# Patient Record
Sex: FEMALE | Race: WHITE | NOT HISPANIC OR LATINO | Employment: OTHER | URBAN - METROPOLITAN AREA
[De-identification: names, ages, dates, MRNs, and addresses within clinical notes are randomized per-mention and may not be internally consistent; named-entity substitution may affect disease eponyms.]

---

## 2021-07-14 ENCOUNTER — HOSPITAL ENCOUNTER (INPATIENT)
Facility: HOSPITAL | Age: 86
LOS: 4 days | Discharge: HOME | End: 2021-07-19
Attending: EMERGENCY MEDICINE | Admitting: STUDENT IN AN ORGANIZED HEALTH CARE EDUCATION/TRAINING PROGRAM

## 2021-07-14 DIAGNOSIS — K52.9 COLITIS: Primary | ICD-10-CM

## 2021-07-14 DIAGNOSIS — N28.9 RENAL INSUFFICIENCY: ICD-10-CM

## 2021-07-14 DIAGNOSIS — R44.3 HALLUCINATIONS: ICD-10-CM

## 2021-07-14 DIAGNOSIS — R41.82 ALTERED MENTAL STATUS, UNSPECIFIED ALTERED MENTAL STATUS TYPE: ICD-10-CM

## 2021-07-14 LAB
ALBUMIN SERPL-MCNC: 3.5 G/DL (ref 3.5–5.2)
ALBUMIN/GLOB SERPL: 1.1 G/DL
ALP SERPL-CCNC: 81 U/L (ref 39–117)
ALT SERPL W P-5'-P-CCNC: 14 U/L (ref 1–33)
ANION GAP SERPL CALCULATED.3IONS-SCNC: 10.9 MMOL/L (ref 5–15)
AST SERPL-CCNC: 18 U/L (ref 1–32)
BACTERIA UR QL AUTO: NORMAL /HPF
BASOPHILS # BLD AUTO: 0.04 10*3/MM3 (ref 0–0.2)
BASOPHILS NFR BLD AUTO: 0.3 % (ref 0–1.5)
BILIRUB SERPL-MCNC: 0.2 MG/DL (ref 0–1.2)
BILIRUB UR QL STRIP: NEGATIVE
BUN SERPL-MCNC: 29 MG/DL (ref 8–23)
BUN/CREAT SERPL: 14.4 (ref 7–25)
CALCIUM SPEC-SCNC: 8.6 MG/DL (ref 8.6–10.5)
CHLORIDE SERPL-SCNC: 96 MMOL/L (ref 98–107)
CLARITY UR: CLEAR
CO2 SERPL-SCNC: 25.1 MMOL/L (ref 22–29)
COLOR UR: YELLOW
CREAT SERPL-MCNC: 2.02 MG/DL (ref 0.57–1)
DEPRECATED RDW RBC AUTO: 40.1 FL (ref 37–54)
EOSINOPHIL # BLD AUTO: 0.08 10*3/MM3 (ref 0–0.4)
EOSINOPHIL NFR BLD AUTO: 0.6 % (ref 0.3–6.2)
ERYTHROCYTE [DISTWIDTH] IN BLOOD BY AUTOMATED COUNT: 12.6 % (ref 12.3–15.4)
ETHANOL BLD-MCNC: <10 MG/DL (ref 0–10)
ETHANOL UR QL: <0.01 %
GFR SERPL CREATININE-BSD FRML MDRD: 24 ML/MIN/1.73
GLOBULIN UR ELPH-MCNC: 3.3 GM/DL
GLUCOSE SERPL-MCNC: 106 MG/DL (ref 65–99)
GLUCOSE UR STRIP-MCNC: NEGATIVE MG/DL
HCT VFR BLD AUTO: 27.1 % (ref 34–46.6)
HGB BLD-MCNC: 8.7 G/DL (ref 12–15.9)
HGB UR QL STRIP.AUTO: NEGATIVE
HOLD SPECIMEN: NORMAL
HYALINE CASTS UR QL AUTO: NORMAL /LPF
IMM GRANULOCYTES # BLD AUTO: 0.11 10*3/MM3 (ref 0–0.05)
IMM GRANULOCYTES NFR BLD AUTO: 0.9 % (ref 0–0.5)
KETONES UR QL STRIP: NEGATIVE
LEUKOCYTE ESTERASE UR QL STRIP.AUTO: NEGATIVE
LIPASE SERPL-CCNC: 108 U/L (ref 13–60)
LYMPHOCYTES # BLD AUTO: 1.31 10*3/MM3 (ref 0.7–3.1)
LYMPHOCYTES NFR BLD AUTO: 10.2 % (ref 19.6–45.3)
MCH RBC QN AUTO: 28.3 PG (ref 26.6–33)
MCHC RBC AUTO-ENTMCNC: 32.1 G/DL (ref 31.5–35.7)
MCV RBC AUTO: 88.3 FL (ref 79–97)
MONOCYTES # BLD AUTO: 0.87 10*3/MM3 (ref 0.1–0.9)
MONOCYTES NFR BLD AUTO: 6.8 % (ref 5–12)
NEUTROPHILS NFR BLD AUTO: 10.43 10*3/MM3 (ref 1.7–7)
NEUTROPHILS NFR BLD AUTO: 81.2 % (ref 42.7–76)
NITRITE UR QL STRIP: NEGATIVE
NRBC BLD AUTO-RTO: 0 /100 WBC (ref 0–0.2)
PH UR STRIP.AUTO: 5.5 [PH] (ref 5–8)
PLATELET # BLD AUTO: 252 10*3/MM3 (ref 140–450)
PMV BLD AUTO: 9.3 FL (ref 6–12)
POTASSIUM SERPL-SCNC: 3.4 MMOL/L (ref 3.5–5.2)
PROT SERPL-MCNC: 6.8 G/DL (ref 6–8.5)
PROT UR QL STRIP: ABNORMAL
RBC # BLD AUTO: 3.07 10*6/MM3 (ref 3.77–5.28)
RBC # UR: NORMAL /HPF
REF LAB TEST METHOD: NORMAL
SODIUM SERPL-SCNC: 132 MMOL/L (ref 136–145)
SP GR UR STRIP: 1.01 (ref 1–1.03)
SQUAMOUS #/AREA URNS HPF: NORMAL /HPF
TROPONIN T SERPL-MCNC: <0.01 NG/ML (ref 0–0.03)
UROBILINOGEN UR QL STRIP: ABNORMAL
WBC # BLD AUTO: 12.84 10*3/MM3 (ref 3.4–10.8)
WBC UR QL AUTO: NORMAL /HPF
WHOLE BLOOD HOLD SPECIMEN: NORMAL

## 2021-07-14 PROCEDURE — P9612 CATHETERIZE FOR URINE SPEC: HCPCS

## 2021-07-14 PROCEDURE — 84484 ASSAY OF TROPONIN QUANT: CPT | Performed by: PHYSICIAN ASSISTANT

## 2021-07-14 PROCEDURE — 80053 COMPREHEN METABOLIC PANEL: CPT | Performed by: PHYSICIAN ASSISTANT

## 2021-07-14 PROCEDURE — 99285 EMERGENCY DEPT VISIT HI MDM: CPT

## 2021-07-14 PROCEDURE — 93010 ELECTROCARDIOGRAM REPORT: CPT | Performed by: INTERNAL MEDICINE

## 2021-07-14 PROCEDURE — 83690 ASSAY OF LIPASE: CPT | Performed by: PHYSICIAN ASSISTANT

## 2021-07-14 PROCEDURE — 82077 ASSAY SPEC XCP UR&BREATH IA: CPT | Performed by: PHYSICIAN ASSISTANT

## 2021-07-14 PROCEDURE — 81001 URINALYSIS AUTO W/SCOPE: CPT | Performed by: PHYSICIAN ASSISTANT

## 2021-07-14 PROCEDURE — 93005 ELECTROCARDIOGRAM TRACING: CPT | Performed by: PHYSICIAN ASSISTANT

## 2021-07-14 PROCEDURE — U0004 COV-19 TEST NON-CDC HGH THRU: HCPCS | Performed by: PHYSICIAN ASSISTANT

## 2021-07-14 PROCEDURE — 85025 COMPLETE CBC W/AUTO DIFF WBC: CPT | Performed by: PHYSICIAN ASSISTANT

## 2021-07-14 PROCEDURE — U0005 INFEC AGEN DETEC AMPLI PROBE: HCPCS | Performed by: PHYSICIAN ASSISTANT

## 2021-07-14 RX ORDER — SODIUM CHLORIDE 0.9 % (FLUSH) 0.9 %
10 SYRINGE (ML) INJECTION AS NEEDED
Status: DISCONTINUED | OUTPATIENT
Start: 2021-07-14 | End: 2021-07-19 | Stop reason: HOSPADM

## 2021-07-15 ENCOUNTER — APPOINTMENT (OUTPATIENT)
Dept: CT IMAGING | Facility: HOSPITAL | Age: 86
End: 2021-07-15

## 2021-07-15 PROBLEM — N17.9 AKI (ACUTE KIDNEY INJURY): Status: ACTIVE | Noted: 2021-07-15

## 2021-07-15 PROBLEM — K52.9 COLITIS: Status: ACTIVE | Noted: 2021-07-15

## 2021-07-15 PROBLEM — G93.41 ACUTE METABOLIC ENCEPHALOPATHY: Status: ACTIVE | Noted: 2021-07-15

## 2021-07-15 PROBLEM — E87.6 HYPOKALEMIA: Status: ACTIVE | Noted: 2021-07-15

## 2021-07-15 PROBLEM — D72.829 LEUKOCYTOSIS: Status: ACTIVE | Noted: 2021-07-15

## 2021-07-15 PROBLEM — D64.9 ANEMIA: Status: ACTIVE | Noted: 2021-07-15

## 2021-07-15 PROBLEM — I10 HTN (HYPERTENSION): Status: ACTIVE | Noted: 2021-07-15

## 2021-07-15 PROBLEM — N18.4 CKD (CHRONIC KIDNEY DISEASE) STAGE 4, GFR 15-29 ML/MIN: Status: ACTIVE | Noted: 2021-07-15

## 2021-07-15 PROBLEM — E87.1 HYPONATREMIA: Status: ACTIVE | Noted: 2021-07-15

## 2021-07-15 PROBLEM — F03.90 DEMENTIA WITHOUT BEHAVIORAL DISTURBANCE: Status: ACTIVE | Noted: 2021-07-15

## 2021-07-15 LAB
ADV 40+41 DNA STL QL NAA+NON-PROBE: NOT DETECTED
ANION GAP SERPL CALCULATED.3IONS-SCNC: 12.2 MMOL/L (ref 5–15)
ASTRO TYP 1-8 RNA STL QL NAA+NON-PROBE: NOT DETECTED
BUN SERPL-MCNC: 29 MG/DL (ref 8–23)
BUN/CREAT SERPL: 15.9 (ref 7–25)
C CAYETANENSIS DNA STL QL NAA+NON-PROBE: NOT DETECTED
C COLI+JEJ+UPSA DNA STL QL NAA+NON-PROBE: NOT DETECTED
C DIFF TOX GENS STL QL NAA+PROBE: POSITIVE
CALCIUM SPEC-SCNC: 8.4 MG/DL (ref 8.6–10.5)
CHLORIDE SERPL-SCNC: 100 MMOL/L (ref 98–107)
CO2 SERPL-SCNC: 22.8 MMOL/L (ref 22–29)
CREAT SERPL-MCNC: 1.82 MG/DL (ref 0.57–1)
CRYPTOSP DNA STL QL NAA+NON-PROBE: NOT DETECTED
DEPRECATED RDW RBC AUTO: 40.9 FL (ref 37–54)
E HISTOLYT DNA STL QL NAA+NON-PROBE: NOT DETECTED
EAEC PAA PLAS AGGR+AATA ST NAA+NON-PRB: NOT DETECTED
EC STX1+STX2 GENES STL QL NAA+NON-PROBE: NOT DETECTED
EPEC EAE GENE STL QL NAA+NON-PROBE: NOT DETECTED
ERYTHROCYTE [DISTWIDTH] IN BLOOD BY AUTOMATED COUNT: 12.7 % (ref 12.3–15.4)
ETEC LTA+ST1A+ST1B TOX ST NAA+NON-PROBE: NOT DETECTED
G LAMBLIA DNA STL QL NAA+NON-PROBE: NOT DETECTED
GFR SERPL CREATININE-BSD FRML MDRD: 27 ML/MIN/1.73
GLUCOSE SERPL-MCNC: 98 MG/DL (ref 65–99)
HCT VFR BLD AUTO: 24.1 % (ref 34–46.6)
HEMOCCULT STL QL: NEGATIVE
HGB BLD-MCNC: 8 G/DL (ref 12–15.9)
MAGNESIUM SERPL-MCNC: 2.1 MG/DL (ref 1.6–2.4)
MCH RBC QN AUTO: 29.3 PG (ref 26.6–33)
MCHC RBC AUTO-ENTMCNC: 33.2 G/DL (ref 31.5–35.7)
MCV RBC AUTO: 88.3 FL (ref 79–97)
NOROVIRUS GI+II RNA STL QL NAA+NON-PROBE: NOT DETECTED
P SHIGELLOIDES DNA STL QL NAA+NON-PROBE: NOT DETECTED
PLATELET # BLD AUTO: 255 10*3/MM3 (ref 140–450)
PMV BLD AUTO: 9.5 FL (ref 6–12)
POTASSIUM SERPL-SCNC: 3 MMOL/L (ref 3.5–5.2)
POTASSIUM SERPL-SCNC: 3.5 MMOL/L (ref 3.5–5.2)
QT INTERVAL: 406 MS
RBC # BLD AUTO: 2.73 10*6/MM3 (ref 3.77–5.28)
RVA RNA STL QL NAA+NON-PROBE: NOT DETECTED
S ENT+BONG DNA STL QL NAA+NON-PROBE: NOT DETECTED
SAPO I+II+IV+V RNA STL QL NAA+NON-PROBE: NOT DETECTED
SARS-COV-2 ORF1AB RESP QL NAA+PROBE: NOT DETECTED
SHIGELLA SP+EIEC IPAH ST NAA+NON-PROBE: NOT DETECTED
SODIUM SERPL-SCNC: 135 MMOL/L (ref 136–145)
TSH SERPL DL<=0.05 MIU/L-ACNC: 1.53 UIU/ML (ref 0.27–4.2)
V CHOL+PARA+VUL DNA STL QL NAA+NON-PROBE: NOT DETECTED
V CHOLERAE DNA STL QL NAA+NON-PROBE: NOT DETECTED
WBC # BLD AUTO: 10.33 10*3/MM3 (ref 3.4–10.8)
Y ENTEROCOL DNA STL QL NAA+NON-PROBE: NOT DETECTED

## 2021-07-15 PROCEDURE — 84132 ASSAY OF SERUM POTASSIUM: CPT | Performed by: NURSE PRACTITIONER

## 2021-07-15 PROCEDURE — 25010000002 ONDANSETRON PER 1 MG: Performed by: NURSE PRACTITIONER

## 2021-07-15 PROCEDURE — 82272 OCCULT BLD FECES 1-3 TESTS: CPT | Performed by: NURSE PRACTITIONER

## 2021-07-15 PROCEDURE — 70450 CT HEAD/BRAIN W/O DYE: CPT

## 2021-07-15 PROCEDURE — 80048 BASIC METABOLIC PNL TOTAL CA: CPT | Performed by: NURSE PRACTITIONER

## 2021-07-15 PROCEDURE — 99222 1ST HOSP IP/OBS MODERATE 55: CPT | Performed by: INTERNAL MEDICINE

## 2021-07-15 PROCEDURE — 25010000002 PIPERACILLIN SOD-TAZOBACTAM PER 1 G: Performed by: PHYSICIAN ASSISTANT

## 2021-07-15 PROCEDURE — 87493 C DIFF AMPLIFIED PROBE: CPT | Performed by: PHYSICIAN ASSISTANT

## 2021-07-15 PROCEDURE — 85027 COMPLETE CBC AUTOMATED: CPT | Performed by: NURSE PRACTITIONER

## 2021-07-15 PROCEDURE — 84443 ASSAY THYROID STIM HORMONE: CPT | Performed by: NURSE PRACTITIONER

## 2021-07-15 PROCEDURE — 36415 COLL VENOUS BLD VENIPUNCTURE: CPT | Performed by: NURSE PRACTITIONER

## 2021-07-15 PROCEDURE — 83735 ASSAY OF MAGNESIUM: CPT | Performed by: NURSE PRACTITIONER

## 2021-07-15 PROCEDURE — 25010000003 POTASSIUM CHLORIDE PER 2 MEQ: Performed by: NURSE PRACTITIONER

## 2021-07-15 PROCEDURE — 74176 CT ABD & PELVIS W/O CONTRAST: CPT

## 2021-07-15 PROCEDURE — 0097U HC BIOFIRE FILMARRAY GI PANEL: CPT | Performed by: PHYSICIAN ASSISTANT

## 2021-07-15 PROCEDURE — 25010000002 PIPERACILLIN SOD-TAZOBACTAM PER 1 G: Performed by: NURSE PRACTITIONER

## 2021-07-15 RX ORDER — PRAVASTATIN SODIUM 20 MG
TABLET ORAL NIGHTLY
COMMUNITY
Start: 2021-06-24 | End: 2022-01-01

## 2021-07-15 RX ORDER — ACETAMINOPHEN 325 MG/1
650 TABLET ORAL EVERY 4 HOURS PRN
Status: DISCONTINUED | OUTPATIENT
Start: 2021-07-15 | End: 2021-07-19 | Stop reason: HOSPADM

## 2021-07-15 RX ORDER — AMLODIPINE BESYLATE 5 MG/1
5 TABLET ORAL 2 TIMES DAILY
Status: DISCONTINUED | OUTPATIENT
Start: 2021-07-15 | End: 2021-07-19 | Stop reason: HOSPADM

## 2021-07-15 RX ORDER — ONDANSETRON 2 MG/ML
4 INJECTION INTRAMUSCULAR; INTRAVENOUS EVERY 8 HOURS PRN
Status: DISCONTINUED | OUTPATIENT
Start: 2021-07-15 | End: 2021-07-19 | Stop reason: HOSPADM

## 2021-07-15 RX ORDER — LABETALOL HYDROCHLORIDE 5 MG/ML
10 INJECTION, SOLUTION INTRAVENOUS EVERY 6 HOURS PRN
Status: DISCONTINUED | OUTPATIENT
Start: 2021-07-15 | End: 2021-07-19 | Stop reason: HOSPADM

## 2021-07-15 RX ORDER — ONDANSETRON 4 MG/1
TABLET, FILM COATED ORAL
COMMUNITY
Start: 2021-06-29 | End: 2022-01-17

## 2021-07-15 RX ORDER — HYDRALAZINE HYDROCHLORIDE 20 MG/ML
10 INJECTION INTRAMUSCULAR; INTRAVENOUS EVERY 8 HOURS PRN
Status: DISCONTINUED | OUTPATIENT
Start: 2021-07-15 | End: 2021-07-19 | Stop reason: HOSPADM

## 2021-07-15 RX ORDER — POTASSIUM CHLORIDE 29.8 MG/ML
20 INJECTION INTRAVENOUS ONCE
Status: COMPLETED | OUTPATIENT
Start: 2021-07-15 | End: 2021-07-15

## 2021-07-15 RX ORDER — MORPHINE SULFATE 2 MG/ML
1 INJECTION, SOLUTION INTRAMUSCULAR; INTRAVENOUS EVERY 4 HOURS PRN
Status: DISCONTINUED | OUTPATIENT
Start: 2021-07-15 | End: 2021-07-19 | Stop reason: HOSPADM

## 2021-07-15 RX ORDER — PRAVASTATIN SODIUM 20 MG
20 TABLET ORAL NIGHTLY
Status: DISCONTINUED | OUTPATIENT
Start: 2021-07-15 | End: 2021-07-19 | Stop reason: HOSPADM

## 2021-07-15 RX ORDER — LEVOTHYROXINE SODIUM 0.07 MG/1
75 TABLET ORAL
Status: DISCONTINUED | OUTPATIENT
Start: 2021-07-16 | End: 2021-07-19 | Stop reason: HOSPADM

## 2021-07-15 RX ORDER — MONTELUKAST SODIUM 10 MG/1
10 TABLET ORAL NIGHTLY
Status: DISCONTINUED | OUTPATIENT
Start: 2021-07-15 | End: 2021-07-19 | Stop reason: HOSPADM

## 2021-07-15 RX ORDER — ASPIRIN 81 MG/1
81 TABLET ORAL DAILY
Status: DISCONTINUED | OUTPATIENT
Start: 2021-07-15 | End: 2021-07-19 | Stop reason: HOSPADM

## 2021-07-15 RX ORDER — ASPIRIN 81 MG/1
81 TABLET ORAL DAILY
COMMUNITY
Start: 2021-06-24 | End: 2022-01-01

## 2021-07-15 RX ORDER — NITROGLYCERIN 0.4 MG/1
0.4 TABLET SUBLINGUAL
Status: DISCONTINUED | OUTPATIENT
Start: 2021-07-15 | End: 2021-07-19 | Stop reason: HOSPADM

## 2021-07-15 RX ORDER — AMOXICILLIN AND CLAVULANATE POTASSIUM 875; 125 MG/1; MG/1
1 TABLET, FILM COATED ORAL
COMMUNITY
Start: 2021-07-10 | End: 2021-07-19 | Stop reason: HOSPADM

## 2021-07-15 RX ORDER — LEVOTHYROXINE SODIUM 0.07 MG/1
75 TABLET ORAL DAILY
COMMUNITY
Start: 2021-06-24 | End: 2022-01-01 | Stop reason: HOSPADM

## 2021-07-15 RX ORDER — ALLOPURINOL 100 MG/1
100 TABLET ORAL DAILY
COMMUNITY
Start: 2021-04-12 | End: 2022-01-01

## 2021-07-15 RX ORDER — SODIUM CHLORIDE 9 MG/ML
75 INJECTION, SOLUTION INTRAVENOUS CONTINUOUS
Status: DISCONTINUED | OUTPATIENT
Start: 2021-07-15 | End: 2021-07-16

## 2021-07-15 RX ORDER — AMLODIPINE BESYLATE 5 MG/1
5 TABLET ORAL
Status: DISCONTINUED | OUTPATIENT
Start: 2021-07-15 | End: 2021-07-15

## 2021-07-15 RX ORDER — SODIUM CHLORIDE 0.9 % (FLUSH) 0.9 %
10 SYRINGE (ML) INJECTION EVERY 12 HOURS SCHEDULED
Status: DISCONTINUED | OUTPATIENT
Start: 2021-07-15 | End: 2021-07-19 | Stop reason: HOSPADM

## 2021-07-15 RX ORDER — VANCOMYCIN HYDROCHLORIDE 125 MG/1
125 CAPSULE ORAL EVERY 6 HOURS SCHEDULED
Status: DISCONTINUED | OUTPATIENT
Start: 2021-07-15 | End: 2021-07-19 | Stop reason: HOSPADM

## 2021-07-15 RX ORDER — CLOPIDOGREL BISULFATE 75 MG/1
75 TABLET ORAL DAILY
Status: DISCONTINUED | OUTPATIENT
Start: 2021-07-15 | End: 2021-07-19 | Stop reason: HOSPADM

## 2021-07-15 RX ORDER — ACETAMINOPHEN 650 MG/1
650 SUPPOSITORY RECTAL EVERY 4 HOURS PRN
Status: DISCONTINUED | OUTPATIENT
Start: 2021-07-15 | End: 2021-07-19 | Stop reason: HOSPADM

## 2021-07-15 RX ORDER — MONTELUKAST SODIUM 10 MG/1
TABLET ORAL NIGHTLY
COMMUNITY
Start: 2021-06-29 | End: 2022-01-01 | Stop reason: HOSPADM

## 2021-07-15 RX ORDER — SODIUM CHLORIDE 0.9 % (FLUSH) 0.9 %
10 SYRINGE (ML) INJECTION AS NEEDED
Status: DISCONTINUED | OUTPATIENT
Start: 2021-07-15 | End: 2021-07-19 | Stop reason: HOSPADM

## 2021-07-15 RX ORDER — AMLODIPINE BESYLATE 5 MG/1
5 TABLET ORAL DAILY
COMMUNITY
Start: 2021-06-24 | End: 2022-01-01 | Stop reason: HOSPADM

## 2021-07-15 RX ORDER — LOSARTAN POTASSIUM 100 MG/1
100 TABLET ORAL DAILY
COMMUNITY
Start: 2021-06-18 | End: 2022-01-01 | Stop reason: HOSPADM

## 2021-07-15 RX ORDER — DICYCLOMINE HYDROCHLORIDE 10 MG/5ML
20 SOLUTION ORAL 4 TIMES DAILY
COMMUNITY
Start: 2021-07-10 | End: 2022-01-01

## 2021-07-15 RX ORDER — HYDROCODONE BITARTRATE AND ACETAMINOPHEN 5; 325 MG/1; MG/1
1 TABLET ORAL
COMMUNITY
Start: 2021-06-09 | End: 2022-01-01

## 2021-07-15 RX ORDER — PSEUDOEPHEDRINE HCL 30 MG
100 TABLET ORAL
COMMUNITY
Start: 2021-06-24 | End: 2021-07-24

## 2021-07-15 RX ORDER — HYDROCODONE BITARTRATE AND ACETAMINOPHEN 5; 325 MG/1; MG/1
1 TABLET ORAL EVERY 8 HOURS PRN
Status: DISCONTINUED | OUTPATIENT
Start: 2021-07-15 | End: 2021-07-19 | Stop reason: HOSPADM

## 2021-07-15 RX ORDER — ACETAMINOPHEN 160 MG/5ML
650 SOLUTION ORAL EVERY 4 HOURS PRN
Status: DISCONTINUED | OUTPATIENT
Start: 2021-07-15 | End: 2021-07-19 | Stop reason: HOSPADM

## 2021-07-15 RX ORDER — CALCIUM POLYCARBOPHIL 625 MG
625 TABLET ORAL DAILY
COMMUNITY
End: 2022-01-01

## 2021-07-15 RX ORDER — CALCIUM CARBONATE 200(500)MG
2 TABLET,CHEWABLE ORAL 2 TIMES DAILY PRN
Status: DISCONTINUED | OUTPATIENT
Start: 2021-07-15 | End: 2021-07-19 | Stop reason: HOSPADM

## 2021-07-15 RX ORDER — DICYCLOMINE HYDROCHLORIDE 10 MG/1
10 CAPSULE ORAL 4 TIMES DAILY
Status: DISCONTINUED | OUTPATIENT
Start: 2021-07-15 | End: 2021-07-19 | Stop reason: HOSPADM

## 2021-07-15 RX ADMIN — AMLODIPINE BESYLATE 5 MG: 5 TABLET ORAL at 15:05

## 2021-07-15 RX ADMIN — MONTELUKAST SODIUM 10 MG: 10 TABLET, FILM COATED ORAL at 21:11

## 2021-07-15 RX ADMIN — POTASSIUM CHLORIDE 20 MEQ: 29.8 INJECTION, SOLUTION INTRAVENOUS at 10:57

## 2021-07-15 RX ADMIN — PRAVASTATIN SODIUM 20 MG: 20 TABLET ORAL at 21:11

## 2021-07-15 RX ADMIN — DICYCLOMINE HYDROCHLORIDE 10 MG: 10 CAPSULE ORAL at 17:07

## 2021-07-15 RX ADMIN — ASPIRIN 81 MG: 81 TABLET, COATED ORAL at 15:05

## 2021-07-15 RX ADMIN — DICYCLOMINE HYDROCHLORIDE 10 MG: 10 CAPSULE ORAL at 21:11

## 2021-07-15 RX ADMIN — LABETALOL HYDROCHLORIDE 10 MG: 5 INJECTION, SOLUTION INTRAVENOUS at 17:35

## 2021-07-15 RX ADMIN — CLOPIDOGREL 75 MG: 75 TABLET, FILM COATED ORAL at 15:05

## 2021-07-15 RX ADMIN — ACETAMINOPHEN 650 MG: 325 TABLET, FILM COATED ORAL at 08:18

## 2021-07-15 RX ADMIN — SODIUM CHLORIDE 100 ML/HR: 9 INJECTION, SOLUTION INTRAVENOUS at 07:20

## 2021-07-15 RX ADMIN — SERTRALINE HYDROCHLORIDE 50 MG: 50 TABLET, FILM COATED ORAL at 15:05

## 2021-07-15 RX ADMIN — SODIUM CHLORIDE, PRESERVATIVE FREE 10 ML: 5 INJECTION INTRAVENOUS at 08:16

## 2021-07-15 RX ADMIN — HYDROCODONE BITARTRATE AND ACETAMINOPHEN 1 TABLET: 5; 325 TABLET ORAL at 15:05

## 2021-07-15 RX ADMIN — TAZOBACTAM SODIUM AND PIPERACILLIN SODIUM 3.38 G: 375; 3 INJECTION, SOLUTION INTRAVENOUS at 14:05

## 2021-07-15 RX ADMIN — SODIUM CHLORIDE 500 ML: 9 INJECTION, SOLUTION INTRAVENOUS at 04:23

## 2021-07-15 RX ADMIN — VANCOMYCIN HYDROCHLORIDE 125 MG: 125 CAPSULE ORAL at 21:11

## 2021-07-15 RX ADMIN — AMLODIPINE BESYLATE 5 MG: 5 TABLET ORAL at 21:11

## 2021-07-15 RX ADMIN — TAZOBACTAM SODIUM AND PIPERACILLIN SODIUM 3.38 G: 375; 3 INJECTION, SOLUTION INTRAVENOUS at 04:23

## 2021-07-15 RX ADMIN — ONDANSETRON 4 MG: 2 INJECTION INTRAMUSCULAR; INTRAVENOUS at 10:57

## 2021-07-15 NOTE — ED PROVIDER NOTES
EMERGENCY DEPARTMENT ENCOUNTER    Room Number:  E564/1  Date of encounter:  7/16/2021  PCP: Naomi Rivera APRN  Historian: Patient daughter and patient      HPI:  Chief Complaint: Hallucinations and altered mental status over the past 48 hours  A complete HPI/ROS/PMH/PSH/SH/FH are unobtainable due to: Nothing    Context: Kellen Parmar is a 85 y.o. female who was brought from home by her daughter to the emergency department for further evaluation of altered mental status and hallucinations.  Per the daughter the hallucinations have been intermittent over the past 24 to 48 hours.  Today they seem to progressively get worse.  Daughter found the patient talking to people that did not exist in her home.  She became concerned and brought the patient to the emergency department for further evaluation.  Patient states she has some mild left lower quadrant abdominal pain and the daughter informs me she is actively being treated for diverticulitis.  She is on Augmentin twice daily.  The patient denies chest pain, palpitations, head trauma.  She does however endorse having 2 falls over the past 48 hours.  Reviewing her chart she does not appear to be on anticoagulants.  She does have a past medical history of dementia.    PAST MEDICAL HISTORY  Active Ambulatory Problems     Diagnosis Date Noted   • No Active Ambulatory Problems     Resolved Ambulatory Problems     Diagnosis Date Noted   • No Resolved Ambulatory Problems     Past Medical History:   Diagnosis Date   • Anemia    • Asthma    • Cancer (CMS/HCC)    • Dementia (CMS/HCC)    • Diabetes mellitus (CMS/HCC)    • Disease of thyroid gland    • GERD (gastroesophageal reflux disease)    • Hypertension          PAST SURGICAL HISTORY  History reviewed. No pertinent surgical history.      FAMILY HISTORY  History reviewed. No pertinent family history.      SOCIAL HISTORY  Social History     Socioeconomic History   • Marital status: Single     Spouse name: Not on file    • Number of children: Not on file   • Years of education: Not on file   • Highest education level: Not on file   Tobacco Use   • Smoking status: Former Smoker     Packs/day: 0.50     Years: 15.00     Pack years: 7.50     Quit date:      Years since quittin.5   • Smokeless tobacco: Former User   Substance and Sexual Activity   • Alcohol use: Not Currently   • Drug use: Never   • Sexual activity: Defer         ALLERGIES  Contrast dye        REVIEW OF SYSTEMS  Review of Systems   Constitutional:        Poor appetite per the daughter   Respiratory: Negative for cough and shortness of breath.    Cardiovascular: Negative for chest pain and palpitations.   Gastrointestinal: Positive for abdominal pain. Negative for diarrhea, nausea and vomiting.   Genitourinary: Negative for flank pain.   Musculoskeletal: Negative for back pain and neck pain.   Neurological: Negative for headaches.        All systems reviewed and negative except for those discussed in HPI.       PHYSICAL EXAM    I have reviewed the triage vital signs and nursing notes.    ED Triage Vitals   Temp Heart Rate Resp BP SpO2   212 21 1902 21 19021 190   98 °F (36.7 °C) 72 17 (!) 181/78 96 %      Temp src Heart Rate Source Patient Position BP Location FiO2 (%)   -- -- 21 --     Sitting Right arm        Physical Exam  GENERAL: Chronically ill-appearing, nontoxic, does not appear to be in any acute distress  HENT: nares patent, face symmetric  EYES: no scleral icterus, PERRL  CV: regular rhythm, regular rate, no obvious murmur, no unilateral leg swelling  RESPIRATORY: normal effort, lungs CTA B  ABDOMEN: TTP in the left lower quadrant, no guarding, no rebound, no obvious mass, bowel sounds unremarkable  MUSCULOSKELETAL: no deformity  NEURO: alert to person and place disoriented to date time, moves all extremities, follows commands, no focal neuro deficits noted on exam  SKIN: warm,  dry, no obvious cellulitis or skin rash noted        LAB RESULTS  Recent Results (from the past 24 hour(s))   Magnesium    Collection Time: 07/16/21  5:57 AM    Specimen: Blood   Result Value Ref Range    Magnesium 2.0 1.6 - 2.4 mg/dL   Basic Metabolic Panel    Collection Time: 07/16/21  5:57 AM    Specimen: Blood   Result Value Ref Range    Glucose 102 (H) 65 - 99 mg/dL    BUN 20 8 - 23 mg/dL    Creatinine 1.46 (H) 0.57 - 1.00 mg/dL    Sodium 139 136 - 145 mmol/L    Potassium 3.6 3.5 - 5.2 mmol/L    Chloride 104 98 - 107 mmol/L    CO2 21.8 (L) 22.0 - 29.0 mmol/L    Calcium 8.3 (L) 8.6 - 10.5 mg/dL    eGFR Non African Amer 34 (L) >60 mL/min/1.73    BUN/Creatinine Ratio 13.7 7.0 - 25.0    Anion Gap 13.2 5.0 - 15.0 mmol/L   Phosphorus    Collection Time: 07/16/21  5:57 AM    Specimen: Blood   Result Value Ref Range    Phosphorus 2.8 2.5 - 4.5 mg/dL   Albumin    Collection Time: 07/16/21  5:57 AM    Specimen: Blood   Result Value Ref Range    Albumin 2.80 (L) 3.50 - 5.20 g/dL   CBC Auto Differential    Collection Time: 07/16/21  5:57 AM    Specimen: Blood   Result Value Ref Range    WBC 16.25 (H) 3.40 - 10.80 10*3/mm3    RBC 3.02 (L) 3.77 - 5.28 10*6/mm3    Hemoglobin 8.6 (L) 12.0 - 15.9 g/dL    Hematocrit 27.8 (L) 34.0 - 46.6 %    MCV 92.1 79.0 - 97.0 fL    MCH 28.5 26.6 - 33.0 pg    MCHC 30.9 (L) 31.5 - 35.7 g/dL    RDW 12.7 12.3 - 15.4 %    RDW-SD 43.3 37.0 - 54.0 fl    MPV 9.2 6.0 - 12.0 fL    Platelets 273 140 - 450 10*3/mm3   Manual Differential    Collection Time: 07/16/21  5:57 AM    Specimen: Blood   Result Value Ref Range    Neutrophil % 91.9 (H) 42.7 - 76.0 %    Lymphocyte % 6.1 (L) 19.6 - 45.3 %    Monocyte % 2.0 (L) 5.0 - 12.0 %    Neutrophils Absolute 14.93 (H) 1.70 - 7.00 10*3/mm3    Lymphocytes Absolute 0.99 0.70 - 3.10 10*3/mm3    Monocytes Absolute 0.33 0.10 - 0.90 10*3/mm3    RBC Morphology Normal Normal    WBC Morphology Normal Normal    Platelet Morphology Normal Normal       Ordered the above labs  and independently reviewed the results.        RADIOLOGY  No Radiology Exams Resulted Within Past 24 Hours    I ordered the above noted radiological studies. Reviewed by me and discussed with radiologist.  See dictation for official radiology interpretation.      PROCEDURES    Procedures      MEDICATIONS GIVEN IN ER    Medications   sodium chloride 0.9 % bolus 500 mL (500 mL Intravenous New Bag 7/15/21 0423)   piperacillin-tazobactam (ZOSYN) 3.375 g in iso-osmotic dextrose 50 ml (premix) (3.375 g Intravenous New Bag 7/15/21 0423)         PROGRESS, DATA ANALYSIS, CONSULTS, AND MEDICAL DECISION MAKING    All labs have been independently reviewed by me.  All radiology studies have been reviewed by me and discussed with radiologist dictating the report.   EKG's independently viewed and interpreted by me.  Discussion below represents my analysis of pertinent findings related to patient's condition, differential diagnosis, treatment plan and final disposition.    DDx includes but is not limited to:   hypertensive encephalopathy, metabolic encephalopathy, hepatic encephalopathy, polypharmacy, encephalopathy secondary to infection, other toxic encephalopathy, CNS insult/intercranial pathology, ACS.  Given history, physical exam findings.  I will order a CBC, CMP, lipase, UA, CT of the abdomen pelvis with out contrast to further evaluate for diverticulitis and complications.  We will also order a CT scan of the head without contrast given the patient's altered mental status to ensure no underlying intracranial pathology sustained secondary with her falls.  We will also order an EKG, troponin to rule out ACS.  Please see below for MDM and further course of care.    ED Course as of Jul 16 2007 Wed Jul 14, 2021   2308 EKG          EKG time: 2306  Rhythm/Rate: Sinus rhythm rate 74  P waves and FL: Normal  QRS, axis: Narrow regular  ST and T waves: Normal    Interpreted Contemporaneously by me, independently viewed    [TJ]    Thu Jul 15, 2021   0220 Call placed to the hospitalist to discuss admission.    [RC]   0245 CT scan of the abdomen pelvis showing a colitis.  This may just be residual findings from her previous diverticulitis.  However, we will go ahead and add a C. difficile and GI panel to further evaluate given her previous antibiotic therapy.  I will also go ahead and order IV Zosyn and 500 cc of normal saline for her acute on chronic renal insufficiency.    [RC]   0312 Discussed patient's case with ANDREA Adamson with [facility].  To admit to Dr. Velázquez's care.    [RC]      ED Course User Index  [RC] Jose Batista III, PA  [TJ] Nayan Moss MD       Patient was placed in face mask in first look. Patient was wearing facemask when I entered the room and throughout our encounter. I wore full protective equipment throughout this patient encounter including a face mask, and gloves. Hand hygiene was performed before donning protective equipment and after removal when leaving the room.    AS OF 20:07 EDT VITALS:    BP - 153/64  HR - 80  TEMP - 98.1 °F (36.7 °C) (Oral)  O2 SATS - 97%        DIAGNOSIS  Final diagnoses:   Colitis   Renal insufficiency   Altered mental status, unspecified altered mental status type   Hallucinations         DISPOSITION  ADMISSION    Discussed treatment plan and reason for admission with pt/family and admitting physician.  Pt/family voiced understanding of the plan for admission for further testing/treatment as needed.              Jose Batista III, PA  07/15/21 0622       Jose Batista III, PA  07/16/21 2006       Jose Batista III, PA  07/16/21 2127

## 2021-07-15 NOTE — ED NOTES
Nursing report ED to floor  Kellen Parmar  85 y.o.  female    HPI (triage note):   Chief Complaint   Patient presents with   • Psychiatric Evaluation   • Hallucinations       Admitting doctor:   Greyson Velázquez MD    Admitting diagnosis:   The primary encounter diagnosis was Colitis. Diagnoses of Renal insufficiency, Altered mental status, unspecified altered mental status type, and Hallucinations were also pertinent to this visit.    Code status:   Current Code Status     Date Active Code Status Order ID Comments User Context       7/15/2021 0309 CPR [id number]  Tere Leonard APRN ED     Advance Care Planning Activity      Questions for Current Code Status     Question Answer Comment    Code Status CPR     Medical Interventions (Level of Support Prior to Arrest) Full           Allergies:   Contrast dye    Weight:   There were no vitals filed for this visit.    Most recent vitals:   Vitals:    07/15/21 0048 07/15/21 0050 07/15/21 0227 07/15/21 0244   BP:  142/91 (!) 164/111    BP Location:  Right arm     Patient Position:  Sitting     Pulse: 71  65 64   Resp:       Temp:       SpO2: 96%  96% 97%       Active LDAs/IV Access:   Lines, Drains & Airways    Active LDAs     Name:   Placement date:   Placement time:   Site:   Days:    Peripheral IV 07/14/21 2242 Right Antecubital   07/14/21 2242    Antecubital   less than 1                Labs (abnormal labs have a star):   Labs Reviewed   COMPREHENSIVE METABOLIC PANEL - Abnormal; Notable for the following components:       Result Value    Glucose 106 (*)     BUN 29 (*)     Creatinine 2.02 (*)     Sodium 132 (*)     Potassium 3.4 (*)     Chloride 96 (*)     eGFR Non  Amer 24 (*)     All other components within normal limits    Narrative:     GFR Normal >60  Chronic Kidney Disease <60  Kidney Failure <15     LIPASE - Abnormal; Notable for the following components:    Lipase 108 (*)     All other components within normal limits   URINALYSIS W/ MICROSCOPIC  IF INDICATED (NO CULTURE) - Abnormal; Notable for the following components:    Protein, UA 30 mg/dL (1+) (*)     All other components within normal limits   CBC WITH AUTO DIFFERENTIAL - Abnormal; Notable for the following components:    WBC 12.84 (*)     RBC 3.07 (*)     Hemoglobin 8.7 (*)     Hematocrit 27.1 (*)     Neutrophil % 81.2 (*)     Lymphocyte % 10.2 (*)     Immature Grans % 0.9 (*)     Neutrophils, Absolute 10.43 (*)     Immature Grans, Absolute 0.11 (*)     All other components within normal limits   COVID-19,APTIMA PANTHER,MIMI IN-HOUSE,NP/OP SWAB IN UTM/VTM/SALINE TRANSPORT MEDIA,24 HR TAT - Normal    Narrative:     Fact sheet for providers: https://www.fda.gov/media/150136/download     Fact sheet for patients: https://www.fda.gov/media/421560/download    Test performed by RT PCR.   TROPONIN (IN-HOUSE) - Normal    Narrative:     Troponin T Reference Range:  <= 0.03 ng/mL-   Negative for AMI  >0.03 ng/mL-     Abnormal for myocardial necrosis.  Clinicians would have to utilize clinical acumen, EKG, Troponin and serial changes to determine if it is an Acute Myocardial Infarction or myocardial injury due to an underlying chronic condition.       Results may be falsely decreased if patient taking Biotin.     COVID PRE-OP / PRE-PROCEDURE SCREENING ORDER (NO ISOLATION)    Narrative:     The following orders were created for panel order COVID PRE-OP / PRE-PROCEDURE SCREENING ORDER (NO ISOLATION) - Swab, Nasopharynx.  Procedure                               Abnormality         Status                     ---------                               -----------         ------                     COVID-19,APTIMA PANTHER,...[[id number]]  Normal              Final result                 Please view results for these tests on the individual orders.   GASTROINTESTINAL PANEL, PCR   CLOSTRIDIUM DIFFICILE TOXIN    Narrative:     The following orders were created for panel order Clostridium Difficile Toxin - Stool, Per  Rectum.  Procedure                               Abnormality         Status                     ---------                               -----------         ------                     Clostridium Difficile To...[[id number]]                                                   Please view results for these tests on the individual orders.   CLOSTRIDIUM DIFFICILE TOXIN, PCR   ETHANOL   URINALYSIS, MICROSCOPIC ONLY   RAINBOW DRAW    Narrative:     The following orders were created for panel order [place] Draw.  Procedure                               Abnormality         Status                     ---------                               -----------         ------                     Green Top (Gel)[[id number]]                                  Final result               Lavender Top[[id number]]                                     Final result               Gold Top - SST[[id number]]                                                                Please view results for these tests on the individual orders.   BASIC METABOLIC PANEL   CBC (NO DIFF)   CBC AND DIFFERENTIAL    Narrative:     The following orders were created for panel order CBC & Differential.  Procedure                               Abnormality         Status                     ---------                               -----------         ------                     CBC Auto Differential[[id number]]        Abnormal            Final result                 Please view results for these tests on the individual orders.   GREEN TOP   LAVENDER TOP   GOLD TOP - SST       EKG:   ECG 12 Lead   Preliminary Result   HEART RATE= 74  bpm   RR Interval= 808  ms   WA Interval= 180  ms   P Horizontal Axis=   deg   P Front Axis= -2  deg   QRSD Interval= 97  ms   QT Interval= 406  ms   QRS Axis= 1  deg   T Wave Axis= 62  deg   - NORMAL ECG -   Sinus rhythm   Electronically Signed By:    Date and Time of Study: 2021-07-14 23:06:38          Meds given in ED:   Medications   sodium chloride  0.9 % flush 10 mL (has no administration in time range)   sodium chloride 0.9 % flush 10 mL (has no administration in time range)   sodium chloride 0.9 % flush 10 mL (has no administration in time range)   nitroglycerin (NITROSTAT) SL tablet 0.4 mg (has no administration in time range)   acetaminophen (TYLENOL) tablet 650 mg (has no administration in time range)     Or   acetaminophen (TYLENOL) 160 MG/5ML solution 650 mg (has no administration in time range)     Or   acetaminophen (TYLENOL) suppository 650 mg (has no administration in time range)   calcium carbonate (TUMS) chewable tablet 500 mg (200 mg elemental) (has no administration in time range)   sodium chloride 0.9 % infusion (has no administration in time range)   sodium chloride 0.9 % bolus 500 mL (has no administration in time range)   piperacillin-tazobactam (ZOSYN) 3.375 g in iso-osmotic dextrose 50 ml (premix) (has no administration in time range)   Pharmacy to Dose Zosyn (has no administration in time range)       Imaging results:  CT Abdomen Pelvis Without Contrast    Result Date: 7/15/2021  Colitis involving the sigmoid colon. No pneumatosis or free air is seen. There is no evidence of obstruction.  Radiation dose reduction techniques were utilized, including automated exposure control and exposure modulation based on body size.  This report was finalized on 7/15/2021 1:21 AM by Dr. Vi Govea M.D.      CT Head Without Contrast    Result Date: 7/15/2021  No acute intracranial findings.  Radiation dose reduction techniques were utilized, including automated exposure control and exposure modulation based on body size.  This report was finalized on 7/15/2021 1:08 AM by Dr. Vi Govea M.D.        Ambulatory status:   - with assist    Social issues:   Social History     Socioeconomic History   • Marital status: Single     Spouse name: Not on file   • Number of children: Not on file   • Years of education: Not on file   • Highest education  level: Not on file   Tobacco Use   • Smoking status: Unknown If Ever Smoked   Substance and Sexual Activity   • Alcohol use: Not Currently   • Drug use: Never   • Sexual activity: Defer    Nursing report ED to floor       Roberta Peña RN  07/15/21 3530

## 2021-07-15 NOTE — PROGRESS NOTES
Star Thurston MD/Irene Weinberg DO    Nephrology Miscellaneous Note    Patient Name: Kellen Parmar  :1935  Age: 85 y.o.          Patient seen and examined.     C. Diff Positive     JEANIE on CKD; JEANIE improving     Continue NS for now but decrease to 75cc/hour.     Increase amlodipine to 5mg BID since ARB on hold. Likely can restart ARB if renal function stable.     PRN labetalol.     Full Consult Note to follow.     Case discussed with daughter Chacha.     Please feel free to contact me with any questions or concerns.     Irene Weinberg DO  [facility]  P: [phone]  F: [phone]  Pager: [phone]

## 2021-07-15 NOTE — ED PROVIDER NOTES
MD ATTESTATION NOTE    The JAY and I have discussed this patient's history, physical exam, and treatment plan.  I have reviewed the documentation and personally had a face to face interaction with the patient. I affirm the documentation and agree with the treatment and plan.  The attached note describes my personal findings.      Kellen Parmar is a 85 y.o. female who presents to the ED c/o altered mental status and hallucinations.  Brought in by her daughter.  States that symptoms were intermittent over the past 2 days.  Patient has been interacting with people when do not exist in her home.  Patient complaining of some abdominal pain being treated for diverticulitis presently.  Patient does have history of dementia      On exam:  No acute distress, normocephalic atraumatic, supple nontender, regular rate and rhythm, clear to auscultation bilaterally, soft left lower quadrant tender to palpation no guarding no rebound nondistended, moving all extremities well    Labs  Recent Results (from the past 24 hour(s))   Comprehensive Metabolic Panel    Collection Time: 07/14/21 10:42 PM    Specimen: Blood   Result Value Ref Range    Glucose 106 (H) 65 - 99 mg/dL    BUN 29 (H) 8 - 23 mg/dL    Creatinine 2.02 (H) 0.57 - 1.00 mg/dL    Sodium 132 (L) 136 - 145 mmol/L    Potassium 3.4 (L) 3.5 - 5.2 mmol/L    Chloride 96 (L) 98 - 107 mmol/L    CO2 25.1 22.0 - 29.0 mmol/L    Calcium 8.6 8.6 - 10.5 mg/dL    Total Protein 6.8 6.0 - 8.5 g/dL    Albumin 3.50 3.50 - 5.20 g/dL    ALT (SGPT) 14 1 - 33 U/L    AST (SGOT) 18 1 - 32 U/L    Alkaline Phosphatase 81 39 - 117 U/L    Total Bilirubin 0.2 0.0 - 1.2 mg/dL    eGFR Non African Amer 24 (L) >60 mL/min/1.73    Globulin 3.3 gm/dL    A/G Ratio 1.1 g/dL    BUN/Creatinine Ratio 14.4 7.0 - 25.0    Anion Gap 10.9 5.0 - 15.0 mmol/L   Lipase    Collection Time: 07/14/21 10:42 PM    Specimen: Blood   Result Value Ref Range    Lipase 108 (H) 13 - 60 U/L   Urinalysis With Microscopic If  Indicated (No Culture) - Urine, Catheter    Collection Time: 07/14/21 10:42 PM    Specimen: Urine, Catheter   Result Value Ref Range    Color, UA Yellow Yellow, Straw    Appearance, UA Clear Clear    pH, UA 5.5 5.0 - 8.0    Specific Gravity, UA 1.007 1.005 - 1.030    Glucose, UA Negative Negative    Ketones, UA Negative Negative    Bilirubin, UA Negative Negative    Blood, UA Negative Negative    Protein, UA 30 mg/dL (1+) (A) Negative    Leuk Esterase, UA Negative Negative    Nitrite, UA Negative Negative    Urobilinogen, UA 0.2 E.U./dL 0.2 - 1.0 E.U./dL   Ethanol    Collection Time: 07/14/21 10:42 PM    Specimen: Blood   Result Value Ref Range    Ethanol <10 0 - 10 mg/dL    Ethanol % <0.010 %   Green Top (Gel)    Collection Time: 07/14/21 10:42 PM   Result Value Ref Range    Extra Tube Hold for add-ons.    Lavender Top    Collection Time: 07/14/21 10:42 PM   Result Value Ref Range    Extra Tube hold for add-on    CBC Auto Differential    Collection Time: 07/14/21 10:42 PM    Specimen: Blood   Result Value Ref Range    WBC 12.84 (H) 3.40 - 10.80 10*3/mm3    RBC 3.07 (L) 3.77 - 5.28 10*6/mm3    Hemoglobin 8.7 (L) 12.0 - 15.9 g/dL    Hematocrit 27.1 (L) 34.0 - 46.6 %    MCV 88.3 79.0 - 97.0 fL    MCH 28.3 26.6 - 33.0 pg    MCHC 32.1 31.5 - 35.7 g/dL    RDW 12.6 12.3 - 15.4 %    RDW-SD 40.1 37.0 - 54.0 fl    MPV 9.3 6.0 - 12.0 fL    Platelets 252 140 - 450 10*3/mm3    Neutrophil % 81.2 (H) 42.7 - 76.0 %    Lymphocyte % 10.2 (L) 19.6 - 45.3 %    Monocyte % 6.8 5.0 - 12.0 %    Eosinophil % 0.6 0.3 - 6.2 %    Basophil % 0.3 0.0 - 1.5 %    Immature Grans % 0.9 (H) 0.0 - 0.5 %    Neutrophils, Absolute 10.43 (H) 1.70 - 7.00 10*3/mm3    Lymphocytes, Absolute 1.31 0.70 - 3.10 10*3/mm3    Monocytes, Absolute 0.87 0.10 - 0.90 10*3/mm3    Eosinophils, Absolute 0.08 0.00 - 0.40 10*3/mm3    Basophils, Absolute 0.04 0.00 - 0.20 10*3/mm3    Immature Grans, Absolute 0.11 (H) 0.00 - 0.05 10*3/mm3    nRBC 0.0 0.0 - 0.2 /100 WBC    Troponin    Collection Time: 07/14/21 10:42 PM    Specimen: Blood   Result Value Ref Range    Troponin T <0.010 0.000 - 0.030 ng/mL   Urinalysis, Microscopic Only - Urine, Catheter    Collection Time: 07/14/21 10:42 PM    Specimen: Urine, Catheter   Result Value Ref Range    RBC, UA 0-2 None Seen, 0-2 /HPF    WBC, UA 0-2 None Seen, 0-2 /HPF    Bacteria, UA None Seen None Seen /HPF    Squamous Epithelial Cells, UA 0-2 None Seen, 0-2 /HPF    Hyaline Casts, UA 0-2 None Seen /LPF    Methodology Automated Microscopy    ECG 12 Lead    Collection Time: 07/14/21 11:06 PM   Result Value Ref Range    QT Interval 406 ms       Radiology  CT Abdomen Pelvis Without Contrast    Result Date: 7/15/2021  CT OF THE ABDOMEN AND PELVIS WITHOUT CONTRAST  HISTORY: Left lower quadrant pain  COMPARISON: None available.  TECHNIQUE: Axial CT imaging was obtained through the abdomen and pelvis. No IV contrast was administered.  FINDINGS: Images through the lung bases do not demonstrate any acute abnormalities. No focal hepatic lesions are seen. Spleen, stomach, and duodenum all appear unremarkable. The gallbladder is not visualized, and may be absent. There is some dilatation of the common bile duct, measuring up to 1 cm. However, this may be related to age and prior cholecystectomy. No hydronephrosis is seen on either side. There are hyperdensities identified within both kidneys. Most of them appear to be vascular calcifications, although a punctate nonobstructing stone cannot be completely excluded. There are simple appearing left renal cyst. No additional follow-up is necessary. No distal ureteral or bladder stones are seen. Urinary bladder is decompressed. Uterus appears normal. There is diffuse wall thickening involving the sigmoid colon, suggesting colitis. There is adjacent pericolonic soft tissue stranding. While there is diverticular disease within this area, the length of colon involved is more keeping with colitis. No pneumatosis  or free air is seen. There is no evidence of obstruction. The appendix is not identified. The patient is status post aortic stent grafting. Aneurysm sac measures approximately 2.9 x 4.7 cm. Presence or absence of endoleak cannot be excluded on this unenhanced study. No acute osseous abnormalities are seen.      Colitis involving the sigmoid colon. No pneumatosis or free air is seen. There is no evidence of obstruction.  Radiation dose reduction techniques were utilized, including automated exposure control and exposure modulation based on body size.  This report was finalized on 7/15/2021 1:21 AM by Dr. Vi Govea M.D.      CT Head Without Contrast    Result Date: 7/15/2021  CT HEAD WITHOUT CONTRAST  HISTORY: Altered mental status  COMPARISON: None available.  TECHNIQUE: Axial CT imaging was obtained through the brain. No IV contrast was administered.  FINDINGS: No acute intracranial hemorrhage is seen. There is diffuse atrophy. There is periventricular and deep white matter microangiopathic change. There is mucosal thickening identified within the ethmoid sinuses. There is no midline shift or mass effect. Old lacunar infarct is noted within the left basal ganglia.      No acute intracranial findings.  Radiation dose reduction techniques were utilized, including automated exposure control and exposure modulation based on body size.  This report was finalized on 7/15/2021 1:08 AM by Dr. Vi Govea M.D.        Medical Decision Making:  ED Course as of Jul 15 0258   Wed Jul 14, 2021   2308 EKG          EKG time: 2306  Rhythm/Rate: Sinus rhythm rate 74  P waves and [place]: Normal  QRS, axis: Narrow regular  ST and T waves: Normal    Interpreted Contemporaneously by me, independently viewed    [TJ]      ED Course User Index  [TJ] Nayan Moss MD           PPE: Both the patient and I wore a surgical mask throughout the entire patient encounter. I wore protective goggles.     Diagnosis  Final diagnoses:    Colitis   Renal insufficiency   Altered mental status, unspecified altered mental status type   Hallucinations        Nayan Moss MD  07/15/21 0259

## 2021-07-15 NOTE — CONSULTS
Star Thurston MD/Irene Weinberg DO    Nephrology Consult Note      Patient Name: Kellen Parmar  :1935  Age: 85 y.o.    Patient Care Team:  Naomi Rivera APRN as PCP - General (Family Medicine)    Chief Complaint:   Chief Complaint   Patient presents with   • Psychiatric Evaluation   • Hallucinations     Reason for Consult: JEANIE on CKD  Referral Provider: Greyson Velázquez MD  Encounter Provider: Irene Weinberg DO    Date of Service: 7/15/2021    SUBJECTIVE:  Kellen Parmar is a 85 y.o.female with history of DM type 2,  HTN, hypothyroidism, dementia who presents to [facility] for hallucinations. She was found to be septic and sigmoid colitis was noted on CT. C. Diff is now positive. Diarrhea is now improving.      Regarding her renal history, baseline of Cr is around 1.6. CR was 2.02 on admit. She was on Losartan and furosemide at home. Cr is improving with IVF and currently 1.82. She states she is peeing well.     When she saw Dr. Thurston in December, she was only on furosemide. Since then daughter states her BP has been very high at home, so she was started on amlodipine and Losartan.     Confusion continues here.     Daughter Chacha at bedside.     ROS  Constitutional: No fevers, no chills  Eye: No eye pain; no eye discharge  Respiratory: No shortness of breath, no cough  Cardiovascular: No Chest pain, no palpitations  Gastrointestinal: + abdominal pain; + diarreha    (All systems reviewed in addition to the above. All pertinent positive and negative as mentioned as above)    Current Meds  Scheduled Meds:amLODIPine, 5 mg, Oral, BID  aspirin, 81 mg, Oral, Daily  clopidogrel, 75 mg, Oral, Daily  dicyclomine, 10 mg, Oral, 4x Daily  [START ON 2021] levothyroxine, 75 mcg, Oral, Q AM  montelukast, 10 mg, Oral, Nightly  piperacillin-tazobactam, 3.375 g, Intravenous, Q12H  pravastatin, 20 mg, Oral, Nightly  sertraline, 50 mg, Oral, Daily  sodium chloride, 10 mL, Intravenous, Q12H      Continuous  Infusions:Pharmacy to Dose Zosyn,   sodium chloride, 75 mL/hr, Last Rate: 75 mL/hr (07/15/21 1506)      PRN Meds:.•  acetaminophen **OR** acetaminophen **OR** acetaminophen  •  calcium carbonate  •  hydrALAZINE  •  HYDROcodone-acetaminophen  •  labetalol  •  Morphine  •  nitroglycerin  •  ondansetron  •  Pharmacy to Dose Zosyn  •  sodium chloride  •  sodium chloride    Home Medications:   Medications Prior to Admission   Medication Sig Dispense Refill Last Dose   • allopurinol (ZYLOPRIM) 100 MG tablet 100 mg.      • amLODIPine (NORVASC) 5 MG tablet 5 mg.      • amoxicillin-clavulanate (AUGMENTIN) 875-125 MG per tablet Take 1 tablet by mouth.      • aspirin (aspirin) 81 MG EC tablet 81 mg.      • Clopidogrel Bisulfate (PLAVIX PO)       • dicyclomine (BENTYL) 10 MG/5ML syrup Take 20 mg by mouth 4 (Four) Times a Day.      • docusate sodium 100 MG capsule 100 mg.      • HYDROcodone-acetaminophen (NORCO) 5-325 MG per tablet Take 1 tablet by mouth.      • levothyroxine (SYNTHROID, LEVOTHROID) 75 MCG tablet 75 mcg.      • losartan (COZAAR) 50 MG tablet Take 50 mg by mouth Daily.      • montelukast (SINGULAIR) 10 MG tablet TAKE ONE TABLET BY MOUTH ONCE NIGHTLY      • ondansetron (ZOFRAN) 4 MG tablet TAKE ONE TABLET BY MOUTH EVERY 8 HOURS AS NEEDED FOR NAUSEA      • pravastatin (PRAVACHOL) 20 MG tablet       • sertraline (ZOLOFT) 50 MG tablet       • Melatonin 5 MG capsule Take 3 mg by mouth Daily.      • polycarbophil 625 MG tablet tablet Take 625 mg by mouth Daily.          Allergies:  Allergies as of 07/14/2021 - Reviewed 07/14/2021   Allergen Reaction Noted   • Contrast dye Anaphylaxis 07/14/2021       Past Medical History  Past Medical History:   Diagnosis Date   • Anemia    • Asthma    • Cancer (CMS/HCC)    • Dementia (CMS/HCC)    • Diabetes mellitus (CMS/HCC)    • Disease of thyroid gland    • GERD (gastroesophageal reflux disease)    • Hypertension        Past Surgical History  History reviewed. No pertinent surgical  "history.    Family History  History reviewed. No pertinent family history.      OBJECTIVE:   BP (!) 203/73 (BP Location: Left arm, Patient Position: Lying)   Pulse 74   Temp 98 °F (36.7 °C) (Oral)   Resp 18   Ht 162.6 cm (64\")   Wt 57.4 kg (126 lb 9.6 oz)   SpO2 97%   Breastfeeding No   BMI 21.73 kg/m²   Temp:  [98 °F (36.7 °C)-98.2 °F (36.8 °C)] 98 °F (36.7 °C)  Heart Rate:  [] 74  Resp:  [16-18] 18  BP: (142-203)/() 203/73    No intake/output data recorded.  No intake/output data recorded.  No intake or output data in the 24 hours ending 07/15/21 1728    Flowsheet Rows      First Filed Value   Admission Height  162.6 cm (64\") Documented at 07/15/2021 0457   Admission Weight  57.4 kg (126 lb 9.6 oz) Documented at 07/15/2021 0457        Weight change:     Physical Exam:   GENERAL: Nontoxic, no acute distress, alert  HEAD: Normocephalic, atraumatic  EENT: no scleral icterus or conjunctival injection  NECK: supple, no rigidity.   LUNGS: Clear to auscultation bilaterally. No crackles or wheezes; no accessory m. use  CV: Regular rate; irregular rhythm; , no gallops/rubs  GI: soft, tender, normoactive bowel sounds, no pulsatile mass or bruit.  : no suprapubic or flank tenderness.  MUCULOSKELETAL: no spinal or paraspinal tenderness; no joint swelling  EXT: No cyanosis, clubbing; + edema  SKIN: warm and dry, no rash  NEURO: Cranial nerves 2-12 grossly intact, no tremor  PSYCH: Normal mood and affect. Speech normal rate and tone.      RESULTS:  Labs:   Results from last 7 days   Lab Units 07/15/21  0539 07/14/21  2242 07/10/21  0949   WBC 10*3/mm3 10.33 12.84* 10.88   HEMOGLOBIN g/dL 8.0* 8.7* 8.8*   HEMATOCRIT % 24.1* 27.1* 28.2*   PLATELETS 10*3/mm3 255 252 275     Results from last 7 days   Lab Units 07/15/21  0539 07/14/21  2242   SODIUM mmol/L 135* 132*   POTASSIUM mmol/L 3.0* 3.4*   CHLORIDE mmol/L 100 96*   CO2 mmol/L 22.8 25.1   BUN mg/dL 29* 29*   CREATININE mg/dL 1.82* 2.02*   GLUCOSE mg/dL " 98 106*   CALCIUM mg/dL 8.4* 8.6   ALBUMIN g/dL  --  3.50   AST (SGOT) U/L  --  18   ALT (SGPT) U/L  --  14     Results from last 7 days   Lab Units 07/15/21  1137   MAGNESIUM mg/dL 2.1           Invalid input(s): LDLCALC  Results from last 7 days   Lab Units 07/15/21  1137   TSH uIU/mL 1.530         Results for orders placed or performed during the hospital encounter of 07/14/21   COVID-19,[facility]A PANTHER,[facility] IN-HOUSE, NP/OP SWAB IN UTM/VTM/[facility]INE TRANSPORT MEDIA,24 HR TAT - Swab, Nasopharynx    Specimen: Nasopharynx; Swab   Result Value Ref Range    COVID19 Not Detected Not Detected - Ref. Range   Gastrointestinal Panel, PCR - Stool, Per Rectum    Specimen: Per Rectum; Stool   Result Value Ref Range    Campylobacter Not Detected Not Detected    Plesiomonas shigelloides Not Detected Not Detected    Salmonella Not Detected Not Detected    Vibrio Not Detected Not Detected    Vibrio cholerae Not Detected Not Detected    Yersinia enterocolitica Not Detected Not Detected    Enteroaggregative E. coli (EAEC) Not Detected Not Detected    Enteropathogenic E. coli (EPEC) Not Detected Not Detected    Enterotoxigenic E. coli (ETEC) lt/st Not Detected Not Detected    Shiga-like toxin-producing E. coli (STEC) stx1/stx2 Not Detected Not Detected    Shigella/Enteroinvasive E. coli (EIEC) Not Detected Not Detected    Cryptosporidium Not Detected Not Detected    Cyclospora cayetanensis Not Detected Not Detected    Entamoeba histolytica Not Detected Not Detected    Giardia lamblia Not Detected Not Detected    Adenovirus F40/41 Not Detected Not Detected    Astrovirus Not Detected Not Detected    Norovirus GI/GII Not Detected Not Detected    Rotavirus A Not Detected Not Detected    Sapovirus (I, II, IV or V) Not Detected Not Detected   Clostridium Difficile Toxin, PCR - Stool, Per Rectum    Specimen: Per Rectum; Stool   Result Value Ref Range    C. Difficile Toxins by PCR Positive (A) Negative   Comprehensive Metabolic Panel     Specimen: Blood   Result Value Ref Range    Glucose 106 (H) 65 - 99 mg/dL    BUN 29 (H) 8 - 23 mg/dL    Creatinine 2.02 (H) 0.57 - 1.00 mg/dL    Sodium 132 (L) 136 - 145 mmol/L    Potassium 3.4 (L) 3.5 - 5.2 mmol/L    Chloride 96 (L) 98 - 107 mmol/L    CO2 25.1 22.0 - 29.0 mmol/L    Calcium 8.6 8.6 - 10.5 mg/dL    Total Protein 6.8 6.0 - 8.5 g/dL    Albumin 3.50 3.50 - 5.20 g/dL    ALT (SGPT) 14 1 - 33 U/L    AST (SGOT) 18 1 - 32 U/L    Alkaline Phosphatase 81 39 - 117 U/L    Total Bilirubin 0.2 0.0 - 1.2 mg/dL    eGFR Non African Amer 24 (L) >60 mL/min/1.73    Globulin 3.3 gm/dL    A/G Ratio 1.1 g/dL    BUN/Creatinine Ratio 14.4 7.0 - 25.0    Anion Gap 10.9 5.0 - 15.0 mmol/L   Lipase    Specimen: Blood   Result Value Ref Range    Lipase 108 (H) 13 - 60 U/L   Urinalysis With Microscopic If Indicated (No Culture) - Urine, Catheter    Specimen: Urine, Catheter   Result Value Ref Range    Color, UA Yellow Yellow, Straw    Appearance, UA Clear Clear    pH, UA 5.5 5.0 - 8.0    Specific Gravity, UA 1.007 1.005 - 1.030    Glucose, UA Negative Negative    Ketones, UA Negative Negative    Bilirubin, UA Negative Negative    Blood, UA Negative Negative    Protein, UA 30 mg/dL (1+) (A) Negative    Leuk Esterase, UA Negative Negative    Nitrite, UA Negative Negative    Urobilinogen, UA 0.2 E.U./dL 0.2 - 1.0 E.U./dL   Ethanol    Specimen: Blood   Result Value Ref Range    Ethanol <10 0 - 10 mg/dL    Ethanol % <0.010 %   CBC Auto Differential    Specimen: Blood   Result Value Ref Range    WBC 12.84 (H) 3.40 - 10.80 10*3/mm3    RBC 3.07 (L) 3.77 - 5.28 10*6/mm3    Hemoglobin 8.7 (L) 12.0 - 15.9 g/dL    Hematocrit 27.1 (L) 34.0 - 46.6 %    MCV 88.3 79.0 - 97.0 fL    MCH 28.3 26.6 - 33.0 pg    MCHC 32.1 31.5 - 35.7 g/dL    RDW 12.6 12.3 - 15.4 %    RDW-SD 40.1 37.0 - 54.0 fl    MPV 9.3 6.0 - 12.0 fL    Platelets 252 140 - 450 10*3/mm3    Neutrophil % 81.2 (H) 42.7 - 76.0 %    Lymphocyte % 10.2 (L) 19.6 - 45.3 %    Monocyte % 6.8 5.0  - 12.0 %    Eosinophil % 0.6 0.3 - 6.2 %    Basophil % 0.3 0.0 - 1.5 %    Immature Grans % 0.9 (H) 0.0 - 0.5 %    Neutrophils, Absolute 10.43 (H) 1.70 - 7.00 10*3/mm3    Lymphocytes, Absolute 1.31 0.70 - 3.10 10*3/mm3    Monocytes, Absolute 0.87 0.10 - 0.90 10*3/mm3    Eosinophils, Absolute 0.08 0.00 - 0.40 10*3/mm3    Basophils, Absolute 0.04 0.00 - 0.20 10*3/mm3    Immature Grans, Absolute 0.11 (H) 0.00 - 0.05 10*3/mm3    nRBC 0.0 0.0 - 0.2 /100 WBC   Troponin    Specimen: Blood   Result Value Ref Range    Troponin T <0.010 0.000 - 0.030 ng/mL   Urinalysis, Microscopic Only - Urine, Catheter    Specimen: Urine, Catheter   Result Value Ref Range    RBC, UA 0-2 None Seen, 0-2 /HPF    WBC, UA 0-2 None Seen, 0-2 /HPF    Bacteria, UA None Seen None Seen /HPF    Squamous Epithelial Cells, UA 0-2 None Seen, 0-2 /HPF    Hyaline Casts, UA 0-2 None Seen /LPF    Methodology Automated Microscopy    Basic Metabolic Panel    Specimen: Blood   Result Value Ref Range    Glucose 98 65 - 99 mg/dL    BUN 29 (H) 8 - 23 mg/dL    Creatinine 1.82 (H) 0.57 - 1.00 mg/dL    Sodium 135 (L) 136 - 145 mmol/L    Potassium 3.0 (L) 3.5 - 5.2 mmol/L    Chloride 100 98 - 107 mmol/L    CO2 22.8 22.0 - 29.0 mmol/L    Calcium 8.4 (L) 8.6 - 10.5 mg/dL    eGFR Non African Amer 27 (L) >60 mL/min/1.73    BUN/Creatinine Ratio 15.9 7.0 - 25.0    Anion Gap 12.2 5.0 - 15.0 mmol/L   CBC (No Diff)    Specimen: Blood   Result Value Ref Range    WBC 10.33 3.40 - 10.80 10*3/mm3    RBC 2.73 (L) 3.77 - 5.28 10*6/mm3    Hemoglobin 8.0 (L) 12.0 - 15.9 g/dL    Hematocrit 24.1 (L) 34.0 - 46.6 %    MCV 88.3 79.0 - 97.0 fL    MCH 29.3 26.6 - 33.0 pg    MCHC 33.2 31.5 - 35.7 g/dL    RDW 12.7 12.3 - 15.4 %    RDW-SD 40.9 37.0 - 54.0 fl    MPV 9.5 6.0 - 12.0 fL    Platelets 255 140 - 450 10*3/mm3   Magnesium    Specimen: Blood   Result Value Ref Range    Magnesium 2.1 1.6 - 2.4 mg/dL   Occult Blood X 1, Stool - Stool, Per Rectum    Specimen: Per Rectum; Stool   Result Value  Ref Range    Fecal Occult Blood Negative Negative   TSH    Specimen: Blood   Result Value Ref Range    TSH 1.530 0.270 - 4.200 uIU/mL   ECG 12 Lead   Result Value Ref Range    QT Interval 406 ms   Green Top (Gel)   Result Value Ref Range    Extra Tube Hold for add-ons.    Lavender Top   Result Value Ref Range    Extra Tube hold for add-on        Imaging  CT Abdomen Pelvis Without Contrast    Result Date: 7/15/2021  CT OF THE ABDOMEN AND PELVIS WITHOUT CONTRAST  HISTORY: Left lower quadrant pain  COMPARISON: None available.  TECHNIQUE: Axial CT imaging was obtained through the abdomen and pelvis. No IV contrast was administered.  FINDINGS: Images through the lung bases do not demonstrate any acute abnormalities. No focal hepatic lesions are seen. Spleen, stomach, and duodenum all appear unremarkable. The gallbladder is not visualized, and may be absent. There is some dilatation of the common bile duct, measuring up to 1 cm. However, this may be related to age and prior cholecystectomy. No hydronephrosis is seen on either side. There are hyperdensities identified within both kidneys. Most of them appear to be vascular calcifications, although a punctate nonobstructing stone cannot be completely excluded. There are simple appearing left renal cyst. No additional follow-up is necessary. No distal ureteral or bladder stones are seen. Urinary bladder is decompressed. Uterus appears normal. There is diffuse wall thickening involving the sigmoid colon, suggesting colitis. There is adjacent pericolonic soft tissue stranding. While there is diverticular disease within this area, the length of colon involved is more keeping with colitis. No pneumatosis or free air is seen. There is no evidence of obstruction. The appendix is not identified. The patient is status post aortic stent grafting. Aneurysm sac measures approximately 2.9 x 4.7 cm. Presence or absence of endoleak cannot be excluded on this unenhanced study. No acute  osseous abnormalities are seen.      Colitis involving the sigmoid colon. No pneumatosis or free air is seen. There is no evidence of obstruction.  Radiation dose reduction techniques were utilized, including automated exposure control and exposure modulation based on body size.  This report was finalized on 7/15/2021 1:21 AM by Dr. Vi Govea M.D.      CT Head Without Contrast    Result Date: 7/15/2021  CT HEAD WITHOUT CONTRAST  HISTORY: Altered mental status  COMPARISON: None available.  TECHNIQUE: Axial CT imaging was obtained through the brain. No IV contrast was administered.  FINDINGS: No acute intracranial hemorrhage is seen. There is diffuse atrophy. There is periventricular and deep white matter microangiopathic change. There is mucosal thickening identified within the ethmoid sinuses. There is no midline shift or mass effect. Old lacunar infarct is noted within the left basal ganglia.      No acute intracranial findings.  Radiation dose reduction techniques were utilized, including automated exposure control and exposure modulation based on body size.  This report was finalized on 7/15/2021 1:08 AM by Dr. Vi Govea M.D.         All available labs and imaging studies reviewed.        ASSESSMENT/PLAN:    JEANIE on CKD Stage 3  - Baseline Cr around 1.6  - Cr improving  - Agree with IVF but decrease to 75cc/hour since diarrhea improving   - Hold furosemide     HTN  - Decrease IVF as above  - partially exacerbated by pain   - increase amlodipine to 5mg BID since ARB on hold     Anemia  - likely partially due to infection and renal insufficiency  with history of iron def anemia  - ferritin 77.3 5/19/2021--> goal 100  - iron sat 22% 5/19/2021--> at goal   - transfuse as needed    Edema  - furosemide on hold    DM Type 2    Delirium/Dementia  - sitter present     C. Diff Colitis  - on PO Vanco    It has been a pleasure seeing this patient. Please feel free to contact me with any questions or  concerns    Irene Weinberg, DO  [facility]  P: [phone]  F: [phone]  Pager: [phone]

## 2021-07-15 NOTE — PLAN OF CARE
Problem: Adult Inpatient Plan of Care  Goal: Plan of Care Review  Outcome: Ongoing, Progressing  Flowsheets (Taken 7/15/2021 1630)  Progress: no change  Plan of Care Reviewed With: patient   Goal Outcome Evaluation:  Plan of Care Reviewed With: patient        Progress: no change       Pt was very confused first thing this morning, she accidentally pulled out 2 iv's along with climbing out of bed and her chair. At one point pt accidentally locked herself in the bathroom. A sitter was ordered to help with her being impulsive and not calling out. Pt is positive for C-diff. She is having a lot of abdominal pain and cramping. Pain medications and bentyl have been ordered to help with this. She is on IV fluids and antibiotics. Later in the afternoon she was able to know where she was and why. GI was consulted and so was Nephrology. Pt has PRN labetalol for HTN. VSS. Will continue to monitor.

## 2021-07-15 NOTE — CASE MANAGEMENT/SOCIAL WORK
Discharge Planning Assessment  [facility]     Patient Name: Kellen Parmar  MRN: [id number]  Today's Date: 7/15/2021    Admit Date: 7/14/2021    Discharge Needs Assessment     Row Name 07/15/21 0180       Living Environment    Lives With  alone    Current Living Arrangements  home/apartment/condo    Primary Care Provided by  self;child(sneha)    Provides Primary Care For  no one    Family Caregiver if Needed  child(sneha), adult    Quality of Family Relationships  supportive       Resource/Environmental Concerns    Transportation Concerns  car, none       Transition Planning    Patient/Family Anticipates Transition to  home       Discharge Needs Assessment    Equipment Currently Used at Home  cane, straight;walker, rolling        Discharge Plan     Row Name 07/15/21 1170       Plan    Plan  Home with family vs home health vs SNF    Patient/Family in Agreement with Plan  yes    Plan Comments  Met with pt and daughter at bedside. Introduced self, explained CCP role, facesheet verified. Pt lives alone.  Daughter states she checks on pt 3-4 times/dayand cooks for pt but does work.  Daughter does state there are cameras present in house and she keeps on eye on pt that way. Pt uses cane regularly.  Has walker but doesn't use.  Has used HH in past but did not find it to be helpful. No history of SNF. Discussed home needs, daughter states plan is for pt to return home.  CCP will follow for needs.   VICENTA Brizuela RN        Continued Care and Services - Admitted Since 7/14/2021    Coordination has not been started for this encounter.         Demographic Summary     Row Name 07/15/21 1448       General Information    Admission Type  observation    Arrived From  home    Referral Source  admission list    Reason for Consult  discharge planning    Preferred Language  English       Contact Information    Permission Granted to Share Info With  family/designee Chacha Castellanos ([facility]) [phone]        Functional Status    No  documentation.       Psychosocial    No documentation.       Abuse/Neglect    No documentation.       Legal    No documentation.       Substance Abuse    No documentation.       Patient Forms    No documentation.           Bela Brizuela RN

## 2021-07-15 NOTE — CONSULTS
Gastroenterology   Initial Inpatient Consult Note  Thank you for asking our opinion on this patient.  Referring Provider: Willem Underwood MD    Reason for Consultation: Possible colitis    Subjective     History of present illness:    85 y.o. female with a fairly complicated recent medical history was been in and out of the hospital with various issues but has had persistent lower abdominal and left lower quadrant discomfort with diarrhea.  She apparently was seen at [facility] and was told that she had diverticulitis.  CT here is more suggestive of colitis.  She responded partially in the past antibiotics but then symptoms returned.  Family is in the room and they deny any rectal bleeding.  She has had a colonoscopy but it was in the remote past.  No recent endoscopic evaluation.  Family is concerned currently about changes in mental status with hallucinations.    Past Medical History:  Past Medical History:   Diagnosis Date   • Anemia    • Asthma    • Cancer (CMS/HCC)    • Dementia (CMS/HCC)    • Diabetes mellitus (CMS/HCC)    • Disease of thyroid gland    • GERD (gastroesophageal reflux disease)    • Hypertension      Past Surgical History:  History reviewed. No pertinent surgical history.   Social History:   Social History     Tobacco Use   • Smoking status: Unknown If Ever Smoked   Substance Use Topics   • Alcohol use: Not Currently      Family History:  History reviewed. No pertinent family history.    Home Meds:  Medications Prior to Admission   Medication Sig Dispense Refill Last Dose   • allopurinol (ZYLOPRIM) 100 MG tablet 100 mg.      • amLODIPine (NORVASC) 5 MG tablet 5 mg.      • amoxicillin-clavulanate (AUGMENTIN) 875-125 MG per tablet Take 1 tablet by mouth.      • aspirin (aspirin) 81 MG EC tablet 81 mg.      • Clopidogrel Bisulfate (PLAVIX PO)       • dicyclomine (BENTYL) 10 MG/5ML syrup Take 20 mg by mouth 4 (Four) Times a Day.      • docusate sodium 100 MG capsule 100 mg.      •  HYDROcodone-acetaminophen (NORCO) 5-325 MG per tablet Take 1 tablet by mouth.      • levothyroxine (SYNTHROID, LEVOTHROID) 75 MCG tablet 75 mcg.      • losartan (COZAAR) 50 MG tablet Take 50 mg by mouth Daily.      • montelukast (SINGULAIR) 10 MG tablet TAKE ONE TABLET BY MOUTH ONCE NIGHTLY      • ondansetron (ZOFRAN) 4 MG tablet TAKE ONE TABLET BY MOUTH EVERY 8 HOURS AS NEEDED FOR NAUSEA      • pravastatin (PRAVACHOL) 20 MG tablet       • sertraline (ZOLOFT) 50 MG tablet       • Melatonin 5 MG capsule Take 3 mg by mouth Daily.      • polycarbophil 625 MG tablet tablet Take 625 mg by mouth Daily.        Current Meds:   amLODIPine, 5 mg, Oral, Q24H  aspirin, 81 mg, Oral, Daily  clopidogrel, 75 mg, Oral, Daily  dicyclomine, 10 mg, Oral, 4x Daily  [START ON 7/16/2021] levothyroxine, 75 mcg, Oral, Q AM  montelukast, 10 mg, Oral, Nightly  piperacillin-tazobactam, 3.375 g, Intravenous, Q12H  pravastatin, 20 mg, Oral, Nightly  sertraline, 50 mg, Oral, Daily  sodium chloride, 10 mL, Intravenous, Q12H      Allergies:  Allergies   Allergen Reactions   • Contrast Dye Anaphylaxis     Review of Systems  Pertinent items are noted in HPI, all other systems reviewed and negative    Objective     Vital Signs  Temp:  [98 °F (36.7 °C)-98.2 °F (36.8 °C)] 98 °F (36.7 °C)  Heart Rate:  [] 74  Resp:  [16-18] 18  BP: (142-203)/() 203/73    Physical Exam:  CONSTITUTIONAL:  today's vital signs reviewed  EARS NOSE THROAT: trachea midline and no deformity of the nares  EYES: no scleral icterus  GASTROINTESTINAL: abdomen is soft, nontender, nondistended with normal active bowel sounds, no masses are appreciated  PSYCHIATRIC: Patient responds to questions but minimally so  RESPIRATORY: normal inspiratory effort with no increased work of breathing  NEUROLOGIC: patient is awake  DERMATOLOGIC: skin is warm with no cyanosis  LYMPHATIC: no periumbilical lymphadenopathy     Results Review:   I reviewed the patient's new clinical  results.    Results from last 7 days   Lab Units 07/15/21  0539 07/14/21  2242 07/10/21  0949   WBC 10*3/mm3 10.33 12.84* 10.88   HEMOGLOBIN g/dL 8.0* 8.7* 8.8*   HEMATOCRIT % 24.1* 27.1* 28.2*   PLATELETS 10*3/mm3 255 252 275     Results from last 7 days   Lab Units 07/15/21  0539 07/14/21  2242   SODIUM mmol/L 135* 132*   POTASSIUM mmol/L 3.0* 3.4*   CHLORIDE mmol/L 100 96*   CO2 mmol/L 22.8 25.1   BUN mg/dL 29* 29*   CREATININE mg/dL 1.82* 2.02*   CALCIUM mg/dL 8.4* 8.6   BILIRUBIN mg/dL  --  0.2   ALK PHOS U/L  --  81   ALT (SGPT) U/L  --  14   AST (SGOT) U/L  --  18   GLUCOSE mg/dL 98 106*         Lab Results   Lab Value Date/Time    LIPASE 108 (H) 07/14/2021 2242    LIPASE 18 07/10/2021 0949    LIPASE 47 (H) 06/09/2021 1117    LIPASE 46 (H) 05/31/2021 1335    LIPASE 106 11/09/2020 2231    LIPASE 152 08/30/2020 1943       Radiology:  CT Abdomen Pelvis Without Contrast   Final Result   Colitis involving the sigmoid colon. No pneumatosis or free air is seen.   There is no evidence of obstruction.       Radiation dose reduction techniques were utilized, including automated   exposure control and exposure modulation based on body size.       This report was finalized on 7/15/2021 1:21 AM by Dr. Vi Govea M.D.          CT Head Without Contrast   Final Result   No acute intracranial findings.       Radiation dose reduction techniques were utilized, including automated   exposure control and exposure modulation based on body size.       This report was finalized on 7/15/2021 1:08 AM by Dr. Vi Govea M.D.              Assessment/Plan   Patient Active Problem List   Diagnosis   • Colitis   • HTN (hypertension)   • CKD (chronic kidney disease) stage 4, GFR 15-29 ml/min (CMS/[facility])   • JEANIE (acute kidney injury) (CMS/[facility])   • Hyponatremia   • Hypokalemia   • Anemia   • Leukocytosis   • Acute metabolic encephalopathy   • Dementia without behavioral disturbance (CMS/HCC)       Assessment:  1. Colitis.   Patient's had persistent findings of colitis on imaging studies with left lower quadrant pain and diarrhea.  Prior studies had showed similar changes but family reports that she had been told in the past that it was due to diverticulitis.  2. Leukocytosis.  Resolved.  3. Acute kidney injury stage IV chronic kidney disease.  4. Anemia.  Hemoglobin trending down slowly but no sign of any overt GI bleeding.  Stool Hemoccults have been ordered.    Plan:  · Await stool studies and C. difficile toxin.  · She will need endoscopic evaluation at least of the left side of the colon during this admission.  · To new IV antibiotics and IV fluid rehydration.  · Further recommendations pending stool studies and stool Hemoccult.      I discussed the patients findings and my recommendations with patient, family and nursing staff.    MD Jason Mejia M.D.  [facility] [place] [id number]  Office: [phone]

## 2021-07-15 NOTE — H&P
"    Patient Name:  Kellen Parmar  YOB: 1935  MRN:  [id number]  Admit Date:  7/14/2021  Patient Care Team:  Naomi Rivera APRN as PCP - General (Family Medicine)      Subjective   History Present Illness     Chief Complaint   Patient presents with   • Psychiatric Evaluation   • Hallucinations         Ms. Parmar is a 85 y.o. non-smoker with a history of hypertension, GERD, diabetes mellitus type 2, dementia, asthma, anemia, diverticulitis who presents to [facility] ER chief complaint of psychiatric evaluation, hallucinations admitted for colitis.    Recent hospital evaluation at [facility] on May 31, 2021 with subsequent 7-day course of Augmentin for colitis.  Patient limited historian regarding history of present illness and aggravated by complaints of abdominal pain with associated nausea, vomiting, diarrhea.  No collateral historian at bedside.  Continue contact patient's daughter--\"Chacha Carrillo at [phone]\"--for additional information; however, no answer.  Reportedly daughter concerned for patient hallucinating; therefore, pursued ER evaluation.    AVSS on room air with exception of hypertension blood pressure 181/78 most likely secondary to subjective abdominal pain, CT reported findings colitis involving sigmoid colon without evidence of obstruction.  CT head negative for acute intercranial abnormality.  Patient without lateralizing features on exam.  Mild leukocytosis WBC count 12,000.  Acute kidney injury serum creatinine 2.0 increased to 1.7 in May 2021 patient received--500 cc normal saline IV fluid bolus.    Further recommendations per hospital course.  See additional details in assessment/plan.    History of Present Illness  Review of Systems   Reason unable to perform ROS: limited ROS 2/2 dementia.   Constitutional: Negative for chills and fever.   HENT: Negative for congestion and rhinorrhea.    Respiratory: Negative for cough and shortness of breath.    Cardiovascular: " Negative for chest pain and leg swelling.   Gastrointestinal: Positive for abdominal pain, diarrhea, nausea and vomiting. Negative for constipation.   Endocrine: Negative for polydipsia, polyphagia and polyuria.   Genitourinary: Negative for difficulty urinating and dysuria.   Musculoskeletal: Positive for gait problem (2/2 generalized weakness). Negative for back pain.   Skin: Negative for rash and wound.   Neurological: Positive for weakness. Negative for light-headedness.   Psychiatric/Behavioral: Positive for confusion (chronic dementia) and hallucinations (Reported hallucinations by patient's daughter). The patient is nervous/anxious.         Personal History     Past Medical History:   Diagnosis Date   • Anemia    • Asthma    • Cancer (CMS/HCC)    • Dementia (CMS/HCC)    • Diabetes mellitus (CMS/HCC)    • Disease of thyroid gland    • GERD (gastroesophageal reflux disease)    • Hypertension      History reviewed. No pertinent surgical history.  History reviewed. No pertinent family history.  Social History     Tobacco Use   • Smoking status: Unknown If Ever Smoked   Substance Use Topics   • Alcohol use: Not Currently   • Drug use: Never     No current facility-administered medications on file prior to encounter.     Current Outpatient Medications on File Prior to Encounter   Medication Sig Dispense Refill   • allopurinol (ZYLOPRIM) 100 MG tablet 100 mg.     • amLODIPine (NORVASC) 5 MG tablet 5 mg.     • amoxicillin-clavulanate (AUGMENTIN) 875-125 MG per tablet Take 1 tablet by mouth.     • aspirin (aspirin) 81 MG EC tablet 81 mg.     • Clopidogrel Bisulfate (PLAVIX PO)      • dicyclomine (BENTYL) 10 MG/5ML syrup Take 20 mg by mouth 4 (Four) Times a Day.     • docusate sodium 100 MG capsule 100 mg.     • HYDROcodone-acetaminophen (NORCO) 5-325 MG per tablet Take 1 tablet by mouth.     • levothyroxine (SYNTHROID, LEVOTHROID) 75 MCG tablet 75 mcg.     • losartan (COZAAR) 50 MG tablet Take 50 mg by mouth Daily.      • montelukast (SINGULAIR) 10 MG tablet TAKE ONE TABLET BY MOUTH ONCE NIGHTLY     • ondansetron (ZOFRAN) 4 MG tablet TAKE ONE TABLET BY MOUTH EVERY 8 HOURS AS NEEDED FOR NAUSEA     • pravastatin (PRAVACHOL) 20 MG tablet      • sertraline (ZOLOFT) 50 MG tablet      • Melatonin 5 MG capsule Take 3 mg by mouth Daily.     • polycarbophil 625 MG tablet tablet Take 625 mg by mouth Daily.       Allergies   Allergen Reactions   • Contrast Dye Anaphylaxis       Objective    Objective     Vital Signs  Temp:  [98 °F (36.7 °C)-98.2 °F (36.8 °C)] 98.1 °F (36.7 °C)  Heart Rate:  [] 81  Resp:  [16-18] 18  BP: (142-193)/() 193/77  SpO2:  [95 %-100 %] 100 %  on   ;   Device (Oxygen Therapy): room air  Body mass index is 21.73 kg/m².    Physical Exam  Constitutional:       General: She is not in acute distress.     Appearance: She is ill-appearing. She is not toxic-appearing.      Comments: Elderly, generally weak   HENT:      Head: Normocephalic and atraumatic.   Eyes:      Extraocular Movements: Extraocular movements intact.      Conjunctiva/sclera: Conjunctivae normal.   Cardiovascular:      Rate and Rhythm: Normal rate.      Heart sounds: Normal heart sounds.   Pulmonary:      Effort: Pulmonary effort is normal.      Comments: Diminished on expiration  Abdominal:      General: There is no distension.      Palpations: Abdomen is soft.      Tenderness: There is abdominal tenderness. There is no guarding.   Musculoskeletal:         General: No tenderness.      Cervical back: Normal range of motion and neck supple.      Right lower leg: No edema.      Left lower leg: No edema.   Skin:     General: Skin is warm and dry.   Neurological:      Mental Status: She is alert. Mental status is at baseline.      Cranial Nerves: No cranial nerve deficit.      Motor: Weakness (generalized) present.   Psychiatric:         Thought Content: Thought content normal.      Comments: Currently cooperative         Results Review:  I reviewed  the patient's new clinical results.  I reviewed the patient's new imaging results and agree with the interpretation.  I reviewed the patient's other test results and agree with the interpretation  I personally viewed and interpreted the patient's EKG/Telemetry data  Discussed with ED provider.    Lab Results (last 24 hours)     Procedure Component Value Units Date/Time    CBC & Differential [[id number]]  (Abnormal) Collected: 07/14/21 2242    Specimen: Blood Updated: 07/14/21 2303    Narrative:      The following orders were created for panel order CBC & Differential.  Procedure                               Abnormality         Status                     ---------                               -----------         ------                     CBC Auto Differential[[id number]]        Abnormal            Final result                 Please view results for these tests on the individual orders.    Comprehensive Metabolic Panel [[id number]]  (Abnormal) Collected: 07/14/21 2242    Specimen: Blood Updated: 07/14/21 2307     Glucose 106 mg/dL      BUN 29 mg/dL      Creatinine 2.02 mg/dL      Sodium 132 mmol/L      Potassium 3.4 mmol/L      Chloride 96 mmol/L      CO2 25.1 mmol/L      Calcium 8.6 mg/dL      Total Protein 6.8 g/dL      Albumin 3.50 g/dL      ALT (SGPT) 14 U/L      AST (SGOT) 18 U/L      Alkaline Phosphatase 81 U/L      Total Bilirubin 0.2 mg/dL      eGFR Non African Amer 24 mL/min/1.73      Globulin 3.3 gm/dL      A/G Ratio 1.1 g/dL      BUN/Creatinine Ratio 14.4     Anion Gap 10.9 mmol/L     Narrative:      GFR Normal >60  Chronic Kidney Disease <60  Kidney Failure <15      Lipase [[id number]]  (Abnormal) Collected: 07/14/21 2242    Specimen: Blood Updated: 07/14/21 2307     Lipase 108 U/L     Urinalysis With Microscopic If Indicated (No Culture) - Urine, Catheter [[id number]]  (Abnormal) Collected: 07/14/21 2242    Specimen: Urine, Catheter Updated: 07/14/21 2311     Color, UA Yellow     Appearance, UA Clear      pH, UA 5.5     Specific Gravity, UA 1.007     Glucose, UA Negative     Ketones, UA Negative     Bilirubin, UA Negative     Blood, UA Negative     Protein, UA 30 mg/dL (1+)     Leuk Esterase, UA Negative     Nitrite, UA Negative     Urobilinogen, UA 0.2 E.U./dL    Ethanol [[id number]] Collected: 07/14/21 2242    Specimen: Blood Updated: 07/14/21 2307     Ethanol <10 mg/dL      Ethanol % <0.010 %     CBC Auto Differential [[id number]]  (Abnormal) Collected: 07/14/21 2242    Specimen: Blood Updated: 07/14/21 2303     WBC 12.84 10*3/mm3      RBC 3.07 10*6/mm3      Hemoglobin 8.7 g/dL      Hematocrit 27.1 %      MCV 88.3 fL      MCH 28.3 pg      MCHC 32.1 g/dL      RDW 12.6 %      RDW-SD 40.1 fl      MPV 9.3 fL      Platelets 252 10*3/mm3      Neutrophil % 81.2 %      Lymphocyte % 10.2 %      Monocyte % 6.8 %      Eosinophil % 0.6 %      Basophil % 0.3 %      Immature Grans % 0.9 %      Neutrophils, Absolute 10.43 10*3/mm3      Lymphocytes, Absolute 1.31 10*3/mm3      Monocytes, Absolute 0.87 10*3/mm3      Eosinophils, Absolute 0.08 10*3/mm3      Basophils, Absolute 0.04 10*3/mm3      Immature Grans, Absolute 0.11 10*3/mm3      nRBC 0.0 /100 WBC     Troponin [[id number]]  (Normal) Collected: 07/14/21 2242    Specimen: Blood Updated: 07/14/21 2307     Troponin T <0.010 ng/mL     Narrative:      Troponin T Reference Range:  <= 0.03 ng/mL-   Negative for AMI  >0.03 ng/mL-     Abnormal for myocardial necrosis.  Clinicians would have to utilize clinical acumen, EKG, Troponin and serial changes to determine if it is an Acute Myocardial Infarction or myocardial injury due to an underlying chronic condition.       Results may be falsely decreased if patient taking Biotin.      Urinalysis, Microscopic Only - Urine, Catheter [[id number]] Collected: 07/14/21 2242    Specimen: Urine, Catheter Updated: 07/14/21 2313     RBC, UA 0-2 /HPF      WBC, UA 0-2 /HPF      Bacteria, UA None Seen /HPF      Squamous Epithelial Cells, UA 0-2 /HPF       Hyaline Casts, UA 0-2 /LPF      Methodology Automated Microscopy    COVID PRE-OP / PRE-PROCEDURE SCREENING ORDER (NO ISOLATION) - Swab, Nasopharynx [[id number]]  (Normal) Collected: 07/14/21 2356    Specimen: Swab from Nasopharynx Updated: 07/15/21 0341    Narrative:      The following orders were created for panel order COVID PRE-OP / PRE-PROCEDURE SCREENING ORDER (NO ISOLATION) - Swab, Nasopharynx.  Procedure                               Abnormality         Status                     ---------                               -----------         ------                     COVID-19,APTIMA PANTHER,...[[id number]]  Normal              Final result                 Please view results for these tests on the individual orders.    COVID-19,APTIMA PANTHER,[facility] IN-HOUSE, NP/OP SWAB IN UTM/VTM/SALINE TRANSPORT MEDIA,24 HR TAT - Swab, Nasopharynx [[id number]]  (Normal) Collected: 07/14/21 2356    Specimen: Swab from Nasopharynx Updated: 07/15/21 0341     COVID19 Not Detected    Narrative:      Fact sheet for providers: https://www.fda.gov/media/321687/download     Fact sheet for patients: https://www.fda.gov/media/355987/download    Test performed by RT PCR.    Basic Metabolic Panel [[id number]]  (Abnormal) Collected: 07/15/21 0539    Specimen: Blood Updated: 07/15/21 0630     Glucose 98 mg/dL      BUN 29 mg/dL      Creatinine 1.82 mg/dL      Sodium 135 mmol/L      Potassium 3.0 mmol/L      Chloride 100 mmol/L      CO2 22.8 mmol/L      Calcium 8.4 mg/dL      eGFR Non African Amer 27 mL/min/1.73      BUN/Creatinine Ratio 15.9     Anion Gap 12.2 mmol/L     Narrative:      GFR Normal >60  Chronic Kidney Disease <60  Kidney Failure <15      CBC (No Diff) [[id number]]  (Abnormal) Collected: 07/15/21 0539    Specimen: Blood Updated: 07/15/21 0603     WBC 10.33 10*3/mm3      RBC 2.73 10*6/mm3      Hemoglobin 8.0 g/dL      Hematocrit 24.1 %      MCV 88.3 fL      MCH 29.3 pg      MCHC 33.2 g/dL      RDW 12.7 %      RDW-SD 40.9 fl      MPV  9.5 fL      Platelets 255 10*3/mm3     Magnesium [[id number]]  (Normal) Collected: 07/15/21 1137    Specimen: Blood Updated: 07/15/21 1250     Magnesium 2.1 mg/dL           Imaging Results (Last 24 Hours)     Procedure Component Value Units Date/Time    CT Abdomen Pelvis Without Contrast [[id number]] Collected: 07/15/21 0108     Updated: 07/15/21 0124    Narrative:      CT OF THE ABDOMEN AND PELVIS WITHOUT CONTRAST     HISTORY: Left lower quadrant pain     COMPARISON: None available.     TECHNIQUE: Axial CT imaging was obtained through the abdomen and pelvis.  No IV contrast was administered.     FINDINGS:  Images through the lung bases do not demonstrate any acute  abnormalities. No focal hepatic lesions are seen. Spleen, stomach, and  duodenum all appear unremarkable. The gallbladder is not visualized, and  may be absent. There is some dilatation of the common bile duct,  measuring up to 1 cm. However, this may be related to age and prior  cholecystectomy. No hydronephrosis is seen on either side. There are  hyperdensities identified within both kidneys. Most of them appear to be  vascular calcifications, although a punctate nonobstructing stone cannot  be completely excluded. There are simple appearing left renal cyst. No  additional follow-up is necessary. No distal ureteral or bladder stones  are seen. Urinary bladder is decompressed. Uterus appears normal. There  is diffuse wall thickening involving the sigmoid colon, suggesting  colitis. There is adjacent pericolonic soft tissue stranding. While  there is diverticular disease within this area, the length of colon  involved is more keeping with colitis. No pneumatosis or free air is  seen. There is no evidence of obstruction. The appendix is not  identified. The patient is status post aortic stent grafting. 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 Presence or absence of endoleak  cannot be excluded on this unenhanced study. No acute  osseous  abnormalities are seen.       Impression:      Colitis involving the sigmoid colon. No pneumatosis or free air is seen.  There is no evidence of obstruction.     Radiation dose reduction techniques were utilized, including automated  exposure control and exposure modulation based on body size.     This report was finalized on 7/15/2021 1:21 AM by Dr. Vi Govea M.D.       CT Head Without Contrast [[id number]] Collected: 07/15/21 0105     Updated: 07/15/21 0111    Narrative:      CT HEAD WITHOUT CONTRAST     HISTORY: Altered mental status     COMPARISON: None available.     TECHNIQUE: Axial CT imaging was obtained through the brain. No IV  contrast was administered.     FINDINGS:  No acute intracranial hemorrhage is seen. There is diffuse atrophy.  There is periventricular and deep white matter microangiopathic change.  There is mucosal thickening identified within the ethmoid sinuses. There  is no midline shift or mass effect. Old lacunar infarct is noted within  the left basal ganglia.       Impression:      No acute intracranial findings.     Radiation dose reduction techniques were utilized, including automated  exposure control and exposure modulation based on body size.     This report was finalized on 7/15/2021 1:08 AM by Dr. Vi Govea M.D.                 ECG 12 Lead   Preliminary Result   HEART RATE= 74  bpm   RR Interval= 808  ms   LA Interval= 180  ms   P Horizontal Axis=   deg   P Front Axis= -2  deg   QRSD Interval= 97  ms   QT Interval= 406  ms   QRS Axis= 1  deg   T Wave Axis= 62  deg   - NORMAL ECG -   Sinus rhythm   Electronically Signed By:    Date and Time of Study: 2021-07-14 23:06:38           Assessment/Plan     Active Hospital Problems    Diagnosis  POA   • **Colitis [K52.9]  Yes   • HTN (hypertension) [I10]  Yes   • CKD (chronic kidney disease) stage 4, GFR 15-29 ml/min (CMS/[facility]) [N18.4]  Yes   • JEANIE (acute kidney injury) (CMS/[facility]) [N17.9]  Yes   • Hyponatremia [E87.1]   Yes   • Hypokalemia [E87.6]  Yes   • Anemia [D64.9]  Yes   • Leukocytosis [D72.829]  Yes   • Acute metabolic encephalopathy [G93.41]  Yes   • Dementia without behavioral disturbance (CMS/HCC) [F03.90]  Yes      Resolved Hospital Problems   No resolved problems to display.       Ms. Parmar is a 85 y.o. non-smoker with a history of hypertension, GERD, diabetes mellitus type 2, dementia, asthma, anemia, diverticulitis who presents to [facility] ER chief complaint of psychiatric evaluation, hallucinations admitted for colitis.    Colitis /leukocytosis  Noted on CT abdomen pelvis within sigmoid colon  Afebrile, hemodynamically stable  Leukocytosis normalizing  Continued empiric antibiotic therapy IV Zosyn  Ordering GI consultation  Consider general surgery consult  Previous [facility] GI evaluation May 2021 for colitis  GI PCR & C. Diff pending    JEANIE / hyponatremia/ hypokalemia /CKD stage IV  Serum creatinine improving with trend following continuous gentle IVF hydration  Consult nephrology to advise further  Unremarkable serum magnesium 2.1   Provide KCL 20 mEq once & repeat serum K+ today    Acute metabolic encephalopathy in the setting of dementia / history of carotid stenosis  Most likely secondary #1   CT head negative  No evidence of lateralizing features  Ordering TSH--levothyroxine continued  Consult neurology service    Anemia  Serum hemoglobin trending downward: 8.8-->8.7-->8.0  No overt signs of active bleeding  ?  GIB, ordering stool Hemoccult    · I discussed the patient's findings and my recommendations with Dr. Underwood.    VTE Prophylaxis - SCD  Code Status - CPR       ANDREA Sena  [facility]  07/15/21  13:58 EDT

## 2021-07-16 LAB
ALBUMIN SERPL-MCNC: 2.8 G/DL (ref 3.5–5.2)
ANION GAP SERPL CALCULATED.3IONS-SCNC: 13.2 MMOL/L (ref 5–15)
BUN SERPL-MCNC: 20 MG/DL (ref 8–23)
BUN/CREAT SERPL: 13.7 (ref 7–25)
CALCIUM SPEC-SCNC: 8.3 MG/DL (ref 8.6–10.5)
CHLORIDE SERPL-SCNC: 104 MMOL/L (ref 98–107)
CO2 SERPL-SCNC: 21.8 MMOL/L (ref 22–29)
CREAT SERPL-MCNC: 1.46 MG/DL (ref 0.57–1)
DEPRECATED RDW RBC AUTO: 43.3 FL (ref 37–54)
ERYTHROCYTE [DISTWIDTH] IN BLOOD BY AUTOMATED COUNT: 12.7 % (ref 12.3–15.4)
GFR SERPL CREATININE-BSD FRML MDRD: 34 ML/MIN/1.73
GLUCOSE SERPL-MCNC: 102 MG/DL (ref 65–99)
HCT VFR BLD AUTO: 27.8 % (ref 34–46.6)
HGB BLD-MCNC: 8.6 G/DL (ref 12–15.9)
LYMPHOCYTES # BLD MANUAL: 0.99 10*3/MM3 (ref 0.7–3.1)
LYMPHOCYTES NFR BLD MANUAL: 2 % (ref 5–12)
LYMPHOCYTES NFR BLD MANUAL: 6.1 % (ref 19.6–45.3)
MAGNESIUM SERPL-MCNC: 2 MG/DL (ref 1.6–2.4)
MCH RBC QN AUTO: 28.5 PG (ref 26.6–33)
MCHC RBC AUTO-ENTMCNC: 30.9 G/DL (ref 31.5–35.7)
MCV RBC AUTO: 92.1 FL (ref 79–97)
MONOCYTES # BLD AUTO: 0.33 10*3/MM3 (ref 0.1–0.9)
NEUTROPHILS # BLD AUTO: 14.93 10*3/MM3 (ref 1.7–7)
NEUTROPHILS NFR BLD MANUAL: 91.9 % (ref 42.7–76)
PHOSPHATE SERPL-MCNC: 2.8 MG/DL (ref 2.5–4.5)
PLAT MORPH BLD: NORMAL
PLATELET # BLD AUTO: 273 10*3/MM3 (ref 140–450)
PMV BLD AUTO: 9.2 FL (ref 6–12)
POTASSIUM SERPL-SCNC: 3.6 MMOL/L (ref 3.5–5.2)
RBC # BLD AUTO: 3.02 10*6/MM3 (ref 3.77–5.28)
RBC MORPH BLD: NORMAL
SODIUM SERPL-SCNC: 139 MMOL/L (ref 136–145)
WBC # BLD AUTO: 16.25 10*3/MM3 (ref 3.4–10.8)
WBC MORPH BLD: NORMAL

## 2021-07-16 PROCEDURE — 80069 RENAL FUNCTION PANEL: CPT | Performed by: INTERNAL MEDICINE

## 2021-07-16 PROCEDURE — 85007 BL SMEAR W/DIFF WBC COUNT: CPT | Performed by: INTERNAL MEDICINE

## 2021-07-16 PROCEDURE — 25010000002 HYDRALAZINE PER 20 MG: Performed by: NURSE PRACTITIONER

## 2021-07-16 PROCEDURE — 83735 ASSAY OF MAGNESIUM: CPT | Performed by: NURSE PRACTITIONER

## 2021-07-16 PROCEDURE — 85025 COMPLETE CBC W/AUTO DIFF WBC: CPT | Performed by: INTERNAL MEDICINE

## 2021-07-16 PROCEDURE — 99232 SBSQ HOSP IP/OBS MODERATE 35: CPT | Performed by: INTERNAL MEDICINE

## 2021-07-16 RX ORDER — LOSARTAN POTASSIUM 50 MG/1
50 TABLET ORAL NIGHTLY
Status: DISCONTINUED | OUTPATIENT
Start: 2021-07-16 | End: 2021-07-19 | Stop reason: HOSPADM

## 2021-07-16 RX ORDER — POTASSIUM CHLORIDE 1.5 G/1.77G
20 POWDER, FOR SOLUTION ORAL ONCE
Status: COMPLETED | OUTPATIENT
Start: 2021-07-16 | End: 2021-07-16

## 2021-07-16 RX ADMIN — HYDROCODONE BITARTRATE AND ACETAMINOPHEN 1 TABLET: 5; 325 TABLET ORAL at 17:13

## 2021-07-16 RX ADMIN — ASPIRIN 81 MG: 81 TABLET, COATED ORAL at 08:52

## 2021-07-16 RX ADMIN — DICYCLOMINE HYDROCHLORIDE 10 MG: 10 CAPSULE ORAL at 17:13

## 2021-07-16 RX ADMIN — SODIUM CHLORIDE 75 ML/HR: 9 INJECTION, SOLUTION INTRAVENOUS at 00:20

## 2021-07-16 RX ADMIN — LOSARTAN POTASSIUM 50 MG: 50 TABLET, FILM COATED ORAL at 20:45

## 2021-07-16 RX ADMIN — VANCOMYCIN HYDROCHLORIDE 125 MG: 125 CAPSULE ORAL at 00:49

## 2021-07-16 RX ADMIN — POTASSIUM CHLORIDE 20 MEQ: 1.5 POWDER, FOR SOLUTION ORAL at 12:13

## 2021-07-16 RX ADMIN — HYDROCODONE BITARTRATE AND ACETAMINOPHEN 1 TABLET: 5; 325 TABLET ORAL at 00:59

## 2021-07-16 RX ADMIN — HYDROCODONE BITARTRATE AND ACETAMINOPHEN 1 TABLET: 5; 325 TABLET ORAL at 08:52

## 2021-07-16 RX ADMIN — DICYCLOMINE HYDROCHLORIDE 10 MG: 10 CAPSULE ORAL at 20:45

## 2021-07-16 RX ADMIN — SODIUM CHLORIDE, PRESERVATIVE FREE 10 ML: 5 INJECTION INTRAVENOUS at 08:52

## 2021-07-16 RX ADMIN — VANCOMYCIN HYDROCHLORIDE 125 MG: 125 CAPSULE ORAL at 06:20

## 2021-07-16 RX ADMIN — DICYCLOMINE HYDROCHLORIDE 10 MG: 10 CAPSULE ORAL at 08:52

## 2021-07-16 RX ADMIN — LABETALOL HYDROCHLORIDE 10 MG: 5 INJECTION, SOLUTION INTRAVENOUS at 03:46

## 2021-07-16 RX ADMIN — SERTRALINE HYDROCHLORIDE 50 MG: 50 TABLET, FILM COATED ORAL at 08:52

## 2021-07-16 RX ADMIN — DICYCLOMINE HYDROCHLORIDE 10 MG: 10 CAPSULE ORAL at 11:12

## 2021-07-16 RX ADMIN — VANCOMYCIN HYDROCHLORIDE 125 MG: 125 CAPSULE ORAL at 11:12

## 2021-07-16 RX ADMIN — CLOPIDOGREL 75 MG: 75 TABLET, FILM COATED ORAL at 08:52

## 2021-07-16 RX ADMIN — HYDRALAZINE HYDROCHLORIDE 10 MG: 20 INJECTION INTRAMUSCULAR; INTRAVENOUS at 08:52

## 2021-07-16 RX ADMIN — AMLODIPINE BESYLATE 5 MG: 5 TABLET ORAL at 20:45

## 2021-07-16 RX ADMIN — AMLODIPINE BESYLATE 5 MG: 5 TABLET ORAL at 08:52

## 2021-07-16 RX ADMIN — VANCOMYCIN HYDROCHLORIDE 125 MG: 125 CAPSULE ORAL at 17:13

## 2021-07-16 RX ADMIN — LEVOTHYROXINE SODIUM 75 MCG: 0.07 TABLET ORAL at 06:20

## 2021-07-16 RX ADMIN — MONTELUKAST SODIUM 10 MG: 10 TABLET, FILM COATED ORAL at 20:45

## 2021-07-16 RX ADMIN — LABETALOL HYDROCHLORIDE 10 MG: 5 INJECTION, SOLUTION INTRAVENOUS at 15:35

## 2021-07-16 RX ADMIN — PRAVASTATIN SODIUM 20 MG: 20 TABLET ORAL at 20:45

## 2021-07-16 RX ADMIN — HYDRALAZINE HYDROCHLORIDE 10 MG: 20 INJECTION INTRAMUSCULAR; INTRAVENOUS at 00:59

## 2021-07-16 NOTE — PROGRESS NOTES
Gastroenterology   Inpatient Progress Note    Reason for Follow Up: Colitis    Subjective     Interval History:   Patient without new complaints today.  She feels as though the diarrhea is somewhat improved.    Current Facility-Administered Medications:   •  acetaminophen (TYLENOL) tablet 650 mg, 650 mg, Oral, Q4H PRN, 650 mg at 07/15/21 0818 **OR** acetaminophen (TYLENOL) 160 MG/5ML solution 650 mg, 650 mg, Oral, Q4H PRN **OR** acetaminophen (TYLENOL) suppository 650 mg, 650 mg, Rectal, Q4H PRN, Tere Leonard APRN  •  amLODIPine (NORVASC) tablet 5 mg, 5 mg, Oral, BID, Irene eWinberg DO, 5 mg at 07/16/21 0852  •  aspirin EC tablet 81 mg, 81 mg, Oral, Daily, Alvin Cagle APRN, 81 mg at 07/16/21 0852  •  calcium carbonate (TUMS) chewable tablet 500 mg (200 mg elemental), 2 tablet, Oral, BID PRN, Tere Leonard APRN  •  clopidogrel (PLAVIX) tablet 75 mg, 75 mg, Oral, Daily, Alvin Cagle APRN, 75 mg at 07/16/21 0852  •  dicyclomine (BENTYL) capsule 10 mg, 10 mg, Oral, 4x Daily, Alvin Cagle APRN, 10 mg at 07/16/21 0852  •  hydrALAZINE (APRESOLINE) injection 10 mg, 10 mg, Intravenous, Q8H PRN, Alvin Cagle APRN, 10 mg at 07/16/21 0852  •  HYDROcodone-acetaminophen (NORCO) 5-325 MG per tablet 1 tablet, 1 tablet, Oral, Q8H PRN, Alvin Cagle APRN, 1 tablet at 07/16/21 0852  •  labetalol (NORMODYNE,TRANDATE) injection 10 mg, 10 mg, Intravenous, Q6H PRN, Irene Weinberg DO, 10 mg at 07/16/21 0346  •  levothyroxine (SYNTHROID, LEVOTHROID) tablet 75 mcg, 75 mcg, Oral, Q AM, Alvin Cagle APRN, 75 mcg at 07/16/21 0620  •  montelukast (SINGULAIR) tablet 10 mg, 10 mg, Oral, Nightly, Alvin Cagle APRN, 10 mg at 07/15/21 2111  •  morphine injection 1 mg, 1 mg, Intravenous, Q4H PRN, Alvin Cagle APRN  •  nitroglycerin (NITROSTAT) SL tablet 0.4 mg, 0.4 mg, Sublingual, Q5 Min PRN, Tere Leonard APRN  •  ondansetron (ZOFRAN) injection 4 mg, 4 mg, Intravenous, Q8H PRN, Alvin Cagle APRN,  4 mg at 07/15/21 1057  •  Pharmacy Consult, , Does not apply, Continuous PRN, Willem Underwood MD  •  pravastatin (PRAVACHOL) tablet 20 mg, 20 mg, Oral, Nightly, Alvin Cagle APRN, 20 mg at 07/15/21 2111  •  sertraline (ZOLOFT) tablet 50 mg, 50 mg, Oral, Daily, Alvin Cagle APRN, 50 mg at 07/16/21 0852  •  sodium chloride 0.9 % flush 10 mL, 10 mL, Intravenous, PRN, Jose Batista III, PA  •  sodium chloride 0.9 % flush 10 mL, 10 mL, Intravenous, Q12H, Tere Leonard APRN, 10 mL at 07/16/21 0852  •  sodium chloride 0.9 % flush 10 mL, 10 mL, Intravenous, PRN, Tere Leonard APRN  •  vancomycin (VANCOCIN) capsule 125 mg, 125 mg, Oral, Q6H, Willem Underwood MD, 125 mg at 07/16/21 0620  Review of Systems:    All systems were reviewed and negative except for:  Gastrointestinal: positive for  diarrhea    Objective     Vital Signs  Temp:  [97.6 °F (36.4 °C)-98.1 °F (36.7 °C)] 97.6 °F (36.4 °C)  Heart Rate:  [] 69  Resp:  [16-18] 16  BP: (169-203)/(62-82) 172/62  Body mass index is 21.73 kg/m².    Intake/Output Summary (Last 24 hours) at 7/16/2021 1040  Last data filed at 7/16/2021 0700  Gross per 24 hour   Intake 120 ml   Output --   Net 120 ml     No intake/output data recorded.     Physical Exam:   General: patient awake, alert and cooperative   Eyes: no scleral icterus   Skin: warm and dry, not jaundiced   Abdomen: soft, nontender, nondistended; normal bowel sounds, no masses palpated, no periumbical lymphadenopathy   Psychiatric: Appropriate affect and behavior     Results Review:     I reviewed the patient's new clinical results.    Results from last 7 days   Lab Units 07/16/21  0557 07/15/21  0539 07/14/21  2242   WBC 10*3/mm3 16.25* 10.33 12.84*   HEMOGLOBIN g/dL 8.6* 8.0* 8.7*   HEMATOCRIT % 27.8* 24.1* 27.1*   PLATELETS 10*3/mm3 273 255 252     Results from last 7 days   Lab Units 07/16/21  0557 07/15/21  1749 07/15/21  0539 07/14/21  2242   SODIUM mmol/L 139  --  135* 132*    POTASSIUM mmol/L 3.6 3.5 3.0* 3.4*   CHLORIDE mmol/L 104  --  100 96*   CO2 mmol/L 21.8*  --  22.8 25.1   BUN mg/dL 20  --  29* 29*   CREATININE mg/dL 1.46*  --  1.82* 2.02*   CALCIUM mg/dL 8.3*  --  8.4* 8.6   BILIRUBIN mg/dL  --   --   --  0.2   ALK PHOS U/L  --   --   --  81   ALT (SGPT) U/L  --   --   --  14   AST (SGOT) U/L  --   --   --  18   GLUCOSE mg/dL 102*  --  98 106*         Lab Results   Lab Value Date/Time    LIPASE 108 (H) 07/14/2021 2242    LIPASE 18 07/10/2021 0949    LIPASE 47 (H) 06/09/2021 1117    LIPASE 46 (H) 05/31/2021 1335    LIPASE 106 11/09/2020 2231    LIPASE 152 08/30/2020 1943       Radiology:  CT Abdomen Pelvis Without Contrast   Final Result   Colitis involving the sigmoid colon. No pneumatosis or free air is seen.   There is no evidence of obstruction.       Radiation dose reduction techniques were utilized, including automated   exposure control and exposure modulation based on body size.       This report was finalized on 7/15/2021 1:21 AM by Dr. Vi Govea M.D.          CT Head Without Contrast   Final Result   No acute intracranial findings.       Radiation dose reduction techniques were utilized, including automated   exposure control and exposure modulation based on body size.       This report was finalized on 7/15/2021 1:08 AM by Dr. Vi Govea M.D.              Assessment/Plan     Patient Active Problem List   Diagnosis   • Colitis   • HTN (hypertension)   • CKD (chronic kidney disease) stage 4, GFR 15-29 ml/min (CMS/[facility])   • JEANIE (acute kidney injury) (CMS/[facility])   • Hyponatremia   • Hypokalemia   • Anemia   • Leukocytosis   • Acute metabolic encephalopathy   • Dementia without behavioral disturbance (CMS/[facility])       Assessment:  1. Colitis on CT.  2. Stool positive for C. Difficile.  3. Anemia.  No overt bleeding and hemoglobin stable.  Stool Hemoccult negative.  4. Diverticulosis.      Plan:  · Patient has been started on vancomycin for C. difficile  colitis.  · Monitor clinical response.  · Monitor H&H.  · No plans for colonoscopy at this time.  I discussed the patients findings and my recommendations with patient and nursing staff.    MD Jason Mejia M.D.  [facility] [address]  Office: [phone]

## 2021-07-16 NOTE — PLAN OF CARE
Goal Outcome Evaluation:  Plan of Care Reviewed With: patient        Progress: no change  Outcome Summary: VSS ex hypertension. PRN labetalol and hydralazine given for hypertension. Alert and oriented to self with intermittent bouts of clarity. Pt c/o abdominal pain which worsens post bowel movement. Prn norco given x1 for pain this shift. Sitter remains at bedside. IVF @ 75 ml/hr continued. Normal sinus rhythm on monitor. On room air. Up to BSC x1 assist. PO vanc started for C. diff. Will continue to monitor.

## 2021-07-16 NOTE — PROGRESS NOTES
"    Star Thurston MD/Irene Weniberg DO    Nephrology Progress Note      Patient Name: Kellen Parmar  :1935  Age: 85 y.o.    Patient Care Team:  Naomi Rivera APRN as PCP - General (Family Medicine)    Referral Provider: Greyson Velázquez MD  Encounter Provider: Irene Weinberg DO  Date of Service: 2021    SUBJECTIVE:  Feeling better; abdominal pain better; diarrhea pretty much stopped per patient ( C. Diff positive)     Current Meds  Scheduled Meds:amLODIPine, 5 mg, Oral, BID  aspirin, 81 mg, Oral, Daily  clopidogrel, 75 mg, Oral, Daily  dicyclomine, 10 mg, Oral, 4x Daily  levothyroxine, 75 mcg, Oral, Q AM  montelukast, 10 mg, Oral, Nightly  pravastatin, 20 mg, Oral, Nightly  sertraline, 50 mg, Oral, Daily  sodium chloride, 10 mL, Intravenous, Q12H  vancomycin, 125 mg, Oral, Q6H      Continuous Infusions:Pharmacy Consult,       PRN Meds:.•  acetaminophen **OR** acetaminophen **OR** acetaminophen  •  calcium carbonate  •  hydrALAZINE  •  HYDROcodone-acetaminophen  •  labetalol  •  Morphine  •  nitroglycerin  •  ondansetron  •  Pharmacy Consult  •  sodium chloride  •  sodium chloride    OBJECTIVE:     Vitals:   /62 (BP Location: Right arm, Patient Position: Lying)   Pulse 69   Temp 97.6 °F (36.4 °C) (Oral)   Resp 16   Ht 162.6 cm (64\")   Wt 57.4 kg (126 lb 9.6 oz)   SpO2 91%   Breastfeeding No   BMI 21.73 kg/m²   Temp:  [97.6 °F (36.4 °C)-98 °F (36.7 °C)] 97.6 °F (36.4 °C)  Heart Rate:  [64-77] 69  Resp:  [16-18] 16  BP: (169-203)/(62-82) 172/62    I/O:   I/O last 3 completed shifts:  In: 120 [P.O.:120]  Out: -   No intake/output data recorded.    Intake/Output Summary (Last 24 hours) at 2021 1134  Last data filed at 2021 0700  Gross per 24 hour   Intake 120 ml   Output --   Net 120 ml       Weights:  Flowsheet Rows      First Filed Value   Admission Height  162.6 cm (64\") Documented at 07/15/2021 0457   Admission Weight  57.4 kg (126 lb 9.6 oz) Documented at 07/15/2021 0457    "     Weight change:     Physical Exam:   GEN: No acute distress, alert   CV: regular rate and rhythm, no rubs or gallops  PULM: clear to auscultation bilaterally, normal respiratory excursion  ABD: soft, nontender, nondistended, normoactive bowel sounds, no guarding or rebound.  EXTR: no cyanosis, clubbing; + edema        RESULTS:    Labs:   Results from last 7 days   Lab Units 07/16/21  0557 07/15/21  0539 07/14/21  2242 07/10/21  0949   WBC 10*3/mm3 16.25* 10.33 12.84* 10.88   HEMOGLOBIN g/dL 8.6* 8.0* 8.7* 8.8*   HEMATOCRIT % 27.8* 24.1* 27.1* 28.2*   PLATELETS 10*3/mm3 273 255 252 275     Results from last 7 days   Lab Units 07/16/21  0557 07/15/21  1749 07/15/21  0539 07/14/21  2242   SODIUM mmol/L 139  --  135* 132*   POTASSIUM mmol/L 3.6 3.5 3.0* 3.4*   CHLORIDE mmol/L 104  --  100 96*   CO2 mmol/L 21.8*  --  22.8 25.1   BUN mg/dL 20  --  29* 29*   CREATININE mg/dL 1.46*  --  1.82* 2.02*   GLUCOSE mg/dL 102*  --  98 106*   CALCIUM mg/dL 8.3*  --  8.4* 8.6   ALBUMIN g/dL 2.80*  --   --  3.50   AST (SGOT) U/L  --   --   --  18   ALT (SGPT) U/L  --   --   --  14     Results from last 7 days   Lab Units 07/16/21  0557 07/15/21  1137   MAGNESIUM mg/dL 2.0 2.1         Results from last 7 days   Lab Units 07/15/21  1137   TSH uIU/mL 1.530           Imaging  CT Abdomen Pelvis Without Contrast    Result Date: 7/15/2021  CT OF THE ABDOMEN AND PELVIS WITHOUT CONTRAST  HISTORY: Left lower quadrant pain  COMPARISON: None available.  TECHNIQUE: Axial CT imaging was obtained through the abdomen and pelvis. No IV contrast was administered.  FINDINGS: Images through the lung bases do not demonstrate any acute abnormalities. No focal hepatic lesions are seen. Spleen, stomach, and duodenum all appear unremarkable. The gallbladder is not visualized, and may be absent. There is some dilatation of the common bile duct, measuring up to 1 cm. However, this may be related to age and prior cholecystectomy. No hydronephrosis is seen on  either side. There are hyperdensities identified within both kidneys. Most of them appear to be vascular calcifications, although a punctate nonobstructing stone cannot be completely excluded. There are simple appearing left renal cyst. No additional follow-up is necessary. No distal ureteral or bladder stones are seen. Urinary bladder is decompressed. Uterus appears normal. There is diffuse wall thickening involving the sigmoid colon, suggesting colitis. There is adjacent pericolonic soft tissue stranding. While there is diverticular disease within this area, the length of colon involved is more keeping with colitis. No pneumatosis or free air is seen. There is no evidence of obstruction. The appendix is not identified. The patient is status post aortic stent grafting. Aneurysm sac measures approximately 2.9 x 4.7 cm. Presence or absence of endoleak cannot be excluded on this unenhanced study. No acute osseous abnormalities are seen.      Colitis involving the sigmoid colon. No pneumatosis or free air is seen. There is no evidence of obstruction.  Radiation dose reduction techniques were utilized, including automated exposure control and exposure modulation based on body size.  This report was finalized on 7/15/2021 1:21 AM by Dr. Vi Govea M.D.      CT Head Without Contrast    Result Date: 7/15/2021  CT HEAD WITHOUT CONTRAST  HISTORY: Altered mental status  COMPARISON: None available.  TECHNIQUE: Axial CT imaging was obtained through the brain. No IV contrast was administered.  FINDINGS: No acute intracranial hemorrhage is seen. There is diffuse atrophy. There is periventricular and deep white matter microangiopathic change. There is mucosal thickening identified within the ethmoid sinuses. There is no midline shift or mass effect. Old lacunar infarct is noted within the left basal ganglia.      No acute intracranial findings.  Radiation dose reduction techniques were utilized, including automated exposure  control and exposure modulation based on body size.  This report was finalized on 7/15/2021 1:08 AM by Dr. Vi Govea M.D.         **All available labs and imaging studies reviewed. **       ASSESSMENT/PLAN:  Kellen Parmar is a 85 y.o.female with history of DM type 2,  HTN, hypothyroidism, dementia who presents to [facility] for hallucinations. She was found to be septic and sigmoid colitis was noted on CT. C. Diff is now positive.       JEANIE on CKD Stage 3  - Baseline Cr around 1.6  - Cr back to baseline and diarrhea now controlled--> stop IVF  - Hold furosemide for today     HTN  - stop IVF as above  - partially exacerbated by pain   - on amlodipine   - restart Losartan 50mg daily tonight  - BP hold parameters in place     Anemia  - likely partially due to infection and renal insufficiency  with history of iron def anemia  - ferritin 77.3 5/19/2021--> goal 100  - iron sat 22% 5/19/2021--> at goal   - transfuse as needed     Edema  - furosemide on hold; reasonable     DM Type 2     Delirium/Dementia  - appears to have improved overall     C. Diff Colitis  - on PO Vanco    Further Plans/Recommendations  - give 20meq of KCL x1       Please feel free to contact me with any questions or concerns.     Irene Weinberg, DO  [facility]  P: [phone]  F: [phone]  Pager: [phone]

## 2021-07-16 NOTE — PLAN OF CARE
Pt hypertensive this shift, PRN hydralazine given with mild improvement. Otherwise vss, RA, NSR. IVF were d/c and pt now saline locked. PO vanc for cdiff. Physical therapy consulted and to see tomorrow. Bed alarm on, pt is mildly impulsive but has only tried to get out of bed once so far this shift. Oriented to self and knows she is in a hospital but not which one, disoriented to time and situation. Up to bedside commode with assistance, having foul smelling loose stool. C/o abdominal pain treated with PRN norco. No distress

## 2021-07-16 NOTE — PROGRESS NOTES
[facility]     LOS: 1 day     Subjective:    CC:Psychiatric Evaluation and Hallucinations    DIET:  Diet Order   Procedures   • Diet Regular; Low Sodium; 3,000 mg Na     No soa  No cp  Tolerating oral intake  Diarrhea slightly improved  Denies nausea or vomiting  Objective:    Vital Signs:  Temp:  [97.6 °F (36.4 °C)-99.9 °F (37.7 °C)] 99.9 °F (37.7 °C)  Heart Rate:  [64-80] 80  Resp:  [16-18] 16  BP: (153-187)/(62-83) 171/71    SpO2:  [91 %-97 %] 97 %  on   ;   Device (Oxygen Therapy): room air  Body mass index is 21.73 kg/m².    Physical Exam  HENT:      Head: Normocephalic and atraumatic.   Cardiovascular:      Heart sounds: No murmur heard.   No friction rub.   Pulmonary:      Effort: Pulmonary effort is normal.      Breath sounds: Normal breath sounds.   Abdominal:      General: Bowel sounds are normal. There is no distension.      Palpations: Abdomen is soft.      Tenderness: There is no abdominal tenderness.   Skin:     General: Skin is warm and dry.         Results Review:    Glucose   Date Value Ref Range Status   07/16/2021 102 (H) 65 - 99 mg/dL Final   07/15/2021 98 65 - 99 mg/dL Final   07/14/2021 106 (H) 65 - 99 mg/dL Final     Results from last 7 days   Lab Units 07/16/21  0557   WBC 10*3/mm3 16.25*   HEMOGLOBIN g/dL 8.6*   HEMATOCRIT % 27.8*   PLATELETS 10*3/mm3 273     Results from last 7 days   Lab Units 07/16/21  0557 07/14/21  2242   SODIUM mmol/L 139 132*   POTASSIUM mmol/L 3.6 3.4*   CHLORIDE mmol/L 104 96*   CO2 mmol/L 21.8* 25.1   BUN mg/dL 20 29*   CREATININE mg/dL 1.46* 2.02*   CALCIUM mg/dL 8.3* 8.6   BILIRUBIN mg/dL  --  0.2   ALK PHOS U/L  --  81   ALT (SGPT) U/L  --  14   AST (SGOT) U/L  --  18   GLUCOSE mg/dL 102* 106*         Results from last 7 days   Lab Units 07/16/21  0557   MAGNESIUM mg/dL 2.0     Results from last 7 days   Lab Units 07/14/21  2242   TROPONIN T ng/mL <0.010     Cultures:  No results found for: BLOODCX, URINECX, WOUNDCX, MRSACX, RESPCX,  STOOLCX    I have reviewed daily medications and changes in CPOE    Scheduled meds  amLODIPine, 5 mg, Oral, BID  aspirin, 81 mg, Oral, Daily  clopidogrel, 75 mg, Oral, Daily  dicyclomine, 10 mg, Oral, 4x Daily  levothyroxine, 75 mcg, Oral, Q AM  losartan, 50 mg, Oral, Nightly  montelukast, 10 mg, Oral, Nightly  pravastatin, 20 mg, Oral, Nightly  sertraline, 50 mg, Oral, Daily  sodium chloride, 10 mL, Intravenous, Q12H  vancomycin, 125 mg, Oral, Q6H        Pharmacy Consult,       PRN meds  •  acetaminophen **OR** acetaminophen **OR** acetaminophen  •  calcium carbonate  •  hydrALAZINE  •  HYDROcodone-acetaminophen  •  labetalol  •  Morphine  •  nitroglycerin  •  ondansetron  •  Pharmacy Consult  •  sodium chloride  •  sodium chloride        Colitis    HTN (hypertension)    CKD (chronic kidney disease) stage 4, GFR 15-29 ml/min (CMS/[facility])    JEANIE (acute kidney injury) (CMS/[facility])    Hyponatremia    Hypokalemia    Anemia    Leukocytosis    Acute metabolic encephalopathy    Dementia without behavioral disturbance (CMS/[facility])        Assessment/Plan:  -C. Difficile-vancomycin  -Kidney function stable  -Potassium is improved  -Continue aspirin and Plavix  -Mental status is improved slightly  -Decrease fluids    DVT PPX:       Willem Underwood MD  07/16/21  17:39 EDT

## 2021-07-17 PROBLEM — A04.72 CLOSTRIDIUM DIFFICILE COLITIS: Status: ACTIVE | Noted: 2021-07-15

## 2021-07-17 PROBLEM — I65.23 CAROTID STENOSIS, BILATERAL: Status: ACTIVE | Noted: 2021-07-17

## 2021-07-17 LAB
ANION GAP SERPL CALCULATED.3IONS-SCNC: 12.8 MMOL/L (ref 5–15)
BASOPHILS # BLD AUTO: 0.08 10*3/MM3 (ref 0–0.2)
BASOPHILS NFR BLD AUTO: 0.4 % (ref 0–1.5)
BUN SERPL-MCNC: 18 MG/DL (ref 8–23)
BUN/CREAT SERPL: 13.8 (ref 7–25)
CALCIUM SPEC-SCNC: 8.1 MG/DL (ref 8.6–10.5)
CHLORIDE SERPL-SCNC: 103 MMOL/L (ref 98–107)
CO2 SERPL-SCNC: 19.2 MMOL/L (ref 22–29)
CREAT SERPL-MCNC: 1.3 MG/DL (ref 0.57–1)
DEPRECATED RDW RBC AUTO: 43.1 FL (ref 37–54)
EOSINOPHIL # BLD AUTO: 0.11 10*3/MM3 (ref 0–0.4)
EOSINOPHIL NFR BLD AUTO: 0.6 % (ref 0.3–6.2)
ERYTHROCYTE [DISTWIDTH] IN BLOOD BY AUTOMATED COUNT: 12.7 % (ref 12.3–15.4)
GFR SERPL CREATININE-BSD FRML MDRD: 39 ML/MIN/1.73
GLUCOSE SERPL-MCNC: 89 MG/DL (ref 65–99)
HCT VFR BLD AUTO: 26.6 % (ref 34–46.6)
HGB BLD-MCNC: 8.4 G/DL (ref 12–15.9)
LYMPHOCYTES # BLD AUTO: 1.61 10*3/MM3 (ref 0.7–3.1)
LYMPHOCYTES NFR BLD AUTO: 8.8 % (ref 19.6–45.3)
MAGNESIUM SERPL-MCNC: 2.1 MG/DL (ref 1.6–2.4)
MCH RBC QN AUTO: 28.7 PG (ref 26.6–33)
MCHC RBC AUTO-ENTMCNC: 31.6 G/DL (ref 31.5–35.7)
MCV RBC AUTO: 90.8 FL (ref 79–97)
MONOCYTES # BLD AUTO: 1.11 10*3/MM3 (ref 0.1–0.9)
MONOCYTES NFR BLD AUTO: 6 % (ref 5–12)
NEUTROPHILS NFR BLD AUTO: 15.14 10*3/MM3 (ref 1.7–7)
NEUTROPHILS NFR BLD AUTO: 82.5 % (ref 42.7–76)
PLATELET # BLD AUTO: 247 10*3/MM3 (ref 140–450)
PMV BLD AUTO: 9.2 FL (ref 6–12)
POTASSIUM SERPL-SCNC: 3.5 MMOL/L (ref 3.5–5.2)
RBC # BLD AUTO: 2.93 10*6/MM3 (ref 3.77–5.28)
SODIUM SERPL-SCNC: 135 MMOL/L (ref 136–145)
WBC # BLD AUTO: 18.36 10*3/MM3 (ref 3.4–10.8)

## 2021-07-17 PROCEDURE — 99232 SBSQ HOSP IP/OBS MODERATE 35: CPT | Performed by: INTERNAL MEDICINE

## 2021-07-17 PROCEDURE — 85025 COMPLETE CBC W/AUTO DIFF WBC: CPT | Performed by: INTERNAL MEDICINE

## 2021-07-17 PROCEDURE — 97161 PT EVAL LOW COMPLEX 20 MIN: CPT | Performed by: PHYSICAL THERAPIST

## 2021-07-17 PROCEDURE — 97116 GAIT TRAINING THERAPY: CPT | Performed by: PHYSICAL THERAPIST

## 2021-07-17 PROCEDURE — 80048 BASIC METABOLIC PNL TOTAL CA: CPT | Performed by: INTERNAL MEDICINE

## 2021-07-17 PROCEDURE — 83735 ASSAY OF MAGNESIUM: CPT | Performed by: INTERNAL MEDICINE

## 2021-07-17 RX ADMIN — PRAVASTATIN SODIUM 20 MG: 20 TABLET ORAL at 20:22

## 2021-07-17 RX ADMIN — DICYCLOMINE HYDROCHLORIDE 10 MG: 10 CAPSULE ORAL at 20:22

## 2021-07-17 RX ADMIN — ASPIRIN 81 MG: 81 TABLET, COATED ORAL at 08:42

## 2021-07-17 RX ADMIN — HYDROCODONE BITARTRATE AND ACETAMINOPHEN 1 TABLET: 5; 325 TABLET ORAL at 23:33

## 2021-07-17 RX ADMIN — LOSARTAN POTASSIUM 50 MG: 50 TABLET, FILM COATED ORAL at 20:22

## 2021-07-17 RX ADMIN — SODIUM CHLORIDE, PRESERVATIVE FREE 10 ML: 5 INJECTION INTRAVENOUS at 20:22

## 2021-07-17 RX ADMIN — LEVOTHYROXINE SODIUM 75 MCG: 0.07 TABLET ORAL at 05:18

## 2021-07-17 RX ADMIN — VANCOMYCIN HYDROCHLORIDE 125 MG: 125 CAPSULE ORAL at 23:40

## 2021-07-17 RX ADMIN — AMLODIPINE BESYLATE 5 MG: 5 TABLET ORAL at 20:22

## 2021-07-17 RX ADMIN — MONTELUKAST SODIUM 10 MG: 10 TABLET, FILM COATED ORAL at 20:22

## 2021-07-17 RX ADMIN — LABETALOL HYDROCHLORIDE 10 MG: 5 INJECTION, SOLUTION INTRAVENOUS at 00:02

## 2021-07-17 RX ADMIN — VANCOMYCIN HYDROCHLORIDE 125 MG: 125 CAPSULE ORAL at 05:19

## 2021-07-17 RX ADMIN — CLOPIDOGREL 75 MG: 75 TABLET, FILM COATED ORAL at 08:42

## 2021-07-17 RX ADMIN — DICYCLOMINE HYDROCHLORIDE 10 MG: 10 CAPSULE ORAL at 08:42

## 2021-07-17 RX ADMIN — VANCOMYCIN HYDROCHLORIDE 125 MG: 125 CAPSULE ORAL at 17:05

## 2021-07-17 RX ADMIN — HYDROCODONE BITARTRATE AND ACETAMINOPHEN 1 TABLET: 5; 325 TABLET ORAL at 05:18

## 2021-07-17 RX ADMIN — DICYCLOMINE HYDROCHLORIDE 10 MG: 10 CAPSULE ORAL at 17:05

## 2021-07-17 RX ADMIN — AMLODIPINE BESYLATE 5 MG: 5 TABLET ORAL at 08:42

## 2021-07-17 RX ADMIN — DICYCLOMINE HYDROCHLORIDE 10 MG: 10 CAPSULE ORAL at 12:59

## 2021-07-17 RX ADMIN — VANCOMYCIN HYDROCHLORIDE 125 MG: 125 CAPSULE ORAL at 12:59

## 2021-07-17 RX ADMIN — VANCOMYCIN HYDROCHLORIDE 125 MG: 125 CAPSULE ORAL at 00:03

## 2021-07-17 RX ADMIN — SODIUM CHLORIDE, PRESERVATIVE FREE 10 ML: 5 INJECTION INTRAVENOUS at 08:43

## 2021-07-17 RX ADMIN — SERTRALINE HYDROCHLORIDE 50 MG: 50 TABLET, FILM COATED ORAL at 08:42

## 2021-07-17 NOTE — PROGRESS NOTES
Gastroenterology   Inpatient Progress Note    Reason for Follow Up:  Colitis, C. difficile positive    Subjective     Interval History:   Patient reports mild tenderness today.  She says it is about the same and may be slightly better however it is still present.  She describes it as cramping and sort of diffuse throughout the abdomen.  She has been able to advance her p.o. diet    Current Facility-Administered Medications:   •  acetaminophen (TYLENOL) tablet 650 mg, 650 mg, Oral, Q4H PRN, 650 mg at 07/15/21 0818 **OR** acetaminophen (TYLENOL) 160 MG/5ML solution 650 mg, 650 mg, Oral, Q4H PRN **OR** acetaminophen (TYLENOL) suppository 650 mg, 650 mg, Rectal, Q4H PRN, Tere Leonard APRN  •  amLODIPine (NORVASC) tablet 5 mg, 5 mg, Oral, BID, Irene Weinberg DO, 5 mg at 07/17/21 0842  •  aspirin EC tablet 81 mg, 81 mg, Oral, Daily, Alvin Cagle APRN, 81 mg at 07/17/21 0842  •  calcium carbonate (TUMS) chewable tablet 500 mg (200 mg elemental), 2 tablet, Oral, BID PRN, Tere Leonard APRN  •  clopidogrel (PLAVIX) tablet 75 mg, 75 mg, Oral, Daily, Alvin Cagle APRN, 75 mg at 07/17/21 0842  •  dicyclomine (BENTYL) capsule 10 mg, 10 mg, Oral, 4x Daily, Alvin Cagle APRN, 10 mg at 07/17/21 0842  •  hydrALAZINE (APRESOLINE) injection 10 mg, 10 mg, Intravenous, Q8H PRN, Alvin Cagle APRN, 10 mg at 07/16/21 0852  •  HYDROcodone-acetaminophen (NORCO) 5-325 MG per tablet 1 tablet, 1 tablet, Oral, Q8H PRN, Alvin Cagle APRN, 1 tablet at 07/17/21 0518  •  labetalol (NORMODYNE,TRANDATE) injection 10 mg, 10 mg, Intravenous, Q6H PRN, Irene Weinberg DO, 10 mg at 07/17/21 0002  •  levothyroxine (SYNTHROID, LEVOTHROID) tablet 75 mcg, 75 mcg, Oral, Q AM, Alvin Cagle APRN, 75 mcg at 07/17/21 0518  •  losartan (COZAAR) tablet 50 mg, 50 mg, Oral, Nightly, Irene Weinberg DO, 50 mg at 07/16/21 2045  •  montelukast (SINGULAIR) tablet 10 mg, 10 mg, Oral, Nightly, Alvin Cagle APRN, 10 mg at 07/16/21 2045  •   morphine injection 1 mg, 1 mg, Intravenous, Q4H PRN, Alvin Cagle APRN  •  nitroglycerin (NITROSTAT) SL tablet 0.4 mg, 0.4 mg, Sublingual, Q5 Min PRN, Tere Leonard APRN  •  ondansetron (ZOFRAN) injection 4 mg, 4 mg, Intravenous, Q8H PRN, Alivn Cagle APRN, 4 mg at 07/15/21 1057  •  Pharmacy Consult, , Does not apply, Continuous PRN, Willem Underwood MD  •  pravastatin (PRAVACHOL) tablet 20 mg, 20 mg, Oral, Nightly, Alvin Cagle APRN, 20 mg at 07/16/21 2045  •  sertraline (ZOLOFT) tablet 50 mg, 50 mg, Oral, Daily, Alvin Cagle APRN, 50 mg at 07/17/21 0842  •  sodium chloride 0.9 % flush 10 mL, 10 mL, Intravenous, PRN, Jose Batista III, PA  •  sodium chloride 0.9 % flush 10 mL, 10 mL, Intravenous, Q12H, Tere Leonard APRN, 10 mL at 07/17/21 0843  •  sodium chloride 0.9 % flush 10 mL, 10 mL, Intravenous, PRN, Tere Leonard APRN  •  vancomycin (VANCOCIN) capsule 125 mg, 125 mg, Oral, Q6H, Willem Underwood MD, 125 mg at 07/17/21 0519  Review of Systems:               All systems were reviewed and negative except for:  Gastrointestinal: positive for  pain    Objective     Vital Signs  Temp:  [97.1 °F (36.2 °C)-99.9 °F (37.7 °C)] 97.1 °F (36.2 °C)  Heart Rate:  [66-80] 66  Resp:  [16] 16  BP: (153-172)/(54-83) 154/83  Body mass index is 21.73 kg/m².    Intake/Output Summary (Last 24 hours) at 7/17/2021 1019  Last data filed at 7/17/2021 0900  Gross per 24 hour   Intake 480 ml   Output --   Net 480 ml     I/O this shift:  In: 240 [P.O.:240]  Out: -                 Physical Exam:              General: patient awake, alert and cooperative              Eyes: no scleral icterus              Skin: warm and dry, not jaundiced              Abdomen: soft, mildly tender to palpation, nondistended; normal bowel sounds, no masses palpated, no periumbical lymphadenopathy              Psychiatric: Appropriate affect and behavior                Results Review:                I  reviewed the patient's new clinical results.    Results from last 7 days   Lab Units 07/17/21  0851 07/16/21  0557 07/15/21  0539   WBC 10*3/mm3 18.36* 16.25* 10.33   HEMOGLOBIN g/dL 8.4* 8.6* 8.0*   HEMATOCRIT % 26.6* 27.8* 24.1*   PLATELETS 10*3/mm3 247 273 255     Results from last 7 days   Lab Units 07/17/21  0851 07/16/21  0557 07/15/21  1749 07/15/21  0539 07/14/21  2242   SODIUM mmol/L 135* 139  --  135* 132*   POTASSIUM mmol/L 3.5 3.6 3.5 3.0* 3.4*   CHLORIDE mmol/L 103 104  --  100 96*   CO2 mmol/L 19.2* 21.8*  --  22.8 25.1   BUN mg/dL 18 20  --  29* 29*   CREATININE mg/dL 1.30* 1.46*  --  1.82* 2.02*   CALCIUM mg/dL 8.1* 8.3*  --  8.4* 8.6   BILIRUBIN mg/dL  --   --   --   --  0.2   ALK PHOS U/L  --   --   --   --  81   ALT (SGPT) U/L  --   --   --   --  14   AST (SGOT) U/L  --   --   --   --  18   GLUCOSE mg/dL 89 102*  --  98 106*         Lab Results   Lab Value Date/Time    LIPASE 108 (H) 07/14/2021 2242    LIPASE 18 07/10/2021 0949    LIPASE 47 (H) 06/09/2021 1117    LIPASE 46 (H) 05/31/2021 1335    LIPASE 106 11/09/2020 2231    LIPASE 152 08/30/2020 1943       Radiology:  CT Abdomen Pelvis Without Contrast   Final Result   Colitis involving the sigmoid colon. No pneumatosis or free air is seen.   There is no evidence of obstruction.       Radiation dose reduction techniques were utilized, including automated   exposure control and exposure modulation based on body size.       This report was finalized on 7/15/2021 1:21 AM by Dr. Vi Govea M.D.          CT Head Without Contrast   Final Result   No acute intracranial findings.       Radiation dose reduction techniques were utilized, including automated   exposure control and exposure modulation based on body size.       This report was finalized on 7/15/2021 1:08 AM by Dr. Vi Govea M.D.              Assessment/Plan     Patient Active Problem List   Diagnosis   • Colitis   • HTN (hypertension)   • CKD (chronic kidney disease) stage  4, GFR 15-29 ml/min (CMS/HCC)   • JEANIE (acute kidney injury) (CMS/HCC)   • Hyponatremia   • Hypokalemia   • Anemia   • Leukocytosis   • Acute metabolic encephalopathy   • Dementia without behavioral disturbance (CMS/HCC)       Assessment:  1. Colitis visible on CT scan and was positive for C. Difficile  2. Patient has been started on vancomycin  3. She of diverticulosis  4. Anemia, stable  5. Abdominal pain.  The patient was mildly tender to exam today and complains of some abdominal pain.  She does inform me that this is similar to better than how it has been and not worse.  She did have a slight increase in her white count today and this needs to be monitored    These problems are new to me.  Plan:  · Continue vancomycin  · Clinically the patient looks okay but she does have some mild tenderness and a slightly increased white count today we will keep an eye on both her clinically her physical exam and her labs while she is being treated effectively with the vancomycin  · Monitor her clinical response  · Monitor H&H and watch for signs of bleeding  · No urgent need for colonoscopy could consider if she does not improve as expected  I discussed the patients findings and my recommendations with patient.             Vineet Schilling M.D.  [facility] [address]  Office: [phone]

## 2021-07-17 NOTE — PLAN OF CARE
Goal Outcome Evaluation:  Plan of Care Reviewed With: patient        Progress: no change  Outcome Summary: VSS ex hypertension. Prn labetalol given. Alert and oriented x2. Pt c/o abdominal pain. Treated with prn pain meds x1. Bed alarm in place. On room air. Normal sinus rhythm on monitor. Continues on PO vanc. 1 BM this shift. x1 assist to bedside commode. Will continue to monitor.

## 2021-07-17 NOTE — PROGRESS NOTES
Name: Kellen Parmar ADMIT: 2021   : 1935  PCP: Naomi Rivera APRN    MRN: [id number] LOS: 2 days   AGE/SEX: 85 y.o. female  ROOM: [facility]     Subjective   Subjective   Sitting on commode. No nausea or vomiting but having abdominal cramping. She reports at least 3-4 BMs so far. No chest pain or dyspnea. No cough/fever/chills. Asking when she can go home.     Objective   Objective   Vital Signs  Temp:  [97.1 °F (36.2 °C)-98.6 °F (37 °C)] 97.2 °F (36.2 °C)  Heart Rate:  [66-80] 80  Resp:  [16] 16  BP: (153-172)/(54-86) 169/72  SpO2:  [94 %-97 %] 96 %  on   ;   Device (Oxygen Therapy): room air  Body mass index is 21.73 kg/m².     Physical Exam  Vitals and nursing note reviewed.   Constitutional:       General: She is not in acute distress.     Appearance: She is ill-appearing (chronically). She is not toxic-appearing.      Comments: Pleasant and alert.   HENT:      Head:      Comments: Missing teeth  Cardiovascular:      Rate and Rhythm: Normal rate and regular rhythm.      Pulses: Normal pulses.      Heart sounds: Normal heart sounds.   Pulmonary:      Effort: Pulmonary effort is normal. No respiratory distress.      Breath sounds: Normal breath sounds.   Abdominal:      General: Bowel sounds are normal. There is no distension.      Palpations: Abdomen is soft.      Tenderness: There is no abdominal tenderness.   Musculoskeletal:         General: No swelling. Normal range of motion.      Cervical back: Normal range of motion and neck supple.   Skin:     General: Skin is warm and dry.      Findings: No bruising.   Neurological:      Mental Status: She is alert. She is disoriented.      Sensory: No sensory deficit.      Motor: Weakness present.      Coordination: Coordination normal.      Comments: Aware of self, place, situation, unaware of time.   Psychiatric:         Mood and Affect: Mood normal.         Behavior: Behavior normal.       Results Review:       I reviewed the patient's new  clinical results.  Results from last 7 days   Lab Units 07/17/21  0851 07/16/21  0557 07/15/21  0539 07/14/21  2242   WBC 10*3/mm3 18.36* 16.25* 10.33 12.84*   HEMOGLOBIN g/dL 8.4* 8.6* 8.0* 8.7*   PLATELETS 10*3/mm3 247 273 255 252     Results from last 7 days   Lab Units 07/17/21  0851 07/16/21  0557 07/15/21  1749 07/15/21  0539 07/14/21  2242   SODIUM mmol/L 135* 139  --  135* 132*   POTASSIUM mmol/L 3.5 3.6 3.5 3.0* 3.4*   CHLORIDE mmol/L 103 104  --  100 96*   CO2 mmol/L 19.2* 21.8*  --  22.8 25.1   BUN mg/dL 18 20  --  29* 29*   CREATININE mg/dL 1.30* 1.46*  --  1.82* 2.02*   GLUCOSE mg/dL 89 102*  --  98 106*   Estimated Creatinine Clearance: 29.7 mL/min (A) (by C-G formula based on SCr of 1.3 mg/dL (H)).  Results from last 7 days   Lab Units 07/16/21  0557 07/14/21  2242   ALBUMIN g/dL 2.80* 3.50   BILIRUBIN mg/dL  --  0.2   ALK PHOS U/L  --  81   AST (SGOT) U/L  --  18   ALT (SGPT) U/L  --  14     Results from last 7 days   Lab Units 07/17/21  0851 07/16/21  0557 07/15/21  1137 07/15/21  0539 07/14/21  2242   CALCIUM mg/dL 8.1* 8.3*  --  8.4* 8.6   ALBUMIN g/dL  --  2.80*  --   --  3.50   MAGNESIUM mg/dL 2.1 2.0 2.1  --   --    PHOSPHORUS mg/dL  --  2.8  --   --   --        No results found for: HGBA1C, POCGLU    amLODIPine, 5 mg, Oral, BID  aspirin, 81 mg, Oral, Daily  clopidogrel, 75 mg, Oral, Daily  dicyclomine, 10 mg, Oral, 4x Daily  levothyroxine, 75 mcg, Oral, Q AM  losartan, 50 mg, Oral, Nightly  montelukast, 10 mg, Oral, Nightly  pravastatin, 20 mg, Oral, Nightly  sertraline, 50 mg, Oral, Daily  sodium chloride, 10 mL, Intravenous, Q12H  vancomycin, 125 mg, Oral, Q6H      Pharmacy Consult,     Diet Regular; Low Sodium; 3,000 mg Na       Assessment/Plan     Active Hospital Problems    Diagnosis  POA   • **Clostridium difficile colitis [A04.72]  Yes   • Carotid stenosis, bilateral [I65.23]  Yes   • HTN (hypertension) [I10]  Yes   • CKD (chronic kidney disease) stage 4, GFR 15-29 ml/min (CMS/HCC)  [N18.4]  Yes   • JEANIE (acute kidney injury) (CMS/HCC) [N17.9]  Yes   • Hyponatremia [E87.1]  Yes   • Hypokalemia [E87.6]  Yes   • Anemia [D64.9]  Yes   • Leukocytosis [D72.829]  Yes   • Acute metabolic encephalopathy [G93.41]  Yes   • Dementia without behavioral disturbance (CMS/HCC) [F03.90]  Yes      Resolved Hospital Problems   No resolved problems to display.     Ms. Parmar is a 85 year old that initially presented to the hospital with reports of hallucinations per her daughter. Recently treated at [facility] for infectious colitis with Augmentin.    · Clostridium difficile colitis: CT A/P with colitis involving the sigmoid colon. GI PCR negative. C.diff positive. Currently on PO Vancomycin. GI following. No urgent plans for colonoscopy.   · Leukocytosis: Secondary to the above. Mildly up today. Monitor closely for changes.  · Acute metabolic encephalopathy: Secondary to infection. Improving. CT head negative on admission.   · Dementia without behavioral disturbance: Stable. Monitor for any acute changes. Delirium precautions.   · CKD4: Creatinine back near baseline. 1.3 today. Monitor labs. Nephrology managing.   · HTN: BP slightly up but stable. On Norvasc and ARB.   · Anemia: Stable in 8 range. Fecal occult negative. Will check AM anemia labs.  · Carotid stenosis: Continues on ASA/Plavix/Statin.    Discussed with patient.    VTE Prophylaxis - SCDs  Code Status - Full code  Disposition - Anticipate discharge TBD. Hopefully Monday. PT was consulted 7/15 but has not yet seen. Await their input regarding ability to return home or if SNF needed versus .      ANDREA Zuniga  [facility]  07/17/21  15:56 EDT

## 2021-07-17 NOTE — PROGRESS NOTES
"Nephrology Progress Note:     LOS: 2 days   Patient Care Team:  Naomi Rivera APRN as PCP - General (Family Medicine)      Subjective     Interval History:   Renal follow up of JEANIE on CKD 3  Mild SOB  Still some diarrhea  Weak  No chest pain      Review of Systems:        Objective     Vital Signs  /86 (BP Location: Right arm, Patient Position: Lying)   Pulse 76   Temp 98.2 °F (36.8 °C) (Oral)   Resp 16   Ht 162.6 cm (64\")   Wt 57.4 kg (126 lb 9.6 oz)   SpO2 96%   Breastfeeding No   BMI 21.73 kg/m²     Intake/Output  I/O last 3 completed shifts:  In: 480 [P.O.:480]  Out: -   I/O this shift:  In: 240 [P.O.:240]  Out: -       Physical Exam:  NAD   No icterus  No JVD  RRR  Lungs clear  Soft mild tenderness positive bowel sounds  No C/C/E  No rash  No focal neuro  alert   Results Review:       Imaging Results (Last 24 Hours)     ** No results found for the last 24 hours. **          Medication Review:  amLODIPine, 5 mg, Oral, BID  aspirin, 81 mg, Oral, Daily  clopidogrel, 75 mg, Oral, Daily  dicyclomine, 10 mg, Oral, 4x Daily  levothyroxine, 75 mcg, Oral, Q AM  losartan, 50 mg, Oral, Nightly  montelukast, 10 mg, Oral, Nightly  pravastatin, 20 mg, Oral, Nightly  sertraline, 50 mg, Oral, Daily  sodium chloride, 10 mL, Intravenous, Q12H  vancomycin, 125 mg, Oral, Q6H        Assessment/Plan   1. JEANIE/CKD 3. Improved and stable renal function  2. C Diff on Vancomycin  3. Anemia   4. HTN slightly high. Observe with recent   changes  5. Encephalopathy better.  Plan:  Observe with current BP meds  Continue Vancomycin    Colitis    HTN (hypertension)    CKD (chronic kidney disease) stage 4, GFR 15-29 ml/min (CMS/HCC)    JEANIE (acute kidney injury) (CMS/[facility])    Hyponatremia    Hypokalemia    Anemia    Leukocytosis    Acute metabolic encephalopathy    Dementia without behavioral disturbance (CMS/[facility])      Star Thurston MD  07/17/21  12:57 EDT        "

## 2021-07-17 NOTE — PLAN OF CARE
Goal Outcome Evaluation:  Plan of Care Reviewed With: patient, family           Outcome Summary: PT EVAL completed. Pt AO x 3. Pt reports she previously lived alone and walked with no A.D. Pt transferred supine to sit with SBA x 1 and bed rail. Pt transferred sit to stand with CGA x 1 and RW. Pt ambulated 60 ft with min x 1 and RW. Pt transferred sit to supine with CGA x 1. Pt requires skilled therapy due to balance deficits, decreased ambulation and transfers, and decreased functional activity tolerance. Recommend DC home with HH or inpatient rehab.    PPE: Patient was placed in face mask in first look. Patient was wearing facemask when I entered the room and throughout our encounter. I wore full protective equipment throughout this patient encounter including a face mask, and gloves. Hand hygiene was performed before donning protective equipment and after removal when leaving the room.

## 2021-07-17 NOTE — THERAPY EVALUATION
Patient Name: Kellen Parmar  : 1935    MRN: [id number]                              Today's Date: 2021       Admit Date: 2021    Visit Dx:     ICD-10-CM ICD-9-CM   1. Colitis  K52.9 558.9   2. Renal insufficiency  N28.9 593.9   3. Altered mental status, unspecified altered mental status type  R41.82 780.97   4. Hallucinations  R44.3 780.1     Patient Active Problem List   Diagnosis   • Clostridium difficile colitis   • HTN (hypertension)   • CKD (chronic kidney disease) stage 4, GFR 15-29 ml/min (CMS/HCC)   • JEANIE (acute kidney injury) (CMS/[facility])   • Hyponatremia   • Hypokalemia   • Anemia   • Leukocytosis   • Acute metabolic encephalopathy   • Dementia without behavioral disturbance (CMS/[facility])   • Carotid stenosis, bilateral     Past Medical History:   Diagnosis Date   • Anemia    • Asthma    • Cancer (CMS/HCC)    • Dementia (CMS/HCC)    • Diabetes mellitus (CMS/[facility])    • Disease of thyroid gland    • GERD (gastroesophageal reflux disease)    • Hypertension      History reviewed. No pertinent surgical history.  General Information     Row Name 21 162          Physical Therapy Time and Intention    Document Type  evaluation  -     Mode of Treatment  individual therapy;physical therapy  -     Row Name 21          General Information    Patient Profile Reviewed  yes  -LC     Prior Level of Function  independent:;community mobility;all household mobility;gait;transfer;bed mobility;ADL's  -LC     Existing Precautions/Restrictions  fall  -LC     Row Name 21 162          Living Environment    Lives With  alone  -     Row Name 21 162          Home Main Entrance    Number of Stairs, Main Entrance  none  -LC     Row Name 21 162          Cognition    Orientation Status (Cognition)  oriented x 3  -LC     Row Name 21 162          Safety Issues, Functional Mobility    Safety Issues Affecting Function (Mobility)  ability to follow commands;insight into  deficits/self-awareness  -     Impairments Affecting Function (Mobility)  balance;strength;endurance/activity tolerance  -       User Key  (r) = Recorded By, (t) = Taken By, (c) = Cosigned By    Initials Name Provider Type     Prudencio Segal, PT DPT Physical Therapist        Mobility     Row Name 07/17/21 1625          Bed Mobility    Bed Mobility  bed mobility (all) activities  -     All Activities, [place] (Bed Mobility)  standby assist  -     Assistive Device (Bed Mobility)  bed rails;head of bed elevated  -     Row Name 07/17/21 1625          Sit-Stand Transfer    Sit-Stand [place] (Transfers)  contact guard  -     Assistive Device (Sit-Stand Transfers)  walker, front-wheeled  -     Row Name 07/17/21 1625          Gait/Stairs (Locomotion)    [place] Level (Gait)  minimum assist (75% patient effort)  -     Assistive Device (Gait)  walker, front-wheeled  -     Distance in Feet (Gait)  60  -     Deviations/Abnormal Patterns (Gait)  ataxic;gait speed decreased;festinating/shuffling  -       User Key  (r) = Recorded By, (t) = Taken By, (c) = Cosigned By    Initials Name Provider Type     Prudencio Segal, PT DPT Physical Therapist        Obj/Interventions     Row Name 07/17/21 1626          Range of Motion Comprehensive    General Range of Motion  no range of motion deficits identified  -     Row Name 07/17/21 1626          Strength Comprehensive (MMT)    Comment, General Manual Muscle Testing (MMT) Assessment  BLE MMT grossly 3/5  -     Row Name 07/17/21 1626          Balance    Balance Assessment  standing static balance;standing dynamic balance  -     Static Standing Balance  WFL;mild impairment  -     Dynamic Standing Balance  mild impairment  -       User Key  (r) = Recorded By, (t) = Taken By, (c) = Cosigned By    Initials Name Provider Type     Prudencio Segal, PT DPT Physical Therapist        Goals/Plan     Row Name 07/17/21 1628          Bed Mobility Goal  1 (PT)    Activity/Assistive Device (Bed Mobility Goal 1, PT)  bed mobility activities, all  -LC     [place] Level/Cues Needed (Bed Mobility Goal 1, PT)  supervision required  -LC     Time Frame (Bed Mobility Goal 1, PT)  1 week  -LC     Row Name 07/17/21 1628          Transfer Goal 1 (PT)    Activity/Assistive Device (Transfer Goal 1, PT)  transfers, all;walker, rolling  -LC     [place] Level/Cues Needed (Transfer Goal 1, PT)  supervision required  -LC     Time Frame (Transfer Goal 1, PT)  1 week  -LC     Row Name 07/17/21 1628          Gait Training Goal 1 (PT)    Activity/Assistive Device (Gait Training Goal 1, PT)  gait (walking locomotion);walker, rolling  -LC     [place] Level (Gait Training Goal 1, PT)  contact guard assist  -LC     Distance (Gait Training Goal 1, PT)  150  -LC     Time Frame (Gait Training Goal 1, PT)  1 week  -LC       User Key  (r) = Recorded By, (t) = Taken By, (c) = Cosigned By    Initials Name Provider Type    LC Prudencio Segal, PT DPT Physical Therapist        Clinical Impression     Row Name 07/17/21 1626          Pain    Additional Documentation  Pain Scale: Numbers Pre/Post-Treatment (Group)  -LC     Row Name 07/17/21 1626          Pain Scale: Numbers Pre/Post-Treatment    Pretreatment Pain Rating  4/10  -LC     Posttreatment Pain Rating  4/10  -LC     Pain Location - Orientation  incisional  -LC     Pain Intervention(s)  Medication (See MAR);Repositioned  -LC     Row Name 07/17/21 1626          Plan of Care Review    Plan of Care Reviewed With  patient;family  -     Outcome Summary  PT EVAL completed. Pt AO x 3. Pt reports she previously lived alone and walked with no A.D. Pt transferred supine to sit with SBA x 1 and bed rail. Pt transferred sit to stand with CGA x 1 and RW. Pt ambulated 60 ft with min x 1 and RW. Pt transferred sit to supine with CGA x 1. Pt requires skilled therapy due to balance deficits, decreased ambulation and transfers, and decreased  functional activity tolerance. Recommend DC home with  or inpatient rehab.  -     Row Name 07/17/21 1626          Therapy Assessment/Plan (PT)    Patient/Family Therapy Goals Statement (PT)  return home with assist  -     Criteria for Skilled Interventions Met (PT)  yes;skilled treatment is necessary  -     Predicted Duration of Therapy Intervention (PT)  1 week  -     Row Name 07/17/21 1626          Positioning and Restraints    Pre-Treatment Position  in bed  -     Post Treatment Position  bed  -     In Bed  notified nsg;supine;call light within reach;encouraged to call for assist;with family/caregiver;side rails up x3  -       User Key  (r) = Recorded By, (t) = Taken By, (c) = Cosigned By    Initials Name Provider Type    Prudencio Terrell, PT DPT Physical Therapist        Outcome Measures     Row Name 07/17/21 1628          How much help from another person do you currently need...    Turning from your back to your side while in flat bed without using bedrails?  3  -LC     Moving from lying on back to sitting on the side of a flat bed without bedrails?  3  -LC     Moving to and from a bed to a chair (including a wheelchair)?  3  -LC     Standing up from a chair using your arms (e.g., wheelchair, bedside chair)?  3  -LC     Climbing 3-5 steps with a railing?  2  -LC     To walk in hospital room?  3  -LC     AM-PAC 6 Clicks Score (PT)  17  -     Row Name 07/17/21 1628          Functional Assessment    Outcome Measure Options  AM-PAC 6 Clicks Basic Mobility (PT)  -       User Key  (r) = Recorded By, (t) = Taken By, (c) = Cosigned By    Initials Name Provider Type    Prudencio Terrell, PT DPT Physical Therapist        Physical Therapy Education                 Title: PT OT SLP Therapies (Done)     Topic: Physical Therapy (Done)     Point: Mobility training (Done)     Learning Progress Summary           Patient Acceptance, E,D, VU,DU,NR by  at 7/17/2021 1629                   Point: Home  exercise program (Done)     Learning Progress Summary           Patient Acceptance, E,D, VU,DU,NR by  at 7/17/2021 1629                   Point: Body mechanics (Done)     Learning Progress Summary           Patient Acceptance, E,D, VU,DU,NR by  at 7/17/2021 1629                   Point: Precautions (Done)     Learning Progress Summary           Patient Acceptance, E,D, VU,DU,NR by  at 7/17/2021 1629                               User Key     Initials Effective Dates Name Provider Type [facility] 07/02/20 -  Prudencio Segal, PT DPT Physical Therapist PT              PT Recommendation and Plan     Plan of Care Reviewed With: patient, family  Outcome Summary: PT EVAL completed. Pt AO x 3. Pt reports she previously lived alone and walked with no A.D. Pt transferred supine to sit with SBA x 1 and bed rail. Pt transferred sit to stand with CGA x 1 and RW. Pt ambulated 60 ft with min x 1 and RW. Pt transferred sit to supine with CGA x 1. Pt requires skilled therapy due to balance deficits, decreased ambulation and transfers, and decreased functional activity tolerance. Recommend DC home with  or inpatient rehab.     Time Calculation:   PT Charges     Row Name 07/17/21 1629             Time Calculation    Start Time  1605  -      Stop Time  1630  -      Time Calculation (min)  25 min  -      PT Received On  07/17/21  -      PT - Next Appointment  07/18/21  -      PT Goal Re-Cert Due Date  07/24/21  -         Time Calculation- PT    Total Timed Code Minutes- PT  25 minute(s)  -         Timed Charges    43263 - Gait Training Minutes   15  -         Total Minutes    Timed Charges Total Minutes  15  -       Total Minutes  15  -        User Key  (r) = Recorded By, (t) = Taken By, (c) = Cosigned By    Initials Name Provider Type     Prudencio Segal, PT DPT Physical Therapist        Therapy Charges for Today     Code Description Service Date Service Provider Modifiers Qty    78010108577 HC GAIT  TRAINING EA 15 MIN 7/17/2021 Prudencio Segal, PT DPT GP 1    12495002797 HC PT EVAL LOW COMPLEXITY 1 7/17/2021 Prudencio Segal, PT DPT GP 1          PT G-Codes  Outcome Measure Options: AM-PAC 6 Clicks Basic Mobility (PT)  AM-PAC 6 Clicks Score (PT): 17    Prudencio Segal, PT DPT  7/17/2021

## 2021-07-17 NOTE — PLAN OF CARE
Goal Outcome Evaluation:         C diff isolation. Bms today but she reports more formed. Sbp 150's, under prn parameters. Monitoring wbc. No complaints. Safety maintained will continue to monitor.

## 2021-07-18 LAB
ANION GAP SERPL CALCULATED.3IONS-SCNC: 10.5 MMOL/L (ref 5–15)
BASOPHILS # BLD AUTO: 0.05 10*3/MM3 (ref 0–0.2)
BASOPHILS NFR BLD AUTO: 0.4 % (ref 0–1.5)
BUN SERPL-MCNC: 18 MG/DL (ref 8–23)
BUN/CREAT SERPL: 14.6 (ref 7–25)
CALCIUM SPEC-SCNC: 7.8 MG/DL (ref 8.6–10.5)
CHLORIDE SERPL-SCNC: 103 MMOL/L (ref 98–107)
CO2 SERPL-SCNC: 21.5 MMOL/L (ref 22–29)
CREAT SERPL-MCNC: 1.23 MG/DL (ref 0.57–1)
DEPRECATED RDW RBC AUTO: 41.3 FL (ref 37–54)
EOSINOPHIL # BLD AUTO: 0.19 10*3/MM3 (ref 0–0.4)
EOSINOPHIL NFR BLD AUTO: 1.5 % (ref 0.3–6.2)
ERYTHROCYTE [DISTWIDTH] IN BLOOD BY AUTOMATED COUNT: 12.8 % (ref 12.3–15.4)
FERRITIN SERPL-MCNC: 111 NG/ML (ref 13–150)
FOLATE SERPL-MCNC: 11.4 NG/ML (ref 4.78–24.2)
GFR SERPL CREATININE-BSD FRML MDRD: 42 ML/MIN/1.73
GLUCOSE SERPL-MCNC: 95 MG/DL (ref 65–99)
HCT VFR BLD AUTO: 24.8 % (ref 34–46.6)
HGB BLD-MCNC: 7.9 G/DL (ref 12–15.9)
IMM GRANULOCYTES # BLD AUTO: 0.39 10*3/MM3 (ref 0–0.05)
IMM GRANULOCYTES NFR BLD AUTO: 3.1 % (ref 0–0.5)
IRON 24H UR-MRATE: 16 MCG/DL (ref 37–145)
IRON SATN MFR SERPL: 8 % (ref 20–50)
LYMPHOCYTES # BLD AUTO: 1.73 10*3/MM3 (ref 0.7–3.1)
LYMPHOCYTES NFR BLD AUTO: 13.8 % (ref 19.6–45.3)
MCH RBC QN AUTO: 28.2 PG (ref 26.6–33)
MCHC RBC AUTO-ENTMCNC: 31.9 G/DL (ref 31.5–35.7)
MCV RBC AUTO: 88.6 FL (ref 79–97)
MONOCYTES # BLD AUTO: 0.94 10*3/MM3 (ref 0.1–0.9)
MONOCYTES NFR BLD AUTO: 7.5 % (ref 5–12)
NEUTROPHILS NFR BLD AUTO: 73.7 % (ref 42.7–76)
NEUTROPHILS NFR BLD AUTO: 9.21 10*3/MM3 (ref 1.7–7)
NRBC BLD AUTO-RTO: 0.1 /100 WBC (ref 0–0.2)
PLATELET # BLD AUTO: 236 10*3/MM3 (ref 140–450)
PMV BLD AUTO: 9.4 FL (ref 6–12)
POTASSIUM SERPL-SCNC: 3.5 MMOL/L (ref 3.5–5.2)
RBC # BLD AUTO: 2.8 10*6/MM3 (ref 3.77–5.28)
SODIUM SERPL-SCNC: 135 MMOL/L (ref 136–145)
TIBC SERPL-MCNC: 210 MCG/DL (ref 298–536)
TRANSFERRIN SERPL-MCNC: 141 MG/DL (ref 200–360)
VIT B12 BLD-MCNC: 974 PG/ML (ref 211–946)
WBC # BLD AUTO: 12.51 10*3/MM3 (ref 3.4–10.8)

## 2021-07-18 PROCEDURE — 82607 VITAMIN B-12: CPT | Performed by: NURSE PRACTITIONER

## 2021-07-18 PROCEDURE — 83540 ASSAY OF IRON: CPT | Performed by: NURSE PRACTITIONER

## 2021-07-18 PROCEDURE — 97530 THERAPEUTIC ACTIVITIES: CPT

## 2021-07-18 PROCEDURE — 82746 ASSAY OF FOLIC ACID SERUM: CPT | Performed by: NURSE PRACTITIONER

## 2021-07-18 PROCEDURE — 99231 SBSQ HOSP IP/OBS SF/LOW 25: CPT | Performed by: INTERNAL MEDICINE

## 2021-07-18 PROCEDURE — 80048 BASIC METABOLIC PNL TOTAL CA: CPT | Performed by: INTERNAL MEDICINE

## 2021-07-18 PROCEDURE — 25010000002 HYDRALAZINE PER 20 MG: Performed by: NURSE PRACTITIONER

## 2021-07-18 PROCEDURE — 82728 ASSAY OF FERRITIN: CPT | Performed by: NURSE PRACTITIONER

## 2021-07-18 PROCEDURE — 84466 ASSAY OF TRANSFERRIN: CPT | Performed by: NURSE PRACTITIONER

## 2021-07-18 PROCEDURE — 85025 COMPLETE CBC W/AUTO DIFF WBC: CPT | Performed by: INTERNAL MEDICINE

## 2021-07-18 RX ADMIN — DICYCLOMINE HYDROCHLORIDE 10 MG: 10 CAPSULE ORAL at 20:49

## 2021-07-18 RX ADMIN — ACETAMINOPHEN 650 MG: 325 TABLET, FILM COATED ORAL at 23:21

## 2021-07-18 RX ADMIN — DICYCLOMINE HYDROCHLORIDE 10 MG: 10 CAPSULE ORAL at 18:54

## 2021-07-18 RX ADMIN — SODIUM CHLORIDE, PRESERVATIVE FREE 10 ML: 5 INJECTION INTRAVENOUS at 09:48

## 2021-07-18 RX ADMIN — PRAVASTATIN SODIUM 20 MG: 20 TABLET ORAL at 20:49

## 2021-07-18 RX ADMIN — VANCOMYCIN HYDROCHLORIDE 125 MG: 125 CAPSULE ORAL at 23:21

## 2021-07-18 RX ADMIN — CLOPIDOGREL 75 MG: 75 TABLET, FILM COATED ORAL at 09:47

## 2021-07-18 RX ADMIN — SODIUM CHLORIDE, PRESERVATIVE FREE 10 ML: 5 INJECTION INTRAVENOUS at 20:49

## 2021-07-18 RX ADMIN — ASPIRIN 81 MG: 81 TABLET, COATED ORAL at 09:47

## 2021-07-18 RX ADMIN — DICYCLOMINE HYDROCHLORIDE 10 MG: 10 CAPSULE ORAL at 09:47

## 2021-07-18 RX ADMIN — VANCOMYCIN HYDROCHLORIDE 125 MG: 125 CAPSULE ORAL at 07:27

## 2021-07-18 RX ADMIN — VANCOMYCIN HYDROCHLORIDE 125 MG: 125 CAPSULE ORAL at 18:54

## 2021-07-18 RX ADMIN — LOSARTAN POTASSIUM 50 MG: 50 TABLET, FILM COATED ORAL at 20:49

## 2021-07-18 RX ADMIN — HYDRALAZINE HYDROCHLORIDE 10 MG: 20 INJECTION INTRAMUSCULAR; INTRAVENOUS at 23:29

## 2021-07-18 RX ADMIN — AMLODIPINE BESYLATE 5 MG: 5 TABLET ORAL at 09:47

## 2021-07-18 RX ADMIN — SERTRALINE HYDROCHLORIDE 50 MG: 50 TABLET, FILM COATED ORAL at 09:47

## 2021-07-18 RX ADMIN — MONTELUKAST SODIUM 10 MG: 10 TABLET, FILM COATED ORAL at 20:49

## 2021-07-18 RX ADMIN — LEVOTHYROXINE SODIUM 75 MCG: 0.07 TABLET ORAL at 07:27

## 2021-07-18 RX ADMIN — AMLODIPINE BESYLATE 5 MG: 5 TABLET ORAL at 20:49

## 2021-07-18 NOTE — PROGRESS NOTES
[facility]     LOS: 3 days     Subjective:    CC:Psychiatric Evaluation and Hallucinations    DIET:  Diet Order   Procedures   • Diet Regular; Low Sodium; 3,000 mg Na   Answering questions appropriately, patient was up all night according to the nurse, this morning sleeping well when I am seeing her she has no complaints  No soa  No cp  Tolerating oral intake  Diarrhea is improved  Denies nausea or vomiting  Objective:    Vital Signs:  Temp:  [97.7 °F (36.5 °C)-98 °F (36.7 °C)] 97.7 °F (36.5 °C)  Heart Rate:  [65-86] 65  Resp:  [18] 18  BP: (156-169)/(66-72) 156/66    SpO2:  [94 %-100 %] 94 %  on   ;   Device (Oxygen Therapy): room air  Body mass index is 21.73 kg/m².    Physical Exam  Constitutional:       Appearance: Normal appearance.   HENT:      Head: Normocephalic and atraumatic.   Cardiovascular:      Rate and Rhythm: Normal rate and regular rhythm.      Heart sounds: No murmur heard.   No friction rub.   Pulmonary:      Effort: Pulmonary effort is normal.      Breath sounds: Normal breath sounds.   Abdominal:      General: Bowel sounds are normal. There is no distension.      Palpations: Abdomen is soft.      Tenderness: There is no abdominal tenderness.   Skin:     General: Skin is warm and dry.   Neurological:      Mental Status: She is alert.   Psychiatric:         Mood and Affect: Mood normal.         Behavior: Behavior normal.         Results Review:    Glucose   Date Value Ref Range Status   07/18/2021 95 65 - 99 mg/dL Final   07/17/2021 89 65 - 99 mg/dL Final   07/16/2021 102 (H) 65 - 99 mg/dL Final     Results from last 7 days   Lab Units 07/18/21  0407   WBC 10*3/mm3 12.51*   HEMOGLOBIN g/dL 7.9*   HEMATOCRIT % 24.8*   PLATELETS 10*3/mm3 236     Results from last 7 days   Lab Units 07/18/21  0407 07/14/21  2242   SODIUM mmol/L 135* 132*   POTASSIUM mmol/L 3.5 3.4*   CHLORIDE mmol/L 103 96*   CO2 mmol/L 21.5* 25.1   BUN mg/dL 18 29*   CREATININE mg/dL 1.23* 2.02*    CALCIUM mg/dL 7.8* 8.6   BILIRUBIN mg/dL  --  0.2   ALK PHOS U/L  --  81   ALT (SGPT) U/L  --  14   AST (SGOT) U/L  --  18   GLUCOSE mg/dL 95 106*         Results from last 7 days   Lab Units 07/17/21  0851   MAGNESIUM mg/dL 2.1     Results from last 7 days   Lab Units 07/14/21  2242   TROPONIN T ng/mL <0.010     Cultures:  No results found for: BLOODCX, URINECX, WOUNDCX, MRSACX, RESPCX, STOOLCX    I have reviewed daily medications and changes in CPOE    Scheduled meds  amLODIPine, 5 mg, Oral, BID  aspirin, 81 mg, Oral, Daily  clopidogrel, 75 mg, Oral, Daily  dicyclomine, 10 mg, Oral, 4x Daily  levothyroxine, 75 mcg, Oral, Q AM  losartan, 50 mg, Oral, Nightly  montelukast, 10 mg, Oral, Nightly  pravastatin, 20 mg, Oral, Nightly  sertraline, 50 mg, Oral, Daily  sodium chloride, 10 mL, Intravenous, Q12H  vancomycin, 125 mg, Oral, Q6H        Pharmacy Consult,       PRN meds  •  acetaminophen **OR** acetaminophen **OR** acetaminophen  •  calcium carbonate  •  hydrALAZINE  •  HYDROcodone-acetaminophen  •  labetalol  •  Morphine  •  nitroglycerin  •  ondansetron  •  Pharmacy Consult  •  sodium chloride  •  sodium chloride        Clostridium difficile colitis    HTN (hypertension)    CKD (chronic kidney disease) stage 4, GFR 15-29 ml/min (CMS/[facility])    JEANIE (acute kidney injury) (CMS/[facility])    Hyponatremia    Hypokalemia    Anemia    Leukocytosis    Acute metabolic encephalopathy    Dementia without behavioral disturbance (CMS/[facility])    Carotid stenosis, bilateral        Assessment/Plan:  -C. Difficile-continue with oral vancomycin, leukocytosis continues to improve, follow-up with [facility] outpatient, gastroenterology has signed off  -Acute kidney injury for which patient was seen consultation by nephrologist Dr. Garcia, kidney function stable, hold on further IV fluids  -Patient's hyperkalemia has improved potassium is improved  -Continue aspirin and Plavix  -Mental status is improving  -Hemoglobin stable likely related to  chronic disease, ferritin levels normal, iron levels low    DVT PPX: SCDs and hold on chemoprophylaxis as patient is already on aspirin and Plavix and hemoglobin is low      Willem Underwood MD  07/18/21  16:05 EDT

## 2021-07-18 NOTE — THERAPY TREATMENT NOTE
Patient Name: Kellen Parmar  : 1935    MRN: [id number]                              Today's Date: 2021       Admit Date: 2021    Visit Dx:     ICD-10-CM ICD-9-CM   1. Colitis  K52.9 558.9   2. Renal insufficiency  N28.9 593.9   3. Altered mental status, unspecified altered mental status type  R41.82 780.97   4. Hallucinations  R44.3 780.1     Patient Active Problem List   Diagnosis   • Clostridium difficile colitis   • HTN (hypertension)   • CKD (chronic kidney disease) stage 4, GFR 15-29 ml/min (CMS/HCC)   • JEANIE (acute kidney injury) (CMS/Formerly [facility])   • Hyponatremia   • Hypokalemia   • Anemia   • Leukocytosis   • Acute metabolic encephalopathy   • Dementia without behavioral disturbance (CMS/Formerly [facility])   • Carotid stenosis, bilateral     Past Medical History:   Diagnosis Date   • Anemia    • Asthma    • Cancer (CMS/HCC)    • Dementia (CMS/HCC)    • Diabetes mellitus (CMS/Formerly [facility])    • Disease of thyroid gland    • GERD (gastroesophageal reflux disease)    • Hypertension      History reviewed. No pertinent surgical history.  General Information     Row Name 21 1544          Physical Therapy Time and Intention    Document Type  therapy note (daily note)  -PH     Mode of Treatment  physical therapy  -PH     Row Name 21 1544          General Information    Existing Precautions/Restrictions  fall  -PH     Row Name 21 1542          Safety Issues, Functional Mobility    Safety Issues Affecting Function (Mobility)  judgment;positioning of assistive device;insight into deficits/self-awareness;at risk behavior observed;safety precaution awareness;safety precautions follow-through/compliance  -PH     Impairments Affecting Function (Mobility)  balance;endurance/activity tolerance;strength;pain  -PH       User Key  (r) = Recorded By, (t) = Taken By, (c) = Cosigned By    Initials Name Provider Type    PH Regina Hameed PTA Physical Therapy Assistant        Mobility     Row Name 21 1541     "      Bed Mobility    Bed Mobility  bed mobility (all) activities  -PH     All Activities, [place] (Bed Mobility)  standby assist  -PH     Assistive Device (Bed Mobility)  bed rails;head of bed elevated  -PH     Comment (Bed Mobility)  Pt SBA at EOB  -PH     Row Name 07/18/21 1545          Sit-Stand Transfer    Sit-Stand [place] (Transfers)  contact guard  -PH     Assistive Device (Sit-Stand Transfers)  walker, front-wheeled  -PH     Row Name 07/18/21 1545          Gait/Stairs (Locomotion)    [place] Level (Gait)  contact guard;minimum assist (75% patient effort);verbal cues;nonverbal cues (demo/gesture)  -PH     Assistive Device (Gait)  walker, front-wheeled  -PH     Distance in Feet (Gait)  90' to BR toilet then 10' to bed  -PH     Deviations/Abnormal Patterns (Gait)  katie decreased;festinating/shuffling;gait speed decreased;stride length decreased  -PH     Bilateral Gait Deviations  forward flexed posture;heel strike decreased  -PH     Comment (Gait/Stairs)  vc for upright posture; pt had 1 LOB as she sat down to toilet req mod A to correct. Pt appears confused and turned fww sideways when walking out of BR w/ pt assisted and cued to turn fww and place B hands on . Pt reports feeling \"shakey\"  -       User Key  (r) = Recorded By, (t) = Taken By, (c) = Cosigned By    Initials Name Provider Type    Regina Serrano PTA Physical Therapy Assistant        Obj/Interventions     Row Name 07/18/21 1548          Motor Skills    Therapeutic Exercise  other (see comments) AP, LAQ, seated march; x 10 reps all; pt continuing to state she felt \"shakey\"  -PH     Row Name 07/18/21 1548          Balance    Balance Assessment  sitting static balance;standing static balance  -PH     Static Sitting Balance  WFL;unsupported;sitting, edge of bed  -PH     Static Standing Balance  mild impairment;supported;standing  -PH     Comment, Balance  Pt req min A w/o use of fww as she cleaned up standing at " edge of toilet.  -PH       User Key  (r) = Recorded By, (t) = Taken By, (c) = Cosigned By    Initials Name Provider Type    Regina Serrano PTA Physical Therapy Assistant        Goals/Plan    No documentation.       Clinical Impression     Row Name 07/18/21 1549          Pain    Additional Documentation  Pain Scale: Numbers Pre/Post-Treatment (Group)  -PH     Row Name 07/18/21 1549          Pain Scale: Numbers Pre/Post-Treatment    Posttreatment Pain Rating  5/10  -PH     Pain Location  abdomen  -PH     Pain Intervention(s)  Repositioned;Ambulation/increased activity  -PH     Row Name 07/18/21 1549          Plan of Care Review    Plan of Care Reviewed With  patient  -PH     Progress  improving  -PH     Outcome Summary  Pt improving w/ amb of 100' although still unsteady at times. Pt req CGA and use of fww for gait. Pt had 1 LOB as she sat down on low toilet req mod A to correct. Pt performed clean up w/ stand bal req min A. PT will prog as pt madisyn.  -PH     Row Name 07/18/21 1549          Positioning and Restraints    Pre-Treatment Position  in bed  -PH     Post Treatment Position  bed  -PH     In Bed  call light within reach;encouraged to call for assist;exit alarm on;fowlers  -PH       User Key  (r) = Recorded By, (t) = Taken By, (c) = Cosigned By    Initials Name Provider Type    Regina Serrano PTA Physical Therapy Assistant        Outcome Measures     Row Name 07/18/21 6497          How much help from another person do you currently need...    Turning from your back to your side while in flat bed without using bedrails?  4  -PH     Moving from lying on back to sitting on the side of a flat bed without bedrails?  4  -PH     Moving to and from a bed to a chair (including a wheelchair)?  3  -PH     Standing up from a chair using your arms (e.g., wheelchair, bedside chair)?  3  -PH     Climbing 3-5 steps with a railing?  2  -PH     To walk in hospital room?  3  -PH     AM-PAC 6 Clicks Score  (PT)  19  -PH     Row Name 07/18/21 1553          Functional Assessment    Outcome Measure Options  AM-PAC 6 Clicks Basic Mobility (PT)  -PH       User Key  (r) = Recorded By, (t) = Taken By, (c) = Cosigned By    Initials Name Provider Type     Regina Hameed PTA Physical Therapy Assistant        Physical Therapy Education                 Title: PT OT SLP Therapies (Done)     Topic: Physical Therapy (Done)     Point: Mobility training (Done)     Learning Progress Summary           Patient Acceptance, E,D, VU,NR by  at 7/18/2021 1554    Acceptance, E,D, VU,DU,NR by  at 7/17/2021 1629                   Point: Home exercise program (Done)     Learning Progress Summary           Patient Acceptance, E,D, VU,NR by  at 7/18/2021 1554    Acceptance, E,D, VU,DU,NR by  at 7/17/2021 1629                   Point: Body mechanics (Done)     Learning Progress Summary           Patient Acceptance, E,D, VU,NR by  at 7/18/2021 1554    Acceptance, E,D, VU,DU,NR by  at 7/17/2021 1629                   Point: Precautions (Done)     Learning Progress Summary           Patient Acceptance, E,D, VU,NR by  at 7/18/2021 1554    Acceptance, E,D, VU,DU,NR by  at 7/17/2021 1629                               User Key     Initials Effective Dates Name Provider Type Discipline     07/02/20 -  Prudencio Segal, PT DPT Physical Therapist PT     06/16/21 -  Regina Hameed PTA Physical Therapy Assistant PT              PT Recommendation and Plan     Plan of Care Reviewed With: patient  Progress: improving  Outcome Summary: Pt improving w/ amb of 100' although still unsteady at times. Pt req CGA and use of fww for gait. Pt had 1 LOB as she sat down on low toilet req mod A to correct. Pt performed clean up w/ stand bal req min A. PT will prog as pt madisyn.     Time Calculation:   PT Charges     Row Name 07/18/21 1554             Time Calculation    Start Time  1514  -PH      Stop Time  1534  -PH      Time Calculation  (min)  20 min  -PH      PT Received On  07/18/21  -PH      PT - Next Appointment  07/19/21  -PH         Timed Charges    40704 - PT Therapeutic Exercise Minutes  4  -PH      99178 - PT Therapeutic Activity Minutes  16  -PH         Total Minutes    Timed Charges Total Minutes  20  -PH       Total Minutes  20  -PH        User Key  (r) = Recorded By, (t) = Taken By, (c) = Cosigned By    Initials Name Provider Type    PH Regina Hameed PTA Physical Therapy Assistant        Therapy Charges for Today     Code Description Service Date Service Provider Modifiers Qty    [id number]  PT THERAPEUTIC ACT EA 15 MIN 7/18/2021 Regian Hameed PTA GP 1          PT G-Codes  Outcome Measure Options: AM-PAC 6 Clicks Basic Mobility (PT)  AM-PAC 6 Clicks Score (PT): 19    Regina Hameed PTA  7/18/2021

## 2021-07-18 NOTE — PLAN OF CARE
Goal Outcome Evaluation:  Plan of Care Reviewed With: patient        Progress: improving  Outcome Summary: VSS, room air, NSR on monitor, pt confused and trying to get out of bed frequently, restless picking at wires. Pleasant and consoled quickly. No bowel movements during evening. Norco 5x1 for abdominal pain. Contact spore precautions. PO Vanc. Will continue to monitor.

## 2021-07-18 NOTE — PROGRESS NOTES
[facility]  Inpatient Progress Note    Reason for Follow Up:  C diff    Subjective     Interval History:   No BM last shift.  Confused    Current Facility-Administered Medications:   •  acetaminophen (TYLENOL) tablet 650 mg, 650 mg, Oral, Q4H PRN, 650 mg at 07/15/21 0818 **OR** acetaminophen (TYLENOL) 160 MG/5ML solution 650 mg, 650 mg, Oral, Q4H PRN **OR** acetaminophen (TYLENOL) suppository 650 mg, 650 mg, Rectal, Q4H PRN, Tere Leonard APRN  •  amLODIPine (NORVASC) tablet 5 mg, 5 mg, Oral, BID, Irene Weinberg DO, 5 mg at 07/17/21 2022  •  aspirin EC tablet 81 mg, 81 mg, Oral, Daily, Alvin Cagle APRN, 81 mg at 07/17/21 0842  •  calcium carbonate (TUMS) chewable tablet 500 mg (200 mg elemental), 2 tablet, Oral, BID PRN, Tere Leonard APRN  •  clopidogrel (PLAVIX) tablet 75 mg, 75 mg, Oral, Daily, Alvin Cagle APRN, 75 mg at 07/17/21 0842  •  dicyclomine (BENTYL) capsule 10 mg, 10 mg, Oral, 4x Daily, Alvin Cagle APRN, 10 mg at 07/17/21 2022  •  hydrALAZINE (APRESOLINE) injection 10 mg, 10 mg, Intravenous, Q8H PRN, Alvin Cagle APRN, 10 mg at 07/16/21 0852  •  HYDROcodone-acetaminophen (NORCO) 5-325 MG per tablet 1 tablet, 1 tablet, Oral, Q8H PRN, Alvin Cagle APRN, 1 tablet at 07/17/21 2333  •  labetalol (NORMODYNE,TRANDATE) injection 10 mg, 10 mg, Intravenous, Q6H PRN, Irene Weinberg DO, 10 mg at 07/17/21 0002  •  levothyroxine (SYNTHROID, LEVOTHROID) tablet 75 mcg, 75 mcg, Oral, Q AM, Alvin Cagle APRN, 75 mcg at 07/17/21 0518  •  losartan (COZAAR) tablet 50 mg, 50 mg, Oral, Nightly, Irene Weinberg DO, 50 mg at 07/17/21 2022  •  montelukast (SINGULAIR) tablet 10 mg, 10 mg, Oral, Nightly, Alvin Cagle APRN, 10 mg at 07/17/21 2022  •  morphine injection 1 mg, 1 mg, Intravenous, Q4H PRN, Alvin Cagle APRN  •  nitroglycerin (NITROSTAT) SL tablet 0.4 mg, 0.4 mg, Sublingual, Q5 Min PRN, Tere Leonard APRN  •  ondansetron (ZOFRAN) injection 4 mg, 4  mg, Intravenous, Q8H PRN, Alvin Cagle APRN, 4 mg at 07/15/21 1057  •  Pharmacy Consult, , Does not apply, Continuous PRN, Willem Underwood MD  •  pravastatin (PRAVACHOL) tablet 20 mg, 20 mg, Oral, Nightly, Alvin Cagle APRN, 20 mg at 07/17/21 2022  •  sertraline (ZOLOFT) tablet 50 mg, 50 mg, Oral, Daily, Alvin Cagle APRN, 50 mg at 07/17/21 0842  •  sodium chloride 0.9 % flush 10 mL, 10 mL, Intravenous, PRN, Jose Batista III, PA  •  sodium chloride 0.9 % flush 10 mL, 10 mL, Intravenous, Q12H, Tere Leonard APRN, 10 mL at 07/17/21 2022  •  sodium chloride 0.9 % flush 10 mL, 10 mL, Intravenous, PRN, Tere Leonard APRN  •  vancomycin (VANCOCIN) capsule 125 mg, 125 mg, Oral, Q6H, Willem Underwood MD, 125 mg at 07/17/21 2340  Review of Systems:    Review of systems could not be obtained due to  patient confusion.    Objective     Vital Signs  Temp:  [97.1 °F (36.2 °C)-98.2 °F (36.8 °C)] 98 °F (36.7 °C)  Heart Rate:  [66-86] 78  Resp:  [16-18] 18  BP: (154-169)/(72-86) 163/72  Body mass index is 21.73 kg/m².    Intake/Output Summary (Last 24 hours) at 7/18/2021 0641  Last data filed at 7/17/2021 2334  Gross per 24 hour   Intake 840 ml   Output --   Net 840 ml     I/O this shift:  In: 120 [P.O.:120]  Out: -      Physical Exam:   General: patient confused, no distress   Abdomen: soft, nontender, nondistended; normal bowel sounds   Rectal: deferred   Extremities: no rash or edema     Results Review:     I reviewed the patient's new clinical results.    Results from last 7 days   Lab Units 07/18/21  0407 07/17/21  0851 07/16/21  0557   WBC 10*3/mm3 12.51* 18.36* 16.25*   HEMOGLOBIN g/dL 7.9* 8.4* 8.6*   HEMATOCRIT % 24.8* 26.6* 27.8*   PLATELETS 10*3/mm3 236 247 273     Results from last 7 days   Lab Units 07/18/21  0407 07/17/21  0851 07/16/21  0557 07/14/21  2242   SODIUM mmol/L 135* 135* 139 132*   POTASSIUM mmol/L 3.5 3.5 3.6 3.4*   CHLORIDE mmol/L 103 103 104 96*   CO2 mmol/L  21.5* 19.2* 21.8* 25.1   BUN mg/dL 18 18 20 29*   CREATININE mg/dL 1.23* 1.30* 1.46* 2.02*   CALCIUM mg/dL 7.8* 8.1* 8.3* 8.6   BILIRUBIN mg/dL  --   --   --  0.2   ALK PHOS U/L  --   --   --  81   ALT (SGPT) U/L  --   --   --  14   AST (SGOT) U/L  --   --   --  18   GLUCOSE mg/dL 95 89 102* 106*         Lab Results   Lab Value Date/Time    LIPASE 108 (H) 07/14/2021 2242    LIPASE 18 07/10/2021 0949    LIPASE 47 (H) 06/09/2021 1117    LIPASE 46 (H) 05/31/2021 1335    LIPASE 106 11/09/2020 2231    LIPASE 152 08/30/2020 1943       Assessment/Plan   Assessment:   1.  Colitis by CT scan  2.  C diff positive stool  3.  Anemia, heme negative    Plan:   Continue current dose vancomycin  Leukocytosis improving  Follow up [facility] upon discharge.    GI will see again as needed      I discussed the patients findings and my recommendations with patient.         Jagdish Read M.D.  [facility], [facility]  [address] [place] [address]  Office: [phone]

## 2021-07-18 NOTE — PLAN OF CARE
Goal Outcome Evaluation:  Plan of Care Reviewed With: patient        Progress: improving  Outcome Summary: Pt improving w/ amb of 100' although still unsteady at times. Pt req CGA and use of fww for gait. Pt had 1 LOB as she sat down on low toilet req mod A to correct. Pt performed clean up w/ stand bal req min A. PT will prog as pt madisyn.    Patient was intermittently wearing a face mask during this therapy encounter. Therapist used appropriate personal protective equipment including eye protection, mask, and gloves.  Mask used was standard procedure mask. Appropriate PPE was worn during the entire therapy session. Hand hygiene was completed before and after therapy session. Patient is not in enhanced droplet precautions.

## 2021-07-19 VITALS
HEIGHT: 64 IN | BODY MASS INDEX: 21.61 KG/M2 | SYSTOLIC BLOOD PRESSURE: 180 MMHG | OXYGEN SATURATION: 94 % | RESPIRATION RATE: 16 BRPM | WEIGHT: 126.6 LBS | HEART RATE: 86 BPM | TEMPERATURE: 98 F | DIASTOLIC BLOOD PRESSURE: 65 MMHG

## 2021-07-19 LAB
ANION GAP SERPL CALCULATED.3IONS-SCNC: 9.9 MMOL/L (ref 5–15)
BASOPHILS # BLD AUTO: 0.04 10*3/MM3 (ref 0–0.2)
BASOPHILS NFR BLD AUTO: 0.4 % (ref 0–1.5)
BUN SERPL-MCNC: 18 MG/DL (ref 8–23)
BUN/CREAT SERPL: 14.4 (ref 7–25)
CALCIUM SPEC-SCNC: 7.9 MG/DL (ref 8.6–10.5)
CHLORIDE SERPL-SCNC: 106 MMOL/L (ref 98–107)
CO2 SERPL-SCNC: 20.1 MMOL/L (ref 22–29)
CREAT SERPL-MCNC: 1.25 MG/DL (ref 0.57–1)
DEPRECATED RDW RBC AUTO: 41.7 FL (ref 37–54)
EOSINOPHIL # BLD AUTO: 0.16 10*3/MM3 (ref 0–0.4)
EOSINOPHIL NFR BLD AUTO: 1.7 % (ref 0.3–6.2)
ERYTHROCYTE [DISTWIDTH] IN BLOOD BY AUTOMATED COUNT: 13 % (ref 12.3–15.4)
GFR SERPL CREATININE-BSD FRML MDRD: 41 ML/MIN/1.73
GLUCOSE SERPL-MCNC: 89 MG/DL (ref 65–99)
HCT VFR BLD AUTO: 24.9 % (ref 34–46.6)
HGB BLD-MCNC: 7.9 G/DL (ref 12–15.9)
IMM GRANULOCYTES # BLD AUTO: 0.4 10*3/MM3 (ref 0–0.05)
IMM GRANULOCYTES NFR BLD AUTO: 4.3 % (ref 0–0.5)
LYMPHOCYTES # BLD AUTO: 1.53 10*3/MM3 (ref 0.7–3.1)
LYMPHOCYTES NFR BLD AUTO: 16.4 % (ref 19.6–45.3)
MCH RBC QN AUTO: 28.1 PG (ref 26.6–33)
MCHC RBC AUTO-ENTMCNC: 31.7 G/DL (ref 31.5–35.7)
MCV RBC AUTO: 88.6 FL (ref 79–97)
MONOCYTES # BLD AUTO: 0.65 10*3/MM3 (ref 0.1–0.9)
MONOCYTES NFR BLD AUTO: 7 % (ref 5–12)
NEUTROPHILS NFR BLD AUTO: 6.57 10*3/MM3 (ref 1.7–7)
NEUTROPHILS NFR BLD AUTO: 70.2 % (ref 42.7–76)
NRBC BLD AUTO-RTO: 0 /100 WBC (ref 0–0.2)
PLATELET # BLD AUTO: 266 10*3/MM3 (ref 140–450)
PMV BLD AUTO: 9.2 FL (ref 6–12)
POTASSIUM SERPL-SCNC: 3.4 MMOL/L (ref 3.5–5.2)
RBC # BLD AUTO: 2.81 10*6/MM3 (ref 3.77–5.28)
SODIUM SERPL-SCNC: 136 MMOL/L (ref 136–145)
WBC # BLD AUTO: 9.35 10*3/MM3 (ref 3.4–10.8)

## 2021-07-19 PROCEDURE — 80048 BASIC METABOLIC PNL TOTAL CA: CPT | Performed by: INTERNAL MEDICINE

## 2021-07-19 PROCEDURE — 85025 COMPLETE CBC W/AUTO DIFF WBC: CPT | Performed by: INTERNAL MEDICINE

## 2021-07-19 RX ORDER — VANCOMYCIN HYDROCHLORIDE 125 MG/1
125 CAPSULE ORAL EVERY 6 HOURS SCHEDULED
Qty: 26 CAPSULE | Refills: 0 | Status: SHIPPED | OUTPATIENT
Start: 2021-07-19 | End: 2021-07-26

## 2021-07-19 RX ORDER — POTASSIUM CHLORIDE 750 MG/1
40 TABLET, FILM COATED, EXTENDED RELEASE ORAL ONCE
Status: COMPLETED | OUTPATIENT
Start: 2021-07-19 | End: 2021-07-19

## 2021-07-19 RX ADMIN — DICYCLOMINE HYDROCHLORIDE 10 MG: 10 CAPSULE ORAL at 09:39

## 2021-07-19 RX ADMIN — HYDROCODONE BITARTRATE AND ACETAMINOPHEN 1 TABLET: 5; 325 TABLET ORAL at 09:39

## 2021-07-19 RX ADMIN — CLOPIDOGREL 75 MG: 75 TABLET, FILM COATED ORAL at 09:39

## 2021-07-19 RX ADMIN — SERTRALINE HYDROCHLORIDE 50 MG: 50 TABLET, FILM COATED ORAL at 09:39

## 2021-07-19 RX ADMIN — DICYCLOMINE HYDROCHLORIDE 10 MG: 10 CAPSULE ORAL at 13:30

## 2021-07-19 RX ADMIN — POTASSIUM CHLORIDE 40 MEQ: 750 TABLET, EXTENDED RELEASE ORAL at 09:39

## 2021-07-19 RX ADMIN — VANCOMYCIN HYDROCHLORIDE 125 MG: 125 CAPSULE ORAL at 06:09

## 2021-07-19 RX ADMIN — LEVOTHYROXINE SODIUM 75 MCG: 0.07 TABLET ORAL at 06:09

## 2021-07-19 RX ADMIN — VANCOMYCIN HYDROCHLORIDE 125 MG: 125 CAPSULE ORAL at 13:30

## 2021-07-19 RX ADMIN — ASPIRIN 81 MG: 81 TABLET, COATED ORAL at 09:39

## 2021-07-19 RX ADMIN — SODIUM CHLORIDE, PRESERVATIVE FREE 10 ML: 5 INJECTION INTRAVENOUS at 09:40

## 2021-07-19 RX ADMIN — AMLODIPINE BESYLATE 5 MG: 5 TABLET ORAL at 09:39

## 2021-07-19 NOTE — CASE MANAGEMENT/SOCIAL WORK
Continued Stay Note  [facility]     Patient Name: Kellen Parmar  MRN: [id number]  Today's Date: 7/19/2021    Admit Date: 7/14/2021    Discharge Plan     Row Name 07/19/21 0856       Plan    Plan  Home with family    Patient/Family in Agreement with Plan  yes    Plan Comments  Met with pt and daughter at bedside who confirm plan to return home at discharge.  Discussed possible risk for fall and need for HH vs rehab. Pt and daughter both declines referrals. Daughter states she will have a family member stay with pt for then next few days and she checks on pt frequently.   Daughter did ask about meals on wheels.  Spoke with [facility] who states [facility] dept isn't in until 10am.  Will follow up .  No further needs identified. Pt has cane, walker, and bedside commode at home.  VICENTA Brizuela RN        Discharge Codes    No documentation.       Expected Discharge Date and Time     Expected Discharge Date Expected Discharge Time    Jul 19, 2021             Blea Brizuela, RN

## 2021-07-19 NOTE — PROGRESS NOTES
"Nephrology Progress Note:     LOS: 3 days   Patient Care Team:  Naomi Rivera APRN as PCP - General (Family Medicine)      Subjective     Interval History:   Renal follow up of CKD 3 resolving JEANIE  Very tired today  Slight confusion  Less diarrhea  No SOB  No chest pain      Review of Systems:        Objective     Vital Signs  /78 (BP Location: Right arm, Patient Position: Lying)   Pulse 74   Temp 97.8 °F (36.6 °C) (Oral)   Resp 18   Ht 162.6 cm (64\")   Wt 57.4 kg (126 lb 9.6 oz)   SpO2 96%   Breastfeeding No   BMI 21.73 kg/m²     Intake/Output  I/O last 3 completed shifts:  In: 1200 [P.O.:1200]  Out: -   No intake/output data recorded.      Physical Exam:  Awake in NAD  No icterus  No JVD  RRR  Lungs clear  Soft abdomen positive bowel sounds  No C/C/E        Results Review:       Imaging Results (Last 24 Hours)     ** No results found for the last 24 hours. **          Medication Review:  amLODIPine, 5 mg, Oral, BID  aspirin, 81 mg, Oral, Daily  clopidogrel, 75 mg, Oral, Daily  dicyclomine, 10 mg, Oral, 4x Daily  levothyroxine, 75 mcg, Oral, Q AM  losartan, 50 mg, Oral, Nightly  montelukast, 10 mg, Oral, Nightly  pravastatin, 20 mg, Oral, Nightly  sertraline, 50 mg, Oral, Daily  sodium chloride, 10 mL, Intravenous, Q12H  vancomycin, 125 mg, Oral, Q6H        Assessment/Plan   1. CKD 3 stable with resolved JEANIE.  2. HTN BP ok  3. C Dif on Oral Vancomycin  4. Hyponatremia better  5. Hypokalemia replace  6. Mild dementia  7. Anemia  Plan  Replace K  Continue oral Vanc      Clostridium difficile colitis    HTN (hypertension)    CKD (chronic kidney disease) stage 4, GFR 15-29 ml/min (CMS/HCC)    JEANIE (acute kidney injury) (CMS/[facility])    Hyponatremia    Hypokalemia    Anemia    Leukocytosis    Acute metabolic encephalopathy    Dementia without behavioral disturbance (CMS/[facility])    Carotid stenosis, bilateral      Don Gian Thurston MD  07/18/21  20:37 EDT        "

## 2021-07-19 NOTE — PLAN OF CARE
Goal Outcome Evaluation:  Plan of Care Reviewed With: patient        Progress: improving   Vital signs stable. Alert and oriented x 3. Up with assistance of one out of bed. Unsteady on feet with generalized weakness noted. Daughter visiting at bedside. On room air. Normal sinus cardiac rhythm. Tolerating regular diet well. Denies discomfort at this time. Voiding per bedside commode. Slept all morning but awake and appropriate this afternoon. Possible discharge in the morning. Will continue to monitor.

## 2021-07-19 NOTE — PLAN OF CARE
Goal Outcome Evaluation:  Plan of Care Reviewed With: patient        Progress: improving  Outcome Summary: VSS except high SBP - IV hydralazine given. Pt restless at beginning of shift calling out frequently, resting better throughout evening with daughter at bedside. Bed alarm and 3rd bedrail up. BSC. A few small bowel movements.  PO Vanc. Awaiting labs. Possible discharge today.

## 2021-07-19 NOTE — PLAN OF CARE
Goal Outcome Evaluation:  Plan of Care Reviewed With: patient        Progress: improving   Vital signs stable. Alert and oriented x 4. Up with assistance of one out of bed. Using bedside commode with help. Falls protocol maintained. Bed alarm on. Tolerating regular diet fairly well. On room air. Normal sinus cardiac rhythm. Daughter at bedside, providing transportation for patient to go home. Discharged in stable condition. Oral hydrocodone given as needed for complaint of abdominal discomfort. Oral vancomycin given for C diff infection. Remains in contact spore isolation precautions. Will continue to monitor.

## 2021-07-19 NOTE — NURSING NOTE
Patient discharged home today. Refusing rehab and home health services offered. Patient lives alone with daughter's residence near by. Daughter states she will be monitored daily and as needed. Nephrology called and agreed with discharge order. Will not transfuse today due to blood shortage. Hgb results reported to Dr. Underwood ( [facility] ) and  ( nephrology). Potassium replacement ordered.

## 2021-07-19 NOTE — DISCHARGE INSTR - APPOINTMENTS
Call for 1 week appointment with nephrology physician Dr. Thurston  for follow up lab work. # [phone]

## 2021-07-19 NOTE — DISCHARGE SUMMARY
"[facility]  [phone]    DISCHARGE SUMMARY  [facility]    Patient Identification:  Name: Kellen Parmar  Age: 85 y.o.  Sex: female  :  1935  MRN: [id number]  Primary Care Physician: Naomi Rivera APRN    Admit date: 2021  Discharge date and time:      Discharge Diagnoses:  Clostridium difficile colitis    HTN (hypertension)    CKD (chronic kidney disease) stage 4, GFR 15-29 ml/min (CMS/Formerly [facility])    JEANIE (acute kidney injury) (CMS/Formerly [facility])    Hyponatremia    Hypokalemia    Anemia    Leukocytosis    Acute metabolic encephalopathy    Dementia without behavioral disturbance (CMS/Formerly [facility])    Carotid stenosis, bilateral       History of present illness from H&P:  Ms. Parmar is a 85 y.o. non-smoker with a history of hypertension, GERD, diabetes mellitus type 2, dementia, asthma, anemia, diverticulitis who presents to [facility] ER chief complaint of psychiatric evaluation, hallucinations admitted for colitis.     Recent hospital evaluation at [facility] on May 31, 2021 with subsequent 7-day course of Augmentin for colitis.  Patient limited historian regarding history of present illness and aggravated by complaints of abdominal pain with associated nausea, vomiting, diarrhea.  No collateral historian at bedside.  Continue contact patient's daughter--\"Chacha Carrillo at [phone]\"--for additional information; however, no answer.  Reportedly daughter concerned for patient hallucinating; therefore, pursued ER evaluation.     AVSS on room air with exception of hypertension blood pressure 181/78 most likely secondary to subjective abdominal pain, CT reported findings colitis involving sigmoid colon without evidence of obstruction.  CT head negative for acute intercranial abnormality.  Patient without lateralizing features on exam.  Mild leukocytosis WBC count 12,000.  Acute kidney injury serum creatinine 2.0 increased to 1.7 in May 2021 patient received--500 cc " normal saline IV fluid bolus.    Hospital Course:    Ms. Parmar is a 85 year old that initially presented to the hospital with reports of hallucinations per her daughter. Recently treated at [facility] for infectious colitis with Augmentin. She was found to have C. Diff colitis. And has been treated with vancomycin and is improved. WBC was up to 18 and is now normal.     · Clostridium difficile colitis: CT A/P with colitis involving the sigmoid colon. GI PCR negative. C.diff positive. Finish PO Vancomycin outpatient. GI followed.   · Leukocytosis: resolved  · Acute metabolic encephalopathy: Secondary to infection.CT head negative on admission. Much better by discharge.   · Dementia without behavioral disturbance: Stable. Monitor for any acute changes. Delirium precautions.   · CKD4: Creatinine back near baseline. 1.3 today. Monitor labs. Nephrology managing.   · HTN: BP slightly up but stable. On Norvasc and ARB. F/u outpatient, followed by nephrology   Anemia: Stable in 8 range. Fecal occult negative. Defer to nephrology and follow up with them  Carotid stenosis: Continues on ASA/Plavix/Statin.      The patient was seen and examined on the day of discharge.    Consults:   Consults     Date and Time Order Name Status Description    7/15/2021  2:03 PM Inpatient Nephrology Consult Completed     7/15/2021  2:02 PM Inpatient Gastroenterology Consult      7/15/2021  2:39 AM DIANA (on-call MD unless specified) Details Completed           Results from last 7 days   Lab Units 07/19/21  0605   WBC 10*3/mm3 9.35   HEMOGLOBIN g/dL 7.9*   HEMATOCRIT % 24.9*   PLATELETS 10*3/mm3 266       Results from last 7 days   Lab Units 07/19/21  0605   SODIUM mmol/L 136   POTASSIUM mmol/L 3.4*   CHLORIDE mmol/L 106   CO2 mmol/L 20.1*   BUN mg/dL 18   CREATININE mg/dL 1.25*   GLUCOSE mg/dL 89   CALCIUM mg/dL 7.9*       Significant Diagnostic Studies:   WBC   Date Value Ref Range Status   07/19/2021 9.35 3.40 - 10.80 10*3/mm3 Final      Hemoglobin   Date Value Ref Range Status   07/19/2021 7.9 (L) 12.0 - 15.9 g/dL Final     Hematocrit   Date Value Ref Range Status   07/19/2021 24.9 (L) 34.0 - 46.6 % Final     Platelets   Date Value Ref Range Status   07/19/2021 266 140 - 450 10*3/mm3 Final     Sodium   Date Value Ref Range Status   07/19/2021 136 136 - 145 mmol/L Final     Potassium   Date Value Ref Range Status   07/19/2021 3.4 (L) 3.5 - 5.2 mmol/L Final     Chloride   Date Value Ref Range Status   07/19/2021 106 98 - 107 mmol/L Final     CO2   Date Value Ref Range Status   07/19/2021 20.1 (L) 22.0 - 29.0 mmol/L Final     BUN   Date Value Ref Range Status   07/19/2021 18 8 - 23 mg/dL Final     Creatinine   Date Value Ref Range Status   07/19/2021 1.25 (H) 0.57 - 1.00 mg/dL Final     Glucose   Date Value Ref Range Status   07/19/2021 89 65 - 99 mg/dL Final     Calcium   Date Value Ref Range Status   07/19/2021 7.9 (L) 8.6 - 10.5 mg/dL Final     Magnesium   Date Value Ref Range Status   07/17/2021 2.1 1.6 - 2.4 mg/dL Final     No results found for: AST, ALT, ALKPHOS  No results found for: APTT, INR  No results found for: COLORU, CLARITYU, SPECGRAV, PHUR, PROTEINUR, GLUCOSEU, KETONESU, BLOODU, NITRITE, LEUKOCYTESUR, BILIRUBINUR, UROBILINOGEN, RBCUA, WBCUA, BACTERIA, UACOMMENT  No results found for: TROPONINT, TROPONINI, BNP  No components found for: HGBA1C;2  No components found for: TSH;2    Imaging Results (All)     Procedure Component Value Units Date/Time    CT Abdomen Pelvis Without Contrast [[id number]] Collected: 07/15/21 0108     Updated: 07/15/21 0124    Narrative:      CT OF THE ABDOMEN AND PELVIS WITHOUT CONTRAST     HISTORY: Left lower quadrant pain     COMPARISON: None available.     TECHNIQUE: Axial CT imaging was obtained through the abdomen and pelvis.  No IV contrast was administered.     FINDINGS:  Images through the lung bases do not demonstrate any acute  abnormalities. No focal hepatic lesions are seen. Spleen, stomach,  and  duodenum all appear unremarkable. The gallbladder is not visualized, and  may be absent. There is some dilatation of the common bile duct,  measuring up to 1 cm. However, this may be related to age and prior  cholecystectomy. No hydronephrosis is seen on either side. There are  hyperdensities identified within both kidneys. Most of them appear to be  vascular calcifications, although a punctate nonobstructing stone cannot  be completely excluded. There are simple appearing left renal cyst. No  additional follow-up is necessary. No distal ureteral or bladder stones  are seen. Urinary bladder is decompressed. Uterus appears normal. There  is diffuse wall thickening involving the sigmoid colon, suggesting  colitis. There is adjacent pericolonic soft tissue stranding. While  there is diverticular disease within this area, the length of colon  involved is more keeping with colitis. No pneumatosis or free air is  seen. There is no evidence of obstruction. The appendix is not  identified. The patient is status post aortic stent grafting. 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 Presence or absence of endoleak  cannot be excluded on this unenhanced study. No acute osseous  abnormalities are seen.       Impression:      Colitis involving the sigmoid colon. No pneumatosis or free air is seen.  There is no evidence of obstruction.     Radiation dose reduction techniques were utilized, including automated  exposure control and exposure modulation based on body size.     This report was finalized on 7/15/2021 1:21 AM by Dr. Vi Govea M.D.       CT Head Without Contrast [[id number]] Collected: 07/15/21 0105     Updated: 07/15/21 0111    Narrative:      CT HEAD WITHOUT CONTRAST     HISTORY: Altered mental status     COMPARISON: None available.     TECHNIQUE: Axial CT imaging was obtained through the brain. No IV  contrast was administered.     FINDINGS:  No acute intracranial hemorrhage is seen. There is  diffuse atrophy.  There is periventricular and deep white matter microangiopathic change.  There is mucosal thickening identified within the ethmoid sinuses. There  is no midline shift or mass effect. Old lacunar infarct is noted within  the left basal ganglia.       Impression:      No acute intracranial findings.     Radiation dose reduction techniques were utilized, including automated  exposure control and exposure modulation based on body size.     This report was finalized on 7/15/2021 1:08 AM by Dr. Vi Govea M.D.         No results found for: SITE, ALLENTEST, PHART, EVH2IHP, PO2ART, WEL3ZAK, BASEEXCESS, I8BLHZRT, HGBBG, HCTABG, OXYHEMOGLOBI, METHHGBN, CARBOXYHGB, CO2CT, BAROMETRIC, MODALITY, FIO2       Discharge Medications      New Medications      Instructions Start Date   vancomycin 125 MG capsule  Commonly known as: VANCOCIN   125 mg, Oral, Every 6 Hours Scheduled         Continue These Medications      Instructions Start Date   allopurinol 100 MG tablet  Commonly known as: ZYLOPRIM   100 mg      amLODIPine 5 MG tablet  Commonly known as: NORVASC   5 mg      aspirin 81 MG EC tablet   81 mg      dicyclomine 10 MG/5ML syrup  Commonly known as: BENTYL   20 mg, Oral, 4 Times Daily      docusate sodium 100 MG capsule   100 mg      HYDROcodone-acetaminophen 5-325 MG per tablet  Commonly known as: NORCO   1 tablet, Oral      levothyroxine 75 MCG tablet  Commonly known as: SYNTHROID, LEVOTHROID   75 mcg      losartan 50 MG tablet  Commonly known as: COZAAR   50 mg, Oral, Daily      Melatonin 5 MG capsule   3 mg, Oral, Daily      montelukast 10 MG tablet  Commonly known as: SINGULAIR   TAKE ONE TABLET BY MOUTH ONCE NIGHTLY      ondansetron 4 MG tablet  Commonly known as: ZOFRAN   TAKE ONE TABLET BY MOUTH EVERY 8 HOURS AS NEEDED FOR NAUSEA      PLAVIX PO   No dose, route, or frequency recorded.      polycarbophil 625 MG tablet tablet   625 mg, Oral, Daily      pravastatin 20 MG tablet  Commonly known as:  PRAVACHOL   No dose, route, or frequency recorded.      sertraline 50 MG tablet  Commonly known as: ZOLOFT   No dose, route, or frequency recorded.         Stop These Medications    amoxicillin-clavulanate 875-125 MG per tablet  Commonly known as: AUGMENTIN              Patient Instructions:       No future appointments.     Follow-up Information     Naomi Rivera APRN .    Specialty: Family Medicine  Contact information:  Laura CHANDLER [address] [place], [place] [id number] [phone]                   Discharge Order (From admission, onward)     Start     Ordered    07/19/21 0746  Discharge patient  Once     Expected Discharge Date: 07/19/21    Discharge Disposition: Home or Self Care    Physician of Record for Attribution - Please select from Treatment Team: WILLEM UNDERWOOD [[id number]]    Review needed by CMO to determine Physician of Record: No       Question Answer Comment   Physician of Record for Attribution - Please select from Treatment Team WILLEM UNDERWOOD    Review needed by CMO to determine Physician of Record No        07/19/21 0747                Diet Order   Procedures   • Diet Regular; Low Sodium; 3,000 mg Na         Cbc in 1 week with PMD        Total time spent discharging patient including evaluation, post hospitalization follow up,  medication and post hospitalization instructions and education, total time exceeds 30 minutes.    Signed:  Willem Underwood MD  7/19/2021  07:58 EDT

## 2021-07-20 ENCOUNTER — READMISSION MANAGEMENT (OUTPATIENT)
Dept: CALL CENTER | Facility: HOSPITAL | Age: 86
End: 2021-07-20

## 2021-07-20 NOTE — OUTREACH NOTE
Prep Survey      Responses   [facility] patient discharged from?  [place]   Is LACE score < 7 ?  No   Emergency Room discharge w/ pulse ox?  No   Eligibility  Readm Mgmt   Discharge diagnosis  Clostridium difficile colitis   Does the patient have one of the following disease processes/diagnoses(primary or secondary)?  Other   Does the patient have Home health ordered?  No   Is there a DME ordered?  No   Prep survey completed?  Yes          Tere Caruso RN

## 2021-07-20 NOTE — CASE MANAGEMENT/SOCIAL WORK
Case Management Discharge Note      Final Note: Pt discharged home with family         Selected Continued Care - Discharged on 7/19/2021 Admission date: 7/14/2021 - Discharge disposition: Home or Self Care    Destination    No services have been selected for the patient.              Durable Medical Equipment    No services have been selected for the patient.              Dialysis/Infusion    No services have been selected for the patient.              Home Medical Care    No services have been selected for the patient.              Therapy    No services have been selected for the patient.              Community Resources    No services have been selected for the patient.              Community & DME    No services have been selected for the patient.                  Transportation Services  Private: Car    Final Discharge Disposition Code: 01 - home or self-care

## 2021-07-23 ENCOUNTER — READMISSION MANAGEMENT (OUTPATIENT)
Dept: CALL CENTER | Facility: HOSPITAL | Age: 86
End: 2021-07-23

## 2021-07-23 NOTE — OUTREACH NOTE
Medical Week 1 Survey      Responses   [facility] [place] patient discharged from?  [place]   Does the patient have one of the following disease processes/diagnoses(primary or secondary)?  Other   Week 1 attempt successful?  No   Unsuccessful attempts  Attempt 1          Roberta Agarwal RN

## 2021-07-28 ENCOUNTER — READMISSION MANAGEMENT (OUTPATIENT)
Dept: CALL CENTER | Facility: HOSPITAL | Age: 86
End: 2021-07-28

## 2021-07-28 NOTE — OUTREACH NOTE
Medical Week 1 Survey      Responses   [facility] patient discharged from?  [place]   Does the patient have one of the following disease processes/diagnoses(primary or secondary)?  Other   Week 1 attempt successful?  No   Unsuccessful attempts  Attempt 2          Chetna Spicer RN

## 2021-08-03 ENCOUNTER — READMISSION MANAGEMENT (OUTPATIENT)
Dept: CALL CENTER | Facility: HOSPITAL | Age: 86
End: 2021-08-03

## 2021-08-03 NOTE — OUTREACH NOTE
Medical Week 2 Survey      Responses   [facility] [place] patient discharged from?  [place]   Does the patient have one of the following disease processes/diagnoses(primary or secondary)?  Other   Week 2 attempt successful?  No   Unsuccessful attempts  Attempt 1          Nakita Pollack RN

## 2021-08-07 ENCOUNTER — READMISSION MANAGEMENT (OUTPATIENT)
Dept: CALL CENTER | Facility: HOSPITAL | Age: 86
End: 2021-08-07

## 2021-08-07 NOTE — OUTREACH NOTE
Medical Week 2 Survey      Responses   [facility] patient discharged from?  [place]   Does the patient have one of the following disease processes/diagnoses(primary or secondary)?  Other   Week 2 attempt successful?  No   Unsuccessful attempts  Attempt 2   Discharge diagnosis  Clostridium difficile colitis          Marisela Rice RN

## 2021-08-16 ENCOUNTER — READMISSION MANAGEMENT (OUTPATIENT)
Dept: CALL CENTER | Facility: HOSPITAL | Age: 86
End: 2021-08-16

## 2021-08-16 NOTE — OUTREACH NOTE
Medical Week 3 Survey      Responses   [facility] patient discharged from?  [place]   Does the patient have one of the following disease processes/diagnoses(primary or secondary)?  Other   Week 3 attempt successful?  No   Rescheduled  Revoked [5 calls no answer]          Nolvia Machado RN

## 2021-09-22 ENCOUNTER — HOSPITAL ENCOUNTER (OUTPATIENT)
Facility: HOSPITAL | Age: 86
Setting detail: OBSERVATION
Discharge: HOME | End: 2021-09-24
Attending: EMERGENCY MEDICINE | Admitting: HOSPITALIST

## 2021-09-22 DIAGNOSIS — I10 HYPERTENSION, UNSPECIFIED TYPE: ICD-10-CM

## 2021-09-22 DIAGNOSIS — F41.9 ANXIETY: ICD-10-CM

## 2021-09-22 DIAGNOSIS — N17.9 AKI (ACUTE KIDNEY INJURY) (HCC): Primary | ICD-10-CM

## 2021-09-22 DIAGNOSIS — N17.9 AKI (ACUTE KIDNEY INJURY) (HCC): ICD-10-CM

## 2021-09-22 LAB
ALBUMIN SERPL-MCNC: 4.8 G/DL (ref 3.5–5.2)
ALBUMIN/GLOB SERPL: 1.5 G/DL
ALP SERPL-CCNC: 64 U/L (ref 39–117)
ALT SERPL W P-5'-P-CCNC: 15 U/L (ref 1–33)
ANION GAP SERPL CALCULATED.3IONS-SCNC: 13.8 MMOL/L (ref 5–15)
AST SERPL-CCNC: 26 U/L (ref 1–32)
BASOPHILS # BLD AUTO: 0.04 10*3/MM3 (ref 0–0.2)
BASOPHILS NFR BLD AUTO: 0.6 % (ref 0–1.5)
BILIRUB SERPL-MCNC: 0.3 MG/DL (ref 0–1.2)
BUN SERPL-MCNC: 28 MG/DL (ref 8–23)
BUN/CREAT SERPL: 13.2 (ref 7–25)
CALCIUM SPEC-SCNC: 9.6 MG/DL (ref 8.6–10.5)
CHLORIDE SERPL-SCNC: 99 MMOL/L (ref 98–107)
CO2 SERPL-SCNC: 23.2 MMOL/L (ref 22–29)
CREAT SERPL-MCNC: 2.12 MG/DL (ref 0.57–1)
DEPRECATED RDW RBC AUTO: 50.2 FL (ref 37–54)
EOSINOPHIL # BLD AUTO: 0.02 10*3/MM3 (ref 0–0.4)
EOSINOPHIL NFR BLD AUTO: 0.3 % (ref 0.3–6.2)
ERYTHROCYTE [DISTWIDTH] IN BLOOD BY AUTOMATED COUNT: 15 % (ref 12.3–15.4)
GFR SERPL CREATININE-BSD FRML MDRD: 22 ML/MIN/1.73
GLOBULIN UR ELPH-MCNC: 3.1 GM/DL
GLUCOSE SERPL-MCNC: 96 MG/DL (ref 65–99)
HCT VFR BLD AUTO: 31.3 % (ref 34–46.6)
HGB BLD-MCNC: 9.8 G/DL (ref 12–15.9)
HOLD SPECIMEN: NORMAL
HOLD SPECIMEN: NORMAL
IMM GRANULOCYTES # BLD AUTO: 0.02 10*3/MM3 (ref 0–0.05)
IMM GRANULOCYTES NFR BLD AUTO: 0.3 % (ref 0–0.5)
LYMPHOCYTES # BLD AUTO: 1.59 10*3/MM3 (ref 0.7–3.1)
LYMPHOCYTES NFR BLD AUTO: 23.2 % (ref 19.6–45.3)
MCH RBC QN AUTO: 28.3 PG (ref 26.6–33)
MCHC RBC AUTO-ENTMCNC: 31.3 G/DL (ref 31.5–35.7)
MCV RBC AUTO: 90.5 FL (ref 79–97)
MONOCYTES # BLD AUTO: 0.44 10*3/MM3 (ref 0.1–0.9)
MONOCYTES NFR BLD AUTO: 6.4 % (ref 5–12)
NEUTROPHILS NFR BLD AUTO: 4.74 10*3/MM3 (ref 1.7–7)
NEUTROPHILS NFR BLD AUTO: 69.2 % (ref 42.7–76)
NRBC BLD AUTO-RTO: 0 /100 WBC (ref 0–0.2)
PLATELET # BLD AUTO: 195 10*3/MM3 (ref 140–450)
PMV BLD AUTO: 9 FL (ref 6–12)
POTASSIUM SERPL-SCNC: 3.9 MMOL/L (ref 3.5–5.2)
PROT SERPL-MCNC: 7.9 G/DL (ref 6–8.5)
RBC # BLD AUTO: 3.46 10*6/MM3 (ref 3.77–5.28)
SARS-COV-2 RNA RESP QL NAA+PROBE: NOT DETECTED
SODIUM SERPL-SCNC: 136 MMOL/L (ref 136–145)
TROPONIN T SERPL-MCNC: 0.03 NG/ML (ref 0–0.03)
TROPONIN T SERPL-MCNC: 0.03 NG/ML (ref 0–0.03)
WBC # BLD AUTO: 6.85 10*3/MM3 (ref 3.4–10.8)
WHOLE BLOOD HOLD SPECIMEN: NORMAL
WHOLE BLOOD HOLD SPECIMEN: NORMAL

## 2021-09-22 PROCEDURE — 96374 THER/PROPH/DIAG INJ IV PUSH: CPT

## 2021-09-22 PROCEDURE — 84484 ASSAY OF TROPONIN QUANT: CPT | Performed by: PHYSICIAN ASSISTANT

## 2021-09-22 PROCEDURE — U0005 INFEC AGEN DETEC AMPLI PROBE: HCPCS | Performed by: EMERGENCY MEDICINE

## 2021-09-22 PROCEDURE — 93010 ELECTROCARDIOGRAM REPORT: CPT | Performed by: INTERNAL MEDICINE

## 2021-09-22 PROCEDURE — 82570 ASSAY OF URINE CREATININE: CPT | Performed by: INTERNAL MEDICINE

## 2021-09-22 PROCEDURE — U0003 INFECTIOUS AGENT DETECTION BY NUCLEIC ACID (DNA OR RNA); SEVERE ACUTE RESPIRATORY SYNDROME CORONAVIRUS 2 (SARS-COV-2) (CORONAVIRUS DISEASE [COVID-19]), AMPLIFIED PROBE TECHNIQUE, MAKING USE OF HIGH THROUGHPUT TECHNOLOGIES AS DESCRIBED BY CMS-2020-01-R: HCPCS | Performed by: EMERGENCY MEDICINE

## 2021-09-22 PROCEDURE — G0378 HOSPITAL OBSERVATION PER HR: HCPCS

## 2021-09-22 PROCEDURE — 84484 ASSAY OF TROPONIN QUANT: CPT | Performed by: INTERNAL MEDICINE

## 2021-09-22 PROCEDURE — C9803 HOPD COVID-19 SPEC COLLECT: HCPCS

## 2021-09-22 PROCEDURE — 84300 ASSAY OF URINE SODIUM: CPT | Performed by: INTERNAL MEDICINE

## 2021-09-22 PROCEDURE — 99284 EMERGENCY DEPT VISIT MOD MDM: CPT

## 2021-09-22 PROCEDURE — 96375 TX/PRO/DX INJ NEW DRUG ADDON: CPT

## 2021-09-22 PROCEDURE — 96372 THER/PROPH/DIAG INJ SC/IM: CPT

## 2021-09-22 PROCEDURE — 25010000002 HYDRALAZINE PER 20 MG: Performed by: INTERNAL MEDICINE

## 2021-09-22 PROCEDURE — 25010000002 HEPARIN (PORCINE) PER 1000 UNITS: Performed by: INTERNAL MEDICINE

## 2021-09-22 PROCEDURE — 87205 SMEAR GRAM STAIN: CPT | Performed by: INTERNAL MEDICINE

## 2021-09-22 PROCEDURE — 36415 COLL VENOUS BLD VENIPUNCTURE: CPT | Performed by: INTERNAL MEDICINE

## 2021-09-22 PROCEDURE — 93005 ELECTROCARDIOGRAM TRACING: CPT | Performed by: PHYSICIAN ASSISTANT

## 2021-09-22 PROCEDURE — 80053 COMPREHEN METABOLIC PANEL: CPT | Performed by: PHYSICIAN ASSISTANT

## 2021-09-22 PROCEDURE — 85025 COMPLETE CBC W/AUTO DIFF WBC: CPT | Performed by: PHYSICIAN ASSISTANT

## 2021-09-22 PROCEDURE — 96361 HYDRATE IV INFUSION ADD-ON: CPT

## 2021-09-22 PROCEDURE — 93005 ELECTROCARDIOGRAM TRACING: CPT | Performed by: EMERGENCY MEDICINE

## 2021-09-22 PROCEDURE — 81001 URINALYSIS AUTO W/SCOPE: CPT | Performed by: EMERGENCY MEDICINE

## 2021-09-22 RX ORDER — SODIUM CHLORIDE 9 MG/ML
65 INJECTION, SOLUTION INTRAVENOUS CONTINUOUS
Status: DISCONTINUED | OUTPATIENT
Start: 2021-09-22 | End: 2021-09-23

## 2021-09-22 RX ORDER — HYDRALAZINE HYDROCHLORIDE 20 MG/ML
10 INJECTION INTRAMUSCULAR; INTRAVENOUS EVERY 6 HOURS PRN
Status: DISCONTINUED | OUTPATIENT
Start: 2021-09-22 | End: 2021-09-24 | Stop reason: HOSPADM

## 2021-09-22 RX ORDER — SODIUM CHLORIDE 0.9 % (FLUSH) 0.9 %
10 SYRINGE (ML) INJECTION EVERY 12 HOURS SCHEDULED
Status: DISCONTINUED | OUTPATIENT
Start: 2021-09-22 | End: 2021-09-24 | Stop reason: HOSPADM

## 2021-09-22 RX ORDER — ACETAMINOPHEN 650 MG/1
650 SUPPOSITORY RECTAL EVERY 4 HOURS PRN
Status: DISCONTINUED | OUTPATIENT
Start: 2021-09-22 | End: 2021-09-24 | Stop reason: HOSPADM

## 2021-09-22 RX ORDER — SODIUM CHLORIDE 9 MG/ML
125 INJECTION, SOLUTION INTRAVENOUS CONTINUOUS
Status: DISCONTINUED | OUTPATIENT
Start: 2021-09-22 | End: 2021-09-23

## 2021-09-22 RX ORDER — NITROGLYCERIN 0.4 MG/1
0.4 TABLET SUBLINGUAL
Status: DISCONTINUED | OUTPATIENT
Start: 2021-09-22 | End: 2021-09-24 | Stop reason: HOSPADM

## 2021-09-22 RX ORDER — ACETAMINOPHEN 325 MG/1
650 TABLET ORAL EVERY 4 HOURS PRN
Status: DISCONTINUED | OUTPATIENT
Start: 2021-09-22 | End: 2021-09-24 | Stop reason: HOSPADM

## 2021-09-22 RX ORDER — ACETAMINOPHEN 160 MG/5ML
650 SOLUTION ORAL EVERY 4 HOURS PRN
Status: DISCONTINUED | OUTPATIENT
Start: 2021-09-22 | End: 2021-09-24 | Stop reason: HOSPADM

## 2021-09-22 RX ORDER — SODIUM CHLORIDE 0.9 % (FLUSH) 0.9 %
10 SYRINGE (ML) INJECTION AS NEEDED
Status: DISCONTINUED | OUTPATIENT
Start: 2021-09-22 | End: 2021-09-24 | Stop reason: HOSPADM

## 2021-09-22 RX ORDER — HEPARIN SODIUM 5000 [USP'U]/ML
5000 INJECTION, SOLUTION INTRAVENOUS; SUBCUTANEOUS EVERY 12 HOURS SCHEDULED
Status: DISCONTINUED | OUTPATIENT
Start: 2021-09-22 | End: 2021-09-24 | Stop reason: HOSPADM

## 2021-09-22 RX ORDER — ONDANSETRON 2 MG/ML
4 INJECTION INTRAMUSCULAR; INTRAVENOUS EVERY 6 HOURS PRN
Status: DISCONTINUED | OUTPATIENT
Start: 2021-09-22 | End: 2021-09-24 | Stop reason: HOSPADM

## 2021-09-22 RX ORDER — AMLODIPINE BESYLATE 5 MG/1
5 TABLET ORAL
Status: DISCONTINUED | OUTPATIENT
Start: 2021-09-22 | End: 2021-09-23

## 2021-09-22 RX ADMIN — HYDRALAZINE HYDROCHLORIDE 10 MG: 20 INJECTION INTRAMUSCULAR; INTRAVENOUS at 23:39

## 2021-09-22 RX ADMIN — HEPARIN SODIUM 5000 UNITS: 5000 INJECTION INTRAVENOUS; SUBCUTANEOUS at 23:01

## 2021-09-22 RX ADMIN — SODIUM CHLORIDE 125 ML/HR: 9 INJECTION, SOLUTION INTRAVENOUS at 20:05

## 2021-09-22 RX ADMIN — SODIUM CHLORIDE, PRESERVATIVE FREE 10 ML: 5 INJECTION INTRAVENOUS at 21:50

## 2021-09-22 RX ADMIN — AMLODIPINE BESYLATE 5 MG: 5 TABLET ORAL at 20:05

## 2021-09-22 NOTE — ED NOTES
"Pt repeated 2x while in phleb room, \"I 'dont know why i'm here, my dr sent me to have some blood work but I don't know why\" and I don't know how im going to get home\". Pt reassured we will help her with a ride home once seen and evaluated by the dr.     Pt placed in mask at triage, this staff member wearing appropriate ppe        Tere Alvares RN  09/22/21 2805    "

## 2021-09-22 NOTE — ED PROVIDER NOTES
EMERGENCY DEPARTMENT ENCOUNTER    Room Number:  24/24  Date seen:  9/22/2021  PCP: Naomi Rivera APRN  Historian: Patient, family      HPI:  Chief Complaint: Anxious, numbness  A complete HPI/ROS/PMH/PSH/SH/FH are unobtainable due to: Nothing  Context: Kellen Parmar is a 85 y.o. female who presents to the ED c/o numbness involving her entire body that occurred this afternoon around 3 PM.  She believes it lasted less than an hour, spontaneously resolved.  She denies any focal weakness during that episode.  That is the primary reason that she came in.  She also reports that she feels mildly anxious right now.  She does have a history of anxiety and is currently taking Zoloft.  She was admitted in July of this year for C. difficile colitis and hallucinations.  She denies any hallucinations or diarrhea currently.  She denies chest pain, shortness of air, nausea, vomiting.  She denies black or bloody stool.  She does report a history of anemia and chronic kidney disease.  She reports normal urine output.  She denies any dysuria or flank pain.  Her blood pressure is elevated but she reports that she has not yet had her home blood pressure medications today.  She has received her first Covid vaccine and is scheduled for her second dose in a couple of weeks.  She denies shortness of breath.  She has a history of diabetes, but does not currently take medication for it.            PAST MEDICAL HISTORY  Active Ambulatory Problems     Diagnosis Date Noted   • Clostridium difficile colitis 07/15/2021   • HTN (hypertension) 07/15/2021   • CKD (chronic kidney disease) stage 4, GFR 15-29 ml/min (CMS/[facility]) 07/15/2021   • JEANIE (acute kidney injury) (CMS/[facility]) 07/15/2021   • Hyponatremia 07/15/2021   • Hypokalemia 07/15/2021   • Anemia 07/15/2021   • Leukocytosis 07/15/2021   • Acute metabolic encephalopathy 07/15/2021   • Dementia without behavioral disturbance (CMS/[facility]) 07/15/2021   • Carotid stenosis, bilateral 07/17/2021      Resolved Ambulatory Problems     Diagnosis Date Noted   • No Resolved Ambulatory Problems     Past Medical History:   Diagnosis Date   • Asthma    • Cancer (CMS/HCC)    • Dementia (CMS/HCC)    • Diabetes mellitus (CMS/HCC)    • Disease of thyroid gland    • GERD (gastroesophageal reflux disease)    • Hypertension          PAST SURGICAL HISTORY  No past surgical history on file.      FAMILY HISTORY  No family history on file.      SOCIAL HISTORY  Social History     Socioeconomic History   • Marital status: Single     Spouse name: Not on file   • Number of children: Not on file   • Years of education: Not on file   • Highest education level: Not on file   Tobacco Use   • Smoking status: Former Smoker     Packs/day: 0.50     Years: 15.00     Pack years: 7.50     Quit date:      Years since quittin.7   • Smokeless tobacco: Former User   Substance and Sexual Activity   • Alcohol use: Not Currently   • Drug use: Never   • Sexual activity: Defer         ALLERGIES  Contrast dye and Shrimp        REVIEW OF SYSTEMS  Review of Systems   Review of all 14 systems is negative other than stated in the HPI above.      PHYSICAL EXAM  ED Triage Vitals [21 1654]   Temp Heart Rate Resp BP SpO2   98.8 °F (37.1 °C) 80 18 174/75 97 %      Temp src Heart Rate Source Patient Position BP Location FiO2 (%)   -- -- -- -- --         GENERAL: Awake alert, no acute distress  HENT: nares patent  EYES: no scleral icterus, EOMI  CV: regular rhythm, normal rate  RESPIRATORY: normal effort, lungs clear to auscultation bilaterally  ABDOMEN: soft, nondistended, nontender throughout  MUSCULOSKELETAL: no deformity, no peripheral edema NEURO: alert, moves all extremities, follows commands  PSYCH:  calm, cooperative  SKIN: warm, dry, normal to inspection, no rash    Vital signs and nursing notes reviewed.          LAB RESULTS  Recent Results (from the past 24 hour(s))   Troponin    Collection Time: 21  5:50 PM    Specimen: Blood    Result Value Ref Range    Troponin T 0.034 (C) 0.000 - 0.030 ng/mL   Comprehensive Metabolic Panel    Collection Time: 09/22/21  5:50 PM    Specimen: Blood   Result Value Ref Range    Glucose 96 65 - 99 mg/dL    BUN 28 (H) 8 - 23 mg/dL    Creatinine 2.12 (H) 0.57 - 1.00 mg/dL    Sodium 136 136 - 145 mmol/L    Potassium 3.9 3.5 - 5.2 mmol/L    Chloride 99 98 - 107 mmol/L    CO2 23.2 22.0 - 29.0 mmol/L    Calcium 9.6 8.6 - 10.5 mg/dL    Total Protein 7.9 6.0 - 8.5 g/dL    Albumin 4.80 3.50 - 5.20 g/dL    ALT (SGPT) 15 1 - 33 U/L    AST (SGOT) 26 1 - 32 U/L    Alkaline Phosphatase 64 39 - 117 U/L    Total Bilirubin 0.3 0.0 - 1.2 mg/dL    eGFR Non African Amer 22 (L) >60 mL/min/1.73    Globulin 3.1 gm/dL    A/G Ratio 1.5 g/dL    BUN/Creatinine Ratio 13.2 7.0 - 25.0    Anion Gap 13.8 5.0 - 15.0 mmol/L   CBC Auto Differential    Collection Time: 09/22/21  5:50 PM    Specimen: Blood   Result Value Ref Range    WBC 6.85 3.40 - 10.80 10*3/mm3    RBC 3.46 (L) 3.77 - 5.28 10*6/mm3    Hemoglobin 9.8 (L) 12.0 - 15.9 g/dL    Hematocrit 31.3 (L) 34.0 - 46.6 %    MCV 90.5 79.0 - 97.0 fL    MCH 28.3 26.6 - 33.0 pg    MCHC 31.3 (L) 31.5 - 35.7 g/dL    RDW 15.0 12.3 - 15.4 %    RDW-SD 50.2 37.0 - 54.0 fl    MPV 9.0 6.0 - 12.0 fL    Platelets 195 140 - 450 10*3/mm3    Neutrophil % 69.2 42.7 - 76.0 %    Lymphocyte % 23.2 19.6 - 45.3 %    Monocyte % 6.4 5.0 - 12.0 %    Eosinophil % 0.3 0.3 - 6.2 %    Basophil % 0.6 0.0 - 1.5 %    Immature Grans % 0.3 0.0 - 0.5 %    Neutrophils, Absolute 4.74 1.70 - 7.00 10*3/mm3    Lymphocytes, Absolute 1.59 0.70 - 3.10 10*3/mm3    Monocytes, Absolute 0.44 0.10 - 0.90 10*3/mm3    Eosinophils, Absolute 0.02 0.00 - 0.40 10*3/mm3    Basophils, Absolute 0.04 0.00 - 0.20 10*3/mm3    Immature Grans, Absolute 0.02 0.00 - 0.05 10*3/mm3    nRBC 0.0 0.0 - 0.2 /100 WBC   Green Top (Gel)    Collection Time: 09/22/21  5:50 PM   Result Value Ref Range    Extra Tube Hold for add-ons.    Lavender Top    Collection  Time: 09/22/21  5:50 PM   Result Value Ref Range    Extra Tube hold for add-on    Gold Top - SST    Collection Time: 09/22/21  5:50 PM   Result Value Ref Range    Extra Tube Hold for add-ons.    Light Blue Top    Collection Time: 09/22/21  5:52 PM   Result Value Ref Range    Extra Tube hold for add-on    ECG 12 Lead    Collection Time: 09/22/21  6:54 PM   Result Value Ref Range    QT Interval 394 ms       Ordered the above labs and reviewed the results.        RADIOLOGY  No Radiology Exams Resulted Within Past 24 Hours    Ordered the above noted radiological studies. Reviewed by me in PACS.            PROCEDURES  Procedures            MEDICATIONS GIVEN IN ER  Medications   sodium chloride 0.9 % infusion (has no administration in time range)   amLODIPine (NORVASC) tablet 5 mg (has no administration in time range)                   MEDICAL DECISION MAKING, PROGRESS, and CONSULTS    All labs have been independently reviewed by me.  All radiology studies have been reviewed by me and discussed with radiologist dictating the report.   EKG's independently viewed and interpreted by me.  Discussion below represents my analysis of pertinent findings related to patient's condition, differential diagnosis, treatment plan and final disposition.      Differential diagnosis includes but is not limited to:  Acute renal failure  Electrolyte disturbance  Hypoxia  Anxiety  Hyperventilation      ED Course as of Sep 22 1957   Wed Sep 22, 2021   1907 Hemoglobin(!): 9.8 [JR]   1907 Stable hgb      [JR]   1907 Troponin T(!!): 0.034 [JR]   1907 Acutely elevated   Creatinine(!): 2.12 [JR]   1907 Potassium: 3.9 [JR]   1907 Sodium: 136 [JR]   1907 AST (SGOT): 26 [JR]   1907 ALT (SGPT): 15 [JR]   1908 BP: 174/75 [JR]   1908 BP(!): 186/88 [JR]   1908 EKG          EKG time: 1854  Rhythm/Rate: Sinus rhythm, 82  P waves and NV: Normal  QRS, axis: Borderline left axis, occasional PAC  ST and T waves: No acute ischemic changes    Interpreted  Contemporaneously by me, independently viewed  Similar compared to prior 7/14/2021          [JR]   1910 Medical record review: I reviewed discharge summary from 7/19/2021.  Patient was admitted for hallucinations and C. difficile colitis.    [JR]   1910 Home norvasc dose ordered, home losartan held at this time due to JEANIE.     [JR]   1943 Medical record review: I reviewed progress notes from nephrology including Dr. Star Thurston, Dr. Weinberg in July 2021.  Patient's baseline creatinine is around 1.6.    [JR]   1943 Patient presents today with an episode of entire body numbness that occurred prior to arrival.  Symptoms have since resolved.  Her symptoms are not really suspicious for stroke given no focal symptoms.  NIH stroke scale is currently 0.  She was incidentally found to have acute renal failure today.  Baseline creatinine is around 1.6, creatinine up to 2.12 today.  I am not sure what caused this as she has had no recent diarrhea or vomiting.  She is on losartan as well as amlodipine.  Blood pressure is elevated here.  She was given her home amlodipine, losartan will be held at this time.  I have ordered normal saline at 125 cc an hour.  Cardiac troponin is mildly elevated today.  She denies chest pain or shortness of air.  EKG without ischemic changes.  I suspect this is secondary to her acute renal failure.  She has chronic anemia that is stable today.    [JR]   1956 Discussed with Dr. Vaughan, [facility], who agrees to admit.    [JR]      ED Course User Index  [JR] Serjio Soler MD              I wore an N95 mask, face shield, and gloves during this patient encounter.  Patient also wearing a surgical mask.  Hand hygeine performed before and after seeing the patient.    DIAGNOSIS  Final diagnoses:   JEANIE (acute kidney injury) (HCC)   Anxiety   Hypertension, unspecified type         DISPOSITION  ADMIT            Latest Documented Vital Signs:  As of 19:57 EDT  BP- (!) 186/88 HR- 92 Temp- 98.8 °F (37.1 °C) O2  sat- 99%        --    Please note that portions of this were completed with a voice recognition program.          Serjio Soler MD  09/22/21 1958

## 2021-09-22 NOTE — ED NOTES
"Pt is from home. Per EMS, call went out \"because she doesn't feel good\". When this RN asks what symptoms she is experiencing to come to the ER today she states, \"I just feel anxious. No other symptoms or pain\"   Pt states she has a hx of anxiety.   A&Ox4.     Patient was placed in face mask during triage process. Patient was wearing facemask when I entered the room and throughout our encounter. I wore full protective equipment throughout this patient encounter including a face mask, eye protection, and gloves. Hand hygiene was performed before donning protective equipment and again following doffing of PPE after leaving the room.         Ramona Alvarez, RN  09/22/21 [id number]    "

## 2021-09-23 PROBLEM — R19.7 DIARRHEA: Status: ACTIVE | Noted: 2021-09-23

## 2021-09-23 LAB
ANION GAP SERPL CALCULATED.3IONS-SCNC: 12.4 MMOL/L (ref 5–15)
BACTERIA UR QL AUTO: NORMAL /HPF
BASOPHILS # BLD AUTO: 0.03 10*3/MM3 (ref 0–0.2)
BASOPHILS NFR BLD AUTO: 0.7 % (ref 0–1.5)
BILIRUB UR QL STRIP: NEGATIVE
BUN SERPL-MCNC: 26 MG/DL (ref 8–23)
BUN/CREAT SERPL: 14.3 (ref 7–25)
C DIFF TOX GENS STL QL NAA+PROBE: NEGATIVE
CALCIUM SPEC-SCNC: 9.3 MG/DL (ref 8.6–10.5)
CHLORIDE SERPL-SCNC: 103 MMOL/L (ref 98–107)
CLARITY UR: CLEAR
CO2 SERPL-SCNC: 22.6 MMOL/L (ref 22–29)
COLOR UR: YELLOW
CREAT SERPL-MCNC: 1.82 MG/DL (ref 0.57–1)
CREAT UR-MCNC: 70.5 MG/DL
DEPRECATED RDW RBC AUTO: 47.6 FL (ref 37–54)
EOSINOPHIL # BLD AUTO: 0.08 10*3/MM3 (ref 0–0.4)
EOSINOPHIL NFR BLD AUTO: 1.8 % (ref 0.3–6.2)
EOSINOPHIL SPEC QL MICRO: 0 % EOS/100 CELLS (ref 0–0)
ERYTHROCYTE [DISTWIDTH] IN BLOOD BY AUTOMATED COUNT: 14.8 % (ref 12.3–15.4)
FERRITIN SERPL-MCNC: 38.1 NG/ML (ref 13–150)
FOLATE SERPL-MCNC: 10.2 NG/ML (ref 4.78–24.2)
GFR SERPL CREATININE-BSD FRML MDRD: 27 ML/MIN/1.73
GLUCOSE SERPL-MCNC: 144 MG/DL (ref 65–99)
GLUCOSE UR STRIP-MCNC: NEGATIVE MG/DL
HCT VFR BLD AUTO: 27.2 % (ref 34–46.6)
HGB BLD-MCNC: 8.6 G/DL (ref 12–15.9)
HGB UR QL STRIP.AUTO: NEGATIVE
HYALINE CASTS UR QL AUTO: NORMAL /LPF
IMM GRANULOCYTES # BLD AUTO: 0.01 10*3/MM3 (ref 0–0.05)
IMM GRANULOCYTES NFR BLD AUTO: 0.2 % (ref 0–0.5)
IRON 24H UR-MRATE: 37 MCG/DL (ref 37–145)
IRON SATN MFR SERPL: 10 % (ref 20–50)
KETONES UR QL STRIP: NEGATIVE
LEUKOCYTE ESTERASE UR QL STRIP.AUTO: NEGATIVE
LYMPHOCYTES # BLD AUTO: 1.07 10*3/MM3 (ref 0.7–3.1)
LYMPHOCYTES NFR BLD AUTO: 24.7 % (ref 19.6–45.3)
MCH RBC QN AUTO: 27.9 PG (ref 26.6–33)
MCHC RBC AUTO-ENTMCNC: 31.6 G/DL (ref 31.5–35.7)
MCV RBC AUTO: 88.3 FL (ref 79–97)
MONOCYTES # BLD AUTO: 0.42 10*3/MM3 (ref 0.1–0.9)
MONOCYTES NFR BLD AUTO: 9.7 % (ref 5–12)
NEUTROPHILS NFR BLD AUTO: 2.72 10*3/MM3 (ref 1.7–7)
NEUTROPHILS NFR BLD AUTO: 62.9 % (ref 42.7–76)
NITRITE UR QL STRIP: NEGATIVE
NRBC BLD AUTO-RTO: 0 /100 WBC (ref 0–0.2)
PH UR STRIP.AUTO: 7 [PH] (ref 5–8)
PLATELET # BLD AUTO: 180 10*3/MM3 (ref 140–450)
PMV BLD AUTO: 9.9 FL (ref 6–12)
POTASSIUM SERPL-SCNC: 3.6 MMOL/L (ref 3.5–5.2)
PROT UR QL STRIP: ABNORMAL
QT INTERVAL: 394 MS
RBC # BLD AUTO: 3.08 10*6/MM3 (ref 3.77–5.28)
RBC # UR: NORMAL /HPF
REF LAB TEST METHOD: NORMAL
RETICS # AUTO: 0.03 10*6/MM3 (ref 0.02–0.13)
RETICS/RBC NFR AUTO: 0.94 % (ref 0.7–1.9)
SODIUM SERPL-SCNC: 138 MMOL/L (ref 136–145)
SODIUM UR-SCNC: 61 MMOL/L
SP GR UR STRIP: 1.01 (ref 1–1.03)
SQUAMOUS #/AREA URNS HPF: NORMAL /HPF
TIBC SERPL-MCNC: 364 MCG/DL (ref 298–536)
TRANSFERRIN SERPL-MCNC: 244 MG/DL (ref 200–360)
TROPONIN T SERPL-MCNC: 0.02 NG/ML (ref 0–0.03)
UROBILINOGEN UR QL STRIP: ABNORMAL
VIT B12 BLD-MCNC: 524 PG/ML (ref 211–946)
WBC # BLD AUTO: 4.33 10*3/MM3 (ref 3.4–10.8)
WBC UR QL AUTO: NORMAL /HPF

## 2021-09-23 PROCEDURE — 82746 ASSAY OF FOLIC ACID SERUM: CPT | Performed by: INTERNAL MEDICINE

## 2021-09-23 PROCEDURE — 97161 PT EVAL LOW COMPLEX 20 MIN: CPT

## 2021-09-23 PROCEDURE — 84466 ASSAY OF TRANSFERRIN: CPT | Performed by: INTERNAL MEDICINE

## 2021-09-23 PROCEDURE — 83540 ASSAY OF IRON: CPT | Performed by: INTERNAL MEDICINE

## 2021-09-23 PROCEDURE — 25010000002 HEPARIN (PORCINE) PER 1000 UNITS: Performed by: INTERNAL MEDICINE

## 2021-09-23 PROCEDURE — G0378 HOSPITAL OBSERVATION PER HR: HCPCS

## 2021-09-23 PROCEDURE — 96372 THER/PROPH/DIAG INJ SC/IM: CPT

## 2021-09-23 PROCEDURE — 85025 COMPLETE CBC W/AUTO DIFF WBC: CPT | Performed by: INTERNAL MEDICINE

## 2021-09-23 PROCEDURE — 87493 C DIFF AMPLIFIED PROBE: CPT | Performed by: HOSPITALIST

## 2021-09-23 PROCEDURE — 82728 ASSAY OF FERRITIN: CPT | Performed by: INTERNAL MEDICINE

## 2021-09-23 PROCEDURE — 97530 THERAPEUTIC ACTIVITIES: CPT

## 2021-09-23 PROCEDURE — 25010000002 IRON SUCROSE PER 1 MG: Performed by: INTERNAL MEDICINE

## 2021-09-23 PROCEDURE — 96365 THER/PROPH/DIAG IV INF INIT: CPT

## 2021-09-23 PROCEDURE — 80048 BASIC METABOLIC PNL TOTAL CA: CPT | Performed by: INTERNAL MEDICINE

## 2021-09-23 PROCEDURE — 85045 AUTOMATED RETICULOCYTE COUNT: CPT | Performed by: INTERNAL MEDICINE

## 2021-09-23 PROCEDURE — 82607 VITAMIN B-12: CPT | Performed by: INTERNAL MEDICINE

## 2021-09-23 PROCEDURE — 84484 ASSAY OF TROPONIN QUANT: CPT | Performed by: INTERNAL MEDICINE

## 2021-09-23 RX ORDER — ASPIRIN 81 MG/1
81 TABLET ORAL DAILY
Status: DISCONTINUED | OUTPATIENT
Start: 2021-09-23 | End: 2021-09-24 | Stop reason: HOSPADM

## 2021-09-23 RX ORDER — LEVOTHYROXINE SODIUM 0.07 MG/1
75 TABLET ORAL DAILY
Status: DISCONTINUED | OUTPATIENT
Start: 2021-09-23 | End: 2021-09-24 | Stop reason: HOSPADM

## 2021-09-23 RX ORDER — CLOPIDOGREL BISULFATE 75 MG/1
75 TABLET ORAL DAILY
Status: DISCONTINUED | OUTPATIENT
Start: 2021-09-23 | End: 2021-09-24 | Stop reason: HOSPADM

## 2021-09-23 RX ORDER — AMLODIPINE BESYLATE 5 MG/1
5 TABLET ORAL DAILY
Status: DISCONTINUED | OUTPATIENT
Start: 2021-09-23 | End: 2021-09-23

## 2021-09-23 RX ORDER — SODIUM FERRIC GLUCONATE COMPLEX IN SUCROSE 12.5 MG/ML
125 INJECTION INTRAVENOUS DAILY
Status: DISCONTINUED | OUTPATIENT
Start: 2021-09-23 | End: 2021-09-23

## 2021-09-23 RX ORDER — MONTELUKAST SODIUM 10 MG/1
10 TABLET ORAL NIGHTLY
Status: DISCONTINUED | OUTPATIENT
Start: 2021-09-23 | End: 2021-09-24 | Stop reason: HOSPADM

## 2021-09-23 RX ORDER — SERTRALINE HYDROCHLORIDE 100 MG/1
100 TABLET, FILM COATED ORAL NIGHTLY
Status: DISCONTINUED | OUTPATIENT
Start: 2021-09-23 | End: 2021-09-24 | Stop reason: HOSPADM

## 2021-09-23 RX ORDER — PRAVASTATIN SODIUM 20 MG
20 TABLET ORAL NIGHTLY
Status: DISCONTINUED | OUTPATIENT
Start: 2021-09-23 | End: 2021-09-24 | Stop reason: HOSPADM

## 2021-09-23 RX ORDER — AMLODIPINE BESYLATE 10 MG/1
10 TABLET ORAL
Status: DISCONTINUED | OUTPATIENT
Start: 2021-09-23 | End: 2021-09-24

## 2021-09-23 RX ADMIN — PRAVASTATIN SODIUM 20 MG: 20 TABLET ORAL at 20:08

## 2021-09-23 RX ADMIN — HEPARIN SODIUM 5000 UNITS: 5000 INJECTION INTRAVENOUS; SUBCUTANEOUS at 08:44

## 2021-09-23 RX ADMIN — IRON SUCROSE 100 MG: 20 INJECTION, SOLUTION INTRAVENOUS at 15:53

## 2021-09-23 RX ADMIN — MONTELUKAST SODIUM 10 MG: 10 TABLET, FILM COATED ORAL at 20:08

## 2021-09-23 RX ADMIN — SODIUM CHLORIDE, PRESERVATIVE FREE 10 ML: 5 INJECTION INTRAVENOUS at 08:44

## 2021-09-23 RX ADMIN — SODIUM CHLORIDE, PRESERVATIVE FREE 10 ML: 5 INJECTION INTRAVENOUS at 20:08

## 2021-09-23 RX ADMIN — CLOPIDOGREL 75 MG: 75 TABLET, FILM COATED ORAL at 12:15

## 2021-09-23 RX ADMIN — SERTRALINE 100 MG: 100 TABLET, FILM COATED ORAL at 20:08

## 2021-09-23 RX ADMIN — ASPIRIN 81 MG: 81 TABLET, COATED ORAL at 12:16

## 2021-09-23 RX ADMIN — HEPARIN SODIUM 5000 UNITS: 5000 INJECTION INTRAVENOUS; SUBCUTANEOUS at 20:08

## 2021-09-23 RX ADMIN — ACETAMINOPHEN 650 MG: 325 TABLET, FILM COATED ORAL at 20:08

## 2021-09-23 RX ADMIN — AMLODIPINE BESYLATE 10 MG: 10 TABLET ORAL at 12:15

## 2021-09-23 RX ADMIN — LEVOTHYROXINE SODIUM 75 MCG: 0.07 TABLET ORAL at 12:15

## 2021-09-23 NOTE — CONSULTS
Star Thurston MD/Irene Weinberg DO    Nephrology Consult Note      Patient Name: Kellen Parmar  :1935  Age: 85 y.o.    Patient Care Team:  Naomi Rivera APRN as PCP - General (Family Medicine)    Chief Complaint:   Chief Complaint   Patient presents with   • Anxiety     Reason for Consult: JEANIE on CKD Stage 3  Admitting Provider: Steven Vaughan*  Encounter Provider: Irene Weinberg DO    Date of Service: 2021    SUBJECTIVE:  Kellen Parmar is a 85 y.o.female with history of DM type 2,  HTN, hypothyroidism, dementia who presents to [facility] for feeling numb all over which is no presumed to a possibly panic attack. She was recently admitted in 2021 for hallucinations. She was found to be septic and sigmoid colitis was noted on CT, and was noted to have C. Diff. She states she no longer has diarrhea though diarrhea was documented for this AM. She says she feels pretty good. She has been eating and drinking well on her own while here. She has been battling a upper respiratory infection per patient prior to admission.  Covid is negative here     Regarding her renal history, baseline of Cr is around 1.6, Cr ws 2.12 on admit. She is on Losartan and furosemide at home, along with amlodipine. She recently saw my partner in the office 2021 and furosemide was restarted/escalated on her due to her edema. Cr last 2021 was 1.5      ROS  Constitutional: No fevers, no chills  Eye: No eye pain; no eye discharge  Respiratory: No shortness of breath, no cough  Cardiovascular: No Chest pain, no palpitations     (All systems reviewed in addition to the above. All pertinent positive and negative as mentioned as above)    Current Meds  Scheduled Meds:amLODIPine, 10 mg, Oral, Q24H  aspirin, 81 mg, Oral, Daily  clopidogrel, 75 mg, Oral, Daily  heparin (porcine), 5,000 Units, Subcutaneous, Q12H  levothyroxine, 75 mcg, Oral, Daily  montelukast, 10 mg, Oral, Nightly  pravastatin, 20 mg, Oral,  Nightly  sertraline, 100 mg, Oral, Nightly  sodium chloride, 10 mL, Intravenous, Q12H      Continuous Infusions:   PRN Meds:.•  acetaminophen **OR** acetaminophen **OR** acetaminophen  •  hydrALAZINE  •  nitroglycerin  •  ondansetron  •  sodium chloride    Home Medications:   Medications Prior to Admission   Medication Sig Dispense Refill Last Dose   • allopurinol (ZYLOPRIM) 100 MG tablet 100 mg Daily.   9/22/2021 at Unknown time   • amLODIPine (NORVASC) 5 MG tablet 5 mg Daily.   9/22/2021 at Unknown time   • aspirin (aspirin) 81 MG EC tablet 81 mg Daily.   9/22/2021 at Unknown time   • Clopidogrel Bisulfate (PLAVIX PO) Daily.   9/22/2021 at Unknown time   • levothyroxine (SYNTHROID, LEVOTHROID) 75 MCG tablet 75 mcg Daily.   9/22/2021 at Unknown time   • losartan (COZAAR) 50 MG tablet Take 50 mg by mouth Daily.   9/22/2021 at Unknown time   • Melatonin 5 MG capsule Take 10 mg by mouth Every Night.   9/22/2021 at Unknown time   • montelukast (SINGULAIR) 10 MG tablet Every Night.   9/22/2021 at Unknown time   • ondansetron (ZOFRAN) 4 MG tablet TAKE ONE TABLET BY MOUTH EVERY 8 HOURS AS NEEDED FOR NAUSEA   9/22/2021 at Unknown time   • pravastatin (PRAVACHOL) 20 MG tablet Every Night.   9/22/2021 at Unknown time   • sertraline (ZOLOFT) 50 MG tablet 100 mg Every Night.   9/22/2021 at Unknown time   • dicyclomine (BENTYL) 10 MG/5ML syrup Take 20 mg by mouth 4 (Four) Times a Day.      • HYDROcodone-acetaminophen (NORCO) 5-325 MG per tablet Take 1 tablet by mouth.      • polycarbophil 625 MG tablet tablet Take 625 mg by mouth Daily.          Allergies:  Allergies as of 09/22/2021 - Reviewed 09/22/2021   Allergen Reaction Noted   • Contrast dye Anaphylaxis 07/14/2021   • Shrimp Nausea And Vomiting 06/29/2020       Past Medical History  Past Medical History:   Diagnosis Date   • Anemia    • Asthma    • Cancer (CMS/HCC)    • Dementia (CMS/HCC)    • Diabetes mellitus (CMS/HCC)    • Disease of thyroid gland    • GERD  "(gastroesophageal reflux disease)    • Hypertension        Past Surgical History  No past surgical history on file.    Family History  No family history on file.      OBJECTIVE:   /65 (BP Location: Left arm, Patient Position: Lying)   Pulse 85   Temp 98 °F (36.7 °C) (Oral)   Resp 18   Ht 152.4 cm (60\")   Wt 52.5 kg (115 lb 11.2 oz)   SpO2 95%   BMI 22.60 kg/m²   Temp:  [97.5 °F (36.4 °C)-98.8 °F (37.1 °C)] 98 °F (36.7 °C)  Heart Rate:  [69-92] 85  Resp:  [16-18] 18  BP: (161-197)/(61-88) 174/65    I/O last 3 completed shifts:  In: -   Out: 200 [Urine:200]  No intake/output data recorded.    Intake/Output Summary (Last 24 hours) at 9/23/2021 1357  Last data filed at 9/22/2021 2315  Gross per 24 hour   Intake --   Output 200 ml   Net -200 ml       Flowsheet Rows      First Filed Value   Admission Height  152.4 cm (60\") Documented at 09/22/2021 1656   Admission Weight  49.9 kg (110 lb) Documented at 09/22/2021 1656        Weight change:     Physical Exam:   GENERAL: Nontoxic, no acute distress, alert; sitting up in bed  HEAD: Normocephalic, atraumatic  EENT: no scleral icterus or conjunctival injection  NECK: supple, no rigidity.   LUNGS: + crackles on left base; no wheezing;; no accessory m. use  CV: Regular rate; irregular rhythm; , no gallops/rubs  GI: soft, tender, normoactive bowel sounds, no pulsatile mass or bruit.  : no suprapubic or flank tenderness.  MUCULOSKELETAL: no spinal or paraspinal tenderness; no joint swelling  EXT: No cyanosis, clubbing; + edema  SKIN: warm and dry, no rash  NEURO: Cranial nerves 2-12 grossly intact, no tremor  PSYCH: Normal mood and affect. Speech normal rate and tone.      RESULTS:  Labs:   Results from last 7 days   Lab Units 09/23/21  0945 09/22/21  1750   WBC 10*3/mm3 4.33 6.85   HEMOGLOBIN g/dL 8.6* 9.8*   HEMATOCRIT % 27.2* 31.3*   PLATELETS 10*3/mm3 180 195     Results from last 7 days   Lab Units 09/23/21  0945 09/22/21  1750   SODIUM mmol/L 138 136 "   POTASSIUM mmol/L 3.6 3.9   CHLORIDE mmol/L 103 99   CO2 mmol/L 22.6 23.2   BUN mg/dL 26* 28*   CREATININE mg/dL 1.82* 2.12*   GLUCOSE mg/dL 144* 96   CALCIUM mg/dL 9.3 9.6   ALBUMIN g/dL  --  4.80   AST (SGOT) U/L  --  26   ALT (SGPT) U/L  --  15               Invalid input(s): LDLCALC          Results for orders placed or performed during the hospital encounter of 09/22/21   COVID-19,[facility] IN-HOUSE CEPHEID/JANAY NP SWAB IN TRANSPORT MEDIA 8-12 HR TAT - Swab, Nasopharynx    Specimen: Nasopharynx; Swab   Result Value Ref Range    COVID19 Not Detected Not Detected - Ref. Range   Eosinophil Smear - Urine, Urine, Clean Catch    Specimen: Urine, Clean Catch   Result Value Ref Range    Eosinophil Smear 0 0 - 0 % EOS/100 Cells   Troponin    Specimen: Blood   Result Value Ref Range    Troponin T 0.034 (C) 0.000 - 0.030 ng/mL   Comprehensive Metabolic Panel    Specimen: Blood   Result Value Ref Range    Glucose 96 65 - 99 mg/dL    BUN 28 (H) 8 - 23 mg/dL    Creatinine 2.12 (H) 0.57 - 1.00 mg/dL    Sodium 136 136 - 145 mmol/L    Potassium 3.9 3.5 - 5.2 mmol/L    Chloride 99 98 - 107 mmol/L    CO2 23.2 22.0 - 29.0 mmol/L    Calcium 9.6 8.6 - 10.5 mg/dL    Total Protein 7.9 6.0 - 8.5 g/dL    Albumin 4.80 3.50 - 5.20 g/dL    ALT (SGPT) 15 1 - 33 U/L    AST (SGOT) 26 1 - 32 U/L    Alkaline Phosphatase 64 39 - 117 U/L    Total Bilirubin 0.3 0.0 - 1.2 mg/dL    eGFR Non African Amer 22 (L) >60 mL/min/1.73    Globulin 3.1 gm/dL    A/G Ratio 1.5 g/dL    BUN/Creatinine Ratio 13.2 7.0 - 25.0    Anion Gap 13.8 5.0 - 15.0 mmol/L   CBC Auto Differential    Specimen: Blood   Result Value Ref Range    WBC 6.85 3.40 - 10.80 10*3/mm3    RBC 3.46 (L) 3.77 - 5.28 10*6/mm3    Hemoglobin 9.8 (L) 12.0 - 15.9 g/dL    Hematocrit 31.3 (L) 34.0 - 46.6 %    MCV 90.5 79.0 - 97.0 fL    MCH 28.3 26.6 - 33.0 pg    MCHC 31.3 (L) 31.5 - 35.7 g/dL    RDW 15.0 12.3 - 15.4 %    RDW-SD 50.2 37.0 - 54.0 fl    MPV 9.0 6.0 - 12.0 fL    Platelets 195 140 - 450  10*3/mm3    Neutrophil % 69.2 42.7 - 76.0 %    Lymphocyte % 23.2 19.6 - 45.3 %    Monocyte % 6.4 5.0 - 12.0 %    Eosinophil % 0.3 0.3 - 6.2 %    Basophil % 0.6 0.0 - 1.5 %    Immature Grans % 0.3 0.0 - 0.5 %    Neutrophils, Absolute 4.74 1.70 - 7.00 10*3/mm3    Lymphocytes, Absolute 1.59 0.70 - 3.10 10*3/mm3    Monocytes, Absolute 0.44 0.10 - 0.90 10*3/mm3    Eosinophils, Absolute 0.02 0.00 - 0.40 10*3/mm3    Basophils, Absolute 0.04 0.00 - 0.20 10*3/mm3    Immature Grans, Absolute 0.02 0.00 - 0.05 10*3/mm3    nRBC 0.0 0.0 - 0.2 /100 WBC   Urinalysis With Microscopic If Indicated (No Culture) - Urine, Clean Catch    Specimen: Urine, Clean Catch   Result Value Ref Range    Color, UA Yellow Yellow, Straw    Appearance, UA Clear Clear    pH, UA 7.0 5.0 - 8.0    Specific Gravity, UA 1.013 1.005 - 1.030    Glucose, UA Negative Negative    Ketones, UA Negative Negative    Bilirubin, UA Negative Negative    Blood, UA Negative Negative    Protein,  mg/dL (2+) (A) Negative    Leuk Esterase, UA Negative Negative    Nitrite, UA Negative Negative    Urobilinogen, UA 0.2 E.U./dL 0.2 - 1.0 E.U./dL   Sodium, Urine, Random - Urine, Clean Catch    Specimen: Urine, Clean Catch   Result Value Ref Range    Sodium, Urine 61 mmol/L   Creatinine, Urine, Random - Urine, Clean Catch    Specimen: Urine, Clean Catch   Result Value Ref Range    Creatinine, Urine 70.5 mg/dL   Troponin    Specimen: Blood   Result Value Ref Range    Troponin T 0.033 (C) 0.000 - 0.030 ng/mL   Troponin    Specimen: Blood   Result Value Ref Range    Troponin T 0.024 0.000 - 0.030 ng/mL   Basic Metabolic Panel    Specimen: Blood   Result Value Ref Range    Glucose 144 (H) 65 - 99 mg/dL    BUN 26 (H) 8 - 23 mg/dL    Creatinine 1.82 (H) 0.57 - 1.00 mg/dL    Sodium 138 136 - 145 mmol/L    Potassium 3.6 3.5 - 5.2 mmol/L    Chloride 103 98 - 107 mmol/L    CO2 22.6 22.0 - 29.0 mmol/L    Calcium 9.3 8.6 - 10.5 mg/dL    eGFR Non African Amer 27 (L) >60 mL/min/1.73     BUN/Creatinine Ratio 14.3 7.0 - 25.0    Anion Gap 12.4 5.0 - 15.0 mmol/L   Iron Profile    Specimen: Blood   Result Value Ref Range    Iron 37 37 - 145 mcg/dL    Iron Saturation 10 (L) 20 - 50 %    Transferrin 244 200 - 360 mg/dL    TIBC 364 298 - 536 mcg/dL   Reticulocytes    Specimen: Blood   Result Value Ref Range    Reticulocyte % 0.94 0.70 - 1.90 %    Reticulocyte Absolute 0.0290 0.0200 - 0.1300 10*6/mm3   Ferritin    Specimen: Blood   Result Value Ref Range    Ferritin 38.10 13.00 - 150.00 ng/mL   Vitamin B12    Specimen: Blood   Result Value Ref Range    Vitamin B-12 524 211 - 946 pg/mL   Folate    Specimen: Blood   Result Value Ref Range    Folate 10.20 4.78 - 24.20 ng/mL   CBC Auto Differential    Specimen: Blood   Result Value Ref Range    WBC 4.33 3.40 - 10.80 10*3/mm3    RBC 3.08 (L) 3.77 - 5.28 10*6/mm3    Hemoglobin 8.6 (L) 12.0 - 15.9 g/dL    Hematocrit 27.2 (L) 34.0 - 46.6 %    MCV 88.3 79.0 - 97.0 fL    MCH 27.9 26.6 - 33.0 pg    MCHC 31.6 31.5 - 35.7 g/dL    RDW 14.8 12.3 - 15.4 %    RDW-SD 47.6 37.0 - 54.0 fl    MPV 9.9 6.0 - 12.0 fL    Platelets 180 140 - 450 10*3/mm3    Neutrophil % 62.9 42.7 - 76.0 %    Lymphocyte % 24.7 19.6 - 45.3 %    Monocyte % 9.7 5.0 - 12.0 %    Eosinophil % 1.8 0.3 - 6.2 %    Basophil % 0.7 0.0 - 1.5 %    Immature Grans % 0.2 0.0 - 0.5 %    Neutrophils, Absolute 2.72 1.70 - 7.00 10*3/mm3    Lymphocytes, Absolute 1.07 0.70 - 3.10 10*3/mm3    Monocytes, Absolute 0.42 0.10 - 0.90 10*3/mm3    Eosinophils, Absolute 0.08 0.00 - 0.40 10*3/mm3    Basophils, Absolute 0.03 0.00 - 0.20 10*3/mm3    Immature Grans, Absolute 0.01 0.00 - 0.05 10*3/mm3    nRBC 0.0 0.0 - 0.2 /100 WBC   Urinalysis, Microscopic Only - Urine, Clean Catch    Specimen: Urine, Clean Catch   Result Value Ref Range    RBC, UA 0-2 None Seen, 0-2 /HPF    WBC, UA 0-2 None Seen, 0-2 /HPF    Bacteria, UA None Seen None Seen /HPF    Squamous Epithelial Cells, UA 0-2 None Seen, 0-2 /HPF    Hyaline Casts, UA None Seen  None Seen /LPF    Methodology Automated Microscopy    ECG 12 Lead   Result Value Ref Range    QT Interval 394 ms   Green Top (Gel)   Result Value Ref Range    Extra Tube Hold for add-ons.    Lavender Top   Result Value Ref Range    Extra Tube hold for add-on    Gold Top - SST   Result Value Ref Range    Extra Tube Hold for add-ons.    Light Blue Top   Result Value Ref Range    Extra Tube hold for add-on        Imaging  No results found.     All available labs and imaging studies reviewed.        ASSESSMENT/PLAN:    JEANIE on CKD Stage 3  - Baseline Cr around 1.6  - Cr pending   - Hold ARB and Furosemide for now   - UA relatively unimpressive aside; urine sodium may be falsely elevated due to lasix use outpatient  - Hold furosemide   - Avoid NSAIDs/Nephrotoxins     HTN  - stop IVF  - increase amlodipine to 10mg daily since ARB on hold      Iron Deficiency Anemia  - ferritin 38.10 5/19/2021--goal >100  - iron sat 10% 5/19/2021--goal > 20%  - start venofer 100mg q day x 5 doses  - transfuse as needed     Edema  - furosemide on hold for now   - stop IVF     DM Type 2     Dementia     Diarrhea  - in isolation; C. Dif toxin getting checked again    It has been a pleasure seeing this patient. Please feel free to contact me with any questions or concerns    Irene Weinberg, DO  [facility]  P: [phone]  F: [phone]  Pager: [phone]

## 2021-09-23 NOTE — CASE MANAGEMENT/SOCIAL WORK
Discharge Planning Assessment  [facility]     Patient Name: Kellen Parmar  MRN: [id number]  Today's Date: 9/23/2021    Admit Date: 9/22/2021    Discharge Needs Assessment     Row Name 09/23/21 1424       Living Environment    Lives With  alone    Current Living Arrangements  home/apartment/condo    Primary Care Provided by  child(sneha)    Provides Primary Care For  no one, unable/limited ability to care for self    Family Caregiver if Needed  child(sneha), adult    Quality of Family Relationships  helpful;involved;supportive    Able to Return to Prior Arrangements  yes       Transition Planning    Patient/Family Anticipates Transition to  home    Patient/Family Anticipated Services at Transition  none    Transportation Anticipated  family or friend will provide       Discharge Needs Assessment    Readmission Within the Last 30 Days  no previous admission in last 30 days    Equipment Currently Used at Home  cane, straight;walker, rolling    Concerns to be Addressed  denies needs/concerns at this time    Anticipated Changes Related to Illness  none    Provided Post Acute Provider List?  Refused    Provided Post Acute Provider Quality & Resource List?  Refused        Discharge Plan     Row Name 09/23/21 1426       Plan    Plan  Home with assist of dtr    Plan Comments  S/W pt's dtr Chacha Castellanos by phone ([phone]).  Facesheet and pharmacy info confirmed.  Pt lives alone in a trailer with 1 step to enter. She usually ambulates without difficulty, but has a cane and walker if needed. Chacha lives a few doors down and checks on patient several times a day.  She assists pt as needed, including with  managing her meds, meals, grocery shopping, laundry and transport.  Pt does not drive. There is a camera at her home so Chacha can keep an eye on her when she is not there.  Chacha states pt does pretty well at home, but gets more confused when she comes into the hospital.  She does have early dementia. Pt has used HH before, but  does not like it and would not want to use it.  No hx of SNF.  Pt plans to return home upon DC. Pvt pay caregiver list given to dtr in case needed.  CCP will follow and assist if needed. .......sk        Continued Care and Services - Admitted Since 9/22/2021    Coordination has not been started for this encounter.       Expected Discharge Date and Time     Expected Discharge Date Expected Discharge Time    Sep 24, 2021         Demographic Summary     Row Name 09/23/21 1420       General Information    Admission Type  inpatient    Arrived From  home    Referral Source  admission list    Reason for Consult  discharge planning    Preferred Language  English        Functional Status     Row Name 09/23/21 1421       Functional Status    Usual Activity Tolerance  moderate    Current Activity Tolerance  moderate       Functional Status, IADL    Medications  assistive person    Meal Preparation  assistive person    Housekeeping  assistive person    Laundry  assistive person    Shopping  assistive person       Employment/    Employment Status  retired                Nolvia Ramos RN

## 2021-09-23 NOTE — H&P
[facility] Admission H&P    Patient Care Team:  Naomi Rivera APRN as PCP - General (Family Medicine)    Chief complaint: Numb all over    History of Present Illness    This is a 85-year-old female with history of mild dementia, diabetes, hypertension and anxiety with panic attacks who presented to the emergency room after family talked to her over the phone and she was complaining of feeling numb all over.  Her daughter went to her house and then called EMS given the ongoing complaints.  Her daughter denies any confusion or change in consciousness.  No slurred or unusual speech.  Her symptoms were resolved fairly quickly and her daughter feels that she likely had another panic attack.  In the emergency room she had basic labs performed which showed acute kidney injury on advanced chronic kidney disease.  Daughter states that the patient's oral intake has been fine and has not had any GI losses.  I been asked to admit her for further care.    Past Medical History:   Diagnosis Date   • Anemia    • Asthma    • Cancer (CMS/HCC)    • Dementia (CMS/HCC)    • Diabetes mellitus (CMS/HCC)    • Disease of thyroid gland    • GERD (gastroesophageal reflux disease)    • Hypertension      No past surgical history on file.  No family history on file.  Social History     Tobacco Use   • Smoking status: Former Smoker     Packs/day: 0.50     Years: 15.00     Pack years: 7.50     Quit date:      Years since quittin.7   • Smokeless tobacco: Former User   Substance Use Topics   • Alcohol use: Not Currently   • Drug use: Never     Medications reviewed    Allergies:  Contrast dye and Shrimp    Review of Systems   Constitutional: Negative for chills and fever.   HENT: Negative for congestion and sore throat.    Eyes: Negative for visual disturbance.   Respiratory: Negative for cough, chest tightness, shortness of breath and wheezing.    Cardiovascular: Negative for chest pain, palpitations and leg swelling.   Gastrointestinal:  Negative for abdominal distention, abdominal pain, diarrhea, nausea and vomiting.   Endocrine: Negative for polydipsia and polyuria.   Genitourinary: Negative for difficulty urinating, dysuria, frequency and urgency.   Musculoskeletal: Negative for arthralgias and myalgias.   Skin: Negative for color change and rash.   Neurological: Negative for dizziness and light-headedness.        Patient was feeling numb all over earlier today but this has since resolved and she now feels back to baseline        PHYSICAL EXAM    Vital Signs  tMax 24 hrs:  Temp (24hrs), Av.8 °F (37.1 °C), Min:98.8 °F (37.1 °C), Max:98.8 °F (37.1 °C)    Vitals Ranges:  Temp:  [98.8 °F (37.1 °C)] 98.8 °F (37.1 °C)  Heart Rate:  [80-92] 80  Resp:  [18] 18  BP: (174-191)/(75-88) 191/86    Physical Exam  Vitals and nursing note reviewed.   Constitutional:       General: She is not in acute distress.     Comments: Elderly frail, weak and deconditioned   HENT:      Head: Normocephalic and atraumatic.   Eyes:      Extraocular Movements: Extraocular movements intact.      Pupils: Pupils are equal, round, and reactive to light.   Cardiovascular:      Rate and Rhythm: Normal rate and regular rhythm.   Pulmonary:      Effort: Pulmonary effort is normal. No respiratory distress.      Breath sounds: Normal breath sounds.   Abdominal:      General: There is no distension.      Palpations: Abdomen is soft.      Tenderness: There is no abdominal tenderness.   Musculoskeletal:         General: No swelling or deformity.      Cervical back: Normal range of motion.   Skin:     General: Skin is warm and dry.   Neurological:      General: No focal deficit present.      Mental Status: She is alert.      Comments: Mildly confused per her baseline dementia         Results Review:  Results from last 7 days   Lab Units 21  1750   WBC 10*3/mm3 6.85   HEMOGLOBIN g/dL 9.8*   HEMATOCRIT % 31.3*   PLATELETS 10*3/mm3 195     Results from last 7 days   Lab Units  09/22/21  1750   SODIUM mmol/L 136   POTASSIUM mmol/L 3.9   CHLORIDE mmol/L 99   CO2 mmol/L 23.2   BUN mg/dL 28*   CREATININE mg/dL 2.12*   CALCIUM mg/dL 9.6   BILIRUBIN mg/dL 0.3   ALK PHOS U/L 64   ALT (SGPT) U/L 15   AST (SGOT) U/L 26   GLUCOSE mg/dL 96        I reviewed the patient's new clinical results.        Active Hospital Problems    Diagnosis  POA   • **JEANIE (acute kidney injury) (CMS/[facility]) [N17.9]  Yes   • CKD (chronic kidney disease) stage 4, GFR 15-29 ml/min (CMS/[facility]) [N18.4]  Yes   • HTN (hypertension) [I10]  Yes   • Dementia without behavioral disturbance (CMS/[facility]) [F03.90]  Yes      Resolved Hospital Problems   No resolved problems to display.       Assessment & Plan    The patient will be admitted mainly for what appears to be acute kidney injury on chronic kidney disease.  We will give her gentle IV fluids, check urine studies, bladder scan, and ask her nephrologist to evaluate.  Repeat labs in the morning after some hydration overnight tonight.  In speaking with the daughter it appears that the complaint of feeling numb all over was likely due to a panic attack.  Will monitor for any recurrence of symptoms.  Patient is back to baseline per her daughter.  Blood pressure elevated.  She has been restarted on Norvasc but ARB on hold.  Add as needed hydralazine to temporize overnight tonight.  Additional plans based on her clinical course.    I discussed the patients findings and my recommendations with patient and family    I wore full protective equipment throughout the patient encounter including eye protection and facemask.  Hand hygiene was performed before donning protective equipment and after removal when leaving the room.    Stevne Vaughan MD  09/22/21  20:35 EDT

## 2021-09-23 NOTE — PROGRESS NOTES
Star Thurston MD/Irene Weinberg DO    Nephrology Miscellaneous Note    Patient Name: Kellen Parmar  :1935  Age: 85 y.o.          Patient seen and examined.     AM labs pending; UA unimpressive and urine sodium may be skewed by her outpatient furosemide.     + edema and left side crackles in lung, though breathing is stable.     Stop IVF for now since she is eating and drinking well on her own.     Increase amlodipine to 10mg daily since Losartan on hold.       Full Consult Note to follow.         Please feel free to contact me with any questions or concerns.     Irene Weinberg DO  [facility]  P: [phone]  F: [phone]  Pager: [phone]

## 2021-09-23 NOTE — PROGRESS NOTES
Name: Kellen Parmar ADMIT: 2021   : 1935  PCP: Naomi Rivera APRN    MRN: [id number] LOS: 0 days   AGE/SEX: 85 y.o. female  ROOM: RUST     Subjective   Subjective   She feels back to normal. Discussed with nursing staff some confusion which according to family is not unusual when she is hospitalized. Denies any numbness. She did have a couple episodes of diarrhea but she states she did not see it. She has a prior history of C. difficile.    Greater than 50% of time spent in counseling and/or coordination of care. Total face/floor time 35 minutes.     Review of Systems   Constitutional: Negative for fever.   Respiratory: Negative for cough and shortness of breath.    Cardiovascular: Negative for chest pain.   Gastrointestinal: Negative for abdominal pain.   Neurological: Positive for numbness.      Objective   Objective   Vital Signs  Temp:  [97.5 °F (36.4 °C)-98.8 °F (37.1 °C)] 97.7 °F (36.5 °C)  Heart Rate:  [69-92] 80  Resp:  [16-18] 17  BP: (161-197)/(61-88) 179/81  SpO2:  [97 %-99 %] 98 %  on   ;   Device (Oxygen Therapy): room air  Body mass index is 22.6 kg/m².    Physical Exam  Constitutional:       Appearance: Normal appearance.   HENT:      Head: Normocephalic and atraumatic.   Cardiovascular:      Rate and Rhythm: Normal rate and regular rhythm.   Pulmonary:      Effort: Pulmonary effort is normal. No respiratory distress.      Breath sounds: Normal breath sounds.   Abdominal:      General: Bowel sounds are normal. There is no distension.      Palpations: Abdomen is soft.      Tenderness: There is no abdominal tenderness. There is no guarding or rebound.   Musculoskeletal:         General: No swelling.      Right lower leg: No edema.      Left lower leg: No edema.   Skin:     General: Skin is warm and dry.   Neurological:      General: No focal deficit present.      Mental Status: She is alert.   Psychiatric:         Mood and Affect: Mood normal.         Behavior: Behavior  normal.     Results Review  I reviewed the patient's new clinical results.  Results from last 7 days   Lab Units 09/22/21  1750   WBC 10*3/mm3 6.85   HEMOGLOBIN g/dL 9.8*   PLATELETS 10*3/mm3 195     Results from last 7 days   Lab Units 09/22/21  1750   SODIUM mmol/L 136   POTASSIUM mmol/L 3.9   CHLORIDE mmol/L 99   CO2 mmol/L 23.2   BUN mg/dL 28*   CREATININE mg/dL 2.12*   GLUCOSE mg/dL 96     Lab Results   Component Value Date    ANIONGAP 13.8 09/22/2021     Estimated Creatinine Clearance: 16.7 mL/min (A) (by C-G formula based on SCr of 2.12 mg/dL (H)).    Results from last 7 days   Lab Units 09/22/21  1750   ALBUMIN g/dL 4.80   BILIRUBIN mg/dL 0.3   ALK PHOS U/L 64   AST (SGOT) U/L 26   ALT (SGPT) U/L 15     Results from last 7 days   Lab Units 09/22/21  1750   CALCIUM mg/dL 9.6   ALBUMIN g/dL 4.80       No results found for: HGBA1C, POCGLU    Scheduled Meds  amLODIPine, 10 mg, Oral, Q24H  aspirin, 81 mg, Oral, Daily  clopidogrel, 75 mg, Oral, Daily  heparin (porcine), 5,000 Units, Subcutaneous, Q12H  levothyroxine, 75 mcg, Oral, Daily  montelukast, 10 mg, Oral, Nightly  pravastatin, 20 mg, Oral, Nightly  sertraline, 100 mg, Oral, Nightly  sodium chloride, 10 mL, Intravenous, Q12H    Continuous Infusions   PRN Meds  •  acetaminophen **OR** acetaminophen **OR** acetaminophen  •  hydrALAZINE  •  nitroglycerin  •  ondansetron  •  sodium chloride     Diet  Diet Regular; Renal     I personally reviewed tele     Assessment/Plan     Active Hospital Problems    Diagnosis  POA   • **JEANIE (acute kidney injury) (CMS/[facility]) [N17.9]  Yes   • Diarrhea [R19.7]  Unknown   • CKD (chronic kidney disease) stage 4, GFR 15-29 ml/min (CMS/[facility]) [N18.4]  Yes   • HTN (hypertension) [I10]  Yes   • Dementia without behavioral disturbance (CMS/[facility]) [F03.90]  Yes      Resolved Hospital Problems   No resolved problems to display.     85 y.o. female admitted with JEANIE (acute kidney injury) (HCC).    · Acute kidney injury on CKD 4  · Followed labs  pending  · ARB on hold  · Norvasc increased while ARB on hold  · Nephrology following   · History of C. difficile now with diarrhea this morning  · Patient now on isolation  · No fever or leukocytosis  · Continue to monitor  · Numbness related to panic attack and hyperventilation: Resolved  · Heparin SC for DVT prophylaxis  · Full code  · Discussed with patient, nursing staff and CCP  · Anticipate discharge home with family in 1-2 days. She lives with her daughter.    Cullen Morin MD  [facility]  09/23/21  10:59 EDT    I wore protective equipment throughout this patient encounter including a face mask, gloves, and protective eyewear.  Hand hygiene was performed before donning protective equipment and after removal when leaving the room.

## 2021-09-23 NOTE — PLAN OF CARE
Goal Outcome Evaluation:  Plan of Care Reviewed With: patient        Progress: improving  Outcome Summary: Pt admitted for JEANIE, had hx of CKD. Pt confised, disoriented to place and time. IVF. VSS. Up to BSC x1 assist. Awaiting am labs

## 2021-09-23 NOTE — PLAN OF CARE
Goal Outcome Evaluation:  Plan of Care Reviewed With: patient           Outcome Summary: PT confused today with short memory span.  She is oriented to self, but confused to others.  She was trying to get out of bed all shift, and while she does walk well, she is unsteady when getting up.  Not redirected well.  Lab levels improving.  Some diarrhea in the am, awaiting for another bm to send down a stool sample,  Will continue to monitor.

## 2021-09-23 NOTE — THERAPY EVALUATION
Patient Name: Kellen Parmar  : 1935    MRN: [id number]                              Today's Date: 2021       Admit Date: 2021    Visit Dx:     ICD-10-CM ICD-9-CM   1. JEANIE (acute kidney injury) ([facility])  N17.9 584.9   2. Anxiety  F41.9 300.00   3. Hypertension, unspecified type  I10 401.9     Patient Active Problem List   Diagnosis   • Clostridium difficile colitis   • HTN (hypertension)   • CKD (chronic kidney disease) stage 4, GFR 15-29 ml/min (CMS/[facility])   • JEANIE (acute kidney injury) (CMS/[facility])   • Hyponatremia   • Hypokalemia   • Anemia   • Leukocytosis   • Acute metabolic encephalopathy   • Dementia without behavioral disturbance (CMS/[facility])   • Carotid stenosis, bilateral   • Diarrhea     Past Medical History:   Diagnosis Date   • Anemia    • Asthma    • Cancer (CMS/[facility])    • Dementia (CMS/[facility])    • Diabetes mellitus (CMS/[facility])    • Disease of thyroid gland    • GERD (gastroesophageal reflux disease)    • Hypertension      No past surgical history on file.  General Information     Row Name 21 1501          Physical Therapy Time and Intention    Document Type  evaluation  -CF     Mode of Treatment  individual therapy;physical therapy  -     Row Name 21 1501          General Information    Patient Profile Reviewed  yes  -CF     Prior Level of Function  independent:;min assist: daughter lives down the street and assists as needed, pt owns cane and walker to use  -CF     Existing Precautions/Restrictions  fall  -CF     Barriers to Rehab  cognitive status  -CF     Row Name 21 1501          Living Environment    Lives With  alone  -CF     Row Name 21 1501          Home Main Entrance    Number of Stairs, Main Entrance  one  -CF     Row Name 21 1501          Stairs Within Home, Primary    Number of Stairs, Within Home, Primary  none  -CF     Row Name 21 1501          Cognition    Orientation Status (Cognition)  oriented to;person  -CF     Row Name 21 1501           Safety Issues, Functional Mobility    Safety Issues Affecting Function (Mobility)  insight into deficits/self-awareness;awareness of need for assistance;safety precautions follow-through/compliance;sequencing abilities;problem-solving;safety precaution awareness;positioning of assistive device  -CF     Impairments Affecting Function (Mobility)  balance;cognition;endurance/activity tolerance  -CF       User Key  (r) = Recorded By, (t) = Taken By, (c) = Cosigned By    Initials Name Provider Type    CF Maryam Ponce, PT Physical Therapist        Mobility     Row Name 09/23/21 1513          Bed Mobility    Bed Mobility  supine-sit;sit-supine  -CF     Supine-Sit [place] (Bed Mobility)  supervision  -CF     Sit-Supine [place] (Bed Mobility)  supervision  -CF     Comment (Bed Mobility)  Supervision at EOB due to cognition  -CF     Row Name 09/23/21 1513          Transfers    Comment (Transfers)  Pt amb to/from bathroom, sba for toileting/hygiene. Assist to maneuver walker into/out of bathroom. cga to stand at sink for handwashing  -CF     Row Name 09/23/21 1513          Sit-Stand Transfer    Sit-Stand [place] (Transfers)  contact guard;verbal cues  -CF     Assistive Device (Sit-Stand Transfers)  walker, front-wheeled  -CF     Row Name 09/23/21 1513          Gait/Stairs (Locomotion)    [place] Level (Gait)  minimum assist (75% patient effort);1 person assist;verbal cues  -CF     Assistive Device (Gait)  walker, front-wheeled  -CF     Distance in Feet (Gait)  10' x2 to/from bathroom  -CF     Deviations/Abnormal Patterns (Gait)  katie decreased;gait speed decreased  -CF     Bilateral Gait Deviations  forward flexed posture  -CF     Comment (Gait/Stairs)  Impulsive/unsafe use of assistive device, cues and assist to avoid obstacles and for steering. Mildly unsteady.  -CF       User Key  (r) = Recorded By, (t) = Taken By, (c) = Cosigned By    Initials Name Provider Type    CF Maryam Ponce PT  Physical Therapist        Obj/Interventions     [facility] Name 09/23/21 1515          Range of Motion Comprehensive    General Range of Motion  bilateral lower extremity ROM WFL  -CF     [facility] Name 09/23/21 1515          Strength Comprehensive (MMT)    Comment, General Manual Muscle Testing (MMT) Assessment  BLE WFL  -CF     [facility] Name 09/23/21 1515          Motor Skills    Therapeutic Exercise  other (see comments) B SLR, heel slides, glute sets, hip abd/add in supine x10 reps  -CF     [facility] Name 09/23/21 1515          Balance    Balance Assessment  sitting static balance;sitting dynamic balance;standing static balance;standing dynamic balance  -CF     Static Sitting Balance  WFL;sitting, edge of bed  -CF     Dynamic Sitting Balance  WFL;sitting, edge of bed  -CF     Static Standing Balance  mild impairment  -CF     Dynamic Standing Balance  mild impairment  -CF     Row Name 09/23/21 1515          Sensory Assessment (Somatosensory)    Sensory Assessment (Somatosensory)  LE sensation intact  -CF       User Key  (r) = Recorded By, (t) = Taken By, (c) = Cosigned By    Initials Name Provider Type    CF Maryam Ponec, PT Physical Therapist        Goals/Plan     Row Name 09/23/21 1522          Bed Mobility Goal 1 (PT)    Activity/Assistive Device (Bed Mobility Goal 1, PT)  bed mobility activities, all  -CF     [place] Level/Cues Needed (Bed Mobility Goal 1, PT)  modified independence  -CF     Time Frame (Bed Mobility Goal 1, PT)  1 week  -CF     Row Name 09/23/21 1522          Transfer Goal 1 (PT)    Activity/Assistive Device (Transfer Goal 1, PT)  sit-to-stand/stand-to-sit;bed-to-chair/chair-to-bed LRAD  -CF     [place] Level/Cues Needed (Transfer Goal 1, PT)  standby assist  -CF     Time Frame (Transfer Goal 1, PT)  1 week  -[facility] Name 09/23/21 1522          Gait Training Goal 1 (PT)    Activity/Assistive Device (Gait Training Goal 1, PT)  gait (walking locomotion) LRAD  -CF     [place] Level (Gait Training  Goal 1, PT)  contact guard assist  -CF     Distance (Gait Training Goal 1, PT)  80'  -CF     Time Frame (Gait Training Goal 1, PT)  1 week  -CF       User Key  (r) = Recorded By, (t) = Taken By, (c) = Cosigned By    Initials Name Provider Type    CF Maryam Ponce, PT Physical Therapist        Clinical Impression     Row Name 09/23/21 1517          Pain    Additional Documentation  Pain Scale: Numbers Pre/Post-Treatment (Group)  -CF     Row Name 09/23/21 1517          Pain Scale: Numbers Pre/Post-Treatment    Pretreatment Pain Rating  0/10 - no pain  -CF     Posttreatment Pain Rating  0/10 - no pain  -CF     Row Name 09/23/21 1517          Plan of Care Review    Plan of Care Reviewed With  patient  -CF     Outcome Summary  Pt is an 84 yo female admitted for JEANIE on CKD. Pt with PMH of HTN, DM2, dementia. Pt alert and oriented to self only. Per chart, pt lives alone in a trailer and her daughter lives down the street. Pt owns cane and walker and receives help as needed from daugther but does ok with mobility. Upon exam, pt demonstrates adequate strength but has limited endurance and impaired balance with use of walker. Pt required min A for obstacle navigation and for general appropriate use of assistive device. Due to confusion and balance impairments, PT recommending 24/7 assist at home vs SNF. Unsafe to d/c home alone at this time.  -CF     Row Name 09/23/21 1517          Therapy Assessment/Plan (PT)    Rehab Potential (PT)  good, to achieve stated therapy goals  -CF     Criteria for Skilled Interventions Met (PT)  yes  -CF     Predicted Duration of Therapy Intervention (PT)  1 week  -CF     Row Name 09/23/21 1517          Vital Signs    O2 Delivery Pre Treatment  room air  -CF     O2 Delivery Intra Treatment  room air  -CF     O2 Delivery Post Treatment  room air  -CF     Row Name 09/23/21 1517          Positioning and Restraints    Pre-Treatment Position  in bed  -CF     Post Treatment Position  bed  -CF      In Bed  call light within reach;supine;encouraged to call for assist;exit alarm on;notified nsg  -CF       User Key  (r) = Recorded By, (t) = Taken By, (c) = Cosigned By    Initials Name Provider Type    Maryam Manjarrez, JUSTIN Physical Therapist        Outcome Measures     Row Name 09/23/21 1523          How much help from another person do you currently need...    Turning from your back to your side while in flat bed without using bedrails?  3  -CF     Moving from lying on back to sitting on the side of a flat bed without bedrails?  3  -CF     Moving to and from a bed to a chair (including a wheelchair)?  3  -CF     Standing up from a chair using your arms (e.g., wheelchair, bedside chair)?  3  -CF     Climbing 3-5 steps with a railing?  2  -CF     To walk in hospital room?  3  -CF     AM-PAC 6 Clicks Score (PT)  17  -CF     Row Name 09/23/21 1523          Functional Assessment    Outcome Measure Options  AM-PAC 6 Clicks Basic Mobility (PT)  -CF       User Key  (r) = Recorded By, (t) = Taken By, (c) = Cosigned By    Initials Name Provider Type    Maryam Manjarrez, JUSTIN Physical Therapist                       Physical Therapy Education                 Title: PT OT SLP Therapies (In Progress)     Topic: Physical Therapy (In Progress)     Point: Mobility training (In Progress)     Learning Progress Summary           Patient Acceptance, E, NR by CF at 9/23/2021 1523                   Point: Home exercise program (In Progress)     Learning Progress Summary           Patient Acceptance, E, NR by CF at 9/23/2021 1523                   Point: Body mechanics (In Progress)     Learning Progress Summary           Patient Acceptance, E, NR by CF at 9/23/2021 1523                   Point: Precautions (In Progress)     Learning Progress Summary           Patient Acceptance, E, NR by CF at 9/23/2021 1523                               User Key     Initials Effective Dates Name Provider Type Discipline    CF 06/16/21 -   Maryam Ponce, PT Physical Therapist PT              PT Recommendation and Plan  Planned Therapy Interventions (PT): balance training, bed mobility training, gait training, patient/family education, transfer training, strengthening  Plan of Care Reviewed With: patient  Outcome Summary: Pt is an 84 yo female admitted for JEANIE on CKD. Pt with PMH of HTN, DM2, dementia. Pt alert and oriented to self only. Per chart, pt lives alone in a trailer and her daughter lives down the street. Pt owns cane and walker and receives help as needed from daugther but does ok with mobility. Upon exam, pt demonstrates adequate strength but has limited endurance and impaired balance with use of walker. Pt required min A for obstacle navigation and for general appropriate use of assistive device. Due to confusion and balance impairments, PT recommending 24/7 assist at home vs SNF. Unsafe to d/c home alone at this time.     Time Calculation:   PT Charges     Row Name 09/23/21 1512             Time Calculation    Start Time  1449  -CF      Stop Time  1507  -CF      Time Calculation (min)  18 min  -CF      PT Received On  09/23/21  -CF      PT - Next Appointment  09/24/21  -CF      PT Goal Re-Cert Due Date  09/30/21  -CF         Time Calculation- PT    Total Timed Code Minutes- PT  12 minute(s)  -CF         Timed Charges    38810 - PT Therapeutic Activity Minutes  12  -CF         Total Minutes    Timed Charges Total Minutes  12  -CF       Total Minutes  12  -CF        User Key  (r) = Recorded By, (t) = Taken By, (c) = Cosigned By    Initials Name Provider Type    CF Maryam Ponce, PT Physical Therapist        Therapy Charges for Today     Code Description Service Date Service Provider Modifiers Qty    61504147358 HC PT EVAL LOW COMPLEXITY 2 9/23/2021 Maryam Ponce, PT GP 1    [id number]  PT THERAPEUTIC ACT EA 15 MIN 9/23/2021 Maryam Ponce, PT GP 1          PT G-Codes  Outcome Measure Options: AM-PAC 6 Clicks Basic Mobility  (PT)  AM-PAC 6 Clicks Score (PT): 17    Maryam Ponce, PT  9/23/2021

## 2021-09-23 NOTE — NURSING NOTE
Am labs have not been drawn yet, spoke with Elizabeth in the laboratory at this time and she stated there was a note that patient had refused. This RN had not been notified that patient refused. Elizabeth stated she would be sending a message for lab to come up and attempt to draw labs.

## 2021-09-23 NOTE — PLAN OF CARE
Goal Outcome Evaluation:  Plan of Care Reviewed With: patient           Outcome Summary: Pt is an 84 yo female admitted for JEANIE on CKD. Pt with PMH of HTN, DM2, dementia. Pt alert and oriented to self only. Per chart, pt lives alone in a trailer and her daughter lives down the street. Pt owns cane and walker and receives help as needed from daugther but does ok with mobility. Upon exam, pt demonstrates adequate strength but has limited endurance and impaired balance with use of walker. Pt required min A for obstacle navigation and for general appropriate use of assistive device. Due to confusion and balance impairments, PT recommending 24/7 assist at home vs SNF. Unsafe to d/c home alone at this time.    Patient was intermittently wearing a face mask during this therapy encounter. Therapist used appropriate personal protective equipment including eye protection, mask, and gloves.  Mask used was standard procedure mask. Appropriate PPE was worn during the entire therapy session. Hand hygiene was completed before and after therapy session. Patient is not in enhanced droplet precautions.

## 2021-09-24 ENCOUNTER — READMISSION MANAGEMENT (OUTPATIENT)
Dept: CALL CENTER | Facility: HOSPITAL | Age: 86
End: 2021-09-24

## 2021-09-24 VITALS
RESPIRATION RATE: 18 BRPM | HEART RATE: 76 BPM | OXYGEN SATURATION: 97 % | TEMPERATURE: 98.5 F | HEIGHT: 60 IN | BODY MASS INDEX: 22.71 KG/M2 | SYSTOLIC BLOOD PRESSURE: 156 MMHG | WEIGHT: 115.7 LBS | DIASTOLIC BLOOD PRESSURE: 60 MMHG

## 2021-09-24 LAB
ALBUMIN SERPL-MCNC: 3.8 G/DL (ref 3.5–5.2)
ANION GAP SERPL CALCULATED.3IONS-SCNC: 11.6 MMOL/L (ref 5–15)
BASOPHILS # BLD AUTO: 0.03 10*3/MM3 (ref 0–0.2)
BASOPHILS NFR BLD AUTO: 0.6 % (ref 0–1.5)
BUN SERPL-MCNC: 23 MG/DL (ref 8–23)
BUN/CREAT SERPL: 14.8 (ref 7–25)
CALCIUM SPEC-SCNC: 8.9 MG/DL (ref 8.6–10.5)
CHLORIDE SERPL-SCNC: 107 MMOL/L (ref 98–107)
CO2 SERPL-SCNC: 20.4 MMOL/L (ref 22–29)
CREAT SERPL-MCNC: 1.55 MG/DL (ref 0.57–1)
DEPRECATED RDW RBC AUTO: 46.6 FL (ref 37–54)
EOSINOPHIL # BLD AUTO: 0.1 10*3/MM3 (ref 0–0.4)
EOSINOPHIL NFR BLD AUTO: 1.9 % (ref 0.3–6.2)
ERYTHROCYTE [DISTWIDTH] IN BLOOD BY AUTOMATED COUNT: 14.5 % (ref 12.3–15.4)
GFR SERPL CREATININE-BSD FRML MDRD: 32 ML/MIN/1.73
GLUCOSE SERPL-MCNC: 102 MG/DL (ref 65–99)
HCT VFR BLD AUTO: 26.8 % (ref 34–46.6)
HGB BLD-MCNC: 8.5 G/DL (ref 12–15.9)
IMM GRANULOCYTES # BLD AUTO: 0.01 10*3/MM3 (ref 0–0.05)
IMM GRANULOCYTES NFR BLD AUTO: 0.2 % (ref 0–0.5)
LYMPHOCYTES # BLD AUTO: 1.88 10*3/MM3 (ref 0.7–3.1)
LYMPHOCYTES NFR BLD AUTO: 35.8 % (ref 19.6–45.3)
MCH RBC QN AUTO: 27.9 PG (ref 26.6–33)
MCHC RBC AUTO-ENTMCNC: 31.7 G/DL (ref 31.5–35.7)
MCV RBC AUTO: 87.9 FL (ref 79–97)
MONOCYTES # BLD AUTO: 0.57 10*3/MM3 (ref 0.1–0.9)
MONOCYTES NFR BLD AUTO: 10.9 % (ref 5–12)
NEUTROPHILS NFR BLD AUTO: 2.66 10*3/MM3 (ref 1.7–7)
NEUTROPHILS NFR BLD AUTO: 50.6 % (ref 42.7–76)
NRBC BLD AUTO-RTO: 0 /100 WBC (ref 0–0.2)
PHOSPHATE SERPL-MCNC: 3.5 MG/DL (ref 2.5–4.5)
PLATELET # BLD AUTO: 185 10*3/MM3 (ref 140–450)
PMV BLD AUTO: 9.7 FL (ref 6–12)
POTASSIUM SERPL-SCNC: 3.6 MMOL/L (ref 3.5–5.2)
RBC # BLD AUTO: 3.05 10*6/MM3 (ref 3.77–5.28)
SODIUM SERPL-SCNC: 139 MMOL/L (ref 136–145)
WBC # BLD AUTO: 5.25 10*3/MM3 (ref 3.4–10.8)

## 2021-09-24 PROCEDURE — 25010000002 HYDRALAZINE PER 20 MG: Performed by: INTERNAL MEDICINE

## 2021-09-24 PROCEDURE — 96372 THER/PROPH/DIAG INJ SC/IM: CPT

## 2021-09-24 PROCEDURE — 85025 COMPLETE CBC W/AUTO DIFF WBC: CPT | Performed by: INTERNAL MEDICINE

## 2021-09-24 PROCEDURE — 25010000002 HEPARIN (PORCINE) PER 1000 UNITS: Performed by: INTERNAL MEDICINE

## 2021-09-24 PROCEDURE — G0378 HOSPITAL OBSERVATION PER HR: HCPCS

## 2021-09-24 PROCEDURE — 96366 THER/PROPH/DIAG IV INF ADDON: CPT

## 2021-09-24 PROCEDURE — 96376 TX/PRO/DX INJ SAME DRUG ADON: CPT

## 2021-09-24 PROCEDURE — 80069 RENAL FUNCTION PANEL: CPT | Performed by: INTERNAL MEDICINE

## 2021-09-24 PROCEDURE — 25010000002 IRON SUCROSE PER 1 MG: Performed by: INTERNAL MEDICINE

## 2021-09-24 RX ORDER — AMLODIPINE BESYLATE 5 MG/1
5 TABLET ORAL 2 TIMES DAILY
Status: DISCONTINUED | OUTPATIENT
Start: 2021-09-24 | End: 2021-09-24

## 2021-09-24 RX ORDER — POTASSIUM CHLORIDE 750 MG/1
40 TABLET, FILM COATED, EXTENDED RELEASE ORAL ONCE
Status: COMPLETED | OUTPATIENT
Start: 2021-09-24 | End: 2021-09-24

## 2021-09-24 RX ORDER — LOSARTAN POTASSIUM 50 MG/1
50 TABLET ORAL
Status: DISCONTINUED | OUTPATIENT
Start: 2021-09-24 | End: 2021-09-24 | Stop reason: HOSPADM

## 2021-09-24 RX ORDER — AMLODIPINE BESYLATE 5 MG/1
5 TABLET ORAL DAILY
Status: DISCONTINUED | OUTPATIENT
Start: 2021-09-25 | End: 2021-09-24 | Stop reason: HOSPADM

## 2021-09-24 RX ADMIN — LOSARTAN POTASSIUM 50 MG: 50 TABLET, FILM COATED ORAL at 09:10

## 2021-09-24 RX ADMIN — POTASSIUM CHLORIDE 40 MEQ: 750 TABLET, EXTENDED RELEASE ORAL at 09:10

## 2021-09-24 RX ADMIN — CLOPIDOGREL 75 MG: 75 TABLET, FILM COATED ORAL at 09:10

## 2021-09-24 RX ADMIN — LEVOTHYROXINE SODIUM 75 MCG: 0.07 TABLET ORAL at 09:10

## 2021-09-24 RX ADMIN — AMLODIPINE BESYLATE 5 MG: 5 TABLET ORAL at 09:10

## 2021-09-24 RX ADMIN — HEPARIN SODIUM 5000 UNITS: 5000 INJECTION INTRAVENOUS; SUBCUTANEOUS at 09:10

## 2021-09-24 RX ADMIN — SODIUM CHLORIDE, PRESERVATIVE FREE 10 ML: 5 INJECTION INTRAVENOUS at 09:11

## 2021-09-24 RX ADMIN — IRON SUCROSE 100 MG: 20 INJECTION, SOLUTION INTRAVENOUS at 09:11

## 2021-09-24 RX ADMIN — HYDRALAZINE HYDROCHLORIDE 10 MG: 20 INJECTION INTRAMUSCULAR; INTRAVENOUS at 00:06

## 2021-09-24 NOTE — PROGRESS NOTES
Continued Stay Note  [facility]     Patient Name: Kellen Parmar  MRN: [id number]  Today's Date: 9/24/2021    Admit Date: 9/22/2021    Discharge Plan     Row Name 09/24/21 1236       Plan    Plan  Home with family assist    Plan Comments  Spoke with the pateint's dtr, Chacha Castellanos # [phone].  She prefers that her mother dc to home and denies needs for services.  She states she spoke with nursing and that she was told to  the pateint around 4 pm and that is when she will be ready for DC after her iron infusion.........................Nitza Valenzuela RN        Discharge Codes    No documentation.       Expected Discharge Date and Time     Expected Discharge Date Expected Discharge Time    Sep 24, 2021             Nitza Valenzuela RN

## 2021-09-24 NOTE — PROGRESS NOTES
Continued Stay Note  [facility]     Patient Name: Kellen Parmar  MRN: [id number]  Today's Date: 9/24/2021    Admit Date: 9/22/2021    Discharge Plan     Row Name 09/24/21 1430       Plan    Plan  Home with family assist    Plan Comments  VM for DTR Chacha Castellanos [phone] to discuss SNU vs HH with 24/7 assist.  Awaiting return call.....................Nitza Valenzuela RN    Row Name 09/24/21 1236       Plan    Plan  Home with family assist    Plan Comments  Spoke with the pateint's dtr, Chacha Castellanos # [phone].  She prefers that her mother dc to home and denies needs for services.  She states she spoke with nursing and that she was told to  the pateint around 4 pm and that is when she will be ready for DC after her iron infusion.........................Nitza Valenzuela RN        Discharge Codes    No documentation.       Expected Discharge Date and Time     Expected Discharge Date Expected Discharge Time    Sep 24, 2021             Nitza Valenzuela RN

## 2021-09-24 NOTE — PROGRESS NOTES
Case Management Discharge Note      Final Note: Home with family assist and [facility] accepts for nursing, PT and     Provided Post Acute Provider List?: Refused  Provided Post Acute Provider Quality & Resource List?: Refused    Selected Continued Care - Admitted Since 9/22/2021     Destination    No services have been selected for the patient.              Durable Medical Equipment    No services have been selected for the patient.              Dialysis/Infusion    No services have been selected for the patient.              Home Medical Care Coordination complete.    Service Provider Selected Services Address Phone Fax Patient Preferred    [facility] [address]  [address] [address] [address] [phone] --          Therapy    No services have been selected for the patient.              Community Resources    No services have been selected for the patient.              Community & DME    No services have been selected for the patient.                  Transportation Services  Private: Car    Final Discharge Disposition Code: 06 - home with home health care

## 2021-09-24 NOTE — PROGRESS NOTES
Star Thurston MD/Irene Weinberg DO    Nephrology Miscellaneous Note    Patient Name: Kellen Parmar  :1935  Age: 85 y.o.        Patient seen and examined.   Cr back to baseline.   Edema controlled--> no need for diuretic today. Left basilar crackles resolved.   Urinating a lot per patient and nursing  C. Diff negative        Restart home dose of Losartan.   Getting 2nd dose of IV iron today.       Ok for DC from nephrology standpoint and would discharge patient on her home meds that she came in with. She can follow-up with Dr. Thurston soon for her BP management and CKD.         Please feel free to contact me with any questions or concerns.     Irene Weinberg, DO  [facility]  P: [phone]  F: [phone]  Pager: [phone]

## 2021-09-24 NOTE — DISCHARGE SUMMARY
[facility]    Date of Discharge:  9/24/2021    PCP: Naomi Rivera APRN    Discharge Diagnosis:   Active Hospital Problems    Diagnosis  POA   • **JEANIE (acute kidney injury) (CMS/[facility]) [N17.9]  Yes   • Diarrhea [R19.7]  Unknown   • CKD (chronic kidney disease) stage 4, GFR 15-29 ml/min (CMS/[facility]) [N18.4]  Yes   • HTN (hypertension) [I10]  Yes   • Dementia without behavioral disturbance (CMS/[facility]) [F03.90]  Yes      Resolved Hospital Problems   No resolved problems to display.      Consults     Date and Time Order Name Status Description    9/22/2021  8:33 PM Inpatient Nephrology Consult Completed     9/22/2021  7:37 PM [facility] (on-call MD unless specified) Details Completed         Hospital Course  85 y.o. female brought to the emergency department for numbness all over and was felt to be due to panic attack (history of the same). Numbness resolved. In the emergency department she was found to have acute kidney injury on chronic kidney disease.    ARB was put on hold and creatinine returned to baseline. Nephrology recommends that she resume her home medications she came in with and they also gave her a couple doses of IV iron.    She had a history of C. difficile had episode of diarrhea yesterday. C. difficile toxin was negative. She had no fever or leukocytosis.    I discussed the patient's findings and my recommendations with patient and nursing staff.    Condition on Discharge: Improved.     Temp:  [96.5 °F (35.8 °C)-98 °F (36.7 °C)] 97.7 °F (36.5 °C)  Heart Rate:  [75-90] 90  Resp:  [16-18] 18  BP: (140-184)/() 140/76  Body mass index is 22.6 kg/m².    Physical Exam  Constitutional:       Appearance: Normal appearance.   HENT:      Head: Normocephalic and atraumatic.   Cardiovascular:      Rate and Rhythm: Normal rate and regular rhythm.   Pulmonary:      Effort: Pulmonary effort is normal. No respiratory distress.      Breath sounds: Normal breath  sounds.   Abdominal:      General: Bowel sounds are normal. There is no distension.      Palpations: Abdomen is soft.      Tenderness: There is no abdominal tenderness. There is no guarding or rebound.   Musculoskeletal:         General: No swelling.      Right lower leg: No edema.      Left lower leg: No edema.   Skin:     General: Skin is warm and dry.   Neurological:      General: No focal deficit present.      Mental Status: She is alert.   Psychiatric:         Mood and Affect: Mood normal.         Behavior: Behavior normal.     While in the room and during my examination of the patient I wore gloves, mask, eye protection.  I washed my hands before and after this patient encounter.     Disposition: Home or Self Care       Discharge Medications      Continue These Medications      Instructions Start Date   allopurinol 100 MG tablet  Commonly known as: ZYLOPRIM   100 mg, Daily      amLODIPine 5 MG tablet  Commonly known as: NORVASC   5 mg, Daily      aspirin 81 MG EC tablet   81 mg, Daily      dicyclomine 10 MG/5ML syrup  Commonly known as: BENTYL   20 mg, Oral, 4 Times Daily      HYDROcodone-acetaminophen 5-325 MG per tablet  Commonly known as: NORCO   1 tablet, Oral      levothyroxine 75 MCG tablet  Commonly known as: SYNTHROID, LEVOTHROID   75 mcg, Daily      losartan 50 MG tablet  Commonly known as: COZAAR   50 mg, Oral, Daily      Melatonin 5 MG capsule   10 mg, Oral, Nightly      montelukast 10 MG tablet  Commonly known as: SINGULAIR   Nightly      ondansetron 4 MG tablet  Commonly known as: ZOFRAN   TAKE ONE TABLET BY MOUTH EVERY 8 HOURS AS NEEDED FOR NAUSEA      PLAVIX PO   Daily      polycarbophil 625 MG tablet tablet   625 mg, Oral, Daily      pravastatin 20 MG tablet  Commonly known as: PRAVACHOL   Nightly      sertraline 50 MG tablet  Commonly known as: ZOLOFT   100 mg, Nightly              Follow-up Information     Naomi Rivera APRN .    Specialty: Family Medicine  Contact information:  [address]  [facility] [address]  [phone]             Star Thurston MD Follow up.    Specialty: Nephrology  Contact information:  [address]  [facility] [address] [phone]                   Cullen Morin MD  09/24/21  10:06 EDT    Discharge time spent greater than 30 minutes.

## 2021-09-24 NOTE — PROGRESS NOTES
Continued Stay Note  [facility]     Patient Name: Kellen Parmar  MRN: [id number]  Today's Date: 9/24/2021    Admit Date: 9/22/2021    Discharge Plan     Row Name 09/24/21 1438       Plan    Plan  Home with family assist and [facility]    Plan Comments  Patient agreeable to a referral for HH.  Stephanie with [facility] can accept for  Nursing and PT and a .  ....................Nitza Valenzuela RN    Row Name 09/24/21 1430       Plan    Plan  Home with family assist    Plan Comments   for DTR Chacha Castellanos [phone] to discuss SNU vs HH with 24/7 assist.  Awaiting return call.....................Nitza Valenzuela RN    Row Name 09/24/21 1236       Plan    Plan  Home with family assist    Plan Comments  Spoke with the pateint's dtr, Chacha Emanuel # [phone].  She prefers that her mother dc to home and denies needs for services.  She states she spoke with nursing and that she was told to  the pateint around 4 pm and that is when she will be ready for DC after her iron infusion.........................Nitza Valenzuela RN        Discharge Codes    No documentation.       Expected Discharge Date and Time     Expected Discharge Date Expected Discharge Time    Sep 24, 2021             Nitza Valenzuela RN

## 2021-09-24 NOTE — PLAN OF CARE
Goal Outcome Evaluation:  Plan of Care Reviewed With: patient           Outcome Summary: Pt discharged home with family

## 2021-09-24 NOTE — PLAN OF CARE
Goal Outcome Evaluation:  Plan of Care Reviewed With: patient        Progress: no change  Outcome Summary: Pt pleasantly confused with short memory span. Oriented to self and continues to try to get oob without assistance. C/o generalized pain earlier in shift. PRN tylenol given. Slept a few hours. Bp 184/69 at midnight PRN hydralazine given and bp came down to 162/63. Pt resting in bed at this time will cotinue to monitor.

## 2021-09-25 NOTE — OUTREACH NOTE
Prep Survey      Responses   [facility] patient discharged from?  [place]   Is LACE score < 7 ?  No   Emergency Room discharge w/ pulse ox?  No   Eligibility  Readm Mgmt   Discharge diagnosis  acute kidney injury   Does the patient have one of the following disease processes/diagnoses(primary or secondary)?  Other   Does the patient have Home health ordered?  Yes   What is the Home health agency?   [facility]   Is there a DME ordered?  No   Prep survey completed?  Yes          ARIELLE Villalba RN

## 2021-09-28 ENCOUNTER — READMISSION MANAGEMENT (OUTPATIENT)
Dept: CALL CENTER | Facility: HOSPITAL | Age: 86
End: 2021-09-28

## 2021-09-28 NOTE — OUTREACH NOTE
Medical Week 1 Survey      Responses   [facility] patient discharged from?  [place]   Does the patient have one of the following disease processes/diagnoses(primary or secondary)?  Other   Week 1 attempt successful?  No   Unsuccessful attempts  Attempt 1   Wrap up additional comments  UTR x1          Louise Hale RN

## 2021-09-29 ENCOUNTER — READMISSION MANAGEMENT (OUTPATIENT)
Dept: CALL CENTER | Facility: HOSPITAL | Age: 86
End: 2021-09-29

## 2021-09-30 ENCOUNTER — READMISSION MANAGEMENT (OUTPATIENT)
Dept: CALL CENTER | Facility: HOSPITAL | Age: 86
End: 2021-09-30

## 2021-09-30 NOTE — OUTREACH NOTE
Medical Week 1 Survey      Responses   [facility] patient discharged from?  [place]   Does the patient have one of the following disease processes/diagnoses(primary or secondary)?  Other   Week 1 attempt successful?  No   Unsuccessful attempts  Attempt 3          Nolvia Machado RN

## 2021-10-08 ENCOUNTER — READMISSION MANAGEMENT (OUTPATIENT)
Dept: CALL CENTER | Facility: HOSPITAL | Age: 86
End: 2021-10-08

## 2021-10-08 NOTE — OUTREACH NOTE
Medical Week 2 Survey      Responses   [facility] patient discharged from?  [place]   Does the patient have one of the following disease processes/diagnoses(primary or secondary)?  Other   Week 2 attempt successful?  Yes   Call start time  1442   Discharge diagnosis  acute kidney injury   Call end time  1444   Meds reviewed with patient/caregiver?  Yes   Is the patient having any side effects they believe may be caused by any medication additions or changes?  No   Does the patient have all medications ordered at discharge?  N/A   Is the patient taking all medications as directed (includes completed medication regime)?  Yes   Does the patient have a primary care provider?   Yes   Does the patient have an appointment with their PCP within 7 days of discharge?  No   What is preventing the patient from scheduling follow up appointments within 7 days of discharge?  Waiting on return call   Nursing Interventions  Advised patient to make appointment   Has the patient kept scheduled appointments due by today?  N/A   What is the Home health agency?   [facility]   Has home health visited the patient within 72 hours of discharge?  Unsure   Psychosocial issues?  No   Did the patient receive a copy of their discharge instructions?  Yes   Nursing interventions  Reviewed instructions with patient   What is the patient's perception of their health status since discharge?  Improving   Is the patient/caregiver able to teach back signs and symptoms related to disease process for when to call PCP?  Yes   Is the patient/caregiver able to teach back signs and symptoms related to disease process for when to call 911?  Yes   Is the patient/caregiver able to teach back the hierarchy of who to call/visit for symptoms/problems? PCP, Specialist, Home health nurse, Urgent Care, ED, 911  Yes   If the patient is a current smoker, are they able to teach back resources for cessation?  Not a smoker   Week 2  Call Completed?  Yes          Liliam Estevez RN

## 2021-10-15 ENCOUNTER — READMISSION MANAGEMENT (OUTPATIENT)
Dept: CALL CENTER | Facility: HOSPITAL | Age: 86
End: 2021-10-15

## 2021-10-15 NOTE — OUTREACH NOTE
Medical Week 3 Survey      Responses   [facility] patient discharged from? [place]   Does the patient have one of the following disease processes/diagnoses(primary or secondary)? Other   Week 3 attempt successful? No   Unsuccessful attempts Attempt 1          Yung Zacarias RN

## 2021-10-19 ENCOUNTER — READMISSION MANAGEMENT (OUTPATIENT)
Dept: CALL CENTER | Facility: HOSPITAL | Age: 86
End: 2021-10-19

## 2021-10-19 NOTE — OUTREACH NOTE
Medical Week 3 Survey      Responses   [facility] patient discharged from? [place]   Does the patient have one of the following disease processes/diagnoses(primary or secondary)? Other   Week 3 attempt successful? No   Unsuccessful attempts Attempt 2          Tierney Roper RN

## 2021-10-28 ENCOUNTER — HOSPITAL ENCOUNTER (EMERGENCY)
Facility: HOSPITAL | Age: 86
Discharge: HOME | End: 2021-10-28
Attending: EMERGENCY MEDICINE | Admitting: EMERGENCY MEDICINE

## 2021-10-28 VITALS
RESPIRATION RATE: 18 BRPM | HEIGHT: 64 IN | HEART RATE: 71 BPM | SYSTOLIC BLOOD PRESSURE: 196 MMHG | WEIGHT: 115 LBS | DIASTOLIC BLOOD PRESSURE: 81 MMHG | BODY MASS INDEX: 19.63 KG/M2 | TEMPERATURE: 96.9 F | OXYGEN SATURATION: 94 %

## 2021-10-28 DIAGNOSIS — F03.90 DEMENTIA WITHOUT BEHAVIORAL DISTURBANCE, UNSPECIFIED DEMENTIA TYPE: ICD-10-CM

## 2021-10-28 DIAGNOSIS — R19.7 DIARRHEA, UNSPECIFIED TYPE: ICD-10-CM

## 2021-10-28 DIAGNOSIS — R10.9 ABDOMINAL PAIN, UNSPECIFIED ABDOMINAL LOCATION: Primary | ICD-10-CM

## 2021-10-28 LAB
ALBUMIN SERPL-MCNC: 4.3 G/DL (ref 3.5–5.2)
ALBUMIN/GLOB SERPL: 1.5 G/DL
ALP SERPL-CCNC: 82 U/L (ref 39–117)
ALT SERPL W P-5'-P-CCNC: 13 U/L (ref 1–33)
ANION GAP SERPL CALCULATED.3IONS-SCNC: 10.3 MMOL/L (ref 5–15)
AST SERPL-CCNC: 18 U/L (ref 1–32)
BACTERIA UR QL AUTO: ABNORMAL /HPF
BASOPHILS # BLD AUTO: 0.06 10*3/MM3 (ref 0–0.2)
BASOPHILS NFR BLD AUTO: 1.3 % (ref 0–1.5)
BILIRUB SERPL-MCNC: 0.2 MG/DL (ref 0–1.2)
BILIRUB UR QL STRIP: NEGATIVE
BUN SERPL-MCNC: 26 MG/DL (ref 8–23)
BUN/CREAT SERPL: 14 (ref 7–25)
CALCIUM SPEC-SCNC: 9.2 MG/DL (ref 8.6–10.5)
CHLORIDE SERPL-SCNC: 99 MMOL/L (ref 98–107)
CLARITY UR: CLEAR
CO2 SERPL-SCNC: 25.7 MMOL/L (ref 22–29)
COLOR UR: YELLOW
CREAT SERPL-MCNC: 1.86 MG/DL (ref 0.57–1)
DEPRECATED RDW RBC AUTO: 52.7 FL (ref 37–54)
EOSINOPHIL # BLD AUTO: 0.08 10*3/MM3 (ref 0–0.4)
EOSINOPHIL NFR BLD AUTO: 1.8 % (ref 0.3–6.2)
ERYTHROCYTE [DISTWIDTH] IN BLOOD BY AUTOMATED COUNT: 15.1 % (ref 12.3–15.4)
GFR SERPL CREATININE-BSD FRML MDRD: 26 ML/MIN/1.73
GLOBULIN UR ELPH-MCNC: 2.9 GM/DL
GLUCOSE SERPL-MCNC: 104 MG/DL (ref 65–99)
GLUCOSE UR STRIP-MCNC: NEGATIVE MG/DL
HCT VFR BLD AUTO: 30 % (ref 34–46.6)
HGB BLD-MCNC: 9.4 G/DL (ref 12–15.9)
HGB UR QL STRIP.AUTO: NEGATIVE
HOLD SPECIMEN: NORMAL
HOLD SPECIMEN: NORMAL
HYALINE CASTS UR QL AUTO: ABNORMAL /LPF
IMM GRANULOCYTES # BLD AUTO: 0.01 10*3/MM3 (ref 0–0.05)
IMM GRANULOCYTES NFR BLD AUTO: 0.2 % (ref 0–0.5)
KETONES UR QL STRIP: NEGATIVE
LEUKOCYTE ESTERASE UR QL STRIP.AUTO: NEGATIVE
LIPASE SERPL-CCNC: 56 U/L (ref 13–60)
LYMPHOCYTES # BLD AUTO: 1.14 10*3/MM3 (ref 0.7–3.1)
LYMPHOCYTES NFR BLD AUTO: 25.1 % (ref 19.6–45.3)
MCH RBC QN AUTO: 29.6 PG (ref 26.6–33)
MCHC RBC AUTO-ENTMCNC: 31.3 G/DL (ref 31.5–35.7)
MCV RBC AUTO: 94.3 FL (ref 79–97)
MONOCYTES # BLD AUTO: 0.41 10*3/MM3 (ref 0.1–0.9)
MONOCYTES NFR BLD AUTO: 9 % (ref 5–12)
NEUTROPHILS NFR BLD AUTO: 2.84 10*3/MM3 (ref 1.7–7)
NEUTROPHILS NFR BLD AUTO: 62.6 % (ref 42.7–76)
NITRITE UR QL STRIP: NEGATIVE
NRBC BLD AUTO-RTO: 0 /100 WBC (ref 0–0.2)
PH UR STRIP.AUTO: 6 [PH] (ref 5–8)
PLATELET # BLD AUTO: 179 10*3/MM3 (ref 140–450)
PMV BLD AUTO: 9.8 FL (ref 6–12)
POTASSIUM SERPL-SCNC: 3.6 MMOL/L (ref 3.5–5.2)
PROT SERPL-MCNC: 7.2 G/DL (ref 6–8.5)
PROT UR QL STRIP: ABNORMAL
RBC # BLD AUTO: 3.18 10*6/MM3 (ref 3.77–5.28)
RBC # UR: ABNORMAL /HPF
REF LAB TEST METHOD: ABNORMAL
SODIUM SERPL-SCNC: 135 MMOL/L (ref 136–145)
SP GR UR STRIP: 1.02 (ref 1–1.03)
SQUAMOUS #/AREA URNS HPF: ABNORMAL /HPF
UROBILINOGEN UR QL STRIP: ABNORMAL
WBC # BLD AUTO: 4.54 10*3/MM3 (ref 3.4–10.8)
WBC UR QL AUTO: ABNORMAL /HPF
WHOLE BLOOD HOLD SPECIMEN: NORMAL
WHOLE BLOOD HOLD SPECIMEN: NORMAL

## 2021-10-28 PROCEDURE — 99283 EMERGENCY DEPT VISIT LOW MDM: CPT

## 2021-10-28 PROCEDURE — 85025 COMPLETE CBC W/AUTO DIFF WBC: CPT | Performed by: EMERGENCY MEDICINE

## 2021-10-28 PROCEDURE — 83690 ASSAY OF LIPASE: CPT | Performed by: EMERGENCY MEDICINE

## 2021-10-28 PROCEDURE — 80053 COMPREHEN METABOLIC PANEL: CPT | Performed by: EMERGENCY MEDICINE

## 2021-10-28 PROCEDURE — 81001 URINALYSIS AUTO W/SCOPE: CPT | Performed by: EMERGENCY MEDICINE

## 2021-10-28 RX ORDER — SODIUM CHLORIDE 0.9 % (FLUSH) 0.9 %
10 SYRINGE (ML) INJECTION AS NEEDED
Status: DISCONTINUED | OUTPATIENT
Start: 2021-10-28 | End: 2021-10-28 | Stop reason: HOSPADM

## 2022-01-01 ENCOUNTER — APPOINTMENT (OUTPATIENT)
Dept: NUCLEAR MEDICINE | Facility: HOSPITAL | Age: 87
End: 2022-01-01

## 2022-01-01 ENCOUNTER — HOSPITAL ENCOUNTER (INPATIENT)
Facility: HOSPITAL | Age: 87
LOS: 6 days | Discharge: NURSING, REHAB OR LONG-TERM CARE | End: 2022-12-05
Attending: EMERGENCY MEDICINE | Admitting: HOSPITALIST

## 2022-01-01 ENCOUNTER — APPOINTMENT (OUTPATIENT)
Dept: GENERAL RADIOLOGY | Facility: HOSPITAL | Age: 87
End: 2022-01-01

## 2022-01-01 ENCOUNTER — HOSPITAL ENCOUNTER (EMERGENCY)
Facility: HOSPITAL | Age: 87
Discharge: NURSING, REHAB OR LONG-TERM CARE | End: 2022-03-13
Attending: EMERGENCY MEDICINE | Admitting: EMERGENCY MEDICINE

## 2022-01-01 ENCOUNTER — APPOINTMENT (OUTPATIENT)
Dept: CARDIOLOGY | Facility: HOSPITAL | Age: 87
End: 2022-01-01

## 2022-01-01 ENCOUNTER — LAB (OUTPATIENT)
Dept: LAB | Facility: HOSPITAL | Age: 87
End: 2022-01-01
Payer: MEDICARE

## 2022-01-01 ENCOUNTER — APPOINTMENT (OUTPATIENT)
Dept: CT IMAGING | Facility: HOSPITAL | Age: 87
End: 2022-01-01

## 2022-01-01 ENCOUNTER — HOSPITAL ENCOUNTER (INPATIENT)
Facility: HOSPITAL | Age: 87
LOS: 4 days | Discharge: NURSING, REHAB OR LONG-TERM CARE | End: 2022-08-12
Attending: EMERGENCY MEDICINE | Admitting: INTERNAL MEDICINE

## 2022-01-01 ENCOUNTER — ANESTHESIA (OUTPATIENT)
Dept: PERIOP | Facility: HOSPITAL | Age: 87
End: 2022-01-01

## 2022-01-01 ENCOUNTER — HOSPITAL ENCOUNTER (INPATIENT)
Facility: HOSPITAL | Age: 87
LOS: 2 days | Discharge: NURSING, REHAB OR LONG-TERM CARE | End: 2022-03-24
Attending: EMERGENCY MEDICINE | Admitting: STUDENT IN AN ORGANIZED HEALTH CARE EDUCATION/TRAINING PROGRAM

## 2022-01-01 ENCOUNTER — OFFICE VISIT (OUTPATIENT)
Dept: CARDIOLOGY | Facility: CLINIC | Age: 87
End: 2022-01-01

## 2022-01-01 ENCOUNTER — HOSPITAL ENCOUNTER (EMERGENCY)
Facility: HOSPITAL | Age: 87
Discharge: NURSING, REHAB OR LONG-TERM CARE | End: 2022-05-16
Attending: EMERGENCY MEDICINE | Admitting: EMERGENCY MEDICINE

## 2022-01-01 ENCOUNTER — HOSPITAL ENCOUNTER (INPATIENT)
Facility: HOSPITAL | Age: 87
LOS: 4 days | Discharge: NURSING, REHAB OR LONG-TERM CARE | End: 2022-11-03
Attending: EMERGENCY MEDICINE | Admitting: HOSPITALIST

## 2022-01-01 ENCOUNTER — OFFICE VISIT (OUTPATIENT)
Dept: ONCOLOGY | Facility: CLINIC | Age: 87
End: 2022-01-01

## 2022-01-01 ENCOUNTER — ANESTHESIA EVENT (OUTPATIENT)
Dept: PERIOP | Facility: HOSPITAL | Age: 87
End: 2022-01-01

## 2022-01-01 VITALS
SYSTOLIC BLOOD PRESSURE: 136 MMHG | TEMPERATURE: 98.1 F | OXYGEN SATURATION: 97 % | RESPIRATION RATE: 18 BRPM | HEIGHT: 62 IN | WEIGHT: 112.6 LBS | BODY MASS INDEX: 20.72 KG/M2 | DIASTOLIC BLOOD PRESSURE: 89 MMHG | HEART RATE: 88 BPM

## 2022-01-01 VITALS
OXYGEN SATURATION: 95 % | TEMPERATURE: 98.1 F | SYSTOLIC BLOOD PRESSURE: 182 MMHG | RESPIRATION RATE: 16 BRPM | HEIGHT: 64 IN | WEIGHT: 131 LBS | BODY MASS INDEX: 22.36 KG/M2 | DIASTOLIC BLOOD PRESSURE: 71 MMHG | HEART RATE: 88 BPM

## 2022-01-01 VITALS
SYSTOLIC BLOOD PRESSURE: 188 MMHG | WEIGHT: 131.1 LBS | HEART RATE: 56 BPM | BODY MASS INDEX: 22.38 KG/M2 | DIASTOLIC BLOOD PRESSURE: 89 MMHG | HEIGHT: 64 IN | OXYGEN SATURATION: 100 % | TEMPERATURE: 99 F | RESPIRATION RATE: 16 BRPM

## 2022-01-01 VITALS
HEART RATE: 59 BPM | DIASTOLIC BLOOD PRESSURE: 63 MMHG | SYSTOLIC BLOOD PRESSURE: 144 MMHG | BODY MASS INDEX: 23.31 KG/M2 | HEIGHT: 64 IN

## 2022-01-01 VITALS
HEART RATE: 73 BPM | RESPIRATION RATE: 18 BRPM | DIASTOLIC BLOOD PRESSURE: 82 MMHG | WEIGHT: 135.8 LBS | BODY MASS INDEX: 23.31 KG/M2 | OXYGEN SATURATION: 98 % | TEMPERATURE: 97.2 F | SYSTOLIC BLOOD PRESSURE: 177 MMHG

## 2022-01-01 VITALS
RESPIRATION RATE: 18 BRPM | SYSTOLIC BLOOD PRESSURE: 180 MMHG | BODY MASS INDEX: 19.23 KG/M2 | DIASTOLIC BLOOD PRESSURE: 70 MMHG | WEIGHT: 112.66 LBS | HEIGHT: 64 IN | OXYGEN SATURATION: 95 % | TEMPERATURE: 97.7 F | HEART RATE: 50 BPM

## 2022-01-01 VITALS
TEMPERATURE: 99.7 F | OXYGEN SATURATION: 97 % | HEART RATE: 87 BPM | DIASTOLIC BLOOD PRESSURE: 97 MMHG | RESPIRATION RATE: 16 BRPM | SYSTOLIC BLOOD PRESSURE: 207 MMHG

## 2022-01-01 VITALS
HEART RATE: 67 BPM | SYSTOLIC BLOOD PRESSURE: 189 MMHG | WEIGHT: 130 LBS | RESPIRATION RATE: 16 BRPM | HEIGHT: 64 IN | OXYGEN SATURATION: 98 % | DIASTOLIC BLOOD PRESSURE: 81 MMHG | BODY MASS INDEX: 22.2 KG/M2 | TEMPERATURE: 97.1 F

## 2022-01-01 DIAGNOSIS — S09.90XA INJURY OF HEAD, INITIAL ENCOUNTER: ICD-10-CM

## 2022-01-01 DIAGNOSIS — S90.32XA CONTUSION OF LEFT FOOT, INITIAL ENCOUNTER: ICD-10-CM

## 2022-01-01 DIAGNOSIS — D64.9 SYMPTOMATIC ANEMIA: Primary | ICD-10-CM

## 2022-01-01 DIAGNOSIS — S90.31XA CONTUSION OF RIGHT FOOT, INITIAL ENCOUNTER: ICD-10-CM

## 2022-01-01 DIAGNOSIS — S72.142A CLOSED INTERTROCHANTERIC FRACTURE OF HIP, LEFT, INITIAL ENCOUNTER: Primary | ICD-10-CM

## 2022-01-01 DIAGNOSIS — E78.5 HYPERLIPIDEMIA, UNSPECIFIED HYPERLIPIDEMIA TYPE: ICD-10-CM

## 2022-01-01 DIAGNOSIS — K92.2 GASTROINTESTINAL HEMORRHAGE, UNSPECIFIED GASTROINTESTINAL HEMORRHAGE TYPE: Primary | ICD-10-CM

## 2022-01-01 DIAGNOSIS — I10 PRIMARY HYPERTENSION: Primary | ICD-10-CM
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
## 2022-01-01 DIAGNOSIS — E11.9 TYPE 2 DIABETES MELLITUS WITHOUT COMPLICATION, UNSPECIFIED WHETHER LONG TERM INSULIN USE: ICD-10-CM

## 2022-01-01 DIAGNOSIS — I10 UNCONTROLLED HYPERTENSION: ICD-10-CM

## 2022-01-01 DIAGNOSIS — D64.9 ANEMIA, UNSPECIFIED TYPE: ICD-10-CM

## 2022-01-01 DIAGNOSIS — I10 HYPERTENSION, UNSPECIFIED TYPE: ICD-10-CM

## 2022-01-01 DIAGNOSIS — F03.90 DEMENTIA WITHOUT BEHAVIORAL DISTURBANCE, UNSPECIFIED DEMENTIA TYPE: Primary | ICD-10-CM

## 2022-01-01 DIAGNOSIS — I47.1 PSVT (PAROXYSMAL SUPRAVENTRICULAR TACHYCARDIA): ICD-10-CM

## 2022-01-01 DIAGNOSIS — N18.9 CHRONIC KIDNEY DISEASE, UNSPECIFIED CKD STAGE: ICD-10-CM

## 2022-01-01 DIAGNOSIS — R06.02 SHORTNESS OF BREATH: Primary | ICD-10-CM

## 2022-01-01 DIAGNOSIS — G30.9 ALZHEIMER'S DEMENTIA, UNSPECIFIED DEMENTIA SEVERITY, UNSPECIFIED TIMING OF DEMENTIA ONSET, UNSPECIFIED WHETHER BEHAVIORAL, PSYCHOTIC, OR MOOD DISTURBANCE OR ANXIETY: ICD-10-CM

## 2022-01-01 DIAGNOSIS — I25.10 CORONARY ARTERY DISEASE INVOLVING NATIVE CORONARY ARTERY OF NATIVE HEART WITHOUT ANGINA PECTORIS: ICD-10-CM
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
## 2022-01-01 DIAGNOSIS — D64.9 SYMPTOMATIC ANEMIA: ICD-10-CM

## 2022-01-01 DIAGNOSIS — R79.89 ELEVATED D-DIMER: ICD-10-CM

## 2022-01-01 DIAGNOSIS — F03.90 DEMENTIA WITHOUT BEHAVIORAL DISTURBANCE: ICD-10-CM

## 2022-01-01 DIAGNOSIS — E03.9 HYPOTHYROIDISM, UNSPECIFIED TYPE: ICD-10-CM

## 2022-01-01 DIAGNOSIS — F02.80 ALZHEIMER'S DEMENTIA, UNSPECIFIED DEMENTIA SEVERITY, UNSPECIFIED TIMING OF DEMENTIA ONSET, UNSPECIFIED WHETHER BEHAVIORAL, PSYCHOTIC, OR MOOD DISTURBANCE OR ANXIETY: ICD-10-CM

## 2022-01-01 DIAGNOSIS — N18.4 CKD (CHRONIC KIDNEY DISEASE) STAGE 4, GFR 15-29 ML/MIN: ICD-10-CM

## 2022-01-01 DIAGNOSIS — S00.03XA CONTUSION OF SCALP, INITIAL ENCOUNTER: ICD-10-CM

## 2022-01-01 LAB
ABO GROUP BLD: NORMAL
ALBUMIN SERPL-MCNC: 2.9 G/DL (ref 3.5–5.2)
ALBUMIN SERPL-MCNC: 3.2 G/DL (ref 3.5–5.2)
ALBUMIN SERPL-MCNC: 3.2 G/DL (ref 3.5–5.2)
ALBUMIN SERPL-MCNC: 3.5 G/DL (ref 3.5–5.2)
ALBUMIN SERPL-MCNC: 3.6 G/DL (ref 3.5–5.2)
ALBUMIN SERPL-MCNC: 3.6 G/DL (ref 3.5–5.2)
ALBUMIN SERPL-MCNC: 3.7 G/DL (ref 3.5–5.2)
ALBUMIN SERPL-MCNC: 3.9 G/DL (ref 3.5–5.2)
ALBUMIN SERPL-MCNC: 4.2 G/DL (ref 3.5–5.2)
ALBUMIN SERPL-MCNC: 4.3 G/DL (ref 3.5–5.2)
ALBUMIN/GLOB SERPL: 1 G/DL
ALBUMIN/GLOB SERPL: 1.1 G/DL
ALBUMIN/GLOB SERPL: 1.5 G/DL
ALBUMIN/GLOB SERPL: 1.5 G/DL
ALBUMIN/GLOB SERPL: 1.6 G/DL
ALBUMIN/GLOB SERPL: 1.7 G/DL
ALP SERPL-CCNC: 127 U/L (ref 39–117)
ALP SERPL-CCNC: 144 U/L (ref 39–117)
ALP SERPL-CCNC: 73 U/L (ref 39–117)
ALP SERPL-CCNC: 87 U/L (ref 39–117)
ALP SERPL-CCNC: 89 U/L (ref 39–117)
ALP SERPL-CCNC: 96 U/L (ref 39–117)
ALT SERPL W P-5'-P-CCNC: 11 U/L (ref 1–33)
ALT SERPL W P-5'-P-CCNC: 13 U/L (ref 1–33)
ALT SERPL W P-5'-P-CCNC: 16 U/L (ref 1–33)
ALT SERPL W P-5'-P-CCNC: 19 U/L (ref 1–33)
ALT SERPL W P-5'-P-CCNC: 8 U/L (ref 1–33)
ALT SERPL W P-5'-P-CCNC: 9 U/L (ref 1–33)
ANION GAP SERPL CALCULATED.3IONS-SCNC: 10 MMOL/L (ref 5–15)
ANION GAP SERPL CALCULATED.3IONS-SCNC: 10.1 MMOL/L (ref 5–15)
ANION GAP SERPL CALCULATED.3IONS-SCNC: 10.9 MMOL/L (ref 5–15)
ANION GAP SERPL CALCULATED.3IONS-SCNC: 11 MMOL/L (ref 5–15)
ANION GAP SERPL CALCULATED.3IONS-SCNC: 11 MMOL/L (ref 5–15)
ANION GAP SERPL CALCULATED.3IONS-SCNC: 11.3 MMOL/L (ref 5–15)
ANION GAP SERPL CALCULATED.3IONS-SCNC: 11.3 MMOL/L (ref 5–15)
ANION GAP SERPL CALCULATED.3IONS-SCNC: 11.8 MMOL/L (ref 5–15)
ANION GAP SERPL CALCULATED.3IONS-SCNC: 11.8 MMOL/L (ref 5–15)
ANION GAP SERPL CALCULATED.3IONS-SCNC: 11.9 MMOL/L (ref 5–15)
ANION GAP SERPL CALCULATED.3IONS-SCNC: 11.9 MMOL/L (ref 5–15)
ANION GAP SERPL CALCULATED.3IONS-SCNC: 12 MMOL/L (ref 5–15)
ANION GAP SERPL CALCULATED.3IONS-SCNC: 12.6 MMOL/L (ref 5–15)
ANION GAP SERPL CALCULATED.3IONS-SCNC: 13.2 MMOL/L (ref 5–15)
ANION GAP SERPL CALCULATED.3IONS-SCNC: 13.8 MMOL/L (ref 5–15)
ANION GAP SERPL CALCULATED.3IONS-SCNC: 14 MMOL/L (ref 5–15)
ANION GAP SERPL CALCULATED.3IONS-SCNC: 15 MMOL/L (ref 5–15)
ANION GAP SERPL CALCULATED.3IONS-SCNC: 15.8 MMOL/L (ref 5–15)
ANION GAP SERPL CALCULATED.3IONS-SCNC: 17.5 MMOL/L (ref 5–15)
ANION GAP SERPL CALCULATED.3IONS-SCNC: 7 MMOL/L (ref 5–15)
ANION GAP SERPL CALCULATED.3IONS-SCNC: 8 MMOL/L (ref 5–15)
ANION GAP SERPL CALCULATED.3IONS-SCNC: 9.1 MMOL/L (ref 5–15)
ANION GAP SERPL CALCULATED.3IONS-SCNC: 9.6 MMOL/L (ref 5–15)
ANTI-D: NORMAL
AORTIC DIMENSIONLESS INDEX: 0.8 (DI)
ARTERIAL PATENCY WRIST A: ABNORMAL
AST SERPL-CCNC: 15 U/L (ref 1–32)
AST SERPL-CCNC: 17 U/L (ref 1–32)
AST SERPL-CCNC: 18 U/L (ref 1–32)
AST SERPL-CCNC: 20 U/L (ref 1–32)
AST SERPL-CCNC: 25 U/L (ref 1–32)
AST SERPL-CCNC: 26 U/L (ref 1–32)
ATMOSPHERIC PRESS: 752.3 MMHG
B PARAPERT DNA SPEC QL NAA+PROBE: NOT DETECTED
B PERT DNA SPEC QL NAA+PROBE: NOT DETECTED
BACTERIA SPEC AEROBE CULT: NORMAL
BACTERIA SPEC AEROBE CULT: NORMAL
BACTERIA UR QL AUTO: ABNORMAL /HPF
BACTERIA UR QL AUTO: NORMAL /HPF
BASE EXCESS BLDA CALC-SCNC: -3.2 MMOL/L (ref 0–2)
BASOPHILS # BLD AUTO: 0.01 10*3/MM3 (ref 0–0.2)
BASOPHILS # BLD AUTO: 0.02 10*3/MM3 (ref 0–0.2)
BASOPHILS # BLD AUTO: 0.03 10*3/MM3 (ref 0–0.2)
BASOPHILS # BLD AUTO: 0.04 10*3/MM3 (ref 0–0.2)
BASOPHILS # BLD AUTO: 0.06 10*3/MM3 (ref 0–0.2)
BASOPHILS # BLD AUTO: 0.07 10*3/MM3 (ref 0–0.2)
BASOPHILS NFR BLD AUTO: 0.2 % (ref 0–1.5)
BASOPHILS NFR BLD AUTO: 0.3 % (ref 0–1.5)
BASOPHILS NFR BLD AUTO: 0.4 % (ref 0–1.5)
BASOPHILS NFR BLD AUTO: 0.5 % (ref 0–1.5)
BASOPHILS NFR BLD AUTO: 0.5 % (ref 0–1.5)
BASOPHILS NFR BLD AUTO: 0.6 % (ref 0–1.5)
BASOPHILS NFR BLD AUTO: 0.6 % (ref 0–1.5)
BASOPHILS NFR BLD AUTO: 1.3 % (ref 0–1.5)
BDY SITE: ABNORMAL
BH BB BLOOD EXPIRATION DATE: NORMAL
BH BB BLOOD TYPE BARCODE: 9500
BH BB DISPENSE STATUS: NORMAL
BH BB PRODUCT CODE: NORMAL
BH BB UNIT NUMBER: NORMAL
BH CV ECHO MEAS - ACS: 2.01 CM
BH CV ECHO MEAS - AO MAX PG: 7.8 MMHG
BH CV ECHO MEAS - AO MEAN PG: 3.9 MMHG
BH CV ECHO MEAS - AO V2 MAX: 140 CM/SEC
BH CV ECHO MEAS - AO V2 VTI: 30.3 CM
BH CV ECHO MEAS - AVA(I,D): 2.35 CM2
BH CV ECHO MEAS - EDV(MOD-SP2): 75 ML
BH CV ECHO MEAS - EDV(MOD-SP4): 76 ML
BH CV ECHO MEAS - EF(MOD-BP): 54.9 %
BH CV ECHO MEAS - EF(MOD-SP2): 52 %
BH CV ECHO MEAS - EF(MOD-SP4): 55.3 %
BH CV ECHO MEAS - ESV(MOD-SP2): 36 ML
BH CV ECHO MEAS - ESV(MOD-SP4): 34 ML
BH CV ECHO MEAS - LAT PEAK E' VEL: 7.2 CM/SEC
BH CV ECHO MEAS - LV DIASTOLIC VOL/BSA (35-75): 46.4 CM2
BH CV ECHO MEAS - LV MAX PG: 5.8 MMHG
BH CV ECHO MEAS - LV MEAN PG: 2.6 MMHG
BH CV ECHO MEAS - LV SYSTOLIC VOL/BSA (12-30): 20.7 CM2
BH CV ECHO MEAS - LV V1 MAX: 120.2 CM/SEC
BH CV ECHO MEAS - LV V1 VTI: 24.6 CM
BH CV ECHO MEAS - LVOT AREA: 2.9 CM2
BH CV ECHO MEAS - LVOT DIAM: 1.92 CM
BH CV ECHO MEAS - MED PEAK E' VEL: 4.1 CM/SEC
BH CV ECHO MEAS - MR MAX PG: 97.6 MMHG
BH CV ECHO MEAS - MR MAX VEL: 494.1 CM/SEC
BH CV ECHO MEAS - MV A DUR: 0.13 SEC
BH CV ECHO MEAS - MV A MAX VEL: 109.6 CM/SEC
BH CV ECHO MEAS - MV DEC SLOPE: 483.5 CM/SEC2
BH CV ECHO MEAS - MV DEC TIME: 210 MSEC
BH CV ECHO MEAS - MV E MAX VEL: 91.5 CM/SEC
BH CV ECHO MEAS - MV E/A: 0.83
BH CV ECHO MEAS - MV MAX PG: 7.9 MMHG
BH CV ECHO MEAS - MV MEAN PG: 2.8 MMHG
BH CV ECHO MEAS - MV P1/2T: 62.6 MSEC
BH CV ECHO MEAS - MV V2 VTI: 34.7 CM
BH CV ECHO MEAS - MVA(P1/2T): 3.5 CM2
BH CV ECHO MEAS - MVA(VTI): 2.05 CM2
BH CV ECHO MEAS - PA ACC TIME: 0.1 SEC
BH CV ECHO MEAS - PA PR(ACCEL): 36.2 MMHG
BH CV ECHO MEAS - PA V2 MAX: 93.7 CM/SEC
BH CV ECHO MEAS - PULM A REVS DUR: 0.1 SEC
BH CV ECHO MEAS - PULM A REVS VEL: 38.6 CM/SEC
BH CV ECHO MEAS - PULM DIAS VEL: 37.4 CM/SEC
BH CV ECHO MEAS - PULM S/D: 1.55
BH CV ECHO MEAS - PULM SYS VEL: 58.1 CM/SEC
BH CV ECHO MEAS - RAP SYSTOLE: 3 MMHG
BH CV ECHO MEAS - RV MAX PG: 1.67 MMHG
BH CV ECHO MEAS - RV V1 MAX: 64.7 CM/SEC
BH CV ECHO MEAS - RV V1 VTI: 13.2 CM
BH CV ECHO MEAS - RVSP: 26.8 MMHG
BH CV ECHO MEAS - SI(MOD-SP2): 23.8 ML/M2
BH CV ECHO MEAS - SI(MOD-SP4): 25.6 ML/M2
BH CV ECHO MEAS - SV(LVOT): 71.2 ML
BH CV ECHO MEAS - SV(MOD-SP2): 39 ML
BH CV ECHO MEAS - SV(MOD-SP4): 42 ML
BH CV ECHO MEAS - TAPSE (>1.6): 1.78 CM
BH CV ECHO MEAS - TR MAX PG: 23.8 MMHG
BH CV ECHO MEAS - TR MAX VEL: 243.7 CM/SEC
BH CV ECHO MEASUREMENTS AVERAGE E/E' RATIO: 16.19
BH CV LOWER VASCULAR LEFT COMMON FEMORAL AUGMENT: NORMAL
BH CV LOWER VASCULAR LEFT COMMON FEMORAL COMPETENT: NORMAL
BH CV LOWER VASCULAR LEFT COMMON FEMORAL COMPRESS: NORMAL
BH CV LOWER VASCULAR LEFT COMMON FEMORAL PHASIC: NORMAL
BH CV LOWER VASCULAR LEFT COMMON FEMORAL SPONT: NORMAL
BH CV LOWER VASCULAR LEFT DISTAL FEMORAL COMPRESS: NORMAL
BH CV LOWER VASCULAR LEFT GASTRONEMIUS COMPRESS: NORMAL
BH CV LOWER VASCULAR LEFT GREATER SAPH AK COMPRESS: NORMAL
BH CV LOWER VASCULAR LEFT GREATER SAPH BK COMPRESS: NORMAL
BH CV LOWER VASCULAR LEFT LESSER SAPH COMPRESS: NORMAL
BH CV LOWER VASCULAR LEFT MID FEMORAL AUGMENT: NORMAL
BH CV LOWER VASCULAR LEFT MID FEMORAL COMPETENT: NORMAL
BH CV LOWER VASCULAR LEFT MID FEMORAL COMPRESS: NORMAL
BH CV LOWER VASCULAR LEFT MID FEMORAL PHASIC: NORMAL
BH CV LOWER VASCULAR LEFT MID FEMORAL SPONT: NORMAL
BH CV LOWER VASCULAR LEFT PERONEAL COMPRESS: NORMAL
BH CV LOWER VASCULAR LEFT POPLITEAL AUGMENT: NORMAL
BH CV LOWER VASCULAR LEFT POPLITEAL COMPETENT: NORMAL
BH CV LOWER VASCULAR LEFT POPLITEAL COMPRESS: NORMAL
BH CV LOWER VASCULAR LEFT POPLITEAL PHASIC: NORMAL
BH CV LOWER VASCULAR LEFT POPLITEAL SPONT: NORMAL
BH CV LOWER VASCULAR LEFT POSTERIOR TIBIAL COMPRESS: NORMAL
BH CV LOWER VASCULAR LEFT PROFUNDA FEMORAL COMPRESS: NORMAL
BH CV LOWER VASCULAR LEFT PROXIMAL FEMORAL COMPRESS: NORMAL
BH CV LOWER VASCULAR LEFT SAPHENOFEMORAL JUNCTION COMPRESS: NORMAL
BH CV LOWER VASCULAR RIGHT COMMON FEMORAL AUGMENT: NORMAL
BH CV LOWER VASCULAR RIGHT COMMON FEMORAL COMPETENT: NORMAL
BH CV LOWER VASCULAR RIGHT COMMON FEMORAL COMPRESS: NORMAL
BH CV LOWER VASCULAR RIGHT COMMON FEMORAL PHASIC: NORMAL
BH CV LOWER VASCULAR RIGHT COMMON FEMORAL SPONT: NORMAL
BH CV LOWER VASCULAR RIGHT DISTAL FEMORAL COMPRESS: NORMAL
BH CV LOWER VASCULAR RIGHT GASTRONEMIUS COMPRESS: NORMAL
BH CV LOWER VASCULAR RIGHT GREATER SAPH AK COMPRESS: NORMAL
BH CV LOWER VASCULAR RIGHT GREATER SAPH BK COMPRESS: NORMAL
BH CV LOWER VASCULAR RIGHT LESSER SAPH COMPRESS: NORMAL
BH CV LOWER VASCULAR RIGHT MID FEMORAL AUGMENT: NORMAL
BH CV LOWER VASCULAR RIGHT MID FEMORAL COMPETENT: NORMAL
BH CV LOWER VASCULAR RIGHT MID FEMORAL COMPRESS: NORMAL
BH CV LOWER VASCULAR RIGHT MID FEMORAL PHASIC: NORMAL
BH CV LOWER VASCULAR RIGHT MID FEMORAL SPONT: NORMAL
BH CV LOWER VASCULAR RIGHT PERONEAL COMPRESS: NORMAL
BH CV LOWER VASCULAR RIGHT POPLITEAL AUGMENT: NORMAL
BH CV LOWER VASCULAR RIGHT POPLITEAL COMPETENT: NORMAL
BH CV LOWER VASCULAR RIGHT POPLITEAL COMPRESS: NORMAL
BH CV LOWER VASCULAR RIGHT POPLITEAL PHASIC: NORMAL
BH CV LOWER VASCULAR RIGHT POPLITEAL SPONT: NORMAL
BH CV LOWER VASCULAR RIGHT POSTERIOR TIBIAL COMPRESS: NORMAL
BH CV LOWER VASCULAR RIGHT PROFUNDA FEMORAL COMPRESS: NORMAL
BH CV LOWER VASCULAR RIGHT PROXIMAL FEMORAL COMPRESS: NORMAL
BH CV LOWER VASCULAR RIGHT SAPHENOFEMORAL JUNCTION COMPRESS: NORMAL
BH CV XLRA - RV BASE: 2.9 CM
BH CV XLRA - RV LENGTH: 6.6 CM
BH CV XLRA - RV MID: 1.95 CM
BH CV XLRA - TDI S': 10.2 CM/SEC
BILIRUB SERPL-MCNC: 0.2 MG/DL (ref 0–1.2)
BILIRUB SERPL-MCNC: <0.2 MG/DL (ref 0–1.2)
BILIRUB UR QL STRIP: NEGATIVE
BILIRUB UR QL STRIP: NEGATIVE
BLD GP AB SCN SERPL QL: POSITIVE
BUN SERPL-MCNC: 19 MG/DL (ref 8–23)
BUN SERPL-MCNC: 21 MG/DL (ref 8–23)
BUN SERPL-MCNC: 22 MG/DL (ref 8–23)
BUN SERPL-MCNC: 25 MG/DL (ref 8–23)
BUN SERPL-MCNC: 27 MG/DL (ref 8–23)
BUN SERPL-MCNC: 27 MG/DL (ref 8–23)
BUN SERPL-MCNC: 30 MG/DL (ref 8–23)
BUN SERPL-MCNC: 31 MG/DL (ref 8–23)
BUN SERPL-MCNC: 32 MG/DL (ref 8–23)
BUN SERPL-MCNC: 32 MG/DL (ref 8–23)
BUN SERPL-MCNC: 33 MG/DL (ref 8–23)
BUN SERPL-MCNC: 34 MG/DL (ref 8–23)
BUN SERPL-MCNC: 35 MG/DL (ref 8–23)
BUN SERPL-MCNC: 36 MG/DL (ref 8–23)
BUN SERPL-MCNC: 42 MG/DL (ref 8–23)
BUN SERPL-MCNC: 43 MG/DL (ref 8–23)
BUN SERPL-MCNC: 43 MG/DL (ref 8–23)
BUN SERPL-MCNC: 47 MG/DL (ref 8–23)
BUN SERPL-MCNC: 47 MG/DL (ref 8–23)
BUN SERPL-MCNC: 49 MG/DL (ref 8–23)
BUN SERPL-MCNC: 55 MG/DL (ref 8–23)
BUN SERPL-MCNC: 56 MG/DL (ref 8–23)
BUN SERPL-MCNC: 62 MG/DL (ref 8–23)
BUN/CREAT SERPL: 13.6 (ref 7–25)
BUN/CREAT SERPL: 15 (ref 7–25)
BUN/CREAT SERPL: 15.4 (ref 7–25)
BUN/CREAT SERPL: 17.4 (ref 7–25)
BUN/CREAT SERPL: 17.9 (ref 7–25)
BUN/CREAT SERPL: 17.9 (ref 7–25)
BUN/CREAT SERPL: 18 (ref 7–25)
BUN/CREAT SERPL: 19.4 (ref 7–25)
BUN/CREAT SERPL: 20.2 (ref 7–25)
BUN/CREAT SERPL: 20.5 (ref 7–25)
BUN/CREAT SERPL: 21.1 (ref 7–25)
BUN/CREAT SERPL: 21.2 (ref 7–25)
BUN/CREAT SERPL: 21.8 (ref 7–25)
BUN/CREAT SERPL: 22.4 (ref 7–25)
BUN/CREAT SERPL: 23.8 (ref 7–25)
BUN/CREAT SERPL: 23.9 (ref 7–25)
BUN/CREAT SERPL: 24.9 (ref 7–25)
BUN/CREAT SERPL: 25.9 (ref 7–25)
BUN/CREAT SERPL: 25.9 (ref 7–25)
BUN/CREAT SERPL: 26.2 (ref 7–25)
BUN/CREAT SERPL: 26.2 (ref 7–25)
BUN/CREAT SERPL: 26.3 (ref 7–25)
BUN/CREAT SERPL: 28.7 (ref 7–25)
BUN/CREAT SERPL: 30.1 (ref 7–25)
BUN/CREAT SERPL: 31.6 (ref 7–25)
C PNEUM DNA NPH QL NAA+NON-PROBE: NOT DETECTED
CALCIUM SPEC-SCNC: 6.6 MG/DL (ref 8.6–10.5)
CALCIUM SPEC-SCNC: 7.4 MG/DL (ref 8.6–10.5)
CALCIUM SPEC-SCNC: 7.6 MG/DL (ref 8.6–10.5)
CALCIUM SPEC-SCNC: 7.8 MG/DL (ref 8.6–10.5)
CALCIUM SPEC-SCNC: 7.9 MG/DL (ref 8.6–10.5)
CALCIUM SPEC-SCNC: 8 MG/DL (ref 8.6–10.5)
CALCIUM SPEC-SCNC: 8.1 MG/DL (ref 8.6–10.5)
CALCIUM SPEC-SCNC: 8.2 MG/DL (ref 8.6–10.5)
CALCIUM SPEC-SCNC: 8.3 MG/DL (ref 8.6–10.5)
CALCIUM SPEC-SCNC: 8.3 MG/DL (ref 8.6–10.5)
CALCIUM SPEC-SCNC: 8.4 MG/DL (ref 8.6–10.5)
CALCIUM SPEC-SCNC: 8.4 MG/DL (ref 8.6–10.5)
CALCIUM SPEC-SCNC: 8.5 MG/DL (ref 8.6–10.5)
CALCIUM SPEC-SCNC: 8.8 MG/DL (ref 8.6–10.5)
CALCIUM SPEC-SCNC: 8.9 MG/DL (ref 8.6–10.5)
CHLORIDE SERPL-SCNC: 101 MMOL/L (ref 98–107)
CHLORIDE SERPL-SCNC: 101 MMOL/L (ref 98–107)
CHLORIDE SERPL-SCNC: 102 MMOL/L (ref 98–107)
CHLORIDE SERPL-SCNC: 103 MMOL/L (ref 98–107)
CHLORIDE SERPL-SCNC: 104 MMOL/L (ref 98–107)
CHLORIDE SERPL-SCNC: 105 MMOL/L (ref 98–107)
CHLORIDE SERPL-SCNC: 105 MMOL/L (ref 98–107)
CHLORIDE SERPL-SCNC: 106 MMOL/L (ref 98–107)
CHLORIDE SERPL-SCNC: 107 MMOL/L (ref 98–107)
CHLORIDE SERPL-SCNC: 107 MMOL/L (ref 98–107)
CHLORIDE SERPL-SCNC: 108 MMOL/L (ref 98–107)
CHLORIDE SERPL-SCNC: 108 MMOL/L (ref 98–107)
CHLORIDE SERPL-SCNC: 109 MMOL/L (ref 98–107)
CHLORIDE SERPL-SCNC: 109 MMOL/L (ref 98–107)
CHLORIDE SERPL-SCNC: 110 MMOL/L (ref 98–107)
CHLORIDE SERPL-SCNC: 111 MMOL/L (ref 98–107)
CHLORIDE SERPL-SCNC: 111 MMOL/L (ref 98–107)
CHLORIDE SERPL-SCNC: 112 MMOL/L (ref 98–107)
CHLORIDE SERPL-SCNC: 115 MMOL/L (ref 98–107)
CHOLEST SERPL-MCNC: 141 MG/DL (ref 0–200)
CHOLEST SERPL-MCNC: 153 MG/DL (ref 0–200)
CLARITY UR: CLEAR
CLARITY UR: CLEAR
CO2 SERPL-SCNC: 14.2 MMOL/L (ref 22–29)
CO2 SERPL-SCNC: 17 MMOL/L (ref 22–29)
CO2 SERPL-SCNC: 18 MMOL/L (ref 22–29)
CO2 SERPL-SCNC: 18.2 MMOL/L (ref 22–29)
CO2 SERPL-SCNC: 18.7 MMOL/L (ref 22–29)
CO2 SERPL-SCNC: 18.7 MMOL/L (ref 22–29)
CO2 SERPL-SCNC: 19 MMOL/L (ref 22–29)
CO2 SERPL-SCNC: 19.4 MMOL/L (ref 22–29)
CO2 SERPL-SCNC: 19.5 MMOL/L (ref 22–29)
CO2 SERPL-SCNC: 20 MMOL/L (ref 22–29)
CO2 SERPL-SCNC: 20.2 MMOL/L (ref 22–29)
CO2 SERPL-SCNC: 20.4 MMOL/L (ref 22–29)
CO2 SERPL-SCNC: 20.9 MMOL/L (ref 22–29)
CO2 SERPL-SCNC: 21 MMOL/L (ref 22–29)
CO2 SERPL-SCNC: 21.9 MMOL/L (ref 22–29)
CO2 SERPL-SCNC: 22.1 MMOL/L (ref 22–29)
CO2 SERPL-SCNC: 23.2 MMOL/L (ref 22–29)
CO2 SERPL-SCNC: 23.8 MMOL/L (ref 22–29)
CO2 SERPL-SCNC: 24 MMOL/L (ref 22–29)
COLOR UR: YELLOW
COLOR UR: YELLOW
CREAT SERPL-MCNC: 1.29 MG/DL (ref 0.57–1)
CREAT SERPL-MCNC: 1.4 MG/DL (ref 0.57–1)
CREAT SERPL-MCNC: 1.4 MG/DL (ref 0.57–1)
CREAT SERPL-MCNC: 1.43 MG/DL (ref 0.57–1)
CREAT SERPL-MCNC: 1.46 MG/DL (ref 0.57–1)
CREAT SERPL-MCNC: 1.47 MG/DL (ref 0.57–1)
CREAT SERPL-MCNC: 1.47 MG/DL (ref 0.57–1)
CREAT SERPL-MCNC: 1.51 MG/DL (ref 0.57–1)
CREAT SERPL-MCNC: 1.52 MG/DL (ref 0.57–1)
CREAT SERPL-MCNC: 1.66 MG/DL (ref 0.57–1)
CREAT SERPL-MCNC: 1.71 MG/DL (ref 0.57–1)
CREAT SERPL-MCNC: 1.72 MG/DL (ref 0.57–1)
CREAT SERPL-MCNC: 1.76 MG/DL (ref 0.57–1)
CREAT SERPL-MCNC: 1.77 MG/DL (ref 0.57–1)
CREAT SERPL-MCNC: 1.79 MG/DL (ref 0.57–1)
CREAT SERPL-MCNC: 1.86 MG/DL (ref 0.57–1)
CREAT SERPL-MCNC: 1.87 MG/DL (ref 0.57–1)
CREAT SERPL-MCNC: 1.89 MG/DL (ref 0.57–1)
CREAT SERPL-MCNC: 1.89 MG/DL (ref 0.57–1)
CREAT SERPL-MCNC: 2.1 MG/DL (ref 0.57–1)
CREAT SERPL-MCNC: 2.13 MG/DL (ref 0.57–1)
CREAT SERPL-MCNC: 2.16 MG/DL (ref 0.57–1)
CREAT SERPL-MCNC: 2.16 MG/DL (ref 0.57–1)
CROSSMATCH INTERPRETATION: NORMAL
D DIMER PPP FEU-MCNC: 9.52 MCGFEU/ML (ref 0–0.49)
D-LACTATE SERPL-SCNC: 1 MMOL/L (ref 0.5–2)
DEPRECATED RDW RBC AUTO: 44.7 FL (ref 37–54)
DEPRECATED RDW RBC AUTO: 45.7 FL (ref 37–54)
DEPRECATED RDW RBC AUTO: 45.9 FL (ref 37–54)
DEPRECATED RDW RBC AUTO: 46.1 FL (ref 37–54)
DEPRECATED RDW RBC AUTO: 47 FL (ref 37–54)
DEPRECATED RDW RBC AUTO: 48.6 FL (ref 37–54)
DEPRECATED RDW RBC AUTO: 49.7 FL (ref 37–54)
DEPRECATED RDW RBC AUTO: 50.5 FL (ref 37–54)
DEPRECATED RDW RBC AUTO: 51 FL (ref 37–54)
DEPRECATED RDW RBC AUTO: 51.4 FL (ref 37–54)
DEPRECATED RDW RBC AUTO: 52 FL (ref 37–54)
DEPRECATED RDW RBC AUTO: 52.1 FL (ref 37–54)
DEPRECATED RDW RBC AUTO: 52.4 FL (ref 37–54)
DEPRECATED RDW RBC AUTO: 52.6 FL (ref 37–54)
DEPRECATED RDW RBC AUTO: 53.5 FL (ref 37–54)
DEPRECATED RDW RBC AUTO: 54 FL (ref 37–54)
DEPRECATED RDW RBC AUTO: 55.5 FL (ref 37–54)
DEPRECATED RDW RBC AUTO: 56.4 FL (ref 37–54)
DEPRECATED RDW RBC AUTO: 57.2 FL (ref 37–54)
DEPRECATED RDW RBC AUTO: 62.9 FL (ref 37–54)
EGFRCR SERPLBLD CKD-EPI 2021: 22.1 ML/MIN/1.73
EGFRCR SERPLBLD CKD-EPI 2021: 22.1 ML/MIN/1.73
EGFRCR SERPLBLD CKD-EPI 2021: 22.5 ML/MIN/1.73
EGFRCR SERPLBLD CKD-EPI 2021: 22.9 ML/MIN/1.73
EGFRCR SERPLBLD CKD-EPI 2021: 25.9 ML/MIN/1.73
EGFRCR SERPLBLD CKD-EPI 2021: 25.9 ML/MIN/1.73
EGFRCR SERPLBLD CKD-EPI 2021: 26.3 ML/MIN/1.73
EGFRCR SERPLBLD CKD-EPI 2021: 26.4 ML/MIN/1.73
EGFRCR SERPLBLD CKD-EPI 2021: 27.7 ML/MIN/1.73
EGFRCR SERPLBLD CKD-EPI 2021: 28.1 ML/MIN/1.73
EGFRCR SERPLBLD CKD-EPI 2021: 28.2 ML/MIN/1.73
EGFRCR SERPLBLD CKD-EPI 2021: 29 ML/MIN/1.73
EGFRCR SERPLBLD CKD-EPI 2021: 29.2 ML/MIN/1.73
EGFRCR SERPLBLD CKD-EPI 2021: 30.3 ML/MIN/1.73
EGFRCR SERPLBLD CKD-EPI 2021: 33.7 ML/MIN/1.73
EGFRCR SERPLBLD CKD-EPI 2021: 33.7 ML/MIN/1.73
EGFRCR SERPLBLD CKD-EPI 2021: 33.9 ML/MIN/1.73
EGFRCR SERPLBLD CKD-EPI 2021: 33.9 ML/MIN/1.73
EGFRCR SERPLBLD CKD-EPI 2021: 34.8 ML/MIN/1.73
EGFRCR SERPLBLD CKD-EPI 2021: 35.1 ML/MIN/1.73
EGFRCR SERPLBLD CKD-EPI 2021: 35.1 ML/MIN/1.73
EGFRCR SERPLBLD CKD-EPI 2021: 36 ML/MIN/1.73
EGFRCR SERPLBLD CKD-EPI 2021: 36.9 ML/MIN/1.73
EGFRCR SERPLBLD CKD-EPI 2021: 36.9 ML/MIN/1.73
EGFRCR SERPLBLD CKD-EPI 2021: 41 ML/MIN/1.73
EOSINOPHIL # BLD AUTO: 0 10*3/MM3 (ref 0–0.4)
EOSINOPHIL # BLD AUTO: 0.04 10*3/MM3 (ref 0–0.4)
EOSINOPHIL # BLD AUTO: 0.06 10*3/MM3 (ref 0–0.4)
EOSINOPHIL # BLD AUTO: 0.06 10*3/MM3 (ref 0–0.4)
EOSINOPHIL # BLD AUTO: 0.1 10*3/MM3 (ref 0–0.4)
EOSINOPHIL # BLD AUTO: 0.11 10*3/MM3 (ref 0–0.4)
EOSINOPHIL # BLD AUTO: 0.12 10*3/MM3 (ref 0–0.4)
EOSINOPHIL # BLD AUTO: 0.13 10*3/MM3 (ref 0–0.4)
EOSINOPHIL # BLD AUTO: 0.14 10*3/MM3 (ref 0–0.4)
EOSINOPHIL # BLD AUTO: 0.14 10*3/MM3 (ref 0–0.4)
EOSINOPHIL # BLD AUTO: 0.15 10*3/MM3 (ref 0–0.4)
EOSINOPHIL # BLD AUTO: 0.16 10*3/MM3 (ref 0–0.4)
EOSINOPHIL # BLD AUTO: 0.18 10*3/MM3 (ref 0–0.4)
EOSINOPHIL # BLD AUTO: 0.23 10*3/MM3 (ref 0–0.4)
EOSINOPHIL NFR BLD AUTO: 0 % (ref 0.3–6.2)
EOSINOPHIL NFR BLD AUTO: 0.8 % (ref 0.3–6.2)
EOSINOPHIL NFR BLD AUTO: 0.8 % (ref 0.3–6.2)
EOSINOPHIL NFR BLD AUTO: 1.1 % (ref 0.3–6.2)
EOSINOPHIL NFR BLD AUTO: 1.7 % (ref 0.3–6.2)
EOSINOPHIL NFR BLD AUTO: 1.9 % (ref 0.3–6.2)
EOSINOPHIL NFR BLD AUTO: 2 % (ref 0.3–6.2)
EOSINOPHIL NFR BLD AUTO: 2.3 % (ref 0.3–6.2)
EOSINOPHIL NFR BLD AUTO: 2.4 % (ref 0.3–6.2)
EOSINOPHIL NFR BLD AUTO: 2.4 % (ref 0.3–6.2)
EOSINOPHIL NFR BLD AUTO: 2.5 % (ref 0.3–6.2)
EOSINOPHIL NFR BLD AUTO: 2.6 % (ref 0.3–6.2)
EOSINOPHIL NFR BLD AUTO: 3.2 % (ref 0.3–6.2)
EOSINOPHIL NFR BLD AUTO: 3.3 % (ref 0.3–6.2)
EOSINOPHIL NFR BLD AUTO: 3.4 % (ref 0.3–6.2)
EOSINOPHIL NFR BLD AUTO: 3.5 % (ref 0.3–6.2)
ERYTHROCYTE [DISTWIDTH] IN BLOOD BY AUTOMATED COUNT: 12.7 % (ref 12.3–15.4)
ERYTHROCYTE [DISTWIDTH] IN BLOOD BY AUTOMATED COUNT: 12.9 % (ref 12.3–15.4)
ERYTHROCYTE [DISTWIDTH] IN BLOOD BY AUTOMATED COUNT: 13 % (ref 12.3–15.4)
ERYTHROCYTE [DISTWIDTH] IN BLOOD BY AUTOMATED COUNT: 13.2 % (ref 12.3–15.4)
ERYTHROCYTE [DISTWIDTH] IN BLOOD BY AUTOMATED COUNT: 13.3 % (ref 12.3–15.4)
ERYTHROCYTE [DISTWIDTH] IN BLOOD BY AUTOMATED COUNT: 14.9 % (ref 12.3–15.4)
ERYTHROCYTE [DISTWIDTH] IN BLOOD BY AUTOMATED COUNT: 15 % (ref 12.3–15.4)
ERYTHROCYTE [DISTWIDTH] IN BLOOD BY AUTOMATED COUNT: 15.1 % (ref 12.3–15.4)
ERYTHROCYTE [DISTWIDTH] IN BLOOD BY AUTOMATED COUNT: 15.1 % (ref 12.3–15.4)
ERYTHROCYTE [DISTWIDTH] IN BLOOD BY AUTOMATED COUNT: 15.4 % (ref 12.3–15.4)
ERYTHROCYTE [DISTWIDTH] IN BLOOD BY AUTOMATED COUNT: 15.6 % (ref 12.3–15.4)
ERYTHROCYTE [DISTWIDTH] IN BLOOD BY AUTOMATED COUNT: 15.8 % (ref 12.3–15.4)
ERYTHROCYTE [DISTWIDTH] IN BLOOD BY AUTOMATED COUNT: 15.8 % (ref 12.3–15.4)
ERYTHROCYTE [DISTWIDTH] IN BLOOD BY AUTOMATED COUNT: 15.9 % (ref 12.3–15.4)
ERYTHROCYTE [DISTWIDTH] IN BLOOD BY AUTOMATED COUNT: 16 % (ref 12.3–15.4)
ERYTHROCYTE [DISTWIDTH] IN BLOOD BY AUTOMATED COUNT: 16.1 % (ref 12.3–15.4)
ERYTHROCYTE [DISTWIDTH] IN BLOOD BY AUTOMATED COUNT: 16.6 % (ref 12.3–15.4)
ERYTHROCYTE [DISTWIDTH] IN BLOOD BY AUTOMATED COUNT: 16.9 % (ref 12.3–15.4)
ERYTHROCYTE [DISTWIDTH] IN BLOOD BY AUTOMATED COUNT: 17.4 % (ref 12.3–15.4)
ERYTHROCYTE [DISTWIDTH] IN BLOOD BY AUTOMATED COUNT: 17.5 % (ref 12.3–15.4)
FERRITIN SERPL-MCNC: 458 NG/ML (ref 13–150)
FERRITIN SERPL-MCNC: 548.5 NG/ML (ref 13–150)
FERRITIN SERPL-MCNC: 83.5 NG/ML (ref 13–150)
FLUAV H1 2009 PAND RNA NPH QL NAA+PROBE: DETECTED
FLUBV RNA ISLT QL NAA+PROBE: NOT DETECTED
FOLATE BLD-MCNC: 415 NG/ML
FOLATE RBC-MCNC: 1305 NG/ML
FOLATE SERPL-MCNC: 5.37 NG/ML (ref 4.78–24.2)
GLOBULIN UR ELPH-MCNC: 2.3 GM/DL
GLOBULIN UR ELPH-MCNC: 2.5 GM/DL
GLOBULIN UR ELPH-MCNC: 2.5 GM/DL
GLOBULIN UR ELPH-MCNC: 2.6 GM/DL
GLOBULIN UR ELPH-MCNC: 2.8 GM/DL
GLOBULIN UR ELPH-MCNC: 3.1 GM/DL
GLUCOSE BLDC GLUCOMTR-MCNC: 100 MG/DL (ref 70–130)
GLUCOSE BLDC GLUCOMTR-MCNC: 106 MG/DL (ref 70–130)
GLUCOSE BLDC GLUCOMTR-MCNC: 109 MG/DL (ref 70–130)
GLUCOSE BLDC GLUCOMTR-MCNC: 116 MG/DL (ref 70–130)
GLUCOSE BLDC GLUCOMTR-MCNC: 117 MG/DL (ref 70–130)
GLUCOSE BLDC GLUCOMTR-MCNC: 118 MG/DL (ref 70–130)
GLUCOSE BLDC GLUCOMTR-MCNC: 120 MG/DL (ref 70–130)
GLUCOSE BLDC GLUCOMTR-MCNC: 121 MG/DL (ref 70–130)
GLUCOSE BLDC GLUCOMTR-MCNC: 122 MG/DL (ref 70–130)
GLUCOSE BLDC GLUCOMTR-MCNC: 126 MG/DL (ref 70–130)
GLUCOSE BLDC GLUCOMTR-MCNC: 126 MG/DL (ref 70–130)
GLUCOSE BLDC GLUCOMTR-MCNC: 127 MG/DL (ref 70–130)
GLUCOSE BLDC GLUCOMTR-MCNC: 129 MG/DL (ref 70–130)
GLUCOSE BLDC GLUCOMTR-MCNC: 132 MG/DL (ref 70–130)
GLUCOSE BLDC GLUCOMTR-MCNC: 133 MG/DL (ref 70–130)
GLUCOSE BLDC GLUCOMTR-MCNC: 136 MG/DL (ref 70–130)
GLUCOSE BLDC GLUCOMTR-MCNC: 141 MG/DL (ref 70–130)
GLUCOSE BLDC GLUCOMTR-MCNC: 142 MG/DL (ref 70–130)
GLUCOSE BLDC GLUCOMTR-MCNC: 151 MG/DL (ref 70–130)
GLUCOSE BLDC GLUCOMTR-MCNC: 154 MG/DL (ref 70–130)
GLUCOSE BLDC GLUCOMTR-MCNC: 163 MG/DL (ref 70–130)
GLUCOSE BLDC GLUCOMTR-MCNC: 175 MG/DL (ref 70–130)
GLUCOSE BLDC GLUCOMTR-MCNC: 175 MG/DL (ref 70–130)
GLUCOSE BLDC GLUCOMTR-MCNC: 181 MG/DL (ref 70–130)
GLUCOSE BLDC GLUCOMTR-MCNC: 202 MG/DL (ref 70–130)
GLUCOSE BLDC GLUCOMTR-MCNC: 245 MG/DL (ref 70–130)
GLUCOSE BLDC GLUCOMTR-MCNC: 307 MG/DL (ref 70–130)
GLUCOSE SERPL-MCNC: 102 MG/DL (ref 65–99)
GLUCOSE SERPL-MCNC: 103 MG/DL (ref 65–99)
GLUCOSE SERPL-MCNC: 106 MG/DL (ref 65–99)
GLUCOSE SERPL-MCNC: 106 MG/DL (ref 65–99)
GLUCOSE SERPL-MCNC: 108 MG/DL (ref 65–99)
GLUCOSE SERPL-MCNC: 109 MG/DL (ref 65–99)
GLUCOSE SERPL-MCNC: 111 MG/DL (ref 65–99)
GLUCOSE SERPL-MCNC: 111 MG/DL (ref 65–99)
GLUCOSE SERPL-MCNC: 112 MG/DL (ref 65–99)
GLUCOSE SERPL-MCNC: 115 MG/DL (ref 65–99)
GLUCOSE SERPL-MCNC: 116 MG/DL (ref 65–99)
GLUCOSE SERPL-MCNC: 121 MG/DL (ref 65–99)
GLUCOSE SERPL-MCNC: 123 MG/DL (ref 65–99)
GLUCOSE SERPL-MCNC: 127 MG/DL (ref 65–99)
GLUCOSE SERPL-MCNC: 132 MG/DL (ref 65–99)
GLUCOSE SERPL-MCNC: 136 MG/DL (ref 65–99)
GLUCOSE SERPL-MCNC: 234 MG/DL (ref 65–99)
GLUCOSE SERPL-MCNC: 81 MG/DL (ref 65–99)
GLUCOSE SERPL-MCNC: 81 MG/DL (ref 65–99)
GLUCOSE SERPL-MCNC: 89 MG/DL (ref 65–99)
GLUCOSE SERPL-MCNC: 94 MG/DL (ref 65–99)
GLUCOSE SERPL-MCNC: 95 MG/DL (ref 65–99)
GLUCOSE SERPL-MCNC: 96 MG/DL (ref 65–99)
GLUCOSE SERPL-MCNC: 98 MG/DL (ref 65–99)
GLUCOSE SERPL-MCNC: 98 MG/DL (ref 65–99)
GLUCOSE UR STRIP-MCNC: NEGATIVE MG/DL
GLUCOSE UR STRIP-MCNC: NEGATIVE MG/DL
HADV DNA SPEC NAA+PROBE: NOT DETECTED
HBA1C MFR BLD: 5.1 % (ref 4.8–5.6)
HBA1C MFR BLD: 5.3 % (ref 4.8–5.6)
HBA1C MFR BLD: 5.4 % (ref 4.8–5.6)
HCO3 BLDA-SCNC: 20.8 MMOL/L (ref 22–28)
HCOV 229E RNA SPEC QL NAA+PROBE: NOT DETECTED
HCOV HKU1 RNA SPEC QL NAA+PROBE: NOT DETECTED
HCOV NL63 RNA SPEC QL NAA+PROBE: NOT DETECTED
HCOV OC43 RNA SPEC QL NAA+PROBE: NOT DETECTED
HCT VFR BLD AUTO: 18.1 % (ref 34–46.6)
HCT VFR BLD AUTO: 20.5 % (ref 34–46.6)
HCT VFR BLD AUTO: 21 % (ref 34–46.6)
HCT VFR BLD AUTO: 21.6 % (ref 34–46.6)
HCT VFR BLD AUTO: 21.9 % (ref 34–46.6)
HCT VFR BLD AUTO: 21.9 % (ref 34–46.6)
HCT VFR BLD AUTO: 22.2 % (ref 34–46.6)
HCT VFR BLD AUTO: 22.6 % (ref 34–46.6)
HCT VFR BLD AUTO: 23.8 % (ref 34–46.6)
HCT VFR BLD AUTO: 25 % (ref 34–46.6)
HCT VFR BLD AUTO: 25.1 % (ref 34–46.6)
HCT VFR BLD AUTO: 25.2 % (ref 34–46.6)
HCT VFR BLD AUTO: 25.2 % (ref 34–46.6)
HCT VFR BLD AUTO: 26.3 % (ref 34–46.6)
HCT VFR BLD AUTO: 26.6 % (ref 34–46.6)
HCT VFR BLD AUTO: 27 % (ref 34–46.6)
HCT VFR BLD AUTO: 27.3 % (ref 34–46.6)
HCT VFR BLD AUTO: 27.3 % (ref 34–46.6)
HCT VFR BLD AUTO: 27.8 % (ref 34–46.6)
HCT VFR BLD AUTO: 28.9 % (ref 34–46.6)
HCT VFR BLD AUTO: 29 % (ref 34–46.6)
HCT VFR BLD AUTO: 29.4 % (ref 34–46.6)
HCT VFR BLD AUTO: 30.2 % (ref 34–46.6)
HCT VFR BLD AUTO: 30.9 % (ref 34–46.6)
HCT VFR BLD AUTO: 31.8 % (ref 34–46.6)
HCT VFR BLD AUTO: 32.5 % (ref 34–46.6)
HCT VFR BLD AUTO: 32.6 % (ref 34–46.6)
HCT VFR BLD AUTO: 32.7 % (ref 34–46.6)
HCT VFR BLD AUTO: 33.7 % (ref 34–46.6)
HCT VFR BLD AUTO: 33.9 % (ref 34–46.6)
HCT VFR BLD AUTO: 36 % (ref 34–46.6)
HDLC SERPL-MCNC: 48 MG/DL (ref 40–60)
HDLC SERPL-MCNC: 64 MG/DL (ref 40–60)
HEMOCCULT STL QL: POSITIVE
HGB BLD-MCNC: 10.1 G/DL (ref 12–15.9)
HGB BLD-MCNC: 10.1 G/DL (ref 12–15.9)
HGB BLD-MCNC: 10.6 G/DL (ref 12–15.9)
HGB BLD-MCNC: 10.8 G/DL (ref 12–15.9)
HGB BLD-MCNC: 10.8 G/DL (ref 12–15.9)
HGB BLD-MCNC: 11 G/DL (ref 12–15.9)
HGB BLD-MCNC: 11.3 G/DL (ref 12–15.9)
HGB BLD-MCNC: 11.4 G/DL (ref 12–15.9)
HGB BLD-MCNC: 6.2 G/DL (ref 12–15.9)
HGB BLD-MCNC: 6.7 G/DL (ref 12–15.9)
HGB BLD-MCNC: 6.7 G/DL (ref 12–15.9)
HGB BLD-MCNC: 7 G/DL (ref 12–15.9)
HGB BLD-MCNC: 7.2 G/DL (ref 12–15.9)
HGB BLD-MCNC: 7.3 G/DL (ref 12–15.9)
HGB BLD-MCNC: 7.4 G/DL (ref 12–15.9)
HGB BLD-MCNC: 7.4 G/DL (ref 12–15.9)
HGB BLD-MCNC: 7.8 G/DL (ref 12–15.9)
HGB BLD-MCNC: 8.1 G/DL (ref 12–15.9)
HGB BLD-MCNC: 8.2 G/DL (ref 12–15.9)
HGB BLD-MCNC: 8.2 G/DL (ref 12–15.9)
HGB BLD-MCNC: 8.4 G/DL (ref 12–15.9)
HGB BLD-MCNC: 8.6 G/DL (ref 12–15.9)
HGB BLD-MCNC: 8.7 G/DL (ref 12–15.9)
HGB BLD-MCNC: 8.7 G/DL (ref 12–15.9)
HGB BLD-MCNC: 8.8 G/DL (ref 12–15.9)
HGB BLD-MCNC: 8.8 G/DL (ref 12–15.9)
HGB BLD-MCNC: 8.9 G/DL (ref 12–15.9)
HGB BLD-MCNC: 9.5 G/DL (ref 12–15.9)
HGB BLD-MCNC: 9.5 G/DL (ref 12–15.9)
HGB BLD-MCNC: 9.7 G/DL (ref 12–15.9)
HGB UR QL STRIP.AUTO: NEGATIVE
HGB UR QL STRIP.AUTO: NEGATIVE
HMPV RNA NPH QL NAA+NON-PROBE: NOT DETECTED
HPIV1 RNA ISLT QL NAA+PROBE: NOT DETECTED
HPIV2 RNA SPEC QL NAA+PROBE: NOT DETECTED
HPIV3 RNA NPH QL NAA+PROBE: NOT DETECTED
HPIV4 P GENE NPH QL NAA+PROBE: NOT DETECTED
HYALINE CASTS UR QL AUTO: ABNORMAL /LPF
HYALINE CASTS UR QL AUTO: NORMAL /LPF
IMM GRANULOCYTES # BLD AUTO: 0.01 10*3/MM3 (ref 0–0.05)
IMM GRANULOCYTES # BLD AUTO: 0.02 10*3/MM3 (ref 0–0.05)
IMM GRANULOCYTES # BLD AUTO: 0.03 10*3/MM3 (ref 0–0.05)
IMM GRANULOCYTES # BLD AUTO: 0.05 10*3/MM3 (ref 0–0.05)
IMM GRANULOCYTES # BLD AUTO: 0.06 10*3/MM3 (ref 0–0.05)
IMM GRANULOCYTES # BLD AUTO: 0.07 10*3/MM3 (ref 0–0.05)
IMM GRANULOCYTES # BLD AUTO: 0.08 10*3/MM3 (ref 0–0.05)
IMM GRANULOCYTES # BLD AUTO: 0.08 10*3/MM3 (ref 0–0.05)
IMM GRANULOCYTES NFR BLD AUTO: 0.2 % (ref 0–0.5)
IMM GRANULOCYTES NFR BLD AUTO: 0.3 % (ref 0–0.5)
IMM GRANULOCYTES NFR BLD AUTO: 0.4 % (ref 0–0.5)
IMM GRANULOCYTES NFR BLD AUTO: 0.4 % (ref 0–0.5)
IMM GRANULOCYTES NFR BLD AUTO: 0.5 % (ref 0–0.5)
IMM GRANULOCYTES NFR BLD AUTO: 0.6 % (ref 0–0.5)
IMM GRANULOCYTES NFR BLD AUTO: 0.6 % (ref 0–0.5)
IMM GRANULOCYTES NFR BLD AUTO: 0.7 % (ref 0–0.5)
IMM GRANULOCYTES NFR BLD AUTO: 0.7 % (ref 0–0.5)
IMM GRANULOCYTES NFR BLD AUTO: 0.9 % (ref 0–0.5)
IMM GRANULOCYTES NFR BLD AUTO: 0.9 % (ref 0–0.5)
IMM GRANULOCYTES NFR BLD AUTO: 1.1 % (ref 0–0.5)
IMM GRANULOCYTES NFR BLD AUTO: 1.6 % (ref 0–0.5)
INR PPP: 1.04 (ref 0.9–1.1)
INR PPP: 1.1 (ref 0.9–1.1)
IRON 24H UR-MRATE: 16 MCG/DL (ref 37–145)
IRON 24H UR-MRATE: 221 MCG/DL (ref 37–145)
IRON 24H UR-MRATE: 49 MCG/DL (ref 37–145)
IRON 24H UR-MRATE: 52 MCG/DL (ref 37–145)
IRON SATN MFR SERPL: 15 % (ref 14–48)
IRON SATN MFR SERPL: 20 % (ref 20–50)
IRON SATN MFR SERPL: 27 % (ref 20–50)
IRON SATN MFR SERPL: 73 % (ref 20–50)
KETONES UR QL STRIP: NEGATIVE
KETONES UR QL STRIP: NEGATIVE
LDLC SERPL CALC-MCNC: 67 MG/DL (ref 0–100)
LDLC SERPL CALC-MCNC: 72 MG/DL (ref 0–100)
LDLC/HDLC SERPL: 1.1 {RATIO}
LDLC/HDLC SERPL: 1.32 {RATIO}
LEFT ATRIUM VOLUME INDEX: 41.6 ML/M2
LEUKOCYTE ESTERASE UR QL STRIP.AUTO: NEGATIVE
LEUKOCYTE ESTERASE UR QL STRIP.AUTO: NEGATIVE
LYMPHOCYTES # BLD AUTO: 0.4 10*3/MM3 (ref 0.7–3.1)
LYMPHOCYTES # BLD AUTO: 0.72 10*3/MM3 (ref 0.7–3.1)
LYMPHOCYTES # BLD AUTO: 0.75 10*3/MM3 (ref 0.7–3.1)
LYMPHOCYTES # BLD AUTO: 0.77 10*3/MM3 (ref 0.7–3.1)
LYMPHOCYTES # BLD AUTO: 0.8 10*3/MM3 (ref 0.7–3.1)
LYMPHOCYTES # BLD AUTO: 0.9 10*3/MM3 (ref 0.7–3.1)
LYMPHOCYTES # BLD AUTO: 0.92 10*3/MM3 (ref 0.7–3.1)
LYMPHOCYTES # BLD AUTO: 0.94 10*3/MM3 (ref 0.7–3.1)
LYMPHOCYTES # BLD AUTO: 0.99 10*3/MM3 (ref 0.7–3.1)
LYMPHOCYTES # BLD AUTO: 1.11 10*3/MM3 (ref 0.7–3.1)
LYMPHOCYTES # BLD AUTO: 1.15 10*3/MM3 (ref 0.7–3.1)
LYMPHOCYTES # BLD AUTO: 1.2 10*3/MM3 (ref 0.7–3.1)
LYMPHOCYTES # BLD AUTO: 1.23 10*3/MM3 (ref 0.7–3.1)
LYMPHOCYTES # BLD AUTO: 1.24 10*3/MM3 (ref 0.7–3.1)
LYMPHOCYTES # BLD AUTO: 1.27 10*3/MM3 (ref 0.7–3.1)
LYMPHOCYTES # BLD AUTO: 1.39 10*3/MM3 (ref 0.7–3.1)
LYMPHOCYTES NFR BLD AUTO: 10.1 % (ref 19.6–45.3)
LYMPHOCYTES NFR BLD AUTO: 10.5 % (ref 19.6–45.3)
LYMPHOCYTES NFR BLD AUTO: 11.6 % (ref 19.6–45.3)
LYMPHOCYTES NFR BLD AUTO: 11.6 % (ref 19.6–45.3)
LYMPHOCYTES NFR BLD AUTO: 13.4 % (ref 19.6–45.3)
LYMPHOCYTES NFR BLD AUTO: 14.9 % (ref 19.6–45.3)
LYMPHOCYTES NFR BLD AUTO: 16.2 % (ref 19.6–45.3)
LYMPHOCYTES NFR BLD AUTO: 19.8 % (ref 19.6–45.3)
LYMPHOCYTES NFR BLD AUTO: 20.4 % (ref 19.6–45.3)
LYMPHOCYTES NFR BLD AUTO: 20.7 % (ref 19.6–45.3)
LYMPHOCYTES NFR BLD AUTO: 23.1 % (ref 19.6–45.3)
LYMPHOCYTES NFR BLD AUTO: 26.9 % (ref 19.6–45.3)
LYMPHOCYTES NFR BLD AUTO: 27.1 % (ref 19.6–45.3)
LYMPHOCYTES NFR BLD AUTO: 3.3 % (ref 19.6–45.3)
LYMPHOCYTES NFR BLD AUTO: 30.1 % (ref 19.6–45.3)
LYMPHOCYTES NFR BLD AUTO: 32.5 % (ref 19.6–45.3)
M PNEUMO IGG SER IA-ACNC: NOT DETECTED
MAGNESIUM SERPL-MCNC: 1.9 MG/DL (ref 1.6–2.4)
MAGNESIUM SERPL-MCNC: 2 MG/DL (ref 1.6–2.4)
MAXIMAL PREDICTED HEART RATE: 136 BPM
MAXIMAL PREDICTED HEART RATE: 136 BPM
MCH RBC QN AUTO: 28.9 PG (ref 26.6–33)
MCH RBC QN AUTO: 29.2 PG (ref 26.6–33)
MCH RBC QN AUTO: 29.6 PG (ref 26.6–33)
MCH RBC QN AUTO: 29.7 PG (ref 26.6–33)
MCH RBC QN AUTO: 29.8 PG (ref 26.6–33)
MCH RBC QN AUTO: 29.8 PG (ref 26.6–33)
MCH RBC QN AUTO: 30.1 PG (ref 26.6–33)
MCH RBC QN AUTO: 30.1 PG (ref 26.6–33)
MCH RBC QN AUTO: 30.3 PG (ref 26.6–33)
MCH RBC QN AUTO: 30.6 PG (ref 26.6–33)
MCH RBC QN AUTO: 30.6 PG (ref 26.6–33)
MCH RBC QN AUTO: 30.7 PG (ref 26.6–33)
MCH RBC QN AUTO: 30.9 PG (ref 26.6–33)
MCH RBC QN AUTO: 31 PG (ref 26.6–33)
MCH RBC QN AUTO: 31.5 PG (ref 26.6–33)
MCH RBC QN AUTO: 31.7 PG (ref 26.6–33)
MCH RBC QN AUTO: 31.8 PG (ref 26.6–33)
MCH RBC QN AUTO: 32.5 PG (ref 26.6–33)
MCHC RBC AUTO-ENTMCNC: 30.8 G/DL (ref 31.5–35.7)
MCHC RBC AUTO-ENTMCNC: 30.9 G/DL (ref 31.5–35.7)
MCHC RBC AUTO-ENTMCNC: 31.9 G/DL (ref 31.5–35.7)
MCHC RBC AUTO-ENTMCNC: 32.1 G/DL (ref 31.5–35.7)
MCHC RBC AUTO-ENTMCNC: 32.2 G/DL (ref 31.5–35.7)
MCHC RBC AUTO-ENTMCNC: 32.2 G/DL (ref 31.5–35.7)
MCHC RBC AUTO-ENTMCNC: 32.4 G/DL (ref 31.5–35.7)
MCHC RBC AUTO-ENTMCNC: 32.7 G/DL (ref 31.5–35.7)
MCHC RBC AUTO-ENTMCNC: 32.8 G/DL (ref 31.5–35.7)
MCHC RBC AUTO-ENTMCNC: 33.1 G/DL (ref 31.5–35.7)
MCHC RBC AUTO-ENTMCNC: 33.4 G/DL (ref 31.5–35.7)
MCHC RBC AUTO-ENTMCNC: 33.6 G/DL (ref 31.5–35.7)
MCHC RBC AUTO-ENTMCNC: 33.8 G/DL (ref 31.5–35.7)
MCHC RBC AUTO-ENTMCNC: 33.8 G/DL (ref 31.5–35.7)
MCHC RBC AUTO-ENTMCNC: 34.3 G/DL (ref 31.5–35.7)
MCV RBC AUTO: 87.3 FL (ref 79–97)
MCV RBC AUTO: 88.7 FL (ref 79–97)
MCV RBC AUTO: 89.5 FL (ref 79–97)
MCV RBC AUTO: 89.8 FL (ref 79–97)
MCV RBC AUTO: 90.1 FL (ref 79–97)
MCV RBC AUTO: 90.4 FL (ref 79–97)
MCV RBC AUTO: 90.7 FL (ref 79–97)
MCV RBC AUTO: 90.8 FL (ref 79–97)
MCV RBC AUTO: 91.2 FL (ref 79–97)
MCV RBC AUTO: 91.8 FL (ref 79–97)
MCV RBC AUTO: 92.4 FL (ref 79–97)
MCV RBC AUTO: 94.2 FL (ref 79–97)
MCV RBC AUTO: 94.7 FL (ref 79–97)
MCV RBC AUTO: 94.8 FL (ref 79–97)
MCV RBC AUTO: 96.1 FL (ref 79–97)
MCV RBC AUTO: 96.3 FL (ref 79–97)
MCV RBC AUTO: 97.3 FL (ref 79–97)
MCV RBC AUTO: 97.8 FL (ref 79–97)
MCV RBC AUTO: 97.8 FL (ref 79–97)
MCV RBC AUTO: 98.8 FL (ref 79–97)
MODALITY: ABNORMAL
MONOCYTES # BLD AUTO: 0.34 10*3/MM3 (ref 0.1–0.9)
MONOCYTES # BLD AUTO: 0.39 10*3/MM3 (ref 0.1–0.9)
MONOCYTES # BLD AUTO: 0.45 10*3/MM3 (ref 0.1–0.9)
MONOCYTES # BLD AUTO: 0.46 10*3/MM3 (ref 0.1–0.9)
MONOCYTES # BLD AUTO: 0.47 10*3/MM3 (ref 0.1–0.9)
MONOCYTES # BLD AUTO: 0.47 10*3/MM3 (ref 0.1–0.9)
MONOCYTES # BLD AUTO: 0.48 10*3/MM3 (ref 0.1–0.9)
MONOCYTES # BLD AUTO: 0.52 10*3/MM3 (ref 0.1–0.9)
MONOCYTES # BLD AUTO: 0.52 10*3/MM3 (ref 0.1–0.9)
MONOCYTES # BLD AUTO: 0.53 10*3/MM3 (ref 0.1–0.9)
MONOCYTES # BLD AUTO: 0.54 10*3/MM3 (ref 0.1–0.9)
MONOCYTES # BLD AUTO: 0.55 10*3/MM3 (ref 0.1–0.9)
MONOCYTES # BLD AUTO: 0.61 10*3/MM3 (ref 0.1–0.9)
MONOCYTES # BLD AUTO: 0.67 10*3/MM3 (ref 0.1–0.9)
MONOCYTES # BLD AUTO: 0.68 10*3/MM3 (ref 0.1–0.9)
MONOCYTES # BLD AUTO: 0.77 10*3/MM3 (ref 0.1–0.9)
MONOCYTES NFR BLD AUTO: 10.3 % (ref 5–12)
MONOCYTES NFR BLD AUTO: 10.8 % (ref 5–12)
MONOCYTES NFR BLD AUTO: 11.3 % (ref 5–12)
MONOCYTES NFR BLD AUTO: 11.4 % (ref 5–12)
MONOCYTES NFR BLD AUTO: 11.4 % (ref 5–12)
MONOCYTES NFR BLD AUTO: 13.1 % (ref 5–12)
MONOCYTES NFR BLD AUTO: 13.3 % (ref 5–12)
MONOCYTES NFR BLD AUTO: 2.8 % (ref 5–12)
MONOCYTES NFR BLD AUTO: 5 % (ref 5–12)
MONOCYTES NFR BLD AUTO: 6.7 % (ref 5–12)
MONOCYTES NFR BLD AUTO: 7.9 % (ref 5–12)
MONOCYTES NFR BLD AUTO: 8.6 % (ref 5–12)
MONOCYTES NFR BLD AUTO: 9 % (ref 5–12)
MONOCYTES NFR BLD AUTO: 9.9 % (ref 5–12)
NEUTROPHILS NFR BLD AUTO: 1.99 10*3/MM3 (ref 1.7–7)
NEUTROPHILS NFR BLD AUTO: 11.28 10*3/MM3 (ref 1.7–7)
NEUTROPHILS NFR BLD AUTO: 2.29 10*3/MM3 (ref 1.7–7)
NEUTROPHILS NFR BLD AUTO: 2.34 10*3/MM3 (ref 1.7–7)
NEUTROPHILS NFR BLD AUTO: 2.99 10*3/MM3 (ref 1.7–7)
NEUTROPHILS NFR BLD AUTO: 3 10*3/MM3 (ref 1.7–7)
NEUTROPHILS NFR BLD AUTO: 3.04 10*3/MM3 (ref 1.7–7)
NEUTROPHILS NFR BLD AUTO: 3.32 10*3/MM3 (ref 1.7–7)
NEUTROPHILS NFR BLD AUTO: 3.66 10*3/MM3 (ref 1.7–7)
NEUTROPHILS NFR BLD AUTO: 3.92 10*3/MM3 (ref 1.7–7)
NEUTROPHILS NFR BLD AUTO: 4.13 10*3/MM3 (ref 1.7–7)
NEUTROPHILS NFR BLD AUTO: 4.87 10*3/MM3 (ref 1.7–7)
NEUTROPHILS NFR BLD AUTO: 5.31 10*3/MM3 (ref 1.7–7)
NEUTROPHILS NFR BLD AUTO: 5.74 10*3/MM3 (ref 1.7–7)
NEUTROPHILS NFR BLD AUTO: 52.8 % (ref 42.7–76)
NEUTROPHILS NFR BLD AUTO: 54.2 % (ref 42.7–76)
NEUTROPHILS NFR BLD AUTO: 56.7 % (ref 42.7–76)
NEUTROPHILS NFR BLD AUTO: 58.4 % (ref 42.7–76)
NEUTROPHILS NFR BLD AUTO: 6.18 10*3/MM3 (ref 1.7–7)
NEUTROPHILS NFR BLD AUTO: 63.9 % (ref 42.7–76)
NEUTROPHILS NFR BLD AUTO: 65.6 % (ref 42.7–76)
NEUTROPHILS NFR BLD AUTO: 65.9 % (ref 42.7–76)
NEUTROPHILS NFR BLD AUTO: 68.1 % (ref 42.7–76)
NEUTROPHILS NFR BLD AUTO: 68.5 % (ref 42.7–76)
NEUTROPHILS NFR BLD AUTO: 7.69 10*3/MM3 (ref 1.7–7)
NEUTROPHILS NFR BLD AUTO: 70.6 % (ref 42.7–76)
NEUTROPHILS NFR BLD AUTO: 72.9 % (ref 42.7–76)
NEUTROPHILS NFR BLD AUTO: 77.3 % (ref 42.7–76)
NEUTROPHILS NFR BLD AUTO: 77.4 % (ref 42.7–76)
NEUTROPHILS NFR BLD AUTO: 79.9 % (ref 42.7–76)
NEUTROPHILS NFR BLD AUTO: 81.7 % (ref 42.7–76)
NEUTROPHILS NFR BLD AUTO: 93.1 % (ref 42.7–76)
NITRITE UR QL STRIP: NEGATIVE
NITRITE UR QL STRIP: POSITIVE
NRBC BLD AUTO-RTO: 0 /100 WBC (ref 0–0.2)
NRBC BLD AUTO-RTO: 0.1 /100 WBC (ref 0–0.2)
NRBC BLD AUTO-RTO: 0.1 /100 WBC (ref 0–0.2)
NRBC BLD AUTO-RTO: 0.2 /100 WBC (ref 0–0.2)
NT-PROBNP SERPL-MCNC: 1193 PG/ML (ref 0–1800)
NT-PROBNP SERPL-MCNC: 2306 PG/ML (ref 0–1800)
NT-PROBNP SERPL-MCNC: 3086 PG/ML (ref 0–1800)
NT-PROBNP SERPL-MCNC: 694 PG/ML (ref 0–1800)
PCO2 BLDA: 31.6 MM HG (ref 35–45)
PH BLDA: 7.43 PH UNITS (ref 7.35–7.45)
PH UR STRIP.AUTO: 5.5 [PH] (ref 5–8)
PH UR STRIP.AUTO: 6 [PH] (ref 5–8)
PHOSPHATE SERPL-MCNC: 3.8 MG/DL (ref 2.5–4.5)
PHOSPHATE SERPL-MCNC: 4.4 MG/DL (ref 2.5–4.5)
PHOSPHATE SERPL-MCNC: 4.9 MG/DL (ref 2.5–4.5)
PHOSPHATE SERPL-MCNC: 5.4 MG/DL (ref 2.5–4.5)
PLATELET # BLD AUTO: 108 10*3/MM3 (ref 140–450)
PLATELET # BLD AUTO: 123 10*3/MM3 (ref 140–450)
PLATELET # BLD AUTO: 123 10*3/MM3 (ref 140–450)
PLATELET # BLD AUTO: 124 10*3/MM3 (ref 140–450)
PLATELET # BLD AUTO: 124 10*3/MM3 (ref 140–450)
PLATELET # BLD AUTO: 130 10*3/MM3 (ref 140–450)
PLATELET # BLD AUTO: 134 10*3/MM3 (ref 140–450)
PLATELET # BLD AUTO: 135 10*3/MM3 (ref 140–450)
PLATELET # BLD AUTO: 137 10*3/MM3 (ref 140–450)
PLATELET # BLD AUTO: 138 10*3/MM3 (ref 140–450)
PLATELET # BLD AUTO: 152 10*3/MM3 (ref 140–450)
PLATELET # BLD AUTO: 157 10*3/MM3 (ref 140–450)
PLATELET # BLD AUTO: 158 10*3/MM3 (ref 140–450)
PLATELET # BLD AUTO: 163 10*3/MM3 (ref 140–450)
PLATELET # BLD AUTO: 164 10*3/MM3 (ref 140–450)
PLATELET # BLD AUTO: 174 10*3/MM3 (ref 140–450)
PLATELET # BLD AUTO: 176 10*3/MM3 (ref 140–450)
PLATELET # BLD AUTO: 200 10*3/MM3 (ref 140–450)
PLATELET # BLD AUTO: 209 10*3/MM3 (ref 140–450)
PLATELET # BLD AUTO: 230 10*3/MM3 (ref 140–450)
PMV BLD AUTO: 10 FL (ref 6–12)
PMV BLD AUTO: 10 FL (ref 6–12)
PMV BLD AUTO: 9 FL (ref 6–12)
PMV BLD AUTO: 9.1 FL (ref 6–12)
PMV BLD AUTO: 9.1 FL (ref 6–12)
PMV BLD AUTO: 9.3 FL (ref 6–12)
PMV BLD AUTO: 9.4 FL (ref 6–12)
PMV BLD AUTO: 9.5 FL (ref 6–12)
PMV BLD AUTO: 9.5 FL (ref 6–12)
PMV BLD AUTO: 9.6 FL (ref 6–12)
PMV BLD AUTO: 9.7 FL (ref 6–12)
PMV BLD AUTO: 9.7 FL (ref 6–12)
PMV BLD AUTO: 9.8 FL (ref 6–12)
PMV BLD AUTO: 9.9 FL (ref 6–12)
PMV BLD AUTO: 9.9 FL (ref 6–12)
PO2 BLDA: 95.5 MM HG (ref 80–100)
POTASSIUM SERPL-SCNC: 3.8 MMOL/L (ref 3.5–5.2)
POTASSIUM SERPL-SCNC: 3.9 MMOL/L (ref 3.5–5.2)
POTASSIUM SERPL-SCNC: 4 MMOL/L (ref 3.5–5.2)
POTASSIUM SERPL-SCNC: 4.1 MMOL/L (ref 3.5–5.2)
POTASSIUM SERPL-SCNC: 4.2 MMOL/L (ref 3.5–5.2)
POTASSIUM SERPL-SCNC: 4.3 MMOL/L (ref 3.5–5.2)
POTASSIUM SERPL-SCNC: 4.4 MMOL/L (ref 3.5–5.2)
POTASSIUM SERPL-SCNC: 4.5 MMOL/L (ref 3.5–5.2)
POTASSIUM SERPL-SCNC: 4.6 MMOL/L (ref 3.5–5.2)
PROT SERPL-MCNC: 5.5 G/DL (ref 6–8.5)
PROT SERPL-MCNC: 5.9 G/DL (ref 6–8.5)
PROT SERPL-MCNC: 6.2 G/DL (ref 6–8.5)
PROT SERPL-MCNC: 6.3 G/DL (ref 6–8.5)
PROT SERPL-MCNC: 6.7 G/DL (ref 6–8.5)
PROT SERPL-MCNC: 7.1 G/DL (ref 6–8.5)
PROT UR QL STRIP: ABNORMAL
PROT UR QL STRIP: ABNORMAL
PROTHROMBIN TIME: 13.7 SECONDS (ref 11.7–14.2)
PROTHROMBIN TIME: 14.4 SECONDS (ref 11.7–14.2)
QT INTERVAL: 339 MS
QT INTERVAL: 378 MS
QT INTERVAL: 382 MS
QT INTERVAL: 396 MS
QT INTERVAL: 429 MS
RBC # BLD AUTO: 1.91 10*6/MM3 (ref 3.77–5.28)
RBC # BLD AUTO: 2.18 10*6/MM3 (ref 3.77–5.28)
RBC # BLD AUTO: 2.22 10*6/MM3 (ref 3.77–5.28)
RBC # BLD AUTO: 2.26 10*6/MM3 (ref 3.77–5.28)
RBC # BLD AUTO: 2.27 10*6/MM3 (ref 3.77–5.28)
RBC # BLD AUTO: 2.49 10*6/MM3 (ref 3.77–5.28)
RBC # BLD AUTO: 2.55 10*6/MM3 (ref 3.77–5.28)
RBC # BLD AUTO: 2.65 10*6/MM3 (ref 3.77–5.28)
RBC # BLD AUTO: 2.79 10*6/MM3 (ref 3.77–5.28)
RBC # BLD AUTO: 2.81 10*6/MM3 (ref 3.77–5.28)
RBC # BLD AUTO: 2.91 10*6/MM3 (ref 3.77–5.28)
RBC # BLD AUTO: 2.95 10*6/MM3 (ref 3.77–5.28)
RBC # BLD AUTO: 3.04 10*6/MM3 (ref 3.77–5.28)
RBC # BLD AUTO: 3.16 10*6/MM3 (ref 3.77–5.28)
RBC # BLD AUTO: 3.26 10*6/MM3 (ref 3.77–5.28)
RBC # BLD AUTO: 3.39 10*6/MM3 (ref 3.77–5.28)
RBC # BLD AUTO: 3.46 10*6/MM3 (ref 3.77–5.28)
RBC # BLD AUTO: 3.53 10*6/MM3 (ref 3.77–5.28)
RBC # BLD AUTO: 3.63 10*6/MM3 (ref 3.77–5.28)
RBC # BLD AUTO: 3.63 10*6/MM3 (ref 3.77–5.28)
RBC # UR STRIP: ABNORMAL /HPF
RBC # UR STRIP: NORMAL /HPF
REF LAB TEST METHOD: ABNORMAL
REF LAB TEST METHOD: NORMAL
RETICS # AUTO: 0.04 10*6/MM3 (ref 0.02–0.13)
RETICS/RBC NFR AUTO: 1.2 % (ref 0.7–1.9)
RH BLD: NEGATIVE
RHINOVIRUS RNA SPEC NAA+PROBE: NOT DETECTED
RSV RNA NPH QL NAA+NON-PROBE: NOT DETECTED
SAO2 % BLDCOA: 97.7 % (ref 92–99)
SARS-COV-2 RNA NPH QL NAA+NON-PROBE: NOT DETECTED
SARS-COV-2 RNA PNL SPEC NAA+PROBE: DETECTED
SARS-COV-2 RNA PNL SPEC NAA+PROBE: NOT DETECTED
SARS-COV-2 RNA RESP QL NAA+PROBE: NOT DETECTED
SARS-COV-2 RNA RESP QL NAA+PROBE: NOT DETECTED
SET MECH RESP RATE: 18
SINUS: 2.7 CM
SODIUM SERPL-SCNC: 135 MMOL/L (ref 136–145)
SODIUM SERPL-SCNC: 135 MMOL/L (ref 136–145)
SODIUM SERPL-SCNC: 136 MMOL/L (ref 136–145)
SODIUM SERPL-SCNC: 137 MMOL/L (ref 136–145)
SODIUM SERPL-SCNC: 138 MMOL/L (ref 136–145)
SODIUM SERPL-SCNC: 139 MMOL/L (ref 136–145)
SODIUM SERPL-SCNC: 139 MMOL/L (ref 136–145)
SODIUM SERPL-SCNC: 140 MMOL/L (ref 136–145)
SODIUM SERPL-SCNC: 141 MMOL/L (ref 136–145)
SODIUM SERPL-SCNC: 141 MMOL/L (ref 136–145)
SODIUM SERPL-SCNC: 142 MMOL/L (ref 136–145)
SODIUM SERPL-SCNC: 144 MMOL/L (ref 136–145)
SP GR UR STRIP: 1.01 (ref 1–1.03)
SP GR UR STRIP: 1.01 (ref 1–1.03)
SQUAMOUS #/AREA URNS HPF: ABNORMAL /HPF
SQUAMOUS #/AREA URNS HPF: NORMAL /HPF
STRESS TARGET HR: 116 BPM
STRESS TARGET HR: 116 BPM
T&S EXPIRATION DATE: NORMAL
T3FREE SERPL-MCNC: 1.72 PG/ML (ref 2–4.4)
T4 FREE SERPL-MCNC: 0.78 NG/DL (ref 0.93–1.7)
TIBC SERPL-MCNC: 104 MCG/DL (ref 249–505)
TIBC SERPL-MCNC: 179 MCG/DL (ref 298–536)
TIBC SERPL-MCNC: 265 MCG/DL (ref 298–536)
TIBC SERPL-MCNC: 301 MCG/DL (ref 298–536)
TOTAL RATE: 18 BREATHS/MINUTE
TRANSFERRIN SERPL-MCNC: 120 MG/DL (ref 200–360)
TRANSFERRIN SERPL-MCNC: 178 MG/DL (ref 200–360)
TRANSFERRIN SERPL-MCNC: 202 MG/DL (ref 200–360)
TRANSFERRIN SERPL-MCNC: 74 MG/DL (ref 200–360)
TRIGL SERPL-MCNC: 148 MG/DL (ref 0–150)
TRIGL SERPL-MCNC: 93 MG/DL (ref 0–150)
TROPONIN T SERPL-MCNC: 0.01 NG/ML (ref 0–0.03)
TROPONIN T SERPL-MCNC: 0.01 NG/ML (ref 0–0.03)
TROPONIN T SERPL-MCNC: 0.02 NG/ML (ref 0–0.03)
TSH SERPL DL<=0.05 MIU/L-ACNC: 10.6 UIU/ML (ref 0.27–4.2)
TSH SERPL DL<=0.05 MIU/L-ACNC: 4.42 UIU/ML (ref 0.27–4.2)
TSH SERPL DL<=0.05 MIU/L-ACNC: 4.5 UIU/ML (ref 0.27–4.2)
UNIT  ABO: NORMAL
UNIT  RH: NORMAL
URATE SERPL-MCNC: 4.9 MG/DL (ref 2.4–5.7)
UROBILINOGEN UR QL STRIP: ABNORMAL
UROBILINOGEN UR QL STRIP: ABNORMAL
VIT B12 BLD-MCNC: 535 PG/ML (ref 211–946)
VIT B12 BLD-MCNC: 632 PG/ML (ref 211–946)
VLDLC SERPL-MCNC: 17 MG/DL (ref 5–40)
VLDLC SERPL-MCNC: 26 MG/DL (ref 5–40)
WBC # UR STRIP: ABNORMAL /HPF
WBC # UR STRIP: NORMAL /HPF
WBC NRBC COR # BLD: 12.11 10*3/MM3 (ref 3.4–10.8)
WBC NRBC COR # BLD: 3.78 10*3/MM3 (ref 3.4–10.8)
WBC NRBC COR # BLD: 4.13 10*3/MM3 (ref 3.4–10.8)
WBC NRBC COR # BLD: 4.16 10*3/MM3 (ref 3.4–10.8)
WBC NRBC COR # BLD: 4.22 10*3/MM3 (ref 3.4–10.8)
WBC NRBC COR # BLD: 4.54 10*3/MM3 (ref 3.4–10.8)
WBC NRBC COR # BLD: 4.64 10*3/MM3 (ref 3.4–10.8)
WBC NRBC COR # BLD: 5.13 10*3/MM3 (ref 3.4–10.8)
WBC NRBC COR # BLD: 5.18 10*3/MM3 (ref 3.4–10.8)
WBC NRBC COR # BLD: 5.2 10*3/MM3 (ref 3.4–10.8)
WBC NRBC COR # BLD: 5.38 10*3/MM3 (ref 3.4–10.8)
WBC NRBC COR # BLD: 5.57 10*3/MM3 (ref 3.4–10.8)
WBC NRBC COR # BLD: 5.72 10*3/MM3 (ref 3.4–10.8)
WBC NRBC COR # BLD: 6.07 10*3/MM3 (ref 3.4–10.8)
WBC NRBC COR # BLD: 6.86 10*3/MM3 (ref 3.4–10.8)
WBC NRBC COR # BLD: 6.87 10*3/MM3 (ref 3.4–10.8)
WBC NRBC COR # BLD: 7.11 10*3/MM3 (ref 3.4–10.8)
WBC NRBC COR # BLD: 7.42 10*3/MM3 (ref 3.4–10.8)
WBC NRBC COR # BLD: 7.74 10*3/MM3 (ref 3.4–10.8)
WBC NRBC COR # BLD: 9.42 10*3/MM3 (ref 3.4–10.8)

## 2022-01-01 PROCEDURE — 80069 RENAL FUNCTION PANEL: CPT | Performed by: INTERNAL MEDICINE

## 2022-01-01 PROCEDURE — 84466 ASSAY OF TRANSFERRIN: CPT | Performed by: INTERNAL MEDICINE

## 2022-01-01 PROCEDURE — 83605 ASSAY OF LACTIC ACID: CPT | Performed by: HOSPITALIST

## 2022-01-01 PROCEDURE — 86901 BLOOD TYPING SEROLOGIC RH(D): CPT | Performed by: EMERGENCY MEDICINE

## 2022-01-01 PROCEDURE — 94799 UNLISTED PULMONARY SVC/PX: CPT

## 2022-01-01 PROCEDURE — 80048 BASIC METABOLIC PNL TOTAL CA: CPT | Performed by: HOSPITALIST

## 2022-01-01 PROCEDURE — 86870 RBC ANTIBODY IDENTIFICATION: CPT | Performed by: EMERGENCY MEDICINE

## 2022-01-01 PROCEDURE — G0378 HOSPITAL OBSERVATION PER HR: HCPCS

## 2022-01-01 PROCEDURE — 80048 BASIC METABOLIC PNL TOTAL CA: CPT | Performed by: NURSE PRACTITIONER

## 2022-01-01 PROCEDURE — 93970 EXTREMITY STUDY: CPT

## 2022-01-01 PROCEDURE — 82607 VITAMIN B-12: CPT | Performed by: HOSPITALIST

## 2022-01-01 PROCEDURE — 84443 ASSAY THYROID STIM HORMONE: CPT | Performed by: HOSPITALIST

## 2022-01-01 PROCEDURE — P9016 RBC LEUKOCYTES REDUCED: HCPCS

## 2022-01-01 PROCEDURE — 94761 N-INVAS EAR/PLS OXIMETRY MLT: CPT

## 2022-01-01 PROCEDURE — 82962 GLUCOSE BLOOD TEST: CPT

## 2022-01-01 PROCEDURE — 85018 HEMOGLOBIN: CPT | Performed by: HOSPITALIST

## 2022-01-01 PROCEDURE — 94760 N-INVAS EAR/PLS OXIMETRY 1: CPT

## 2022-01-01 PROCEDURE — 80053 COMPREHEN METABOLIC PANEL: CPT | Performed by: EMERGENCY MEDICINE

## 2022-01-01 PROCEDURE — 99213 OFFICE O/P EST LOW 20 MIN: CPT | Performed by: INTERNAL MEDICINE

## 2022-01-01 PROCEDURE — 71045 X-RAY EXAM CHEST 1 VIEW: CPT

## 2022-01-01 PROCEDURE — 63710000001 INSULIN LISPRO (HUMAN) PER 5 UNITS: Performed by: ORTHOPAEDIC SURGERY

## 2022-01-01 PROCEDURE — 85027 COMPLETE CBC AUTOMATED: CPT | Performed by: NURSE PRACTITIONER

## 2022-01-01 PROCEDURE — 73501 X-RAY EXAM HIP UNI 1 VIEW: CPT

## 2022-01-01 PROCEDURE — 85025 COMPLETE CBC W/AUTO DIFF WBC: CPT | Performed by: EMERGENCY MEDICINE

## 2022-01-01 PROCEDURE — 99232 SBSQ HOSP IP/OBS MODERATE 35: CPT | Performed by: NURSE PRACTITIONER

## 2022-01-01 PROCEDURE — 85018 HEMOGLOBIN: CPT | Performed by: NURSE PRACTITIONER

## 2022-01-01 PROCEDURE — 85379 FIBRIN DEGRADATION QUANT: CPT | Performed by: EMERGENCY MEDICINE

## 2022-01-01 PROCEDURE — 25010000002 HYDRALAZINE PER 20 MG: Performed by: INTERNAL MEDICINE

## 2022-01-01 PROCEDURE — 25010000002 ONDANSETRON PER 1 MG: Performed by: ORTHOPAEDIC SURGERY

## 2022-01-01 PROCEDURE — 94664 DEMO&/EVAL PT USE INHALER: CPT

## 2022-01-01 PROCEDURE — 86900 BLOOD TYPING SEROLOGIC ABO: CPT

## 2022-01-01 PROCEDURE — 82728 ASSAY OF FERRITIN: CPT

## 2022-01-01 PROCEDURE — 93306 TTE W/DOPPLER COMPLETE: CPT | Performed by: INTERNAL MEDICINE

## 2022-01-01 PROCEDURE — 25010000002 CEFTRIAXONE PER 250 MG: Performed by: ORTHOPAEDIC SURGERY

## 2022-01-01 PROCEDURE — 87635 SARS-COV-2 COVID-19 AMP PRB: CPT | Performed by: EMERGENCY MEDICINE

## 2022-01-01 PROCEDURE — 97530 THERAPEUTIC ACTIVITIES: CPT

## 2022-01-01 PROCEDURE — 85014 HEMATOCRIT: CPT | Performed by: HOSPITALIST

## 2022-01-01 PROCEDURE — 36430 TRANSFUSION BLD/BLD COMPNT: CPT

## 2022-01-01 PROCEDURE — 80069 RENAL FUNCTION PANEL: CPT | Performed by: HOSPITALIST

## 2022-01-01 PROCEDURE — 92610 EVALUATE SWALLOWING FUNCTION: CPT | Performed by: SPEECH-LANGUAGE PATHOLOGIST

## 2022-01-01 PROCEDURE — 76000 FLUOROSCOPY <1 HR PHYS/QHP: CPT

## 2022-01-01 PROCEDURE — 83540 ASSAY OF IRON: CPT | Performed by: HOSPITALIST

## 2022-01-01 PROCEDURE — 93005 ELECTROCARDIOGRAM TRACING: CPT

## 2022-01-01 PROCEDURE — 86922 COMPATIBILITY TEST ANTIGLOB: CPT

## 2022-01-01 PROCEDURE — 94640 AIRWAY INHALATION TREATMENT: CPT

## 2022-01-01 PROCEDURE — 93005 ELECTROCARDIOGRAM TRACING: CPT | Performed by: HOSPITALIST

## 2022-01-01 PROCEDURE — 99232 SBSQ HOSP IP/OBS MODERATE 35: CPT | Performed by: INTERNAL MEDICINE

## 2022-01-01 PROCEDURE — 85025 COMPLETE CBC W/AUTO DIFF WBC: CPT | Performed by: HOSPITALIST

## 2022-01-01 PROCEDURE — 93010 ELECTROCARDIOGRAM REPORT: CPT | Performed by: INTERNAL MEDICINE

## 2022-01-01 PROCEDURE — 83036 HEMOGLOBIN GLYCOSYLATED A1C: CPT | Performed by: ORTHOPAEDIC SURGERY

## 2022-01-01 PROCEDURE — 99285 EMERGENCY DEPT VISIT HI MDM: CPT

## 2022-01-01 PROCEDURE — 25010000002 FUROSEMIDE PER 20 MG: Performed by: INTERNAL MEDICINE

## 2022-01-01 PROCEDURE — 0202U NFCT DS 22 TRGT SARS-COV-2: CPT | Performed by: HOSPITALIST

## 2022-01-01 PROCEDURE — 83880 ASSAY OF NATRIURETIC PEPTIDE: CPT | Performed by: ORTHOPAEDIC SURGERY

## 2022-01-01 PROCEDURE — 93005 ELECTROCARDIOGRAM TRACING: CPT | Performed by: PHYSICIAN ASSISTANT

## 2022-01-01 PROCEDURE — 80053 COMPREHEN METABOLIC PANEL: CPT | Performed by: HOSPITALIST

## 2022-01-01 PROCEDURE — U0003 INFECTIOUS AGENT DETECTION BY NUCLEIC ACID (DNA OR RNA); SEVERE ACUTE RESPIRATORY SYNDROME CORONAVIRUS 2 (SARS-COV-2) (CORONAVIRUS DISEASE [COVID-19]), AMPLIFIED PROBE TECHNIQUE, MAKING USE OF HIGH THROUGHPUT TECHNOLOGIES AS DESCRIBED BY CMS-2020-01-R: HCPCS | Performed by: HOSPITALIST

## 2022-01-01 PROCEDURE — 25010000002 FENTANYL CITRATE (PF) 50 MCG/ML SOLUTION: Performed by: EMERGENCY MEDICINE

## 2022-01-01 PROCEDURE — 86900 BLOOD TYPING SEROLOGIC ABO: CPT | Performed by: EMERGENCY MEDICINE

## 2022-01-01 PROCEDURE — 83540 ASSAY OF IRON: CPT | Performed by: INTERNAL MEDICINE

## 2022-01-01 PROCEDURE — 85045 AUTOMATED RETICULOCYTE COUNT: CPT | Performed by: HOSPITALIST

## 2022-01-01 PROCEDURE — 99283 EMERGENCY DEPT VISIT LOW MDM: CPT

## 2022-01-01 PROCEDURE — 72125 CT NECK SPINE W/O DYE: CPT

## 2022-01-01 PROCEDURE — P9612 CATHETERIZE FOR URINE SPEC: HCPCS

## 2022-01-01 PROCEDURE — 86920 COMPATIBILITY TEST SPIN: CPT

## 2022-01-01 PROCEDURE — 84466 ASSAY OF TRANSFERRIN: CPT

## 2022-01-01 PROCEDURE — 83880 ASSAY OF NATRIURETIC PEPTIDE: CPT | Performed by: EMERGENCY MEDICINE

## 2022-01-01 PROCEDURE — 25010000002 HYDRALAZINE PER 20 MG: Performed by: HOSPITALIST

## 2022-01-01 PROCEDURE — 85027 COMPLETE CBC AUTOMATED: CPT | Performed by: INTERNAL MEDICINE

## 2022-01-01 PROCEDURE — 84484 ASSAY OF TROPONIN QUANT: CPT | Performed by: PHYSICIAN ASSISTANT

## 2022-01-01 PROCEDURE — U0003 INFECTIOUS AGENT DETECTION BY NUCLEIC ACID (DNA OR RNA); SEVERE ACUTE RESPIRATORY SYNDROME CORONAVIRUS 2 (SARS-COV-2) (CORONAVIRUS DISEASE [COVID-19]), AMPLIFIED PROBE TECHNIQUE, MAKING USE OF HIGH THROUGHPUT TECHNOLOGIES AS DESCRIBED BY CMS-2020-01-R: HCPCS | Performed by: EMERGENCY MEDICINE

## 2022-01-01 PROCEDURE — 85025 COMPLETE CBC W/AUTO DIFF WBC: CPT | Performed by: PHYSICIAN ASSISTANT

## 2022-01-01 PROCEDURE — A9540 TC99M MAA: HCPCS | Performed by: HOSPITALIST

## 2022-01-01 PROCEDURE — 25010000002 FENTANYL CITRATE (PF) 50 MCG/ML SOLUTION: Performed by: ANESTHESIOLOGY

## 2022-01-01 PROCEDURE — 84466 ASSAY OF TRANSFERRIN: CPT | Performed by: HOSPITALIST

## 2022-01-01 PROCEDURE — 99232 SBSQ HOSP IP/OBS MODERATE 35: CPT | Performed by: PHYSICIAN ASSISTANT

## 2022-01-01 PROCEDURE — 74018 RADEX ABDOMEN 1 VIEW: CPT

## 2022-01-01 PROCEDURE — 73502 X-RAY EXAM HIP UNI 2-3 VIEWS: CPT

## 2022-01-01 PROCEDURE — 25010000002 ENOXAPARIN PER 10 MG: Performed by: ORTHOPAEDIC SURGERY

## 2022-01-01 PROCEDURE — 85025 COMPLETE CBC W/AUTO DIFF WBC: CPT

## 2022-01-01 PROCEDURE — 86850 RBC ANTIBODY SCREEN: CPT | Performed by: NURSE PRACTITIONER

## 2022-01-01 PROCEDURE — 97166 OT EVAL MOD COMPLEX 45 MIN: CPT

## 2022-01-01 PROCEDURE — 81001 URINALYSIS AUTO W/SCOPE: CPT | Performed by: NURSE PRACTITIONER

## 2022-01-01 PROCEDURE — 93005 ELECTROCARDIOGRAM TRACING: CPT | Performed by: EMERGENCY MEDICINE

## 2022-01-01 PROCEDURE — 86901 BLOOD TYPING SEROLOGIC RH(D): CPT

## 2022-01-01 PROCEDURE — 36415 COLL VENOUS BLD VENIPUNCTURE: CPT

## 2022-01-01 PROCEDURE — 25010000002 ONDANSETRON PER 1 MG: Performed by: INTERNAL MEDICINE

## 2022-01-01 PROCEDURE — C1713 ANCHOR/SCREW BN/BN,TIS/BN: HCPCS | Performed by: ORTHOPAEDIC SURGERY

## 2022-01-01 PROCEDURE — 63710000001 ONDANSETRON PER 8 MG: Performed by: INTERNAL MEDICINE

## 2022-01-01 PROCEDURE — 82746 ASSAY OF FOLIC ACID SERUM: CPT | Performed by: HOSPITALIST

## 2022-01-01 PROCEDURE — 85014 HEMATOCRIT: CPT | Performed by: NURSE PRACTITIONER

## 2022-01-01 PROCEDURE — 70450 CT HEAD/BRAIN W/O DYE: CPT

## 2022-01-01 PROCEDURE — 80053 COMPREHEN METABOLIC PANEL: CPT | Performed by: ORTHOPAEDIC SURGERY

## 2022-01-01 PROCEDURE — 36600 WITHDRAWAL OF ARTERIAL BLOOD: CPT

## 2022-01-01 PROCEDURE — 83036 HEMOGLOBIN GLYCOSYLATED A1C: CPT | Performed by: NURSE PRACTITIONER

## 2022-01-01 PROCEDURE — 84484 ASSAY OF TROPONIN QUANT: CPT

## 2022-01-01 PROCEDURE — 83880 ASSAY OF NATRIURETIC PEPTIDE: CPT | Performed by: HOSPITALIST

## 2022-01-01 PROCEDURE — 80048 BASIC METABOLIC PNL TOTAL CA: CPT | Performed by: INTERNAL MEDICINE

## 2022-01-01 PROCEDURE — 80061 LIPID PANEL: CPT | Performed by: ORTHOPAEDIC SURGERY

## 2022-01-01 PROCEDURE — 71101 X-RAY EXAM UNILAT RIBS/CHEST: CPT

## 2022-01-01 PROCEDURE — 97162 PT EVAL MOD COMPLEX 30 MIN: CPT

## 2022-01-01 PROCEDURE — 99222 1ST HOSP IP/OBS MODERATE 55: CPT | Performed by: INTERNAL MEDICINE

## 2022-01-01 PROCEDURE — 99231 SBSQ HOSP IP/OBS SF/LOW 25: CPT | Performed by: INTERNAL MEDICINE

## 2022-01-01 PROCEDURE — 25010000002 HYDRALAZINE PER 20 MG: Performed by: ORTHOPAEDIC SURGERY

## 2022-01-01 PROCEDURE — 86850 RBC ANTIBODY SCREEN: CPT | Performed by: EMERGENCY MEDICINE

## 2022-01-01 PROCEDURE — 86870 RBC ANTIBODY IDENTIFICATION: CPT | Performed by: NURSE PRACTITIONER

## 2022-01-01 PROCEDURE — 73521 X-RAY EXAM HIPS BI 2 VIEWS: CPT

## 2022-01-01 PROCEDURE — 25010000002 CEFTRIAXONE PER 250 MG: Performed by: HOSPITALIST

## 2022-01-01 PROCEDURE — 25010000002 FENTANYL CITRATE (PF) 100 MCG/2ML SOLUTION: Performed by: ANESTHESIOLOGY

## 2022-01-01 PROCEDURE — 92610 EVALUATE SWALLOWING FUNCTION: CPT

## 2022-01-01 PROCEDURE — 82272 OCCULT BLD FECES 1-3 TESTS: CPT | Performed by: HOSPITALIST

## 2022-01-01 PROCEDURE — 99213 OFFICE O/P EST LOW 20 MIN: CPT

## 2022-01-01 PROCEDURE — 82607 VITAMIN B-12: CPT | Performed by: INTERNAL MEDICINE

## 2022-01-01 PROCEDURE — 25010000002 MORPHINE PER 10 MG: Performed by: EMERGENCY MEDICINE

## 2022-01-01 PROCEDURE — 82803 BLOOD GASES ANY COMBINATION: CPT

## 2022-01-01 PROCEDURE — 83880 ASSAY OF NATRIURETIC PEPTIDE: CPT | Performed by: NURSE PRACTITIONER

## 2022-01-01 PROCEDURE — 82728 ASSAY OF FERRITIN: CPT | Performed by: INTERNAL MEDICINE

## 2022-01-01 PROCEDURE — 82728 ASSAY OF FERRITIN: CPT | Performed by: HOSPITALIST

## 2022-01-01 PROCEDURE — 81001 URINALYSIS AUTO W/SCOPE: CPT | Performed by: PHYSICIAN ASSISTANT

## 2022-01-01 PROCEDURE — 25010000002 LORAZEPAM PER 2 MG: Performed by: HOSPITALIST

## 2022-01-01 PROCEDURE — 84481 FREE ASSAY (FT-3): CPT | Performed by: NURSE PRACTITIONER

## 2022-01-01 PROCEDURE — 73630 X-RAY EXAM OF FOOT: CPT

## 2022-01-01 PROCEDURE — 25010000002 HYDRALAZINE PER 20 MG: Performed by: ANESTHESIOLOGY

## 2022-01-01 PROCEDURE — 25010000002 MORPHINE PER 10 MG: Performed by: ORTHOPAEDIC SURGERY

## 2022-01-01 PROCEDURE — 87635 SARS-COV-2 COVID-19 AMP PRB: CPT | Performed by: HOSPITALIST

## 2022-01-01 PROCEDURE — 99231 SBSQ HOSP IP/OBS SF/LOW 25: CPT | Performed by: NURSE PRACTITIONER

## 2022-01-01 PROCEDURE — 97535 SELF CARE MNGMENT TRAINING: CPT

## 2022-01-01 PROCEDURE — 25010000002 PROPOFOL 10 MG/ML EMULSION: Performed by: ANESTHESIOLOGY

## 2022-01-01 PROCEDURE — 0 TECHNETIUM ALBUMIN AGGREGATED: Performed by: HOSPITALIST

## 2022-01-01 PROCEDURE — 0QS736Z REPOSITION LEFT UPPER FEMUR WITH INTRAMEDULLARY INTERNAL FIXATION DEVICE, PERCUTANEOUS APPROACH: ICD-10-PCS | Performed by: ORTHOPAEDIC SURGERY

## 2022-01-01 PROCEDURE — 25010000002 DROPERIDOL PER 5 MG: Performed by: EMERGENCY MEDICINE

## 2022-01-01 PROCEDURE — 85014 HEMATOCRIT: CPT | Performed by: INTERNAL MEDICINE

## 2022-01-01 PROCEDURE — 80053 COMPREHEN METABOLIC PANEL: CPT | Performed by: PHYSICIAN ASSISTANT

## 2022-01-01 PROCEDURE — 25010000002 CEFAZOLIN IN DEXTROSE 2-4 GM/100ML-% SOLUTION: Performed by: ORTHOPAEDIC SURGERY

## 2022-01-01 PROCEDURE — 84439 ASSAY OF FREE THYROXINE: CPT | Performed by: NURSE PRACTITIONER

## 2022-01-01 PROCEDURE — 86900 BLOOD TYPING SEROLOGIC ABO: CPT | Performed by: NURSE PRACTITIONER

## 2022-01-01 PROCEDURE — U0005 INFEC AGEN DETEC AMPLI PROBE: HCPCS | Performed by: HOSPITALIST

## 2022-01-01 PROCEDURE — 83036 HEMOGLOBIN GLYCOSYLATED A1C: CPT | Performed by: HOSPITALIST

## 2022-01-01 PROCEDURE — U0005 INFEC AGEN DETEC AMPLI PROBE: HCPCS | Performed by: EMERGENCY MEDICINE

## 2022-01-01 PROCEDURE — 83735 ASSAY OF MAGNESIUM: CPT | Performed by: PHYSICIAN ASSISTANT

## 2022-01-01 PROCEDURE — 87040 BLOOD CULTURE FOR BACTERIA: CPT | Performed by: HOSPITALIST

## 2022-01-01 PROCEDURE — 99221 1ST HOSP IP/OBS SF/LOW 40: CPT | Performed by: INTERNAL MEDICINE

## 2022-01-01 PROCEDURE — 25010000002 ONDANSETRON PER 1 MG: Performed by: ANESTHESIOLOGY

## 2022-01-01 PROCEDURE — 99284 EMERGENCY DEPT VISIT MOD MDM: CPT

## 2022-01-01 PROCEDURE — 25010000002 ONDANSETRON PER 1 MG: Performed by: PHYSICIAN ASSISTANT

## 2022-01-01 PROCEDURE — 84484 ASSAY OF TROPONIN QUANT: CPT | Performed by: EMERGENCY MEDICINE

## 2022-01-01 PROCEDURE — 85027 COMPLETE CBC AUTOMATED: CPT | Performed by: STUDENT IN AN ORGANIZED HEALTH CARE EDUCATION/TRAINING PROGRAM

## 2022-01-01 PROCEDURE — 85610 PROTHROMBIN TIME: CPT | Performed by: EMERGENCY MEDICINE

## 2022-01-01 PROCEDURE — 84443 ASSAY THYROID STIM HORMONE: CPT | Performed by: NURSE PRACTITIONER

## 2022-01-01 PROCEDURE — 82747 ASSAY OF FOLIC ACID RBC: CPT | Performed by: INTERNAL MEDICINE

## 2022-01-01 PROCEDURE — 30233N1 TRANSFUSION OF NONAUTOLOGOUS RED BLOOD CELLS INTO PERIPHERAL VEIN, PERCUTANEOUS APPROACH: ICD-10-PCS | Performed by: HOSPITALIST

## 2022-01-01 PROCEDURE — 80061 LIPID PANEL: CPT | Performed by: HOSPITALIST

## 2022-01-01 PROCEDURE — 85610 PROTHROMBIN TIME: CPT | Performed by: PHYSICIAN ASSISTANT

## 2022-01-01 PROCEDURE — 84550 ASSAY OF BLOOD/URIC ACID: CPT | Performed by: NURSE PRACTITIONER

## 2022-01-01 PROCEDURE — 80048 BASIC METABOLIC PNL TOTAL CA: CPT | Performed by: STUDENT IN AN ORGANIZED HEALTH CARE EDUCATION/TRAINING PROGRAM

## 2022-01-01 PROCEDURE — 36415 COLL VENOUS BLD VENIPUNCTURE: CPT | Performed by: INTERNAL MEDICINE

## 2022-01-01 PROCEDURE — 83735 ASSAY OF MAGNESIUM: CPT

## 2022-01-01 PROCEDURE — 93306 TTE W/DOPPLER COMPLETE: CPT

## 2022-01-01 PROCEDURE — 78580 LUNG PERFUSION IMAGING: CPT

## 2022-01-01 PROCEDURE — 86901 BLOOD TYPING SEROLOGIC RH(D): CPT | Performed by: NURSE PRACTITIONER

## 2022-01-01 PROCEDURE — 84443 ASSAY THYROID STIM HORMONE: CPT | Performed by: ORTHOPAEDIC SURGERY

## 2022-01-01 PROCEDURE — 83540 ASSAY OF IRON: CPT

## 2022-01-01 PROCEDURE — 25010000002 DEXAMETHASONE SODIUM PHOSPHATE 20 MG/5ML SOLUTION: Performed by: ANESTHESIOLOGY

## 2022-01-01 PROCEDURE — 25010000002 HYDRALAZINE PER 20 MG: Performed by: NURSE PRACTITIONER

## 2022-01-01 DEVICE — THREADED RECON K-WIRE: Type: IMPLANTABLE DEVICE | Site: TROCHANTER | Status: FUNCTIONAL

## 2022-01-01 DEVICE — LAG SCREW
Type: IMPLANTABLE DEVICE | Site: TROCHANTER | Status: FUNCTIONAL
Brand: GAMMA

## 2022-01-01 DEVICE — TROCHANTERIC NAIL
Type: IMPLANTABLE DEVICE | Site: TROCHANTER | Status: FUNCTIONAL
Brand: GAMMA

## 2022-01-01 DEVICE — LOCKING SCREW
Type: IMPLANTABLE DEVICE | Site: TROCHANTER | Status: FUNCTIONAL
Brand: T2 ALPHA

## 2022-01-01 DEVICE — PRECISION PIN
Type: IMPLANTABLE DEVICE | Site: FEMUR | Status: FUNCTIONAL
Brand: GAMMA

## 2022-01-01 RX ORDER — ATORVASTATIN CALCIUM 10 MG/1
10 TABLET, FILM COATED ORAL NIGHTLY
Status: DISCONTINUED | OUTPATIENT
Start: 2022-01-01 | End: 2022-01-01 | Stop reason: HOSPADM

## 2022-01-01 RX ORDER — SODIUM CHLORIDE 9 MG/ML
50 INJECTION, SOLUTION INTRAVENOUS CONTINUOUS
Status: DISCONTINUED | OUTPATIENT
Start: 2022-01-01 | End: 2022-01-01

## 2022-01-01 RX ORDER — MORPHINE SULFATE 2 MG/ML
2 INJECTION, SOLUTION INTRAMUSCULAR; INTRAVENOUS ONCE
Status: COMPLETED | OUTPATIENT
Start: 2022-01-01 | End: 2022-01-01

## 2022-01-01 RX ORDER — ATORVASTATIN CALCIUM 20 MG/1
40 TABLET, FILM COATED ORAL DAILY
Status: DISCONTINUED | OUTPATIENT
Start: 2022-01-01 | End: 2022-01-01

## 2022-01-01 RX ORDER — LIDOCAINE HYDROCHLORIDE 10 MG/ML
0.5 INJECTION, SOLUTION EPIDURAL; INFILTRATION; INTRACAUDAL; PERINEURAL ONCE AS NEEDED
Status: DISCONTINUED | OUTPATIENT
Start: 2022-01-01 | End: 2022-01-01 | Stop reason: HOSPADM

## 2022-01-01 RX ORDER — AMOXICILLIN 250 MG
2 CAPSULE ORAL NIGHTLY
Start: 2022-01-01

## 2022-01-01 RX ORDER — SODIUM CHLORIDE 0.9 % (FLUSH) 0.9 %
10 SYRINGE (ML) INJECTION EVERY 12 HOURS SCHEDULED
Status: DISCONTINUED | OUTPATIENT
Start: 2022-01-01 | End: 2022-01-01 | Stop reason: HOSPADM

## 2022-01-01 RX ORDER — HYDROCODONE BITARTRATE AND ACETAMINOPHEN 5; 325 MG/1; MG/1
1 TABLET ORAL EVERY 6 HOURS PRN
Status: DISCONTINUED | OUTPATIENT
Start: 2022-01-01 | End: 2022-01-01 | Stop reason: HOSPADM

## 2022-01-01 RX ORDER — PROPOFOL 10 MG/ML
VIAL (ML) INTRAVENOUS AS NEEDED
Status: DISCONTINUED | OUTPATIENT
Start: 2022-01-01 | End: 2022-01-01 | Stop reason: SURG

## 2022-01-01 RX ORDER — MELATONIN
1000 DAILY
COMMUNITY
End: 2022-01-01

## 2022-01-01 RX ORDER — MORPHINE SULFATE 2 MG/ML
2 INJECTION, SOLUTION INTRAMUSCULAR; INTRAVENOUS EVERY 4 HOURS PRN
Status: DISCONTINUED | OUTPATIENT
Start: 2022-01-01 | End: 2022-01-01

## 2022-01-01 RX ORDER — HYDRALAZINE HYDROCHLORIDE 50 MG/1
50 TABLET, FILM COATED ORAL EVERY 8 HOURS SCHEDULED
Status: DISCONTINUED | OUTPATIENT
Start: 2022-01-01 | End: 2022-01-01 | Stop reason: HOSPADM

## 2022-01-01 RX ORDER — ONDANSETRON 2 MG/ML
4 INJECTION INTRAMUSCULAR; INTRAVENOUS ONCE AS NEEDED
Status: DISCONTINUED | OUTPATIENT
Start: 2022-01-01 | End: 2022-01-01 | Stop reason: HOSPADM

## 2022-01-01 RX ORDER — UREA 10 %
3 LOTION (ML) TOPICAL NIGHTLY PRN
Status: DISCONTINUED | OUTPATIENT
Start: 2022-01-01 | End: 2022-01-01 | Stop reason: HOSPADM

## 2022-01-01 RX ORDER — AMLODIPINE BESYLATE 10 MG/1
1 TABLET ORAL DAILY
COMMUNITY
Start: 2021-11-29

## 2022-01-01 RX ORDER — SODIUM CHLORIDE, SODIUM LACTATE, POTASSIUM CHLORIDE, CALCIUM CHLORIDE 600; 310; 30; 20 MG/100ML; MG/100ML; MG/100ML; MG/100ML
9 INJECTION, SOLUTION INTRAVENOUS CONTINUOUS
Status: DISCONTINUED | OUTPATIENT
Start: 2022-01-01 | End: 2022-01-01

## 2022-01-01 RX ORDER — LEVOTHYROXINE SODIUM 0.07 MG/1
37.5 TABLET ORAL DAILY
Status: DISCONTINUED | OUTPATIENT
Start: 2022-01-01 | End: 2022-01-01

## 2022-01-01 RX ORDER — CLONAZEPAM 0.5 MG/1
0.5 TABLET ORAL 3 TIMES DAILY PRN
Status: ON HOLD | COMMUNITY
End: 2022-01-01 | Stop reason: SDUPTHER

## 2022-01-01 RX ORDER — LORAZEPAM 2 MG/ML
0.5 INJECTION INTRAMUSCULAR EVERY 4 HOURS PRN
Status: DISCONTINUED | OUTPATIENT
Start: 2022-01-01 | End: 2022-01-01

## 2022-01-01 RX ORDER — ASPIRIN 81 MG/1
81 TABLET, CHEWABLE ORAL DAILY
Status: DISCONTINUED | OUTPATIENT
Start: 2022-01-01 | End: 2022-01-01 | Stop reason: HOSPADM

## 2022-01-01 RX ORDER — LABETALOL HYDROCHLORIDE 5 MG/ML
20 INJECTION, SOLUTION INTRAVENOUS ONCE
Status: COMPLETED | OUTPATIENT
Start: 2022-01-01 | End: 2022-01-01

## 2022-01-01 RX ORDER — HYDRALAZINE HYDROCHLORIDE 20 MG/ML
10 INJECTION INTRAMUSCULAR; INTRAVENOUS ONCE
Status: COMPLETED | OUTPATIENT
Start: 2022-01-01 | End: 2022-01-01

## 2022-01-01 RX ORDER — DICYCLOMINE HYDROCHLORIDE 10 MG/1
10 CAPSULE ORAL 3 TIMES DAILY PRN
Status: DISCONTINUED | OUTPATIENT
Start: 2022-01-01 | End: 2022-01-01 | Stop reason: HOSPADM

## 2022-01-01 RX ORDER — DROPERIDOL 2.5 MG/ML
2.5 INJECTION, SOLUTION INTRAMUSCULAR; INTRAVENOUS ONCE
Status: COMPLETED | OUTPATIENT
Start: 2022-01-01 | End: 2022-01-01

## 2022-01-01 RX ORDER — LABETALOL HYDROCHLORIDE 5 MG/ML
10 INJECTION, SOLUTION INTRAVENOUS
Status: DISCONTINUED | OUTPATIENT
Start: 2022-01-01 | End: 2022-01-01

## 2022-01-01 RX ORDER — SACCHAROMYCES BOULARDII 250 MG
250 CAPSULE ORAL 2 TIMES DAILY
COMMUNITY
Start: 2022-01-01 | End: 2022-01-01 | Stop reason: HOSPADM

## 2022-01-01 RX ORDER — FUROSEMIDE 40 MG/1
40 TABLET ORAL
Status: DISCONTINUED | OUTPATIENT
Start: 2022-01-01 | End: 2022-01-01

## 2022-01-01 RX ORDER — TAMSULOSIN HYDROCHLORIDE 0.4 MG/1
0.4 CAPSULE ORAL NIGHTLY
Status: DISCONTINUED | OUTPATIENT
Start: 2022-01-01 | End: 2022-01-01 | Stop reason: HOSPADM

## 2022-01-01 RX ORDER — CLONAZEPAM 0.5 MG/1
0.5 TABLET ORAL NIGHTLY PRN
COMMUNITY
End: 2022-01-01 | Stop reason: HOSPADM

## 2022-01-01 RX ORDER — POTASSIUM CHLORIDE 750 MG/1
10 TABLET, FILM COATED, EXTENDED RELEASE ORAL DAILY
COMMUNITY
End: 2022-01-01 | Stop reason: HOSPADM

## 2022-01-01 RX ORDER — FENTANYL CITRATE 50 UG/ML
25 INJECTION, SOLUTION INTRAMUSCULAR; INTRAVENOUS ONCE
Status: DISCONTINUED | OUTPATIENT
Start: 2022-01-01 | End: 2022-01-01

## 2022-01-01 RX ORDER — LORAZEPAM 2 MG/ML
0.5 INJECTION INTRAMUSCULAR EVERY 4 HOURS
Status: DISCONTINUED | OUTPATIENT
Start: 2022-01-01 | End: 2022-01-01

## 2022-01-01 RX ORDER — PANTOPRAZOLE SODIUM 40 MG/10ML
80 INJECTION, POWDER, LYOPHILIZED, FOR SOLUTION INTRAVENOUS ONCE
Status: COMPLETED | OUTPATIENT
Start: 2022-01-01 | End: 2022-01-01

## 2022-01-01 RX ORDER — ISOSORBIDE MONONITRATE 60 MG/1
60 TABLET, EXTENDED RELEASE ORAL
Status: DISCONTINUED | OUTPATIENT
Start: 2022-01-01 | End: 2022-01-01

## 2022-01-01 RX ORDER — PRIMIDONE 50 MG/1
50 TABLET ORAL 3 TIMES DAILY
COMMUNITY
Start: 2021-09-29

## 2022-01-01 RX ORDER — LOSARTAN POTASSIUM 100 MG/1
100 TABLET ORAL DAILY
Status: DISCONTINUED | OUTPATIENT
Start: 2022-01-01 | End: 2022-01-01

## 2022-01-01 RX ORDER — CETIRIZINE HYDROCHLORIDE 10 MG/1
5 TABLET ORAL DAILY
Status: DISCONTINUED | OUTPATIENT
Start: 2022-01-01 | End: 2022-01-01 | Stop reason: HOSPADM

## 2022-01-01 RX ORDER — HYDRALAZINE HYDROCHLORIDE 20 MG/ML
5 INJECTION INTRAMUSCULAR; INTRAVENOUS
Status: DISCONTINUED | OUTPATIENT
Start: 2022-01-01 | End: 2022-01-01 | Stop reason: HOSPADM

## 2022-01-01 RX ORDER — SERTRALINE HYDROCHLORIDE 100 MG/1
100 TABLET, FILM COATED ORAL DAILY
Status: ON HOLD | COMMUNITY
Start: 2021-09-15 | End: 2022-01-01

## 2022-01-01 RX ORDER — ISOSORBIDE MONONITRATE 30 MG/1
30 TABLET, EXTENDED RELEASE ORAL
Status: DISCONTINUED | OUTPATIENT
Start: 2022-01-01 | End: 2022-01-01

## 2022-01-01 RX ORDER — POLYETHYLENE GLYCOL 3350 17 G/17G
17 POWDER, FOR SOLUTION ORAL 2 TIMES DAILY
Status: DISCONTINUED | OUTPATIENT
Start: 2022-01-01 | End: 2022-01-01 | Stop reason: HOSPADM

## 2022-01-01 RX ORDER — EPHEDRINE SULFATE 50 MG/ML
INJECTION, SOLUTION INTRAVENOUS AS NEEDED
Status: DISCONTINUED | OUTPATIENT
Start: 2022-01-01 | End: 2022-01-01 | Stop reason: SURG

## 2022-01-01 RX ORDER — PRIMIDONE 50 MG/1
50 TABLET ORAL 3 TIMES DAILY
Status: DISCONTINUED | OUTPATIENT
Start: 2022-01-01 | End: 2022-01-01 | Stop reason: HOSPADM

## 2022-01-01 RX ORDER — DOCUSATE SODIUM 250 MG
250 CAPSULE ORAL DAILY
COMMUNITY

## 2022-01-01 RX ORDER — MAGNESIUM HYDROXIDE 1200 MG/15ML
LIQUID ORAL AS NEEDED
Status: DISCONTINUED | OUTPATIENT
Start: 2022-01-01 | End: 2022-01-01 | Stop reason: HOSPADM

## 2022-01-01 RX ORDER — FAMOTIDINE 20 MG/1
20 TABLET, FILM COATED ORAL 2 TIMES DAILY
Status: DISCONTINUED | OUTPATIENT
Start: 2022-01-01 | End: 2022-01-01 | Stop reason: HOSPADM

## 2022-01-01 RX ORDER — TRAMADOL HYDROCHLORIDE 50 MG/1
50 TABLET ORAL EVERY 8 HOURS PRN
COMMUNITY
End: 2022-01-01 | Stop reason: HOSPADM

## 2022-01-01 RX ORDER — HYDRALAZINE HYDROCHLORIDE 25 MG/1
25 TABLET, FILM COATED ORAL EVERY 8 HOURS SCHEDULED
Status: DISCONTINUED | OUTPATIENT
Start: 2022-01-01 | End: 2022-01-01 | Stop reason: HOSPADM

## 2022-01-01 RX ORDER — HYDROCODONE BITARTRATE AND ACETAMINOPHEN 5; 325 MG/1; MG/1
1 TABLET ORAL EVERY 4 HOURS PRN
Status: DISCONTINUED | OUTPATIENT
Start: 2022-01-01 | End: 2022-01-01 | Stop reason: HOSPADM

## 2022-01-01 RX ORDER — SODIUM CHLORIDE 9 MG/ML
75 INJECTION, SOLUTION INTRAVENOUS CONTINUOUS
Status: DISCONTINUED | OUTPATIENT
Start: 2022-01-01 | End: 2022-01-01

## 2022-01-01 RX ORDER — ENOXAPARIN SODIUM 100 MG/ML
30 INJECTION SUBCUTANEOUS NIGHTLY
Status: DISCONTINUED | OUTPATIENT
Start: 2022-01-01 | End: 2022-01-01 | Stop reason: HOSPADM

## 2022-01-01 RX ORDER — HYDROCODONE BITARTRATE AND ACETAMINOPHEN 5; 325 MG/1; MG/1
1 TABLET ORAL EVERY 4 HOURS PRN
Qty: 10 TABLET | Refills: 0 | Status: SHIPPED | OUTPATIENT
Start: 2022-01-01 | End: 2022-01-01

## 2022-01-01 RX ORDER — ALLOPURINOL 100 MG/1
100 TABLET ORAL DAILY
Status: DISCONTINUED | OUTPATIENT
Start: 2022-01-01 | End: 2022-01-01 | Stop reason: HOSPADM

## 2022-01-01 RX ORDER — FUROSEMIDE 40 MG/1
40 TABLET ORAL
Status: DISCONTINUED | OUTPATIENT
Start: 2022-01-01 | End: 2022-01-01 | Stop reason: HOSPADM

## 2022-01-01 RX ORDER — SERTRALINE HYDROCHLORIDE 100 MG/1
100 TABLET, FILM COATED ORAL NIGHTLY
Status: DISCONTINUED | OUTPATIENT
Start: 2022-01-01 | End: 2022-01-01 | Stop reason: HOSPADM

## 2022-01-01 RX ORDER — HYDRALAZINE HYDROCHLORIDE 50 MG/1
50 TABLET, FILM COATED ORAL EVERY 8 HOURS SCHEDULED
Qty: 90 TABLET | Refills: 0 | Status: SHIPPED | OUTPATIENT
Start: 2022-01-01 | End: 2022-01-01 | Stop reason: HOSPADM

## 2022-01-01 RX ORDER — MELATONIN
1000 DAILY
Status: DISCONTINUED | OUTPATIENT
Start: 2022-01-01 | End: 2022-01-01 | Stop reason: HOSPADM

## 2022-01-01 RX ORDER — PANTOPRAZOLE SODIUM 40 MG/1
40 TABLET, DELAYED RELEASE ORAL
Qty: 30 TABLET | Refills: 0 | Status: SHIPPED | OUTPATIENT
Start: 2022-01-01 | End: 2022-01-01

## 2022-01-01 RX ORDER — INSULIN LISPRO 100 [IU]/ML
0-7 INJECTION, SOLUTION INTRAVENOUS; SUBCUTANEOUS
Status: DISCONTINUED | OUTPATIENT
Start: 2022-01-01 | End: 2022-01-01

## 2022-01-01 RX ORDER — LEVOTHYROXINE SODIUM 0.07 MG/1
75 TABLET ORAL DAILY
Qty: 30 TABLET | Refills: 0 | Status: SHIPPED | OUTPATIENT
Start: 2022-01-01 | End: 2022-01-01

## 2022-01-01 RX ORDER — PANTOPRAZOLE SODIUM 40 MG/1
40 TABLET, DELAYED RELEASE ORAL
Status: DISCONTINUED | OUTPATIENT
Start: 2022-01-01 | End: 2022-01-01 | Stop reason: HOSPADM

## 2022-01-01 RX ORDER — LOSARTAN POTASSIUM 50 MG/1
50 TABLET ORAL DAILY
Status: DISCONTINUED | OUTPATIENT
Start: 2022-01-01 | End: 2022-01-01 | Stop reason: HOSPADM

## 2022-01-01 RX ORDER — ONDANSETRON 2 MG/ML
4 INJECTION INTRAMUSCULAR; INTRAVENOUS EVERY 6 HOURS PRN
Status: DISCONTINUED | OUTPATIENT
Start: 2022-01-01 | End: 2022-01-01 | Stop reason: HOSPADM

## 2022-01-01 RX ORDER — CLONIDINE HYDROCHLORIDE 0.1 MG/1
0.1 TABLET ORAL ONCE
Status: COMPLETED | OUTPATIENT
Start: 2022-01-01 | End: 2022-01-01

## 2022-01-01 RX ORDER — LOSARTAN POTASSIUM 50 MG/1
50 TABLET ORAL 2 TIMES DAILY
Start: 2022-01-01 | End: 2022-01-01 | Stop reason: HOSPADM

## 2022-01-01 RX ORDER — ATORVASTATIN CALCIUM 10 MG/1
10 TABLET, FILM COATED ORAL NIGHTLY
COMMUNITY

## 2022-01-01 RX ORDER — PANTOPRAZOLE SODIUM 40 MG/1
40 TABLET, DELAYED RELEASE ORAL
Status: DISCONTINUED | OUTPATIENT
Start: 2022-01-01 | End: 2022-01-01

## 2022-01-01 RX ORDER — CLOPIDOGREL BISULFATE 75 MG/1
75 TABLET ORAL DAILY
Status: DISCONTINUED | OUTPATIENT
Start: 2022-01-01 | End: 2022-01-01 | Stop reason: HOSPADM

## 2022-01-01 RX ORDER — LOSARTAN POTASSIUM 50 MG/1
50 TABLET ORAL 2 TIMES DAILY
Status: DISCONTINUED | OUTPATIENT
Start: 2022-01-01 | End: 2022-01-01

## 2022-01-01 RX ORDER — MELATONIN
2000 DAILY
Status: DISCONTINUED | OUTPATIENT
Start: 2022-01-01 | End: 2022-01-01

## 2022-01-01 RX ORDER — HYDRALAZINE HYDROCHLORIDE 20 MG/ML
10 INJECTION INTRAMUSCULAR; INTRAVENOUS EVERY 4 HOURS PRN
Status: DISCONTINUED | OUTPATIENT
Start: 2022-01-01 | End: 2022-01-01 | Stop reason: HOSPADM

## 2022-01-01 RX ORDER — CHOLECALCIFEROL (VITAMIN D3) 50 MCG
2000 TABLET ORAL DAILY
COMMUNITY

## 2022-01-01 RX ORDER — CETIRIZINE HYDROCHLORIDE 10 MG/1
5 TABLET ORAL DAILY
Status: DISCONTINUED | OUTPATIENT
Start: 2022-01-01 | End: 2022-01-01

## 2022-01-01 RX ORDER — ARFORMOTEROL TARTRATE 15 UG/2ML
15 SOLUTION RESPIRATORY (INHALATION)
Status: DISCONTINUED | OUTPATIENT
Start: 2022-01-01 | End: 2022-01-01 | Stop reason: HOSPADM

## 2022-01-01 RX ORDER — ENOXAPARIN SODIUM 100 MG/ML
30 INJECTION SUBCUTANEOUS NIGHTLY
Qty: 4.2 ML | Refills: 0 | Status: SHIPPED | OUTPATIENT
Start: 2022-01-01 | End: 2022-01-01

## 2022-01-01 RX ORDER — SACCHAROMYCES BOULARDII 250 MG
250 CAPSULE ORAL 2 TIMES DAILY
Status: DISCONTINUED | OUTPATIENT
Start: 2022-01-01 | End: 2022-01-01

## 2022-01-01 RX ORDER — CARVEDILOL 12.5 MG/1
12.5 TABLET ORAL 2 TIMES DAILY WITH MEALS
Status: DISCONTINUED | OUTPATIENT
Start: 2022-01-01 | End: 2022-01-01

## 2022-01-01 RX ORDER — ISOSORBIDE MONONITRATE 30 MG/1
30 TABLET, EXTENDED RELEASE ORAL
Qty: 30 TABLET | Refills: 0 | Status: SHIPPED | OUTPATIENT
Start: 2022-01-01 | End: 2023-01-01

## 2022-01-01 RX ORDER — BUPIVACAINE HCL/0.9 % NACL/PF 0.125 %
PLASTIC BAG, INJECTION (ML) EPIDURAL AS NEEDED
Status: DISCONTINUED | OUTPATIENT
Start: 2022-01-01 | End: 2022-01-01 | Stop reason: SURG

## 2022-01-01 RX ORDER — ONDANSETRON 4 MG/1
4 TABLET, FILM COATED ORAL EVERY 6 HOURS PRN
Status: DISCONTINUED | OUTPATIENT
Start: 2022-01-01 | End: 2022-01-01 | Stop reason: HOSPADM

## 2022-01-01 RX ORDER — SODIUM CHLORIDE 0.9 % (FLUSH) 0.9 %
10 SYRINGE (ML) INJECTION AS NEEDED
Status: DISCONTINUED | OUTPATIENT
Start: 2022-01-01 | End: 2022-01-01 | Stop reason: HOSPADM

## 2022-01-01 RX ORDER — LEVOTHYROXINE SODIUM 0.07 MG/1
37.5 TABLET ORAL DAILY
COMMUNITY
End: 2022-01-01 | Stop reason: HOSPADM

## 2022-01-01 RX ORDER — PANTOPRAZOLE SODIUM 40 MG/10ML
40 INJECTION, POWDER, LYOPHILIZED, FOR SOLUTION INTRAVENOUS EVERY 12 HOURS SCHEDULED
Status: DISCONTINUED | OUTPATIENT
Start: 2022-01-01 | End: 2022-01-01

## 2022-01-01 RX ORDER — NITROGLYCERIN 0.4 MG/1
0.4 TABLET SUBLINGUAL
Status: DISCONTINUED | OUTPATIENT
Start: 2022-01-01 | End: 2022-01-01 | Stop reason: HOSPADM

## 2022-01-01 RX ORDER — HYDRALAZINE HYDROCHLORIDE 25 MG/1
25 TABLET, FILM COATED ORAL EVERY 8 HOURS SCHEDULED
Status: DISCONTINUED | OUTPATIENT
Start: 2022-01-01 | End: 2022-01-01

## 2022-01-01 RX ORDER — LEVOTHYROXINE SODIUM 0.1 MG/1
100 TABLET ORAL DAILY
COMMUNITY

## 2022-01-01 RX ORDER — DOCUSATE SODIUM 100 MG/1
200 CAPSULE, LIQUID FILLED ORAL 2 TIMES DAILY
Status: DISCONTINUED | OUTPATIENT
Start: 2022-01-01 | End: 2022-01-01 | Stop reason: HOSPADM

## 2022-01-01 RX ORDER — CLONIDINE HYDROCHLORIDE 0.1 MG/1
0.1 TABLET ORAL EVERY 8 HOURS SCHEDULED
Status: DISCONTINUED | OUTPATIENT
Start: 2022-01-01 | End: 2022-01-01

## 2022-01-01 RX ORDER — FENTANYL CITRATE 50 UG/ML
25 INJECTION, SOLUTION INTRAMUSCULAR; INTRAVENOUS ONCE
Status: COMPLETED | OUTPATIENT
Start: 2022-01-01 | End: 2022-01-01

## 2022-01-01 RX ORDER — DIPHENHYDRAMINE HYDROCHLORIDE 50 MG/ML
12.5 INJECTION INTRAMUSCULAR; INTRAVENOUS
Status: DISCONTINUED | OUTPATIENT
Start: 2022-01-01 | End: 2022-01-01 | Stop reason: HOSPADM

## 2022-01-01 RX ORDER — GUAIFENESIN 600 MG/1
600 TABLET, EXTENDED RELEASE ORAL EVERY 12 HOURS SCHEDULED
Status: DISCONTINUED | OUTPATIENT
Start: 2022-01-01 | End: 2022-01-01

## 2022-01-01 RX ORDER — MELATONIN
2000 DAILY
Status: DISCONTINUED | OUTPATIENT
Start: 2022-01-01 | End: 2022-01-01 | Stop reason: HOSPADM

## 2022-01-01 RX ORDER — FUROSEMIDE 80 MG
80 TABLET ORAL
Status: DISCONTINUED | OUTPATIENT
Start: 2022-01-01 | End: 2022-01-01

## 2022-01-01 RX ORDER — CARVEDILOL 25 MG/1
25 TABLET ORAL 2 TIMES DAILY WITH MEALS
Qty: 60 TABLET | Refills: 0 | Status: SHIPPED | OUTPATIENT
Start: 2022-01-01 | End: 2023-01-01

## 2022-01-01 RX ORDER — TAMSULOSIN HYDROCHLORIDE 0.4 MG/1
0.4 CAPSULE ORAL NIGHTLY
Qty: 30 CAPSULE | Refills: 0 | Status: SHIPPED | OUTPATIENT
Start: 2022-01-01

## 2022-01-01 RX ORDER — CLOMIPRAMINE HYDROCHLORIDE 50 MG/1
50 CAPSULE ORAL NIGHTLY
COMMUNITY

## 2022-01-01 RX ORDER — HYDRALAZINE HYDROCHLORIDE 100 MG/1
100 TABLET, FILM COATED ORAL 3 TIMES DAILY
Qty: 90 TABLET | Refills: 0 | Status: SHIPPED | OUTPATIENT
Start: 2022-01-01 | End: 2023-01-01

## 2022-01-01 RX ORDER — CARVEDILOL 25 MG/1
25 TABLET ORAL 2 TIMES DAILY WITH MEALS
Status: DISCONTINUED | OUTPATIENT
Start: 2022-01-01 | End: 2022-01-01 | Stop reason: HOSPADM

## 2022-01-01 RX ORDER — FUROSEMIDE 80 MG
80 TABLET ORAL SEE ADMIN INSTRUCTIONS
COMMUNITY
End: 2022-01-01 | Stop reason: HOSPADM

## 2022-01-01 RX ORDER — ACETAMINOPHEN 325 MG/1
650 TABLET ORAL EVERY 6 HOURS PRN
Status: DISCONTINUED | OUTPATIENT
Start: 2022-01-01 | End: 2022-01-01

## 2022-01-01 RX ORDER — MORPHINE SULFATE 2 MG/ML
2 INJECTION, SOLUTION INTRAMUSCULAR; INTRAVENOUS ONCE
Status: DISCONTINUED | OUTPATIENT
Start: 2022-01-01 | End: 2022-01-01

## 2022-01-01 RX ORDER — FUROSEMIDE 80 MG
80 TABLET ORAL DAILY
Status: DISCONTINUED | OUTPATIENT
Start: 2022-01-01 | End: 2022-01-01

## 2022-01-01 RX ORDER — SODIUM BICARBONATE 650 MG/1
1300 TABLET ORAL 3 TIMES DAILY
Qty: 180 TABLET | Refills: 0 | Status: SHIPPED | OUTPATIENT
Start: 2022-01-01 | End: 2023-01-01

## 2022-01-01 RX ORDER — ARFORMOTEROL TARTRATE 15 UG/2ML
15 SOLUTION RESPIRATORY (INHALATION)
Qty: 120 ML
Start: 2022-01-01

## 2022-01-01 RX ORDER — POLYETHYLENE GLYCOL 3350 17 G/17G
17 POWDER, FOR SOLUTION ORAL DAILY
Start: 2022-01-01 | End: 2022-01-01 | Stop reason: SDUPTHER

## 2022-01-01 RX ORDER — MEMANTINE HYDROCHLORIDE 5 MG/1
5 TABLET ORAL EVERY 12 HOURS SCHEDULED
Status: DISCONTINUED | OUTPATIENT
Start: 2022-01-01 | End: 2022-01-01 | Stop reason: HOSPADM

## 2022-01-01 RX ORDER — INSULIN LISPRO 100 [IU]/ML
0-7 INJECTION, SOLUTION INTRAVENOUS; SUBCUTANEOUS
Status: DISCONTINUED | OUTPATIENT
Start: 2022-01-01 | End: 2022-01-01 | Stop reason: HOSPADM

## 2022-01-01 RX ORDER — ONDANSETRON 2 MG/ML
INJECTION INTRAMUSCULAR; INTRAVENOUS AS NEEDED
Status: DISCONTINUED | OUTPATIENT
Start: 2022-01-01 | End: 2022-01-01 | Stop reason: SURG

## 2022-01-01 RX ORDER — ACETAMINOPHEN 325 MG/1
650 TABLET ORAL EVERY 4 HOURS PRN
Status: DISCONTINUED | OUTPATIENT
Start: 2022-01-01 | End: 2022-01-01 | Stop reason: HOSPADM

## 2022-01-01 RX ORDER — MONTELUKAST SODIUM 10 MG/1
10 TABLET ORAL NIGHTLY
Status: DISCONTINUED | OUTPATIENT
Start: 2022-01-01 | End: 2022-01-01 | Stop reason: HOSPADM

## 2022-01-01 RX ORDER — MONTELUKAST SODIUM 10 MG/1
10 TABLET ORAL NIGHTLY
Status: DISCONTINUED | OUTPATIENT
Start: 2022-01-01 | End: 2022-01-01

## 2022-01-01 RX ORDER — AMLODIPINE BESYLATE 10 MG/1
10 TABLET ORAL DAILY
Status: DISCONTINUED | OUTPATIENT
Start: 2022-01-01 | End: 2022-01-01 | Stop reason: HOSPADM

## 2022-01-01 RX ORDER — CLOMIPRAMINE HYDROCHLORIDE 50 MG/1
50 CAPSULE ORAL NIGHTLY
Status: DISCONTINUED | OUTPATIENT
Start: 2022-01-01 | End: 2022-01-01 | Stop reason: HOSPADM

## 2022-01-01 RX ORDER — LEVOTHYROXINE SODIUM 0.1 MG/1
100 TABLET ORAL
Status: DISCONTINUED | OUTPATIENT
Start: 2022-01-01 | End: 2022-01-01 | Stop reason: HOSPADM

## 2022-01-01 RX ORDER — FENTANYL CITRATE 50 UG/ML
25 INJECTION, SOLUTION INTRAMUSCULAR; INTRAVENOUS
Status: DISCONTINUED | OUTPATIENT
Start: 2022-01-01 | End: 2022-01-01 | Stop reason: HOSPADM

## 2022-01-01 RX ORDER — ONDANSETRON 2 MG/ML
4 INJECTION INTRAMUSCULAR; INTRAVENOUS EVERY 6 HOURS PRN
Status: DISCONTINUED | OUTPATIENT
Start: 2022-01-01 | End: 2022-01-01

## 2022-01-01 RX ORDER — ISOSORBIDE MONONITRATE 30 MG/1
30 TABLET, EXTENDED RELEASE ORAL
Status: DISCONTINUED | OUTPATIENT
Start: 2022-01-01 | End: 2022-01-01 | Stop reason: HOSPADM

## 2022-01-01 RX ORDER — AMOXICILLIN 250 MG
2 CAPSULE ORAL NIGHTLY
Status: DISCONTINUED | OUTPATIENT
Start: 2022-01-01 | End: 2022-01-01 | Stop reason: HOSPADM

## 2022-01-01 RX ORDER — FUROSEMIDE 40 MG/1
40 TABLET ORAL DAILY
Status: DISCONTINUED | OUTPATIENT
Start: 2022-01-01 | End: 2022-01-01

## 2022-01-01 RX ORDER — AMLODIPINE BESYLATE 10 MG/1
10 TABLET ORAL
Status: DISCONTINUED | OUTPATIENT
Start: 2022-01-01 | End: 2022-01-01 | Stop reason: HOSPADM

## 2022-01-01 RX ORDER — ROCURONIUM BROMIDE 10 MG/ML
INJECTION, SOLUTION INTRAVENOUS AS NEEDED
Status: DISCONTINUED | OUTPATIENT
Start: 2022-01-01 | End: 2022-01-01 | Stop reason: SURG

## 2022-01-01 RX ORDER — SODIUM BICARBONATE 650 MG/1
1300 TABLET ORAL 3 TIMES DAILY
Status: DISCONTINUED | OUTPATIENT
Start: 2022-01-01 | End: 2022-01-01 | Stop reason: HOSPADM

## 2022-01-01 RX ORDER — CARVEDILOL 6.25 MG/1
6.25 TABLET ORAL 2 TIMES DAILY WITH MEALS
COMMUNITY
End: 2022-01-01 | Stop reason: HOSPADM

## 2022-01-01 RX ORDER — LEVOTHYROXINE SODIUM 0.1 MG/1
100 TABLET ORAL DAILY
Status: DISCONTINUED | OUTPATIENT
Start: 2022-01-01 | End: 2022-01-01 | Stop reason: HOSPADM

## 2022-01-01 RX ORDER — ATORVASTATIN CALCIUM 20 MG/1
10 TABLET, FILM COATED ORAL NIGHTLY
Status: DISCONTINUED | OUTPATIENT
Start: 2022-01-01 | End: 2022-01-01 | Stop reason: HOSPADM

## 2022-01-01 RX ORDER — HYDRALAZINE HYDROCHLORIDE 20 MG/ML
10 INJECTION INTRAMUSCULAR; INTRAVENOUS EVERY 4 HOURS PRN
Status: DISCONTINUED | OUTPATIENT
Start: 2022-01-01 | End: 2022-01-01

## 2022-01-01 RX ORDER — POTASSIUM CHLORIDE 750 MG/1
10 TABLET, FILM COATED, EXTENDED RELEASE ORAL DAILY
Status: DISCONTINUED | OUTPATIENT
Start: 2022-01-01 | End: 2022-01-01

## 2022-01-01 RX ORDER — CEFAZOLIN SODIUM 2 G/100ML
2 INJECTION, SOLUTION INTRAVENOUS EVERY 8 HOURS
Status: COMPLETED | OUTPATIENT
Start: 2022-01-01 | End: 2022-01-01

## 2022-01-01 RX ORDER — TRAZODONE HYDROCHLORIDE 50 MG/1
50 TABLET ORAL NIGHTLY
COMMUNITY
Start: 2022-01-10 | End: 2022-01-01

## 2022-01-01 RX ORDER — NALBUPHINE HCL 10 MG/ML
2 AMPUL (ML) INJECTION EVERY 4 HOURS PRN
Status: DISCONTINUED | OUTPATIENT
Start: 2022-01-01 | End: 2022-01-01 | Stop reason: HOSPADM

## 2022-01-01 RX ORDER — LOSARTAN POTASSIUM 50 MG/1
50 TABLET ORAL 2 TIMES DAILY
Status: DISCONTINUED | OUTPATIENT
Start: 2022-01-01 | End: 2022-01-01 | Stop reason: HOSPADM

## 2022-01-01 RX ORDER — CLONAZEPAM 0.5 MG/1
0.5 TABLET ORAL 3 TIMES DAILY PRN
Qty: 10 TABLET | Refills: 0 | Status: SHIPPED | OUTPATIENT
Start: 2022-01-01 | End: 2022-01-01

## 2022-01-01 RX ORDER — LEVOTHYROXINE SODIUM 0.07 MG/1
75 TABLET ORAL DAILY
Status: DISCONTINUED | OUTPATIENT
Start: 2022-01-01 | End: 2022-01-01 | Stop reason: HOSPADM

## 2022-01-01 RX ORDER — SODIUM BICARBONATE 650 MG/1
650 TABLET ORAL 3 TIMES DAILY
Status: DISCONTINUED | OUTPATIENT
Start: 2022-01-01 | End: 2022-01-01

## 2022-01-01 RX ORDER — LOSARTAN POTASSIUM 50 MG/1
1 TABLET ORAL DAILY
Status: ON HOLD | COMMUNITY
Start: 2022-01-11 | End: 2022-01-01

## 2022-01-01 RX ORDER — CLONIDINE HYDROCHLORIDE 0.1 MG/1
0.2 TABLET ORAL EVERY 8 HOURS SCHEDULED
Status: DISCONTINUED | OUTPATIENT
Start: 2022-01-01 | End: 2022-01-01 | Stop reason: HOSPADM

## 2022-01-01 RX ORDER — BISACODYL 10 MG
10 SUPPOSITORY, RECTAL RECTAL DAILY
Status: DISCONTINUED | OUTPATIENT
Start: 2022-01-01 | End: 2022-01-01 | Stop reason: HOSPADM

## 2022-01-01 RX ORDER — NALBUPHINE HCL 10 MG/ML
10 AMPUL (ML) INJECTION EVERY 4 HOURS PRN
Status: DISCONTINUED | OUTPATIENT
Start: 2022-01-01 | End: 2022-01-01 | Stop reason: HOSPADM

## 2022-01-01 RX ORDER — HYDRALAZINE HYDROCHLORIDE 20 MG/ML
INJECTION INTRAMUSCULAR; INTRAVENOUS AS NEEDED
Status: DISCONTINUED | OUTPATIENT
Start: 2022-01-01 | End: 2022-01-01 | Stop reason: SURG

## 2022-01-01 RX ORDER — DEXAMETHASONE SODIUM PHOSPHATE 4 MG/ML
INJECTION, SOLUTION INTRA-ARTICULAR; INTRALESIONAL; INTRAMUSCULAR; INTRAVENOUS; SOFT TISSUE AS NEEDED
Status: DISCONTINUED | OUTPATIENT
Start: 2022-01-01 | End: 2022-01-01 | Stop reason: SURG

## 2022-01-01 RX ORDER — MEMANTINE HYDROCHLORIDE 5 MG/1
5 TABLET ORAL 2 TIMES DAILY
COMMUNITY

## 2022-01-01 RX ORDER — FUROSEMIDE 80 MG
40 TABLET ORAL SEE ADMIN INSTRUCTIONS
COMMUNITY
End: 2022-01-01 | Stop reason: HOSPADM

## 2022-01-01 RX ORDER — FAMOTIDINE 20 MG/1
20 TABLET, FILM COATED ORAL 2 TIMES DAILY PRN
COMMUNITY
Start: 2021-12-30 | End: 2022-01-01

## 2022-01-01 RX ORDER — NALOXONE HCL 0.4 MG/ML
0.4 VIAL (ML) INJECTION AS NEEDED
Status: DISCONTINUED | OUTPATIENT
Start: 2022-01-01 | End: 2022-01-01 | Stop reason: HOSPADM

## 2022-01-01 RX ORDER — DONEPEZIL HYDROCHLORIDE 5 MG/1
1 TABLET, FILM COATED ORAL NIGHTLY
COMMUNITY
Start: 2021-12-14 | End: 2022-01-01

## 2022-01-01 RX ORDER — CETIRIZINE HYDROCHLORIDE 10 MG/1
10 TABLET ORAL DAILY
COMMUNITY
End: 2022-01-01

## 2022-01-01 RX ORDER — PANTOPRAZOLE SODIUM 40 MG/1
40 TABLET, DELAYED RELEASE ORAL DAILY
Status: DISCONTINUED | OUTPATIENT
Start: 2022-01-01 | End: 2022-01-01

## 2022-01-01 RX ORDER — ATORVASTATIN CALCIUM 20 MG/1
10 TABLET, FILM COATED ORAL DAILY
Status: DISCONTINUED | OUTPATIENT
Start: 2022-01-01 | End: 2022-01-01 | Stop reason: HOSPADM

## 2022-01-01 RX ORDER — TRAZODONE HYDROCHLORIDE 50 MG/1
50 TABLET ORAL NIGHTLY
Status: DISCONTINUED | OUTPATIENT
Start: 2022-01-01 | End: 2022-01-01 | Stop reason: HOSPADM

## 2022-01-01 RX ORDER — OSELTAMIVIR PHOSPHATE 6 MG/ML
30 FOR SUSPENSION ORAL
Status: DISCONTINUED | OUTPATIENT
Start: 2022-01-01 | End: 2022-01-01

## 2022-01-01 RX ORDER — CEFAZOLIN SODIUM 2 G/100ML
2 INJECTION, SOLUTION INTRAVENOUS ONCE
Status: COMPLETED | OUTPATIENT
Start: 2022-01-01 | End: 2022-01-01

## 2022-01-01 RX ORDER — FUROSEMIDE 40 MG/1
40 TABLET ORAL 2 TIMES DAILY
Start: 2022-01-01 | End: 2022-01-01 | Stop reason: HOSPADM

## 2022-01-01 RX ORDER — AMOXICILLIN AND CLAVULANATE POTASSIUM 875; 125 MG/1; MG/1
TABLET, FILM COATED ORAL
COMMUNITY
Start: 2022-01-01

## 2022-01-01 RX ORDER — FERROUS SULFATE 325(65) MG
325 TABLET ORAL
COMMUNITY
End: 2022-01-01 | Stop reason: HOSPADM

## 2022-01-01 RX ORDER — LABETALOL HYDROCHLORIDE 5 MG/ML
20 INJECTION, SOLUTION INTRAVENOUS ONCE
Status: DISCONTINUED | OUTPATIENT
Start: 2022-01-01 | End: 2022-01-01

## 2022-01-01 RX ORDER — HYDROCODONE BITARTRATE AND ACETAMINOPHEN 5; 325 MG/1; MG/1
1 TABLET ORAL EVERY 4 HOURS PRN
Status: ON HOLD | COMMUNITY
End: 2022-01-01 | Stop reason: SDUPTHER

## 2022-01-01 RX ORDER — MENTHOL 40 MG/ML
1 GEL TOPICAL 2 TIMES DAILY
COMMUNITY
End: 2022-01-01 | Stop reason: HOSPADM

## 2022-01-01 RX ORDER — ASCORBIC ACID 500 MG
500 TABLET ORAL DAILY
Status: DISCONTINUED | OUTPATIENT
Start: 2022-01-01 | End: 2022-01-01 | Stop reason: HOSPADM

## 2022-01-01 RX ORDER — ACETAMINOPHEN 325 MG/1
650 TABLET ORAL EVERY 6 HOURS PRN
COMMUNITY
End: 2022-01-01

## 2022-01-01 RX ORDER — MEMANTINE HYDROCHLORIDE 10 MG/1
10 TABLET ORAL EVERY 12 HOURS SCHEDULED
Status: DISCONTINUED | OUTPATIENT
Start: 2022-01-01 | End: 2022-01-01 | Stop reason: HOSPADM

## 2022-01-01 RX ORDER — LOSARTAN POTASSIUM 100 MG/1
100 TABLET ORAL
Qty: 30 TABLET | Refills: 0 | Status: SHIPPED | OUTPATIENT
Start: 2022-01-01 | End: 2022-01-01

## 2022-01-01 RX ORDER — OSELTAMIVIR PHOSPHATE 30 MG/1
30 CAPSULE ORAL
Status: DISCONTINUED | OUTPATIENT
Start: 2022-01-01 | End: 2022-01-01

## 2022-01-01 RX ORDER — ONDANSETRON 2 MG/ML
4 INJECTION INTRAMUSCULAR; INTRAVENOUS ONCE
Status: COMPLETED | OUTPATIENT
Start: 2022-01-01 | End: 2022-01-01

## 2022-01-01 RX ORDER — CLONIDINE HYDROCHLORIDE 0.2 MG/1
0.2 TABLET ORAL EVERY 8 HOURS SCHEDULED
Qty: 90 TABLET | Refills: 0 | Status: SHIPPED | OUTPATIENT
Start: 2022-01-01 | End: 2023-01-01

## 2022-01-01 RX ORDER — CLONAZEPAM 0.5 MG/1
0.5 TABLET ORAL 3 TIMES DAILY PRN
Status: DISCONTINUED | OUTPATIENT
Start: 2022-01-01 | End: 2022-01-01

## 2022-01-01 RX ORDER — POLYETHYLENE GLYCOL 3350 17 G/17G
17 POWDER, FOR SOLUTION ORAL 2 TIMES DAILY
Start: 2022-01-01

## 2022-01-01 RX ORDER — LOSARTAN POTASSIUM 100 MG/1
100 TABLET ORAL
Status: DISCONTINUED | OUTPATIENT
Start: 2022-01-01 | End: 2022-01-01 | Stop reason: HOSPADM

## 2022-01-01 RX ORDER — LIDOCAINE HYDROCHLORIDE 20 MG/ML
INJECTION, SOLUTION INFILTRATION; PERINEURAL AS NEEDED
Status: DISCONTINUED | OUTPATIENT
Start: 2022-01-01 | End: 2022-01-01 | Stop reason: SURG

## 2022-01-01 RX ORDER — LEVOTHYROXINE SODIUM 0.07 MG/1
75 TABLET ORAL
Status: DISCONTINUED | OUTPATIENT
Start: 2022-01-01 | End: 2022-01-01

## 2022-01-01 RX ORDER — FENTANYL CITRATE 50 UG/ML
INJECTION, SOLUTION INTRAMUSCULAR; INTRAVENOUS AS NEEDED
Status: DISCONTINUED | OUTPATIENT
Start: 2022-01-01 | End: 2022-01-01 | Stop reason: SURG

## 2022-01-01 RX ORDER — CLONAZEPAM 0.5 MG/1
0.5 TABLET ORAL NIGHTLY PRN
Status: DISCONTINUED | OUTPATIENT
Start: 2022-01-01 | End: 2022-01-01

## 2022-01-01 RX ORDER — FUROSEMIDE 10 MG/ML
80 INJECTION INTRAMUSCULAR; INTRAVENOUS ONCE
Status: COMPLETED | OUTPATIENT
Start: 2022-01-01 | End: 2022-01-01

## 2022-01-01 RX ORDER — DONEPEZIL HYDROCHLORIDE 5 MG/1
5 TABLET, FILM COATED ORAL NIGHTLY
Status: DISCONTINUED | OUTPATIENT
Start: 2022-01-01 | End: 2022-01-01 | Stop reason: HOSPADM

## 2022-01-01 RX ORDER — ASPIRIN 81 MG/1
81 TABLET ORAL DAILY
Status: DISCONTINUED | OUTPATIENT
Start: 2022-01-01 | End: 2022-01-01 | Stop reason: HOSPADM

## 2022-01-01 RX ORDER — HYDROXYZINE HYDROCHLORIDE 25 MG/1
25 TABLET, FILM COATED ORAL 3 TIMES DAILY PRN
Status: DISCONTINUED | OUTPATIENT
Start: 2022-01-01 | End: 2022-01-01 | Stop reason: HOSPADM

## 2022-01-01 RX ORDER — SODIUM CHLORIDE 9 MG/ML
100 INJECTION, SOLUTION INTRAVENOUS CONTINUOUS
Status: DISCONTINUED | OUTPATIENT
Start: 2022-01-01 | End: 2022-01-01

## 2022-01-01 RX ORDER — PANTOPRAZOLE SODIUM 40 MG/1
40 TABLET, DELAYED RELEASE ORAL DAILY
Start: 2022-01-01

## 2022-01-01 RX ORDER — HYDRALAZINE HYDROCHLORIDE 50 MG/1
100 TABLET, FILM COATED ORAL 3 TIMES DAILY
Status: DISCONTINUED | OUTPATIENT
Start: 2022-01-01 | End: 2022-01-01 | Stop reason: HOSPADM

## 2022-01-01 RX ORDER — AMLODIPINE BESYLATE 5 MG/1
10 TABLET ORAL DAILY
Status: DISCONTINUED | OUTPATIENT
Start: 2022-01-01 | End: 2022-01-01 | Stop reason: HOSPADM

## 2022-01-01 RX ORDER — CARVEDILOL 6.25 MG/1
6.25 TABLET ORAL 2 TIMES DAILY WITH MEALS
Status: DISCONTINUED | OUTPATIENT
Start: 2022-01-01 | End: 2022-01-01

## 2022-01-01 RX ORDER — HYDROCODONE BITARTRATE AND ACETAMINOPHEN 5; 325 MG/1; MG/1
1 TABLET ORAL EVERY 4 HOURS PRN
Status: DISCONTINUED | OUTPATIENT
Start: 2022-01-01 | End: 2022-01-01

## 2022-01-01 RX ORDER — DROPERIDOL 2.5 MG/ML
2.5 INJECTION, SOLUTION INTRAMUSCULAR; INTRAVENOUS ONCE
Status: DISCONTINUED | OUTPATIENT
Start: 2022-01-01 | End: 2022-01-01

## 2022-01-01 RX ORDER — MULTIVIT WITH MINERALS/LUTEIN
500 TABLET ORAL 2 TIMES DAILY
COMMUNITY
End: 2022-01-01

## 2022-01-01 RX ADMIN — PRIMIDONE 50 MG: 50 TABLET ORAL at 00:25

## 2022-01-01 RX ADMIN — CLONAZEPAM 0.5 MG: 0.5 TABLET ORAL at 20:52

## 2022-01-01 RX ADMIN — POLYETHYLENE GLYCOL 3350 17 G: 17 POWDER, FOR SOLUTION ORAL at 20:52

## 2022-01-01 RX ADMIN — OXYCODONE HYDROCHLORIDE AND ACETAMINOPHEN 500 MG: 500 TABLET ORAL at 08:04

## 2022-01-01 RX ADMIN — LOSARTAN POTASSIUM 50 MG: 50 TABLET, FILM COATED ORAL at 09:32

## 2022-01-01 RX ADMIN — Medication 1000 UNITS: at 08:49

## 2022-01-01 RX ADMIN — CETIRIZINE HYDROCHLORIDE 5 MG: 10 TABLET ORAL at 11:12

## 2022-01-01 RX ADMIN — FAMOTIDINE 20 MG: 20 TABLET ORAL at 09:37

## 2022-01-01 RX ADMIN — MEMANTINE HYDROCHLORIDE 5 MG: 5 TABLET, FILM COATED ORAL at 09:09

## 2022-01-01 RX ADMIN — Medication 10 ML: at 20:25

## 2022-01-01 RX ADMIN — ATORVASTATIN CALCIUM 10 MG: 10 TABLET, FILM COATED ORAL at 22:29

## 2022-01-01 RX ADMIN — MONTELUKAST SODIUM 10 MG: 10 TABLET, FILM COATED ORAL at 20:36

## 2022-01-01 RX ADMIN — INSULIN LISPRO 2 UNITS: 100 INJECTION, SOLUTION INTRAVENOUS; SUBCUTANEOUS at 21:48

## 2022-01-01 RX ADMIN — PANTOPRAZOLE SODIUM 40 MG: 40 TABLET, DELAYED RELEASE ORAL at 06:46

## 2022-01-01 RX ADMIN — DEXAMETHASONE SODIUM PHOSPHATE 4 MG: 4 INJECTION, SOLUTION INTRAMUSCULAR; INTRAVENOUS at 17:19

## 2022-01-01 RX ADMIN — DONEPEZIL HYDROCHLORIDE 5 MG: 5 TABLET, FILM COATED ORAL at 21:07

## 2022-01-01 RX ADMIN — ALLOPURINOL 100 MG: 100 TABLET ORAL at 08:18

## 2022-01-01 RX ADMIN — Medication 2000 UNITS: at 09:33

## 2022-01-01 RX ADMIN — MEMANTINE HYDROCHLORIDE 10 MG: 10 TABLET, FILM COATED ORAL at 21:53

## 2022-01-01 RX ADMIN — POLYETHYLENE GLYCOL 3350 17 G: 17 POWDER, FOR SOLUTION ORAL at 09:08

## 2022-01-01 RX ADMIN — FAMOTIDINE 20 MG: 20 TABLET ORAL at 20:55

## 2022-01-01 RX ADMIN — NYSTATIN 500000 UNITS: 500000 SUSPENSION ORAL at 10:59

## 2022-01-01 RX ADMIN — POLYETHYLENE GLYCOL 3350 17 G: 17 POWDER, FOR SOLUTION ORAL at 08:51

## 2022-01-01 RX ADMIN — MEMANTINE HYDROCHLORIDE 10 MG: 10 TABLET, FILM COATED ORAL at 08:15

## 2022-01-01 RX ADMIN — Medication 1000 UNITS: at 09:09

## 2022-01-01 RX ADMIN — MONTELUKAST SODIUM 10 MG: 10 TABLET, FILM COATED ORAL at 20:20

## 2022-01-01 RX ADMIN — NYSTATIN 500000 UNITS: 500000 SUSPENSION ORAL at 17:05

## 2022-01-01 RX ADMIN — SUGAMMADEX 200 MG: 100 INJECTION, SOLUTION INTRAVENOUS at 17:44

## 2022-01-01 RX ADMIN — TAMSULOSIN HYDROCHLORIDE 0.4 MG: 0.4 CAPSULE ORAL at 20:45

## 2022-01-01 RX ADMIN — HYDRALAZINE HYDROCHLORIDE 10 MG: 20 INJECTION, SOLUTION INTRAMUSCULAR; INTRAVENOUS at 17:00

## 2022-01-01 RX ADMIN — MONTELUKAST SODIUM 10 MG: 10 TABLET, FILM COATED ORAL at 22:34

## 2022-01-01 RX ADMIN — ATORVASTATIN CALCIUM 10 MG: 10 TABLET, FILM COATED ORAL at 21:14

## 2022-01-01 RX ADMIN — HYDRALAZINE HYDROCHLORIDE 10 MG: 20 INJECTION INTRAMUSCULAR; INTRAVENOUS at 00:21

## 2022-01-01 RX ADMIN — ASPIRIN 81 MG: 81 TABLET, CHEWABLE ORAL at 09:30

## 2022-01-01 RX ADMIN — HYDROCODONE BITARTRATE AND ACETAMINOPHEN 1 TABLET: 5; 325 TABLET ORAL at 21:14

## 2022-01-01 RX ADMIN — CEFAZOLIN SODIUM 2 G: 2 INJECTION, SOLUTION INTRAVENOUS at 00:59

## 2022-01-01 RX ADMIN — CLONIDINE HYDROCHLORIDE 0.2 MG: 0.1 TABLET ORAL at 21:05

## 2022-01-01 RX ADMIN — FUROSEMIDE 40 MG: 40 TABLET ORAL at 08:04

## 2022-01-01 RX ADMIN — Medication 10 ML: at 16:55

## 2022-01-01 RX ADMIN — HYDROCODONE BITARTRATE AND ACETAMINOPHEN 1 TABLET: 5; 325 TABLET ORAL at 16:53

## 2022-01-01 RX ADMIN — ARFORMOTEROL TARTRATE 15 MCG: 15 SOLUTION RESPIRATORY (INHALATION) at 07:36

## 2022-01-01 RX ADMIN — HYDROCODONE BITARTRATE AND ACETAMINOPHEN 1 TABLET: 5; 325 TABLET ORAL at 03:13

## 2022-01-01 RX ADMIN — POLYETHYLENE GLYCOL 3350 17 G: 17 POWDER, FOR SOLUTION ORAL at 20:21

## 2022-01-01 RX ADMIN — Medication 1000 UNITS: at 08:15

## 2022-01-01 RX ADMIN — MEMANTINE HYDROCHLORIDE 5 MG: 5 TABLET, FILM COATED ORAL at 20:10

## 2022-01-01 RX ADMIN — MEMANTINE HYDROCHLORIDE 5 MG: 5 TABLET, FILM COATED ORAL at 20:55

## 2022-01-01 RX ADMIN — HYDRALAZINE HYDROCHLORIDE 10 MG: 20 INJECTION INTRAMUSCULAR; INTRAVENOUS at 23:35

## 2022-01-01 RX ADMIN — HYDROCODONE BITARTRATE AND ACETAMINOPHEN 1 TABLET: 5; 325 TABLET ORAL at 13:54

## 2022-01-01 RX ADMIN — HYDRALAZINE HYDROCHLORIDE 100 MG: 50 TABLET, FILM COATED ORAL at 16:49

## 2022-01-01 RX ADMIN — SERTRALINE 100 MG: 100 TABLET, FILM COATED ORAL at 21:07

## 2022-01-01 RX ADMIN — HYDRALAZINE HYDROCHLORIDE 50 MG: 50 TABLET, FILM COATED ORAL at 13:04

## 2022-01-01 RX ADMIN — DOCUSATE SODIUM 200 MG: 100 CAPSULE, LIQUID FILLED ORAL at 09:37

## 2022-01-01 RX ADMIN — LABETALOL HYDROCHLORIDE 10 MG: 5 INJECTION, SOLUTION INTRAVENOUS at 05:04

## 2022-01-01 RX ADMIN — NYSTATIN 500000 UNITS: 500000 SUSPENSION ORAL at 16:48

## 2022-01-01 RX ADMIN — Medication 3 MG: at 22:36

## 2022-01-01 RX ADMIN — TAMSULOSIN HYDROCHLORIDE 0.4 MG: 0.4 CAPSULE ORAL at 20:10

## 2022-01-01 RX ADMIN — HYDRALAZINE HYDROCHLORIDE 25 MG: 25 TABLET, FILM COATED ORAL at 21:07

## 2022-01-01 RX ADMIN — ISOSORBIDE MONONITRATE 60 MG: 60 TABLET ORAL at 21:10

## 2022-01-01 RX ADMIN — MEMANTINE HYDROCHLORIDE 5 MG: 5 TABLET, FILM COATED ORAL at 08:26

## 2022-01-01 RX ADMIN — ONDANSETRON 4 MG: 2 INJECTION INTRAMUSCULAR; INTRAVENOUS at 22:47

## 2022-01-01 RX ADMIN — MEMANTINE HYDROCHLORIDE 10 MG: 10 TABLET, FILM COATED ORAL at 11:12

## 2022-01-01 RX ADMIN — CARVEDILOL 25 MG: 25 TABLET, FILM COATED ORAL at 11:12

## 2022-01-01 RX ADMIN — INSULIN LISPRO 5 UNITS: 100 INJECTION, SOLUTION INTRAVENOUS; SUBCUTANEOUS at 23:28

## 2022-01-01 RX ADMIN — LOSARTAN POTASSIUM 100 MG: 100 TABLET, FILM COATED ORAL at 08:14

## 2022-01-01 RX ADMIN — NYSTATIN 500000 UNITS: 500000 SUSPENSION ORAL at 17:06

## 2022-01-01 RX ADMIN — PANTOPRAZOLE SODIUM 8 MG/HR: 40 INJECTION, POWDER, LYOPHILIZED, FOR SOLUTION INTRAVENOUS at 04:07

## 2022-01-01 RX ADMIN — TAMSULOSIN HYDROCHLORIDE 0.4 MG: 0.4 CAPSULE ORAL at 21:52

## 2022-01-01 RX ADMIN — MEMANTINE HYDROCHLORIDE 5 MG: 5 TABLET, FILM COATED ORAL at 09:07

## 2022-01-01 RX ADMIN — PRIMIDONE 50 MG: 50 TABLET ORAL at 20:56

## 2022-01-01 RX ADMIN — PANTOPRAZOLE SODIUM 40 MG: 40 TABLET, DELAYED RELEASE ORAL at 08:13

## 2022-01-01 RX ADMIN — DOCUSATE SODIUM 200 MG: 100 CAPSULE, LIQUID FILLED ORAL at 11:14

## 2022-01-01 RX ADMIN — HYDRALAZINE HYDROCHLORIDE 10 MG: 20 INJECTION INTRAMUSCULAR; INTRAVENOUS at 16:55

## 2022-01-01 RX ADMIN — HYDRALAZINE HYDROCHLORIDE 50 MG: 50 TABLET, FILM COATED ORAL at 13:25

## 2022-01-01 RX ADMIN — CLONAZEPAM 0.5 MG: 0.5 TABLET ORAL at 21:14

## 2022-01-01 RX ADMIN — DOCUSATE SODIUM 50MG AND SENNOSIDES 8.6MG 2 TABLET: 8.6; 5 TABLET, FILM COATED ORAL at 21:53

## 2022-01-01 RX ADMIN — TAMSULOSIN HYDROCHLORIDE 0.4 MG: 0.4 CAPSULE ORAL at 21:10

## 2022-01-01 RX ADMIN — CARVEDILOL 25 MG: 25 TABLET, FILM COATED ORAL at 07:53

## 2022-01-01 RX ADMIN — CARVEDILOL 25 MG: 25 TABLET, FILM COATED ORAL at 17:05

## 2022-01-01 RX ADMIN — LABETALOL HYDROCHLORIDE 10 MG: 5 INJECTION, SOLUTION INTRAVENOUS at 15:42

## 2022-01-01 RX ADMIN — ALLOPURINOL 100 MG: 100 TABLET ORAL at 08:14

## 2022-01-01 RX ADMIN — ATORVASTATIN CALCIUM 10 MG: 10 TABLET, FILM COATED ORAL at 20:36

## 2022-01-01 RX ADMIN — LOSARTAN POTASSIUM 100 MG: 100 TABLET, FILM COATED ORAL at 11:13

## 2022-01-01 RX ADMIN — MEMANTINE HYDROCHLORIDE 10 MG: 10 TABLET, FILM COATED ORAL at 20:52

## 2022-01-01 RX ADMIN — SODIUM BICARBONATE 1300 MG: 650 TABLET ORAL at 21:08

## 2022-01-01 RX ADMIN — ONDANSETRON 4 MG: 2 INJECTION INTRAMUSCULAR; INTRAVENOUS at 02:15

## 2022-01-01 RX ADMIN — ARFORMOTEROL TARTRATE 15 MCG: 15 SOLUTION RESPIRATORY (INHALATION) at 20:40

## 2022-01-01 RX ADMIN — ROCURONIUM BROMIDE 10 MG: 10 INJECTION, SOLUTION INTRAVENOUS at 17:17

## 2022-01-01 RX ADMIN — PRIMIDONE 50 MG: 50 TABLET ORAL at 20:20

## 2022-01-01 RX ADMIN — MEMANTINE HYDROCHLORIDE 5 MG: 5 TABLET, FILM COATED ORAL at 09:38

## 2022-01-01 RX ADMIN — DOCUSATE SODIUM 200 MG: 100 CAPSULE, LIQUID FILLED ORAL at 21:53

## 2022-01-01 RX ADMIN — POLYETHYLENE GLYCOL 3350 17 G: 17 POWDER, FOR SOLUTION ORAL at 22:30

## 2022-01-01 RX ADMIN — ASPIRIN 81 MG: 81 TABLET, FILM COATED ORAL at 08:26

## 2022-01-01 RX ADMIN — SERTRALINE 100 MG: 100 TABLET, FILM COATED ORAL at 20:36

## 2022-01-01 RX ADMIN — CEFTRIAXONE SODIUM 1 G: 1 INJECTION, POWDER, FOR SOLUTION INTRAMUSCULAR; INTRAVENOUS at 05:38

## 2022-01-01 RX ADMIN — DOCUSATE SODIUM 200 MG: 100 CAPSULE, LIQUID FILLED ORAL at 09:09

## 2022-01-01 RX ADMIN — PRIMIDONE 50 MG: 50 TABLET ORAL at 09:38

## 2022-01-01 RX ADMIN — LEVOTHYROXINE SODIUM 100 MCG: 0.1 TABLET ORAL at 06:21

## 2022-01-01 RX ADMIN — SODIUM BICARBONATE 650 MG: 650 TABLET ORAL at 20:10

## 2022-01-01 RX ADMIN — LEVOTHYROXINE SODIUM 100 MCG: 0.1 TABLET ORAL at 08:13

## 2022-01-01 RX ADMIN — Medication 2000 UNITS: at 10:17

## 2022-01-01 RX ADMIN — CLONAZEPAM 0.5 MG: 0.5 TABLET ORAL at 10:54

## 2022-01-01 RX ADMIN — ARFORMOTEROL TARTRATE 15 MCG: 15 SOLUTION RESPIRATORY (INHALATION) at 08:06

## 2022-01-01 RX ADMIN — PRIMIDONE 50 MG: 50 TABLET ORAL at 17:01

## 2022-01-01 RX ADMIN — TAMSULOSIN HYDROCHLORIDE 0.4 MG: 0.4 CAPSULE ORAL at 21:14

## 2022-01-01 RX ADMIN — ENOXAPARIN SODIUM 30 MG: 30 INJECTION SUBCUTANEOUS at 21:13

## 2022-01-01 RX ADMIN — DOCUSATE SODIUM 50MG AND SENNOSIDES 8.6MG 2 TABLET: 8.6; 5 TABLET, FILM COATED ORAL at 21:15

## 2022-01-01 RX ADMIN — POTASSIUM CHLORIDE 10 MEQ: 10 TABLET, EXTENDED RELEASE ORAL at 09:38

## 2022-01-01 RX ADMIN — FAMOTIDINE 20 MG: 20 TABLET ORAL at 09:08

## 2022-01-01 RX ADMIN — HYDRALAZINE HYDROCHLORIDE 100 MG: 50 TABLET, FILM COATED ORAL at 08:09

## 2022-01-01 RX ADMIN — ACETAMINOPHEN 650 MG: 325 TABLET, FILM COATED ORAL at 21:42

## 2022-01-01 RX ADMIN — HYDRALAZINE HYDROCHLORIDE 10 MG: 20 INJECTION INTRAMUSCULAR; INTRAVENOUS at 05:27

## 2022-01-01 RX ADMIN — FAMOTIDINE 20 MG: 20 TABLET ORAL at 21:13

## 2022-01-01 RX ADMIN — FENTANYL CITRATE 25 MCG: 50 INJECTION INTRAMUSCULAR; INTRAVENOUS at 18:52

## 2022-01-01 RX ADMIN — LABETALOL HYDROCHLORIDE 10 MG: 5 INJECTION, SOLUTION INTRAVENOUS at 05:08

## 2022-01-01 RX ADMIN — ALLOPURINOL 100 MG: 100 TABLET ORAL at 08:15

## 2022-01-01 RX ADMIN — ALLOPURINOL 100 MG: 100 TABLET ORAL at 13:41

## 2022-01-01 RX ADMIN — ENOXAPARIN SODIUM 30 MG: 30 INJECTION SUBCUTANEOUS at 20:55

## 2022-01-01 RX ADMIN — CLONIDINE HYDROCHLORIDE 0.1 MG: 0.1 TABLET ORAL at 09:20

## 2022-01-01 RX ADMIN — CLONAZEPAM 0.5 MG: 0.5 TABLET ORAL at 07:51

## 2022-01-01 RX ADMIN — PRIMIDONE 50 MG: 50 TABLET ORAL at 20:36

## 2022-01-01 RX ADMIN — LEVOTHYROXINE SODIUM 100 MCG: 0.1 TABLET ORAL at 06:19

## 2022-01-01 RX ADMIN — PRIMIDONE 50 MG: 50 TABLET ORAL at 20:55

## 2022-01-01 RX ADMIN — SODIUM CHLORIDE 75 ML/HR: 9 INJECTION, SOLUTION INTRAVENOUS at 13:19

## 2022-01-01 RX ADMIN — PRIMIDONE 50 MG: 50 TABLET ORAL at 21:07

## 2022-01-01 RX ADMIN — TAMSULOSIN HYDROCHLORIDE 0.4 MG: 0.4 CAPSULE ORAL at 20:22

## 2022-01-01 RX ADMIN — LOSARTAN POTASSIUM 100 MG: 100 TABLET, FILM COATED ORAL at 13:19

## 2022-01-01 RX ADMIN — PRIMIDONE 50 MG: 50 TABLET ORAL at 08:26

## 2022-01-01 RX ADMIN — MONTELUKAST SODIUM 10 MG: 10 TABLET, FILM COATED ORAL at 21:14

## 2022-01-01 RX ADMIN — LOSARTAN POTASSIUM 100 MG: 100 TABLET, FILM COATED ORAL at 08:18

## 2022-01-01 RX ADMIN — CLOPIDOGREL 75 MG: 75 TABLET, FILM COATED ORAL at 08:26

## 2022-01-01 RX ADMIN — PANTOPRAZOLE SODIUM 40 MG: 40 INJECTION, POWDER, FOR SOLUTION INTRAVENOUS at 00:03

## 2022-01-01 RX ADMIN — MEMANTINE HYDROCHLORIDE 5 MG: 5 TABLET, FILM COATED ORAL at 09:33

## 2022-01-01 RX ADMIN — DOCUSATE SODIUM 50MG AND SENNOSIDES 8.6MG 2 TABLET: 8.6; 5 TABLET, FILM COATED ORAL at 20:22

## 2022-01-01 RX ADMIN — Medication 2000 UNITS: at 09:07

## 2022-01-01 RX ADMIN — TAMSULOSIN HYDROCHLORIDE 0.4 MG: 0.4 CAPSULE ORAL at 22:34

## 2022-01-01 RX ADMIN — ALLOPURINOL 100 MG: 100 TABLET ORAL at 08:49

## 2022-01-01 RX ADMIN — LABETALOL HYDROCHLORIDE 10 MG: 5 INJECTION, SOLUTION INTRAVENOUS at 02:37

## 2022-01-01 RX ADMIN — CEFAZOLIN SODIUM 2 G: 2 INJECTION, SOLUTION INTRAVENOUS at 08:12

## 2022-01-01 RX ADMIN — HYDRALAZINE HYDROCHLORIDE 100 MG: 50 TABLET, FILM COATED ORAL at 09:04

## 2022-01-01 RX ADMIN — OSELTAMIVIR PHOSPHATE 30 MG: 30 CAPSULE ORAL at 17:05

## 2022-01-01 RX ADMIN — LOSARTAN POTASSIUM 100 MG: 100 TABLET, FILM COATED ORAL at 08:15

## 2022-01-01 RX ADMIN — POLYETHYLENE GLYCOL 3350 17 G: 17 POWDER, FOR SOLUTION ORAL at 09:38

## 2022-01-01 RX ADMIN — FAMOTIDINE 20 MG: 20 TABLET, FILM COATED ORAL at 16:49

## 2022-01-01 RX ADMIN — MEMANTINE HYDROCHLORIDE 10 MG: 10 TABLET, FILM COATED ORAL at 21:10

## 2022-01-01 RX ADMIN — ALLOPURINOL 100 MG: 100 TABLET ORAL at 09:38

## 2022-01-01 RX ADMIN — PRIMIDONE 50 MG: 50 TABLET ORAL at 17:18

## 2022-01-01 RX ADMIN — ALLOPURINOL 100 MG: 100 TABLET ORAL at 09:33

## 2022-01-01 RX ADMIN — CETIRIZINE HYDROCHLORIDE 5 MG: 10 TABLET ORAL at 14:37

## 2022-01-01 RX ADMIN — ASPIRIN 81 MG: 81 TABLET, FILM COATED ORAL at 09:07

## 2022-01-01 RX ADMIN — SERTRALINE 100 MG: 100 TABLET, FILM COATED ORAL at 23:13

## 2022-01-01 RX ADMIN — HYDROXYZINE HYDROCHLORIDE 25 MG: 25 TABLET ORAL at 21:42

## 2022-01-01 RX ADMIN — EPHEDRINE SULFATE 10 MG: 50 INJECTION INTRAVENOUS at 17:09

## 2022-01-01 RX ADMIN — FAMOTIDINE 20 MG: 20 TABLET, FILM COATED ORAL at 09:20

## 2022-01-01 RX ADMIN — ALLOPURINOL 100 MG: 100 TABLET ORAL at 09:07

## 2022-01-01 RX ADMIN — DOCUSATE SODIUM 50MG AND SENNOSIDES 8.6MG 2 TABLET: 8.6; 5 TABLET, FILM COATED ORAL at 22:29

## 2022-01-01 RX ADMIN — CARVEDILOL 25 MG: 25 TABLET, FILM COATED ORAL at 18:21

## 2022-01-01 RX ADMIN — TAMSULOSIN HYDROCHLORIDE 0.4 MG: 0.4 CAPSULE ORAL at 23:41

## 2022-01-01 RX ADMIN — CLONIDINE HYDROCHLORIDE 0.1 MG: 0.1 TABLET ORAL at 23:12

## 2022-01-01 RX ADMIN — PRIMIDONE 50 MG: 50 TABLET ORAL at 08:18

## 2022-01-01 RX ADMIN — DOCUSATE SODIUM 200 MG: 100 CAPSULE, LIQUID FILLED ORAL at 20:22

## 2022-01-01 RX ADMIN — SERTRALINE 100 MG: 100 TABLET, FILM COATED ORAL at 20:45

## 2022-01-01 RX ADMIN — CLONIDINE HYDROCHLORIDE 0.1 MG: 0.1 TABLET ORAL at 21:47

## 2022-01-01 RX ADMIN — LEVOTHYROXINE SODIUM 100 MCG: 0.1 TABLET ORAL at 08:02

## 2022-01-01 RX ADMIN — OXYCODONE HYDROCHLORIDE AND ACETAMINOPHEN 500 MG: 500 TABLET ORAL at 14:37

## 2022-01-01 RX ADMIN — HYDRALAZINE HYDROCHLORIDE 10 MG: 20 INJECTION INTRAMUSCULAR; INTRAVENOUS at 23:30

## 2022-01-01 RX ADMIN — HYDRALAZINE HYDROCHLORIDE 10 MG: 20 INJECTION INTRAMUSCULAR; INTRAVENOUS at 05:32

## 2022-01-01 RX ADMIN — LEVOTHYROXINE SODIUM 100 MCG: 0.1 TABLET ORAL at 06:33

## 2022-01-01 RX ADMIN — MEMANTINE HYDROCHLORIDE 5 MG: 5 TABLET, FILM COATED ORAL at 09:30

## 2022-01-01 RX ADMIN — POLYETHYLENE GLYCOL 3350 17 G: 17 POWDER, FOR SOLUTION ORAL at 08:03

## 2022-01-01 RX ADMIN — DOCUSATE SODIUM 200 MG: 100 CAPSULE, LIQUID FILLED ORAL at 08:04

## 2022-01-01 RX ADMIN — PRIMIDONE 50 MG: 50 TABLET ORAL at 08:51

## 2022-01-01 RX ADMIN — CLOPIDOGREL 75 MG: 75 TABLET, FILM COATED ORAL at 09:07

## 2022-01-01 RX ADMIN — ATORVASTATIN CALCIUM 10 MG: 20 TABLET, FILM COATED ORAL at 20:10

## 2022-01-01 RX ADMIN — MEMANTINE HYDROCHLORIDE 5 MG: 5 TABLET, FILM COATED ORAL at 20:56

## 2022-01-01 RX ADMIN — CLONAZEPAM 0.5 MG: 0.5 TABLET ORAL at 23:23

## 2022-01-01 RX ADMIN — PANTOPRAZOLE SODIUM 8 MG/HR: 40 INJECTION, POWDER, LYOPHILIZED, FOR SOLUTION INTRAVENOUS at 19:03

## 2022-01-01 RX ADMIN — LOSARTAN POTASSIUM 100 MG: 100 TABLET, FILM COATED ORAL at 09:38

## 2022-01-01 RX ADMIN — MEMANTINE HYDROCHLORIDE 10 MG: 10 TABLET, FILM COATED ORAL at 20:22

## 2022-01-01 RX ADMIN — ALLOPURINOL 100 MG: 100 TABLET ORAL at 10:17

## 2022-01-01 RX ADMIN — PROPOFOL 80 MG: 10 INJECTION, EMULSION INTRAVENOUS at 16:46

## 2022-01-01 RX ADMIN — ALLOPURINOL 100 MG: 100 TABLET ORAL at 09:09

## 2022-01-01 RX ADMIN — PANTOPRAZOLE SODIUM 40 MG: 40 TABLET, DELAYED RELEASE ORAL at 08:15

## 2022-01-01 RX ADMIN — LEVOTHYROXINE SODIUM 100 MCG: 0.1 TABLET ORAL at 09:19

## 2022-01-01 RX ADMIN — CLOPIDOGREL 75 MG: 75 TABLET, FILM COATED ORAL at 11:50

## 2022-01-01 RX ADMIN — AMLODIPINE BESYLATE 10 MG: 10 TABLET ORAL at 09:30

## 2022-01-01 RX ADMIN — MEMANTINE HYDROCHLORIDE 10 MG: 10 TABLET, FILM COATED ORAL at 08:51

## 2022-01-01 RX ADMIN — PRIMIDONE 50 MG: 50 TABLET ORAL at 16:28

## 2022-01-01 RX ADMIN — PRIMIDONE 50 MG: 50 TABLET ORAL at 09:20

## 2022-01-01 RX ADMIN — FUROSEMIDE 40 MG: 40 TABLET ORAL at 09:38

## 2022-01-01 RX ADMIN — LOSARTAN POTASSIUM 50 MG: 50 TABLET, FILM COATED ORAL at 10:11

## 2022-01-01 RX ADMIN — AMLODIPINE BESYLATE 10 MG: 10 TABLET ORAL at 08:05

## 2022-01-01 RX ADMIN — Medication 1000 UNITS: at 14:37

## 2022-01-01 RX ADMIN — DOCUSATE SODIUM 50MG AND SENNOSIDES 8.6MG 2 TABLET: 8.6; 5 TABLET, FILM COATED ORAL at 20:55

## 2022-01-01 RX ADMIN — AMLODIPINE BESYLATE 10 MG: 10 TABLET ORAL at 08:04

## 2022-01-01 RX ADMIN — DOCUSATE SODIUM 200 MG: 100 CAPSULE, LIQUID FILLED ORAL at 20:38

## 2022-01-01 RX ADMIN — TAMSULOSIN HYDROCHLORIDE 0.4 MG: 0.4 CAPSULE ORAL at 20:55

## 2022-01-01 RX ADMIN — TAMSULOSIN HYDROCHLORIDE 0.4 MG: 0.4 CAPSULE ORAL at 21:07

## 2022-01-01 RX ADMIN — ARFORMOTEROL TARTRATE 15 MCG: 15 SOLUTION RESPIRATORY (INHALATION) at 20:33

## 2022-01-01 RX ADMIN — HYDRALAZINE HYDROCHLORIDE 10 MG: 20 INJECTION INTRAMUSCULAR; INTRAVENOUS at 03:08

## 2022-01-01 RX ADMIN — AMLODIPINE BESYLATE 10 MG: 10 TABLET ORAL at 05:30

## 2022-01-01 RX ADMIN — LABETALOL HYDROCHLORIDE 20 MG: 5 INJECTION, SOLUTION INTRAVENOUS at 19:48

## 2022-01-01 RX ADMIN — LOSARTAN POTASSIUM 50 MG: 50 TABLET, FILM COATED ORAL at 08:20

## 2022-01-01 RX ADMIN — ATORVASTATIN CALCIUM 10 MG: 20 TABLET, FILM COATED ORAL at 20:51

## 2022-01-01 RX ADMIN — FAMOTIDINE 20 MG: 20 TABLET ORAL at 20:36

## 2022-01-01 RX ADMIN — MONTELUKAST SODIUM 10 MG: 10 TABLET, FILM COATED ORAL at 20:38

## 2022-01-01 RX ADMIN — AMLODIPINE BESYLATE 10 MG: 10 TABLET ORAL at 09:38

## 2022-01-01 RX ADMIN — GUAIFENESIN 600 MG: 600 TABLET, EXTENDED RELEASE ORAL at 21:08

## 2022-01-01 RX ADMIN — HYDROCODONE BITARTRATE AND ACETAMINOPHEN 1 TABLET: 5; 325 TABLET ORAL at 04:19

## 2022-01-01 RX ADMIN — CARVEDILOL 25 MG: 25 TABLET, FILM COATED ORAL at 17:03

## 2022-01-01 RX ADMIN — POLYETHYLENE GLYCOL 3350 17 G: 17 POWDER, FOR SOLUTION ORAL at 20:11

## 2022-01-01 RX ADMIN — CLONAZEPAM 0.5 MG: 0.5 TABLET ORAL at 12:53

## 2022-01-01 RX ADMIN — Medication 2000 UNITS: at 08:51

## 2022-01-01 RX ADMIN — HYDRALAZINE HYDROCHLORIDE 10 MG: 20 INJECTION INTRAMUSCULAR; INTRAVENOUS at 12:29

## 2022-01-01 RX ADMIN — PRIMIDONE 50 MG: 50 TABLET ORAL at 08:04

## 2022-01-01 RX ADMIN — AMLODIPINE BESYLATE 10 MG: 10 TABLET ORAL at 09:20

## 2022-01-01 RX ADMIN — ONDANSETRON 4 MG: 2 INJECTION INTRAMUSCULAR; INTRAVENOUS at 17:20

## 2022-01-01 RX ADMIN — HYDRALAZINE HYDROCHLORIDE 25 MG: 25 TABLET, FILM COATED ORAL at 13:44

## 2022-01-01 RX ADMIN — HYDRALAZINE HYDROCHLORIDE 100 MG: 50 TABLET, FILM COATED ORAL at 11:12

## 2022-01-01 RX ADMIN — Medication 100 MCG: at 17:35

## 2022-01-01 RX ADMIN — PRIMIDONE 50 MG: 50 TABLET ORAL at 20:11

## 2022-01-01 RX ADMIN — HYDROCODONE BITARTRATE AND ACETAMINOPHEN 1 TABLET: 5; 325 TABLET ORAL at 20:09

## 2022-01-01 RX ADMIN — LOSARTAN POTASSIUM 50 MG: 50 TABLET, FILM COATED ORAL at 10:17

## 2022-01-01 RX ADMIN — NYSTATIN 500000 UNITS: 500000 SUSPENSION ORAL at 20:09

## 2022-01-01 RX ADMIN — SODIUM BICARBONATE 1300 MG: 650 TABLET ORAL at 20:51

## 2022-01-01 RX ADMIN — ONDANSETRON 4 MG: 2 INJECTION INTRAMUSCULAR; INTRAVENOUS at 09:44

## 2022-01-01 RX ADMIN — CLONAZEPAM 0.5 MG: 0.5 TABLET ORAL at 13:16

## 2022-01-01 RX ADMIN — HYDROXYZINE HYDROCHLORIDE 25 MG: 25 TABLET ORAL at 20:45

## 2022-01-01 RX ADMIN — CLONAZEPAM 0.5 MG: 0.5 TABLET ORAL at 01:23

## 2022-01-01 RX ADMIN — PANTOPRAZOLE SODIUM 8 MG/HR: 40 INJECTION, POWDER, LYOPHILIZED, FOR SOLUTION INTRAVENOUS at 02:58

## 2022-01-01 RX ADMIN — HYDROCODONE BITARTRATE AND ACETAMINOPHEN 1 TABLET: 5; 325 TABLET ORAL at 09:50

## 2022-01-01 RX ADMIN — HYDRALAZINE HYDROCHLORIDE 100 MG: 50 TABLET, FILM COATED ORAL at 17:04

## 2022-01-01 RX ADMIN — MORPHINE SULFATE 2 MG: 2 INJECTION, SOLUTION INTRAMUSCULAR; INTRAVENOUS at 22:49

## 2022-01-01 RX ADMIN — PRIMIDONE 50 MG: 50 TABLET ORAL at 11:12

## 2022-01-01 RX ADMIN — AMLODIPINE BESYLATE 10 MG: 10 TABLET ORAL at 09:33

## 2022-01-01 RX ADMIN — PRIMIDONE 50 MG: 50 TABLET ORAL at 20:37

## 2022-01-01 RX ADMIN — MEMANTINE HYDROCHLORIDE 5 MG: 5 TABLET, FILM COATED ORAL at 10:17

## 2022-01-01 RX ADMIN — HYDRALAZINE HYDROCHLORIDE 100 MG: 50 TABLET, FILM COATED ORAL at 05:30

## 2022-01-01 RX ADMIN — SERTRALINE 100 MG: 100 TABLET, FILM COATED ORAL at 20:39

## 2022-01-01 RX ADMIN — HYDROCODONE BITARTRATE AND ACETAMINOPHEN 1 TABLET: 5; 325 TABLET ORAL at 03:57

## 2022-01-01 RX ADMIN — POTASSIUM CHLORIDE 10 MEQ: 10 TABLET, EXTENDED RELEASE ORAL at 08:18

## 2022-01-01 RX ADMIN — POLYETHYLENE GLYCOL 3350 17 G: 17 POWDER, FOR SOLUTION ORAL at 20:55

## 2022-01-01 RX ADMIN — HYDRALAZINE HYDROCHLORIDE 100 MG: 50 TABLET, FILM COATED ORAL at 16:48

## 2022-01-01 RX ADMIN — FUROSEMIDE 80 MG: 10 INJECTION, SOLUTION INTRAMUSCULAR; INTRAVENOUS at 17:28

## 2022-01-01 RX ADMIN — SERTRALINE 100 MG: 100 TABLET, FILM COATED ORAL at 22:29

## 2022-01-01 RX ADMIN — SODIUM BICARBONATE 1300 MG: 650 TABLET ORAL at 08:49

## 2022-01-01 RX ADMIN — PRIMIDONE 50 MG: 50 TABLET ORAL at 20:09

## 2022-01-01 RX ADMIN — AMLODIPINE BESYLATE 10 MG: 10 TABLET ORAL at 10:17

## 2022-01-01 RX ADMIN — AMLODIPINE BESYLATE 10 MG: 10 TABLET ORAL at 08:18

## 2022-01-01 RX ADMIN — NYSTATIN 500000 UNITS: 500000 SUSPENSION ORAL at 11:13

## 2022-01-01 RX ADMIN — FENTANYL CITRATE 25 MCG: 50 INJECTION INTRAMUSCULAR; INTRAVENOUS at 04:20

## 2022-01-01 RX ADMIN — MEMANTINE HYDROCHLORIDE 5 MG: 5 TABLET, FILM COATED ORAL at 20:38

## 2022-01-01 RX ADMIN — ASPIRIN 81 MG: 81 TABLET, FILM COATED ORAL at 09:32

## 2022-01-01 RX ADMIN — HYDRALAZINE HYDROCHLORIDE 10 MG: 20 INJECTION INTRAMUSCULAR; INTRAVENOUS at 04:12

## 2022-01-01 RX ADMIN — HYDRALAZINE HYDROCHLORIDE 100 MG: 50 TABLET, FILM COATED ORAL at 21:09

## 2022-01-01 RX ADMIN — LEVOTHYROXINE SODIUM 100 MCG: 0.1 TABLET ORAL at 05:39

## 2022-01-01 RX ADMIN — NYSTATIN 500000 UNITS: 500000 SUSPENSION ORAL at 20:52

## 2022-01-01 RX ADMIN — PRIMIDONE 50 MG: 50 TABLET ORAL at 22:29

## 2022-01-01 RX ADMIN — HYDRALAZINE HYDROCHLORIDE 10 MG: 20 INJECTION INTRAMUSCULAR; INTRAVENOUS at 11:36

## 2022-01-01 RX ADMIN — HYDRALAZINE HYDROCHLORIDE 100 MG: 50 TABLET, FILM COATED ORAL at 21:53

## 2022-01-01 RX ADMIN — PRIMIDONE 50 MG: 50 TABLET ORAL at 16:18

## 2022-01-01 RX ADMIN — PRIMIDONE 50 MG: 50 TABLET ORAL at 16:53

## 2022-01-01 RX ADMIN — ACETAMINOPHEN 650 MG: 325 TABLET, FILM COATED ORAL at 23:11

## 2022-01-01 RX ADMIN — LABETALOL HYDROCHLORIDE 10 MG: 5 INJECTION, SOLUTION INTRAVENOUS at 21:36

## 2022-01-01 RX ADMIN — MONTELUKAST SODIUM 10 MG: 10 TABLET, FILM COATED ORAL at 23:13

## 2022-01-01 RX ADMIN — NYSTATIN 500000 UNITS: 500000 SUSPENSION ORAL at 09:19

## 2022-01-01 RX ADMIN — LEVOTHYROXINE SODIUM 100 MCG: 0.1 TABLET ORAL at 08:15

## 2022-01-01 RX ADMIN — DOCUSATE SODIUM 50MG AND SENNOSIDES 8.6MG 2 TABLET: 8.6; 5 TABLET, FILM COATED ORAL at 23:12

## 2022-01-01 RX ADMIN — NYSTATIN 500000 UNITS: 500000 SUSPENSION ORAL at 20:48

## 2022-01-01 RX ADMIN — ARFORMOTEROL TARTRATE 15 MCG: 15 SOLUTION RESPIRATORY (INHALATION) at 07:52

## 2022-01-01 RX ADMIN — ISOSORBIDE MONONITRATE 60 MG: 60 TABLET ORAL at 20:45

## 2022-01-01 RX ADMIN — PRIMIDONE 50 MG: 50 TABLET ORAL at 15:11

## 2022-01-01 RX ADMIN — CLOPIDOGREL 75 MG: 75 TABLET, FILM COATED ORAL at 09:33

## 2022-01-01 RX ADMIN — ALLOPURINOL 100 MG: 100 TABLET ORAL at 13:19

## 2022-01-01 RX ADMIN — FAMOTIDINE 20 MG: 20 TABLET ORAL at 20:38

## 2022-01-01 RX ADMIN — HYDROCODONE BITARTRATE AND ACETAMINOPHEN 1 TABLET: 5; 325 TABLET ORAL at 12:21

## 2022-01-01 RX ADMIN — HYDROCODONE BITARTRATE AND ACETAMINOPHEN 1 TABLET: 5; 325 TABLET ORAL at 07:51

## 2022-01-01 RX ADMIN — ATORVASTATIN CALCIUM 10 MG: 10 TABLET, FILM COATED ORAL at 20:55

## 2022-01-01 RX ADMIN — INSULIN LISPRO 3 UNITS: 100 INJECTION, SOLUTION INTRAVENOUS; SUBCUTANEOUS at 22:24

## 2022-01-01 RX ADMIN — Medication 2000 UNITS: at 09:38

## 2022-01-01 RX ADMIN — LOSARTAN POTASSIUM 50 MG: 50 TABLET, FILM COATED ORAL at 08:04

## 2022-01-01 RX ADMIN — POLYETHYLENE GLYCOL 3350 17 G: 17 POWDER, FOR SOLUTION ORAL at 13:03

## 2022-01-01 RX ADMIN — CETIRIZINE HYDROCHLORIDE 5 MG: 10 TABLET ORAL at 08:04

## 2022-01-01 RX ADMIN — Medication 1000 UNITS: at 08:14

## 2022-01-01 RX ADMIN — HYDROCODONE BITARTRATE AND ACETAMINOPHEN 1 TABLET: 5; 325 TABLET ORAL at 18:37

## 2022-01-01 RX ADMIN — DOCUSATE SODIUM 200 MG: 100 CAPSULE, LIQUID FILLED ORAL at 08:49

## 2022-01-01 RX ADMIN — LOSARTAN POTASSIUM 100 MG: 100 TABLET, FILM COATED ORAL at 09:07

## 2022-01-01 RX ADMIN — MEMANTINE HYDROCHLORIDE 5 MG: 5 TABLET, FILM COATED ORAL at 22:36

## 2022-01-01 RX ADMIN — PANTOPRAZOLE SODIUM 8 MG/HR: 40 INJECTION, POWDER, LYOPHILIZED, FOR SOLUTION INTRAVENOUS at 00:18

## 2022-01-01 RX ADMIN — ARFORMOTEROL TARTRATE 15 MCG: 15 SOLUTION RESPIRATORY (INHALATION) at 08:20

## 2022-01-01 RX ADMIN — CARVEDILOL 25 MG: 25 TABLET, FILM COATED ORAL at 16:48

## 2022-01-01 RX ADMIN — HYDROCODONE BITARTRATE AND ACETAMINOPHEN 1 TABLET: 5; 325 TABLET ORAL at 09:20

## 2022-01-01 RX ADMIN — HYDRALAZINE HYDROCHLORIDE 50 MG: 50 TABLET, FILM COATED ORAL at 05:12

## 2022-01-01 RX ADMIN — MEMANTINE HYDROCHLORIDE 10 MG: 10 TABLET, FILM COATED ORAL at 20:20

## 2022-01-01 RX ADMIN — MONTELUKAST SODIUM 10 MG: 10 TABLET, FILM COATED ORAL at 22:29

## 2022-01-01 RX ADMIN — DOCUSATE SODIUM 200 MG: 100 CAPSULE, LIQUID FILLED ORAL at 21:15

## 2022-01-01 RX ADMIN — MEMANTINE HYDROCHLORIDE 5 MG: 5 TABLET, FILM COATED ORAL at 08:18

## 2022-01-01 RX ADMIN — CARVEDILOL 12.5 MG: 12.5 TABLET, FILM COATED ORAL at 08:51

## 2022-01-01 RX ADMIN — POLYETHYLENE GLYCOL 3350 17 G: 17 POWDER, FOR SOLUTION ORAL at 20:34

## 2022-01-01 RX ADMIN — PRIMIDONE 50 MG: 50 TABLET ORAL at 17:52

## 2022-01-01 RX ADMIN — CLOPIDOGREL 75 MG: 75 TABLET, FILM COATED ORAL at 10:17

## 2022-01-01 RX ADMIN — HYDRALAZINE HYDROCHLORIDE 100 MG: 50 TABLET, FILM COATED ORAL at 20:20

## 2022-01-01 RX ADMIN — Medication 1000 UNITS: at 09:19

## 2022-01-01 RX ADMIN — ATORVASTATIN CALCIUM 40 MG: 20 TABLET, FILM COATED ORAL at 09:59

## 2022-01-01 RX ADMIN — HYDROCODONE BITARTRATE AND ACETAMINOPHEN 1 TABLET: 5; 325 TABLET ORAL at 17:32

## 2022-01-01 RX ADMIN — FUROSEMIDE 40 MG: 40 TABLET ORAL at 10:17

## 2022-01-01 RX ADMIN — ALLOPURINOL 100 MG: 100 TABLET ORAL at 08:04

## 2022-01-01 RX ADMIN — LORAZEPAM 0.5 MG: 2 INJECTION INTRAMUSCULAR; INTRAVENOUS at 07:04

## 2022-01-01 RX ADMIN — HYDRALAZINE HYDROCHLORIDE 100 MG: 50 TABLET, FILM COATED ORAL at 16:43

## 2022-01-01 RX ADMIN — DOCUSATE SODIUM 200 MG: 100 CAPSULE, LIQUID FILLED ORAL at 08:13

## 2022-01-01 RX ADMIN — PRIMIDONE 50 MG: 50 TABLET ORAL at 19:18

## 2022-01-01 RX ADMIN — PRIMIDONE 50 MG: 50 TABLET ORAL at 09:09

## 2022-01-01 RX ADMIN — MONTELUKAST SODIUM 10 MG: 10 TABLET, FILM COATED ORAL at 20:45

## 2022-01-01 RX ADMIN — DOCUSATE SODIUM 50MG AND SENNOSIDES 8.6MG 2 TABLET: 8.6; 5 TABLET, FILM COATED ORAL at 20:37

## 2022-01-01 RX ADMIN — HYDROCODONE BITARTRATE AND ACETAMINOPHEN 1 TABLET: 5; 325 TABLET ORAL at 15:14

## 2022-01-01 RX ADMIN — SERTRALINE 100 MG: 100 TABLET, FILM COATED ORAL at 20:55

## 2022-01-01 RX ADMIN — CARVEDILOL 25 MG: 25 TABLET, FILM COATED ORAL at 17:04

## 2022-01-01 RX ADMIN — MEMANTINE HYDROCHLORIDE 5 MG: 5 TABLET, FILM COATED ORAL at 22:29

## 2022-01-01 RX ADMIN — LEVOTHYROXINE SODIUM 100 MCG: 0.1 TABLET ORAL at 05:32

## 2022-01-01 RX ADMIN — SERTRALINE 100 MG: 100 TABLET, FILM COATED ORAL at 23:41

## 2022-01-01 RX ADMIN — CLONAZEPAM 0.5 MG: 0.5 TABLET ORAL at 20:23

## 2022-01-01 RX ADMIN — ENOXAPARIN SODIUM 30 MG: 30 INJECTION SUBCUTANEOUS at 20:36

## 2022-01-01 RX ADMIN — MEMANTINE HYDROCHLORIDE 10 MG: 10 TABLET, FILM COATED ORAL at 08:49

## 2022-01-01 RX ADMIN — CLONAZEPAM 0.5 MG: 0.5 TABLET ORAL at 10:22

## 2022-01-01 RX ADMIN — ALLOPURINOL 100 MG: 100 TABLET ORAL at 08:26

## 2022-01-01 RX ADMIN — PANTOPRAZOLE SODIUM 8 MG/HR: 40 INJECTION, POWDER, LYOPHILIZED, FOR SOLUTION INTRAVENOUS at 14:23

## 2022-01-01 RX ADMIN — TAMSULOSIN HYDROCHLORIDE 0.4 MG: 0.4 CAPSULE ORAL at 23:13

## 2022-01-01 RX ADMIN — PRIMIDONE 50 MG: 50 TABLET ORAL at 16:43

## 2022-01-01 RX ADMIN — MEMANTINE HYDROCHLORIDE 5 MG: 5 TABLET, FILM COATED ORAL at 20:36

## 2022-01-01 RX ADMIN — PANTOPRAZOLE SODIUM 40 MG: 40 TABLET, DELAYED RELEASE ORAL at 17:05

## 2022-01-01 RX ADMIN — ATORVASTATIN CALCIUM 10 MG: 20 TABLET, FILM COATED ORAL at 23:12

## 2022-01-01 RX ADMIN — NYSTATIN 500000 UNITS: 500000 SUSPENSION ORAL at 17:03

## 2022-01-01 RX ADMIN — POTASSIUM CHLORIDE 10 MEQ: 10 TABLET, EXTENDED RELEASE ORAL at 09:07

## 2022-01-01 RX ADMIN — AMLODIPINE BESYLATE 10 MG: 10 TABLET ORAL at 08:13

## 2022-01-01 RX ADMIN — PRIMIDONE 50 MG: 50 TABLET ORAL at 16:48

## 2022-01-01 RX ADMIN — LOSARTAN POTASSIUM 100 MG: 100 TABLET, FILM COATED ORAL at 09:31

## 2022-01-01 RX ADMIN — ROCURONIUM BROMIDE 20 MG: 10 INJECTION, SOLUTION INTRAVENOUS at 16:46

## 2022-01-01 RX ADMIN — CLONIDINE HYDROCHLORIDE 0.1 MG: 0.1 TABLET ORAL at 06:13

## 2022-01-01 RX ADMIN — SODIUM BICARBONATE 650 MG: 650 TABLET ORAL at 08:14

## 2022-01-01 RX ADMIN — TAMSULOSIN HYDROCHLORIDE 0.4 MG: 0.4 CAPSULE ORAL at 22:29

## 2022-01-01 RX ADMIN — DOCUSATE SODIUM 200 MG: 100 CAPSULE, LIQUID FILLED ORAL at 21:11

## 2022-01-01 RX ADMIN — DOCUSATE SODIUM 200 MG: 100 CAPSULE, LIQUID FILLED ORAL at 20:36

## 2022-01-01 RX ADMIN — Medication 3 MG: at 23:13

## 2022-01-01 RX ADMIN — CETIRIZINE HYDROCHLORIDE 5 MG: 10 TABLET ORAL at 20:11

## 2022-01-01 RX ADMIN — AMLODIPINE BESYLATE 10 MG: 10 TABLET ORAL at 08:15

## 2022-01-01 RX ADMIN — DOCUSATE SODIUM 200 MG: 100 CAPSULE, LIQUID FILLED ORAL at 20:09

## 2022-01-01 RX ADMIN — ATORVASTATIN CALCIUM 10 MG: 20 TABLET, FILM COATED ORAL at 21:08

## 2022-01-01 RX ADMIN — LORAZEPAM 0.5 MG: 2 INJECTION INTRAMUSCULAR; INTRAVENOUS at 13:03

## 2022-01-01 RX ADMIN — ATORVASTATIN CALCIUM 10 MG: 20 TABLET, FILM COATED ORAL at 21:41

## 2022-01-01 RX ADMIN — ARFORMOTEROL TARTRATE 15 MCG: 15 SOLUTION RESPIRATORY (INHALATION) at 19:27

## 2022-01-01 RX ADMIN — HYDRALAZINE HYDROCHLORIDE 50 MG: 50 TABLET, FILM COATED ORAL at 21:14

## 2022-01-01 RX ADMIN — PRIMIDONE 50 MG: 50 TABLET ORAL at 09:07

## 2022-01-01 RX ADMIN — ARFORMOTEROL TARTRATE 15 MCG: 15 SOLUTION RESPIRATORY (INHALATION) at 19:49

## 2022-01-01 RX ADMIN — ALLOPURINOL 100 MG: 100 TABLET ORAL at 09:19

## 2022-01-01 RX ADMIN — Medication 10 ML: at 20:52

## 2022-01-01 RX ADMIN — SODIUM CHLORIDE 100 ML/HR: 9 INJECTION, SOLUTION INTRAVENOUS at 00:01

## 2022-01-01 RX ADMIN — POLYETHYLENE GLYCOL 3350 17 G: 17 POWDER, FOR SOLUTION ORAL at 11:14

## 2022-01-01 RX ADMIN — FENTANYL CITRATE 25 MCG: 50 INJECTION INTRAMUSCULAR; INTRAVENOUS at 19:25

## 2022-01-01 RX ADMIN — AMLODIPINE BESYLATE 10 MG: 10 TABLET ORAL at 08:49

## 2022-01-01 RX ADMIN — ISOSORBIDE MONONITRATE 30 MG: 30 TABLET, EXTENDED RELEASE ORAL at 20:09

## 2022-01-01 RX ADMIN — DOCUSATE SODIUM 200 MG: 100 CAPSULE, LIQUID FILLED ORAL at 22:29

## 2022-01-01 RX ADMIN — PANTOPRAZOLE SODIUM 8 MG/HR: 40 INJECTION, POWDER, LYOPHILIZED, FOR SOLUTION INTRAVENOUS at 08:32

## 2022-01-01 RX ADMIN — LABETALOL HYDROCHLORIDE 10 MG: 5 INJECTION, SOLUTION INTRAVENOUS at 01:04

## 2022-01-01 RX ADMIN — AMLODIPINE BESYLATE 10 MG: 10 TABLET ORAL at 08:26

## 2022-01-01 RX ADMIN — CARVEDILOL 25 MG: 25 TABLET, FILM COATED ORAL at 17:06

## 2022-01-01 RX ADMIN — DOCUSATE SODIUM 200 MG: 100 CAPSULE, LIQUID FILLED ORAL at 08:15

## 2022-01-01 RX ADMIN — CEFTRIAXONE SODIUM 1 G: 1 INJECTION, POWDER, FOR SOLUTION INTRAMUSCULAR; INTRAVENOUS at 06:24

## 2022-01-01 RX ADMIN — CEFTRIAXONE SODIUM 1 G: 1 INJECTION, POWDER, FOR SOLUTION INTRAMUSCULAR; INTRAVENOUS at 06:09

## 2022-01-01 RX ADMIN — HYDROXYZINE HYDROCHLORIDE 25 MG: 25 TABLET ORAL at 11:54

## 2022-01-01 RX ADMIN — PRIMIDONE 50 MG: 50 TABLET ORAL at 20:54

## 2022-01-01 RX ADMIN — SERTRALINE 100 MG: 100 TABLET, FILM COATED ORAL at 22:35

## 2022-01-01 RX ADMIN — SODIUM BICARBONATE 1300 MG: 650 TABLET ORAL at 20:44

## 2022-01-01 RX ADMIN — INSULIN LISPRO 2 UNITS: 100 INJECTION, SOLUTION INTRAVENOUS; SUBCUTANEOUS at 09:09

## 2022-01-01 RX ADMIN — ENOXAPARIN SODIUM 30 MG: 30 INJECTION SUBCUTANEOUS at 20:39

## 2022-01-01 RX ADMIN — CARVEDILOL 12.5 MG: 12.5 TABLET, FILM COATED ORAL at 17:01

## 2022-01-01 RX ADMIN — MONTELUKAST SODIUM 10 MG: 10 TABLET, FILM COATED ORAL at 20:56

## 2022-01-01 RX ADMIN — LABETALOL HYDROCHLORIDE 10 MG: 5 INJECTION, SOLUTION INTRAVENOUS at 11:39

## 2022-01-01 RX ADMIN — ARFORMOTEROL TARTRATE 15 MCG: 15 SOLUTION RESPIRATORY (INHALATION) at 20:45

## 2022-01-01 RX ADMIN — FUROSEMIDE 40 MG: 40 TABLET ORAL at 08:20

## 2022-01-01 RX ADMIN — SODIUM BICARBONATE 1300 MG: 650 TABLET ORAL at 15:11

## 2022-01-01 RX ADMIN — NYSTATIN 500000 UNITS: 500000 SUSPENSION ORAL at 08:13

## 2022-01-01 RX ADMIN — LOSARTAN POTASSIUM 100 MG: 100 TABLET, FILM COATED ORAL at 22:34

## 2022-01-01 RX ADMIN — NYSTATIN 500000 UNITS: 500000 SUSPENSION ORAL at 11:30

## 2022-01-01 RX ADMIN — HYDRALAZINE HYDROCHLORIDE 100 MG: 50 TABLET, FILM COATED ORAL at 20:22

## 2022-01-01 RX ADMIN — AMLODIPINE BESYLATE 10 MG: 10 TABLET ORAL at 08:52

## 2022-01-01 RX ADMIN — HYDRALAZINE HYDROCHLORIDE 100 MG: 50 TABLET, FILM COATED ORAL at 20:54

## 2022-01-01 RX ADMIN — ONDANSETRON HYDROCHLORIDE 4 MG: 4 TABLET, FILM COATED ORAL at 09:38

## 2022-01-01 RX ADMIN — ARFORMOTEROL TARTRATE 15 MCG: 15 SOLUTION RESPIRATORY (INHALATION) at 08:21

## 2022-01-01 RX ADMIN — AMLODIPINE BESYLATE 10 MG: 10 TABLET ORAL at 09:07

## 2022-01-01 RX ADMIN — ATORVASTATIN CALCIUM 10 MG: 20 TABLET, FILM COATED ORAL at 20:46

## 2022-01-01 RX ADMIN — POLYETHYLENE GLYCOL 3350 17 G: 17 POWDER, FOR SOLUTION ORAL at 09:20

## 2022-01-01 RX ADMIN — HYDRALAZINE HYDROCHLORIDE 10 MG: 20 INJECTION INTRAMUSCULAR; INTRAVENOUS at 04:22

## 2022-01-01 RX ADMIN — HYDRALAZINE HYDROCHLORIDE 10 MG: 20 INJECTION INTRAMUSCULAR; INTRAVENOUS at 12:45

## 2022-01-01 RX ADMIN — DOCUSATE SODIUM 200 MG: 100 CAPSULE, LIQUID FILLED ORAL at 20:19

## 2022-01-01 RX ADMIN — TAMSULOSIN HYDROCHLORIDE 0.4 MG: 0.4 CAPSULE ORAL at 20:52

## 2022-01-01 RX ADMIN — MONTELUKAST SODIUM 10 MG: 10 TABLET, FILM COATED ORAL at 21:07

## 2022-01-01 RX ADMIN — MONTELUKAST SODIUM 10 MG: 10 TABLET, FILM COATED ORAL at 20:52

## 2022-01-01 RX ADMIN — AMLODIPINE BESYLATE 10 MG: 10 TABLET ORAL at 08:20

## 2022-01-01 RX ADMIN — ATORVASTATIN CALCIUM 10 MG: 20 TABLET, FILM COATED ORAL at 20:08

## 2022-01-01 RX ADMIN — DOCUSATE SODIUM 50MG AND SENNOSIDES 8.6MG 2 TABLET: 8.6; 5 TABLET, FILM COATED ORAL at 20:21

## 2022-01-01 RX ADMIN — LEVOTHYROXINE SODIUM 100 MCG: 0.1 TABLET ORAL at 08:49

## 2022-01-01 RX ADMIN — FUROSEMIDE 40 MG: 40 TABLET ORAL at 09:32

## 2022-01-01 RX ADMIN — ATORVASTATIN CALCIUM 10 MG: 20 TABLET, FILM COATED ORAL at 14:37

## 2022-01-01 RX ADMIN — Medication 1000 UNITS: at 08:04

## 2022-01-01 RX ADMIN — POLYETHYLENE GLYCOL 3350 17 G: 17 POWDER, FOR SOLUTION ORAL at 21:55

## 2022-01-01 RX ADMIN — ALLOPURINOL 100 MG: 100 TABLET ORAL at 08:51

## 2022-01-01 RX ADMIN — CLONIDINE HYDROCHLORIDE 0.1 MG: 0.1 TABLET ORAL at 14:08

## 2022-01-01 RX ADMIN — ALLOPURINOL 100 MG: 100 TABLET ORAL at 22:29

## 2022-01-01 RX ADMIN — LEVOTHYROXINE SODIUM 75 MCG: 0.07 TABLET ORAL at 08:04

## 2022-01-01 RX ADMIN — ACETAMINOPHEN 650 MG: 325 TABLET, FILM COATED ORAL at 10:17

## 2022-01-01 RX ADMIN — MEMANTINE HYDROCHLORIDE 10 MG: 10 TABLET, FILM COATED ORAL at 13:21

## 2022-01-01 RX ADMIN — FUROSEMIDE 80 MG: 80 TABLET ORAL at 09:07

## 2022-01-01 RX ADMIN — SODIUM BICARBONATE 1300 MG: 650 TABLET ORAL at 16:49

## 2022-01-01 RX ADMIN — OSELTAMIVIR PHOSPHATE 30 MG: 6 POWDER, FOR SUSPENSION ORAL at 11:14

## 2022-01-01 RX ADMIN — ASPIRIN 81 MG: 81 TABLET, FILM COATED ORAL at 09:37

## 2022-01-01 RX ADMIN — PANTOPRAZOLE SODIUM 40 MG: 40 TABLET, DELAYED RELEASE ORAL at 17:01

## 2022-01-01 RX ADMIN — HYDRALAZINE HYDROCHLORIDE 100 MG: 50 TABLET, FILM COATED ORAL at 08:15

## 2022-01-01 RX ADMIN — LOSARTAN POTASSIUM 100 MG: 100 TABLET, FILM COATED ORAL at 09:09

## 2022-01-01 RX ADMIN — MEMANTINE HYDROCHLORIDE 5 MG: 5 TABLET, FILM COATED ORAL at 20:45

## 2022-01-01 RX ADMIN — LOSARTAN POTASSIUM 100 MG: 100 TABLET, FILM COATED ORAL at 09:19

## 2022-01-01 RX ADMIN — NYSTATIN 500000 UNITS: 500000 SUSPENSION ORAL at 07:54

## 2022-01-01 RX ADMIN — HYDROCODONE BITARTRATE AND ACETAMINOPHEN 1 TABLET: 5; 325 TABLET ORAL at 22:26

## 2022-01-01 RX ADMIN — AMLODIPINE BESYLATE 10 MG: 10 TABLET ORAL at 09:08

## 2022-01-01 RX ADMIN — FUROSEMIDE 40 MG: 40 TABLET ORAL at 08:21

## 2022-01-01 RX ADMIN — DOCUSATE SODIUM 50MG AND SENNOSIDES 8.6MG 2 TABLET: 8.6; 5 TABLET, FILM COATED ORAL at 20:09

## 2022-01-01 RX ADMIN — Medication 10 ML: at 08:16

## 2022-01-01 RX ADMIN — TAMSULOSIN HYDROCHLORIDE 0.4 MG: 0.4 CAPSULE ORAL at 20:36

## 2022-01-01 RX ADMIN — Medication 3 MG: at 20:08

## 2022-01-01 RX ADMIN — PRIMIDONE 50 MG: 50 TABLET ORAL at 20:45

## 2022-01-01 RX ADMIN — HYDROCODONE BITARTRATE AND ACETAMINOPHEN 1 TABLET: 5; 325 TABLET ORAL at 13:21

## 2022-01-01 RX ADMIN — SODIUM BICARBONATE 1300 MG: 650 TABLET ORAL at 17:04

## 2022-01-01 RX ADMIN — Medication 2000 UNITS: at 08:18

## 2022-01-01 RX ADMIN — POLYETHYLENE GLYCOL 3350 17 G: 17 POWDER, FOR SOLUTION ORAL at 09:34

## 2022-01-01 RX ADMIN — PANTOPRAZOLE SODIUM 8 MG/HR: 40 INJECTION, POWDER, LYOPHILIZED, FOR SOLUTION INTRAVENOUS at 10:34

## 2022-01-01 RX ADMIN — MORPHINE SULFATE 2 MG: 2 INJECTION, SOLUTION INTRAMUSCULAR; INTRAVENOUS at 03:34

## 2022-01-01 RX ADMIN — AMLODIPINE BESYLATE 10 MG: 10 TABLET ORAL at 10:26

## 2022-01-01 RX ADMIN — PRIMIDONE 50 MG: 50 TABLET ORAL at 21:10

## 2022-01-01 RX ADMIN — MEMANTINE HYDROCHLORIDE 10 MG: 10 TABLET, FILM COATED ORAL at 08:14

## 2022-01-01 RX ADMIN — PANTOPRAZOLE SODIUM 80 MG: 40 INJECTION, POWDER, LYOPHILIZED, FOR SOLUTION INTRAVENOUS at 20:13

## 2022-01-01 RX ADMIN — ARFORMOTEROL TARTRATE 15 MCG: 15 SOLUTION RESPIRATORY (INHALATION) at 20:00

## 2022-01-01 RX ADMIN — HYDROXYZINE HYDROCHLORIDE 25 MG: 25 TABLET ORAL at 21:05

## 2022-01-01 RX ADMIN — CEFAZOLIN SODIUM 2 G: 2 INJECTION, SOLUTION INTRAVENOUS at 16:38

## 2022-01-01 RX ADMIN — HYDRALAZINE HYDROCHLORIDE 25 MG: 25 TABLET, FILM COATED ORAL at 06:05

## 2022-01-01 RX ADMIN — CLONAZEPAM 0.5 MG: 0.5 TABLET ORAL at 16:47

## 2022-01-01 RX ADMIN — FAMOTIDINE 20 MG: 20 TABLET, FILM COATED ORAL at 11:14

## 2022-01-01 RX ADMIN — SODIUM CHLORIDE 50 ML/HR: 9 INJECTION, SOLUTION INTRAVENOUS at 09:12

## 2022-01-01 RX ADMIN — PRIMIDONE 50 MG: 50 TABLET ORAL at 22:33

## 2022-01-01 RX ADMIN — LEVOTHYROXINE SODIUM 100 MCG: 0.1 TABLET ORAL at 11:13

## 2022-01-01 RX ADMIN — MEMANTINE HYDROCHLORIDE 5 MG: 5 TABLET, FILM COATED ORAL at 21:14

## 2022-01-01 RX ADMIN — LOSARTAN POTASSIUM 100 MG: 100 TABLET, FILM COATED ORAL at 08:49

## 2022-01-01 RX ADMIN — DROPERIDOL 2.5 MG: 2.5 INJECTION, SOLUTION INTRAMUSCULAR; INTRAVENOUS at 18:46

## 2022-01-01 RX ADMIN — ARFORMOTEROL TARTRATE 15 MCG: 15 SOLUTION RESPIRATORY (INHALATION) at 20:44

## 2022-01-01 RX ADMIN — PANTOPRAZOLE SODIUM 40 MG: 40 TABLET, DELAYED RELEASE ORAL at 16:43

## 2022-01-01 RX ADMIN — ATORVASTATIN CALCIUM 10 MG: 10 TABLET, FILM COATED ORAL at 20:39

## 2022-01-01 RX ADMIN — FENTANYL CITRATE 75 MCG: 50 INJECTION, SOLUTION INTRAMUSCULAR; INTRAVENOUS at 16:56

## 2022-01-01 RX ADMIN — LEVOTHYROXINE SODIUM 100 MCG: 0.1 TABLET ORAL at 05:12

## 2022-01-01 RX ADMIN — PRIMIDONE 50 MG: 50 TABLET ORAL at 10:17

## 2022-01-01 RX ADMIN — AMLODIPINE BESYLATE 10 MG: 10 TABLET ORAL at 09:37

## 2022-01-01 RX ADMIN — ASPIRIN 81 MG: 81 TABLET, CHEWABLE ORAL at 09:09

## 2022-01-01 RX ADMIN — MONTELUKAST SODIUM 10 MG: 10 TABLET, FILM COATED ORAL at 21:53

## 2022-01-01 RX ADMIN — PRIMIDONE 50 MG: 50 TABLET ORAL at 08:13

## 2022-01-01 RX ADMIN — CLOPIDOGREL 75 MG: 75 TABLET, FILM COATED ORAL at 08:18

## 2022-01-01 RX ADMIN — PRIMIDONE 50 MG: 50 TABLET ORAL at 16:07

## 2022-01-01 RX ADMIN — SERTRALINE 100 MG: 100 TABLET, FILM COATED ORAL at 20:56

## 2022-01-01 RX ADMIN — SODIUM CHLORIDE 75 ML/HR: 9 INJECTION, SOLUTION INTRAVENOUS at 06:36

## 2022-01-01 RX ADMIN — HYDROCODONE BITARTRATE AND ACETAMINOPHEN 1 TABLET: 5; 325 TABLET ORAL at 20:48

## 2022-01-01 RX ADMIN — DOCUSATE SODIUM 50MG AND SENNOSIDES 8.6MG 2 TABLET: 8.6; 5 TABLET, FILM COATED ORAL at 20:11

## 2022-01-01 RX ADMIN — DOCUSATE SODIUM 50MG AND SENNOSIDES 8.6MG 2 TABLET: 8.6; 5 TABLET, FILM COATED ORAL at 20:36

## 2022-01-01 RX ADMIN — LEVOTHYROXINE SODIUM 100 MCG: 0.1 TABLET ORAL at 06:25

## 2022-01-01 RX ADMIN — ONDANSETRON 4 MG: 2 INJECTION INTRAMUSCULAR; INTRAVENOUS at 09:37

## 2022-01-01 RX ADMIN — PROPOFOL 40 MG: 10 INJECTION, EMULSION INTRAVENOUS at 16:51

## 2022-01-01 RX ADMIN — PRIMIDONE 50 MG: 50 TABLET ORAL at 08:15

## 2022-01-01 RX ADMIN — TRAZODONE HYDROCHLORIDE 50 MG: 50 TABLET ORAL at 22:07

## 2022-01-01 RX ADMIN — PRIMIDONE 50 MG: 50 TABLET ORAL at 17:05

## 2022-01-01 RX ADMIN — ARFORMOTEROL TARTRATE 15 MCG: 15 SOLUTION RESPIRATORY (INHALATION) at 19:39

## 2022-01-01 RX ADMIN — MEMANTINE HYDROCHLORIDE 5 MG: 5 TABLET, FILM COATED ORAL at 23:12

## 2022-01-01 RX ADMIN — MORPHINE SULFATE 2 MG: 2 INJECTION, SOLUTION INTRAMUSCULAR; INTRAVENOUS at 08:39

## 2022-01-01 RX ADMIN — LEVOTHYROXINE SODIUM 100 MCG: 0.1 TABLET ORAL at 06:24

## 2022-01-01 RX ADMIN — DOCUSATE SODIUM 200 MG: 100 CAPSULE, LIQUID FILLED ORAL at 08:50

## 2022-01-01 RX ADMIN — PRIMIDONE 50 MG: 50 TABLET ORAL at 17:04

## 2022-01-01 RX ADMIN — MEMANTINE HYDROCHLORIDE 5 MG: 5 TABLET, FILM COATED ORAL at 08:04

## 2022-01-01 RX ADMIN — ARFORMOTEROL TARTRATE 15 MCG: 15 SOLUTION RESPIRATORY (INHALATION) at 19:24

## 2022-01-01 RX ADMIN — FAMOTIDINE 20 MG: 20 TABLET, FILM COATED ORAL at 08:49

## 2022-01-01 RX ADMIN — CARVEDILOL 25 MG: 25 TABLET, FILM COATED ORAL at 08:14

## 2022-01-01 RX ADMIN — INSULIN LISPRO 2 UNITS: 100 INJECTION, SOLUTION INTRAVENOUS; SUBCUTANEOUS at 08:12

## 2022-01-01 RX ADMIN — PRIMIDONE 50 MG: 50 TABLET ORAL at 21:52

## 2022-01-01 RX ADMIN — ARFORMOTEROL TARTRATE 15 MCG: 15 SOLUTION RESPIRATORY (INHALATION) at 08:50

## 2022-01-01 RX ADMIN — LEVOTHYROXINE SODIUM 100 MCG: 0.1 TABLET ORAL at 06:49

## 2022-01-01 RX ADMIN — ARFORMOTEROL TARTRATE 15 MCG: 15 SOLUTION RESPIRATORY (INHALATION) at 08:37

## 2022-01-01 RX ADMIN — PRIMIDONE 50 MG: 50 TABLET ORAL at 09:33

## 2022-01-01 RX ADMIN — GUAIFENESIN 600 MG: 600 TABLET, EXTENDED RELEASE ORAL at 09:20

## 2022-01-01 RX ADMIN — Medication 1000 UNITS: at 09:31

## 2022-01-01 RX ADMIN — MEMANTINE HYDROCHLORIDE 10 MG: 10 TABLET, FILM COATED ORAL at 20:10

## 2022-01-01 RX ADMIN — FENTANYL CITRATE 25 MCG: 50 INJECTION INTRAMUSCULAR; INTRAVENOUS at 19:35

## 2022-01-01 RX ADMIN — MORPHINE SULFATE 2 MG: 2 INJECTION, SOLUTION INTRAMUSCULAR; INTRAVENOUS at 14:46

## 2022-01-01 RX ADMIN — Medication 1000 UNITS: at 09:38

## 2022-01-01 RX ADMIN — SODIUM BICARBONATE 1300 MG: 650 TABLET ORAL at 09:20

## 2022-01-01 RX ADMIN — HYDRALAZINE HYDROCHLORIDE 100 MG: 50 TABLET, FILM COATED ORAL at 20:11

## 2022-01-01 RX ADMIN — SERTRALINE 100 MG: 100 TABLET, FILM COATED ORAL at 21:14

## 2022-01-01 RX ADMIN — ARFORMOTEROL TARTRATE 15 MCG: 15 SOLUTION RESPIRATORY (INHALATION) at 20:18

## 2022-01-01 RX ADMIN — MONTELUKAST SODIUM 10 MG: 10 TABLET, FILM COATED ORAL at 23:41

## 2022-01-01 RX ADMIN — MEMANTINE HYDROCHLORIDE 5 MG: 5 TABLET, FILM COATED ORAL at 23:41

## 2022-01-01 RX ADMIN — FAMOTIDINE 20 MG: 20 TABLET, FILM COATED ORAL at 17:03

## 2022-01-01 RX ADMIN — LEVOTHYROXINE SODIUM 37.5 MCG: 0.07 TABLET ORAL at 14:37

## 2022-01-01 RX ADMIN — CEFTRIAXONE SODIUM 1 G: 1 INJECTION, POWDER, FOR SOLUTION INTRAMUSCULAR; INTRAVENOUS at 05:32

## 2022-01-01 RX ADMIN — MONTELUKAST SODIUM 10 MG: 10 TABLET, FILM COATED ORAL at 20:10

## 2022-01-01 RX ADMIN — ATORVASTATIN CALCIUM 10 MG: 20 TABLET, FILM COATED ORAL at 20:22

## 2022-01-01 RX ADMIN — POLYETHYLENE GLYCOL 3350 17 G: 17 POWDER, FOR SOLUTION ORAL at 21:11

## 2022-01-01 RX ADMIN — SODIUM CHLORIDE 75 ML/HR: 9 INJECTION, SOLUTION INTRAVENOUS at 23:30

## 2022-01-01 RX ADMIN — HYDRALAZINE HYDROCHLORIDE 100 MG: 50 TABLET, FILM COATED ORAL at 17:05

## 2022-01-01 RX ADMIN — CETIRIZINE HYDROCHLORIDE 5 MG: 10 TABLET ORAL at 08:49

## 2022-01-01 RX ADMIN — ARFORMOTEROL TARTRATE 15 MCG: 15 SOLUTION RESPIRATORY (INHALATION) at 08:05

## 2022-01-01 RX ADMIN — HYDROCODONE BITARTRATE AND ACETAMINOPHEN 1 TABLET: 5; 325 TABLET ORAL at 20:52

## 2022-01-01 RX ADMIN — AMLODIPINE BESYLATE 10 MG: 10 TABLET ORAL at 11:13

## 2022-01-01 RX ADMIN — PRIMIDONE 50 MG: 50 TABLET ORAL at 23:16

## 2022-01-01 RX ADMIN — LOSARTAN POTASSIUM 100 MG: 100 TABLET, FILM COATED ORAL at 08:52

## 2022-01-01 RX ADMIN — HYDRALAZINE HYDROCHLORIDE 25 MG: 25 TABLET, FILM COATED ORAL at 13:30

## 2022-01-01 RX ADMIN — ARFORMOTEROL TARTRATE 15 MCG: 15 SOLUTION RESPIRATORY (INHALATION) at 07:29

## 2022-01-01 RX ADMIN — ARFORMOTEROL TARTRATE 15 MCG: 15 SOLUTION RESPIRATORY (INHALATION) at 19:18

## 2022-01-01 RX ADMIN — HYDROCODONE BITARTRATE AND ACETAMINOPHEN 1 TABLET: 5; 325 TABLET ORAL at 13:16

## 2022-01-01 RX ADMIN — FUROSEMIDE 80 MG: 10 INJECTION, SOLUTION INTRAMUSCULAR; INTRAVENOUS at 15:39

## 2022-01-01 RX ADMIN — ASPIRIN 81 MG: 81 TABLET, FILM COATED ORAL at 10:17

## 2022-01-01 RX ADMIN — CLONAZEPAM 0.5 MG: 0.5 TABLET ORAL at 20:55

## 2022-01-01 RX ADMIN — CLONIDINE HYDROCHLORIDE 0.1 MG: 0.1 TABLET ORAL at 00:33

## 2022-01-01 RX ADMIN — HYDROCODONE BITARTRATE AND ACETAMINOPHEN 1 TABLET: 5; 325 TABLET ORAL at 06:19

## 2022-01-01 RX ADMIN — SODIUM BICARBONATE 1300 MG: 650 TABLET ORAL at 20:22

## 2022-01-01 RX ADMIN — ARFORMOTEROL TARTRATE 15 MCG: 15 SOLUTION RESPIRATORY (INHALATION) at 07:17

## 2022-01-01 RX ADMIN — LEVOTHYROXINE SODIUM 100 MCG: 0.1 TABLET ORAL at 08:51

## 2022-01-01 RX ADMIN — DOCUSATE SODIUM 200 MG: 100 CAPSULE, LIQUID FILLED ORAL at 09:20

## 2022-01-01 RX ADMIN — OSELTAMIVIR PHOSPHATE 30 MG: 30 CAPSULE ORAL at 08:13

## 2022-01-01 RX ADMIN — ASPIRIN 81 MG: 81 TABLET, FILM COATED ORAL at 08:18

## 2022-01-01 RX ADMIN — HYDROXYZINE HYDROCHLORIDE 25 MG: 25 TABLET ORAL at 20:10

## 2022-01-01 RX ADMIN — CEFTRIAXONE SODIUM 1 G: 1 INJECTION, POWDER, FOR SOLUTION INTRAMUSCULAR; INTRAVENOUS at 11:06

## 2022-01-01 RX ADMIN — ARFORMOTEROL TARTRATE 15 MCG: 15 SOLUTION RESPIRATORY (INHALATION) at 19:33

## 2022-01-01 RX ADMIN — POLYETHYLENE GLYCOL 3350 17 G: 17 POWDER, FOR SOLUTION ORAL at 08:15

## 2022-01-01 RX ADMIN — ALLOPURINOL 100 MG: 100 TABLET ORAL at 09:30

## 2022-01-01 RX ADMIN — SODIUM CHLORIDE, POTASSIUM CHLORIDE, SODIUM LACTATE AND CALCIUM CHLORIDE 9 ML/HR: 600; 310; 30; 20 INJECTION, SOLUTION INTRAVENOUS at 16:38

## 2022-01-01 RX ADMIN — HYDROCODONE BITARTRATE AND ACETAMINOPHEN 1 TABLET: 5; 325 TABLET ORAL at 13:23

## 2022-01-01 RX ADMIN — LORAZEPAM 0.5 MG: 2 INJECTION INTRAMUSCULAR; INTRAVENOUS at 23:35

## 2022-01-01 RX ADMIN — OSELTAMIVIR PHOSPHATE 30 MG: 6 POWDER, FOR SUSPENSION ORAL at 14:08

## 2022-01-01 RX ADMIN — HYDROCODONE BITARTRATE AND ACETAMINOPHEN 1 TABLET: 5; 325 TABLET ORAL at 16:47

## 2022-01-01 RX ADMIN — ISOSORBIDE MONONITRATE 30 MG: 30 TABLET, EXTENDED RELEASE ORAL at 20:22

## 2022-01-01 RX ADMIN — SODIUM CHLORIDE 50 ML/HR: 9 INJECTION, SOLUTION INTRAVENOUS at 15:19

## 2022-01-01 RX ADMIN — NYSTATIN 500000 UNITS: 500000 SUSPENSION ORAL at 21:11

## 2022-01-01 RX ADMIN — HYDROCODONE BITARTRATE AND ACETAMINOPHEN 1 TABLET: 5; 325 TABLET ORAL at 10:54

## 2022-01-01 RX ADMIN — LABETALOL HYDROCHLORIDE 10 MG: 5 INJECTION, SOLUTION INTRAVENOUS at 05:05

## 2022-01-01 RX ADMIN — HYDRALAZINE HYDROCHLORIDE 100 MG: 50 TABLET, FILM COATED ORAL at 08:49

## 2022-01-01 RX ADMIN — MEMANTINE HYDROCHLORIDE 10 MG: 10 TABLET, FILM COATED ORAL at 09:20

## 2022-01-01 RX ADMIN — GUAIFENESIN 600 MG: 600 TABLET, EXTENDED RELEASE ORAL at 11:14

## 2022-01-01 RX ADMIN — PRIMIDONE 50 MG: 50 TABLET ORAL at 21:14

## 2022-01-01 RX ADMIN — LOSARTAN POTASSIUM 50 MG: 50 TABLET, FILM COATED ORAL at 20:09

## 2022-01-01 RX ADMIN — PANTOPRAZOLE SODIUM 40 MG: 40 TABLET, DELAYED RELEASE ORAL at 06:05

## 2022-01-01 RX ADMIN — LOSARTAN POTASSIUM 50 MG: 50 TABLET, FILM COATED ORAL at 08:05

## 2022-01-01 RX ADMIN — ATORVASTATIN CALCIUM 10 MG: 20 TABLET, FILM COATED ORAL at 20:56

## 2022-01-01 RX ADMIN — ARFORMOTEROL TARTRATE 15 MCG: 15 SOLUTION RESPIRATORY (INHALATION) at 08:12

## 2022-01-01 RX ADMIN — PANTOPRAZOLE SODIUM 40 MG: 40 TABLET, DELAYED RELEASE ORAL at 09:10

## 2022-01-01 RX ADMIN — HYDRALAZINE HYDROCHLORIDE 100 MG: 50 TABLET, FILM COATED ORAL at 08:14

## 2022-01-01 RX ADMIN — CLONAZEPAM 0.5 MG: 0.5 TABLET ORAL at 13:21

## 2022-01-01 RX ADMIN — MONTELUKAST SODIUM 10 MG: 10 TABLET, FILM COATED ORAL at 20:55

## 2022-01-01 RX ADMIN — HYDRALAZINE HYDROCHLORIDE 100 MG: 50 TABLET, FILM COATED ORAL at 09:20

## 2022-01-01 RX ADMIN — DOCUSATE SODIUM 200 MG: 100 CAPSULE, LIQUID FILLED ORAL at 20:55

## 2022-01-01 RX ADMIN — MONTELUKAST SODIUM 10 MG: 10 TABLET, FILM COATED ORAL at 21:10

## 2022-01-01 RX ADMIN — LABETALOL HYDROCHLORIDE 10 MG: 5 INJECTION, SOLUTION INTRAVENOUS at 01:26

## 2022-01-01 RX ADMIN — HYDRALAZINE HYDROCHLORIDE 100 MG: 50 TABLET, FILM COATED ORAL at 15:11

## 2022-01-01 RX ADMIN — PRIMIDONE 50 MG: 50 TABLET ORAL at 08:49

## 2022-01-01 RX ADMIN — HYDRALAZINE HYDROCHLORIDE 100 MG: 50 TABLET, FILM COATED ORAL at 17:01

## 2022-01-01 RX ADMIN — LABETALOL HYDROCHLORIDE 10 MG: 5 INJECTION, SOLUTION INTRAVENOUS at 15:46

## 2022-01-01 RX ADMIN — ASPIRIN 81 MG: 81 TABLET, CHEWABLE ORAL at 08:04

## 2022-01-01 RX ADMIN — NYSTATIN 500000 UNITS: 500000 SUSPENSION ORAL at 11:00

## 2022-01-01 RX ADMIN — SODIUM BICARBONATE 650 MG: 650 TABLET ORAL at 17:05

## 2022-01-01 RX ADMIN — MORPHINE SULFATE 2 MG: 2 INJECTION, SOLUTION INTRAMUSCULAR; INTRAVENOUS at 02:15

## 2022-01-01 RX ADMIN — PRIMIDONE 50 MG: 50 TABLET ORAL at 20:22

## 2022-01-01 RX ADMIN — FENTANYL CITRATE 25 MCG: 50 INJECTION, SOLUTION INTRAMUSCULAR; INTRAVENOUS at 16:46

## 2022-01-01 RX ADMIN — KIT FOR THE PREPARATION OF TECHNETIUM TC 99M ALBUMIN AGGREGATED 1 DOSE: 2.5 INJECTION, POWDER, FOR SOLUTION INTRAVENOUS at 08:20

## 2022-01-01 RX ADMIN — ISOSORBIDE MONONITRATE 30 MG: 30 TABLET, EXTENDED RELEASE ORAL at 20:54

## 2022-01-01 RX ADMIN — HYDRALAZINE HYDROCHLORIDE 100 MG: 50 TABLET, FILM COATED ORAL at 20:45

## 2022-01-01 RX ADMIN — CLONAZEPAM 0.5 MG: 0.5 TABLET ORAL at 20:09

## 2022-01-01 RX ADMIN — LEVOTHYROXINE SODIUM 75 MCG: 0.07 TABLET ORAL at 05:40

## 2022-01-01 RX ADMIN — LOSARTAN POTASSIUM 50 MG: 50 TABLET, FILM COATED ORAL at 20:45

## 2022-01-01 RX ADMIN — CARVEDILOL 25 MG: 25 TABLET, FILM COATED ORAL at 08:15

## 2022-01-01 RX ADMIN — LOSARTAN POTASSIUM 100 MG: 100 TABLET, FILM COATED ORAL at 08:04

## 2022-01-01 RX ADMIN — FAMOTIDINE 20 MG: 20 TABLET, FILM COATED ORAL at 16:47

## 2022-01-01 RX ADMIN — PRIMIDONE 50 MG: 50 TABLET ORAL at 16:49

## 2022-01-01 RX ADMIN — PANTOPRAZOLE SODIUM 40 MG: 40 TABLET, DELAYED RELEASE ORAL at 06:19

## 2022-01-01 RX ADMIN — Medication 2000 UNITS: at 08:26

## 2022-01-01 RX ADMIN — ARFORMOTEROL TARTRATE 15 MCG: 15 SOLUTION RESPIRATORY (INHALATION) at 19:38

## 2022-01-01 RX ADMIN — CETIRIZINE HYDROCHLORIDE 5 MG: 10 TABLET ORAL at 09:20

## 2022-01-01 RX ADMIN — FAMOTIDINE 20 MG: 20 TABLET, FILM COATED ORAL at 17:04

## 2022-01-01 RX ADMIN — ALLOPURINOL 100 MG: 100 TABLET ORAL at 11:12

## 2022-01-01 RX ADMIN — PRIMIDONE 50 MG: 50 TABLET ORAL at 09:30

## 2022-01-01 RX ADMIN — ATORVASTATIN CALCIUM 10 MG: 20 TABLET, FILM COATED ORAL at 22:35

## 2022-01-01 RX ADMIN — TAMSULOSIN HYDROCHLORIDE 0.4 MG: 0.4 CAPSULE ORAL at 20:21

## 2022-01-01 RX ADMIN — TAMSULOSIN HYDROCHLORIDE 0.4 MG: 0.4 CAPSULE ORAL at 20:37

## 2022-01-01 RX ADMIN — LIDOCAINE HYDROCHLORIDE 60 MG: 20 INJECTION, SOLUTION INFILTRATION; PERINEURAL at 16:46

## 2022-01-01 RX ADMIN — SODIUM BICARBONATE 1300 MG: 650 TABLET ORAL at 16:48

## 2022-01-01 RX ADMIN — HYDROCODONE BITARTRATE AND ACETAMINOPHEN 1 TABLET: 5; 325 TABLET ORAL at 20:19

## 2022-01-01 RX ADMIN — TAMSULOSIN HYDROCHLORIDE 0.4 MG: 0.4 CAPSULE ORAL at 20:56

## 2022-01-01 RX ADMIN — HYDROCODONE BITARTRATE AND ACETAMINOPHEN 1 TABLET: 5; 325 TABLET ORAL at 09:08

## 2022-01-01 RX ADMIN — CETIRIZINE HYDROCHLORIDE 5 MG: 10 TABLET ORAL at 08:13

## 2022-01-01 RX ADMIN — METOPROLOL TARTRATE 2.5 MG: 1 INJECTION, SOLUTION INTRAVENOUS at 08:32

## 2022-01-01 RX ADMIN — HYDROCODONE BITARTRATE AND ACETAMINOPHEN 1 TABLET: 5; 325 TABLET ORAL at 01:23

## 2022-01-01 RX ADMIN — POLYETHYLENE GLYCOL 3350 17 G: 17 POWDER, FOR SOLUTION ORAL at 20:22

## 2022-01-01 RX ADMIN — GUAIFENESIN 600 MG: 600 TABLET, EXTENDED RELEASE ORAL at 20:23

## 2022-01-01 RX ADMIN — SODIUM BICARBONATE 1300 MG: 650 TABLET ORAL at 11:12

## 2022-01-01 RX ADMIN — MEMANTINE HYDROCHLORIDE 10 MG: 10 TABLET, FILM COATED ORAL at 20:46

## 2022-01-01 RX ADMIN — CARVEDILOL 25 MG: 25 TABLET, FILM COATED ORAL at 09:20

## 2022-01-01 RX ADMIN — ASPIRIN 81 MG: 81 TABLET, CHEWABLE ORAL at 09:38

## 2022-01-01 RX ADMIN — SERTRALINE 100 MG: 100 TABLET, FILM COATED ORAL at 20:10

## 2022-01-01 RX ADMIN — ATORVASTATIN CALCIUM 40 MG: 20 TABLET, FILM COATED ORAL at 08:20

## 2022-01-01 RX ADMIN — Medication 1000 UNITS: at 11:15

## 2022-01-01 RX ADMIN — ATORVASTATIN CALCIUM 10 MG: 20 TABLET, FILM COATED ORAL at 08:04

## 2022-01-01 RX ADMIN — ATORVASTATIN CALCIUM 10 MG: 20 TABLET, FILM COATED ORAL at 20:21

## 2022-01-01 RX ADMIN — FAMOTIDINE 20 MG: 20 TABLET ORAL at 09:31

## 2022-01-01 RX ADMIN — HYDRALAZINE HYDROCHLORIDE 10 MG: 20 INJECTION INTRAMUSCULAR; INTRAVENOUS at 01:24

## 2022-01-01 RX ADMIN — ATORVASTATIN CALCIUM 10 MG: 20 TABLET, FILM COATED ORAL at 21:53

## 2022-01-17 ENCOUNTER — HOSPITAL ENCOUNTER (EMERGENCY)
Facility: HOSPITAL | Age: 87
Discharge: HOME | End: 2022-01-17
Attending: EMERGENCY MEDICINE | Admitting: EMERGENCY MEDICINE

## 2022-01-17 ENCOUNTER — APPOINTMENT (OUTPATIENT)
Dept: CT IMAGING | Facility: HOSPITAL | Age: 87
End: 2022-01-17

## 2022-01-17 VITALS
RESPIRATION RATE: 17 BRPM | BODY MASS INDEX: 23 KG/M2 | WEIGHT: 125 LBS | OXYGEN SATURATION: 98 % | DIASTOLIC BLOOD PRESSURE: 87 MMHG | HEART RATE: 61 BPM | TEMPERATURE: 96.8 F | HEIGHT: 62 IN | SYSTOLIC BLOOD PRESSURE: 195 MMHG

## 2022-01-17 DIAGNOSIS — K52.9 COLITIS: ICD-10-CM

## 2022-01-17 DIAGNOSIS — D64.9 CHRONIC ANEMIA: ICD-10-CM

## 2022-01-17 DIAGNOSIS — N18.9 CHRONIC KIDNEY DISEASE, UNSPECIFIED CKD STAGE: ICD-10-CM

## 2022-01-17 DIAGNOSIS — R10.84 GENERALIZED ABDOMINAL PAIN: Primary | ICD-10-CM

## 2022-01-17 LAB
ALBUMIN SERPL-MCNC: 3.6 G/DL (ref 3.5–5.2)
ALBUMIN/GLOB SERPL: 1.3 G/DL
ALP SERPL-CCNC: 73 U/L (ref 39–117)
ALT SERPL W P-5'-P-CCNC: 9 U/L (ref 1–33)
ANION GAP SERPL CALCULATED.3IONS-SCNC: 10.9 MMOL/L (ref 5–15)
AST SERPL-CCNC: 15 U/L (ref 1–32)
BACTERIA UR QL AUTO: ABNORMAL /HPF
BASOPHILS # BLD AUTO: 0.03 10*3/MM3 (ref 0–0.2)
BASOPHILS NFR BLD AUTO: 0.5 % (ref 0–1.5)
BILIRUB SERPL-MCNC: 0.3 MG/DL (ref 0–1.2)
BILIRUB UR QL STRIP: NEGATIVE
BUN SERPL-MCNC: 17 MG/DL (ref 8–23)
BUN/CREAT SERPL: 9.6 (ref 7–25)
CALCIUM SPEC-SCNC: 8.9 MG/DL (ref 8.6–10.5)
CHLORIDE SERPL-SCNC: 103 MMOL/L (ref 98–107)
CLARITY UR: CLEAR
CO2 SERPL-SCNC: 29.1 MMOL/L (ref 22–29)
COLOR UR: YELLOW
CREAT SERPL-MCNC: 1.77 MG/DL (ref 0.57–1)
DEPRECATED RDW RBC AUTO: 49.8 FL (ref 37–54)
EOSINOPHIL # BLD AUTO: 0.07 10*3/MM3 (ref 0–0.4)
EOSINOPHIL NFR BLD AUTO: 1.1 % (ref 0.3–6.2)
ERYTHROCYTE [DISTWIDTH] IN BLOOD BY AUTOMATED COUNT: 15.1 % (ref 12.3–15.4)
GFR SERPL CREATININE-BSD FRML MDRD: 27 ML/MIN/1.73
GLOBULIN UR ELPH-MCNC: 2.7 GM/DL
GLUCOSE SERPL-MCNC: 122 MG/DL (ref 65–99)
GLUCOSE UR STRIP-MCNC: NEGATIVE MG/DL
HCT VFR BLD AUTO: 24.7 % (ref 34–46.6)
HGB BLD-MCNC: 7.8 G/DL (ref 12–15.9)
HGB UR QL STRIP.AUTO: NEGATIVE
HYALINE CASTS UR QL AUTO: ABNORMAL /LPF
IMM GRANULOCYTES # BLD AUTO: 0.02 10*3/MM3 (ref 0–0.05)
IMM GRANULOCYTES NFR BLD AUTO: 0.3 % (ref 0–0.5)
KETONES UR QL STRIP: ABNORMAL
LEUKOCYTE ESTERASE UR QL STRIP.AUTO: ABNORMAL
LIPASE SERPL-CCNC: 42 U/L (ref 13–60)
LYMPHOCYTES # BLD AUTO: 0.83 10*3/MM3 (ref 0.7–3.1)
LYMPHOCYTES NFR BLD AUTO: 13.6 % (ref 19.6–45.3)
MCH RBC QN AUTO: 28.9 PG (ref 26.6–33)
MCHC RBC AUTO-ENTMCNC: 31.6 G/DL (ref 31.5–35.7)
MCV RBC AUTO: 91.5 FL (ref 79–97)
MONOCYTES # BLD AUTO: 0.39 10*3/MM3 (ref 0.1–0.9)
MONOCYTES NFR BLD AUTO: 6.4 % (ref 5–12)
NEUTROPHILS NFR BLD AUTO: 4.75 10*3/MM3 (ref 1.7–7)
NEUTROPHILS NFR BLD AUTO: 78.1 % (ref 42.7–76)
NITRITE UR QL STRIP: NEGATIVE
NRBC BLD AUTO-RTO: 0 /100 WBC (ref 0–0.2)
PH UR STRIP.AUTO: 5.5 [PH] (ref 5–8)
PLATELET # BLD AUTO: 251 10*3/MM3 (ref 140–450)
PMV BLD AUTO: 9.5 FL (ref 6–12)
POTASSIUM SERPL-SCNC: 3.1 MMOL/L (ref 3.5–5.2)
PROT SERPL-MCNC: 6.3 G/DL (ref 6–8.5)
PROT UR QL STRIP: ABNORMAL
RBC # BLD AUTO: 2.7 10*6/MM3 (ref 3.77–5.28)
RBC # UR STRIP: ABNORMAL /HPF
REF LAB TEST METHOD: ABNORMAL
SODIUM SERPL-SCNC: 143 MMOL/L (ref 136–145)
SP GR UR STRIP: 1.02 (ref 1–1.03)
SQUAMOUS #/AREA URNS HPF: ABNORMAL /HPF
UROBILINOGEN UR QL STRIP: ABNORMAL
WBC # UR STRIP: ABNORMAL /HPF
WBC NRBC COR # BLD: 6.09 10*3/MM3 (ref 3.4–10.8)

## 2022-01-17 PROCEDURE — 85025 COMPLETE CBC W/AUTO DIFF WBC: CPT | Performed by: PHYSICIAN ASSISTANT

## 2022-01-17 PROCEDURE — 74176 CT ABD & PELVIS W/O CONTRAST: CPT

## 2022-01-17 PROCEDURE — 36415 COLL VENOUS BLD VENIPUNCTURE: CPT

## 2022-01-17 PROCEDURE — 80053 COMPREHEN METABOLIC PANEL: CPT | Performed by: PHYSICIAN ASSISTANT

## 2022-01-17 PROCEDURE — 81001 URINALYSIS AUTO W/SCOPE: CPT | Performed by: PHYSICIAN ASSISTANT

## 2022-01-17 PROCEDURE — 99283 EMERGENCY DEPT VISIT LOW MDM: CPT

## 2022-01-17 PROCEDURE — 83690 ASSAY OF LIPASE: CPT | Performed by: PHYSICIAN ASSISTANT

## 2022-01-17 RX ORDER — ONDANSETRON 4 MG/1
4 TABLET, FILM COATED ORAL 4 TIMES DAILY PRN
Qty: 12 TABLET | Refills: 0 | Status: SHIPPED | OUTPATIENT
Start: 2022-01-17 | End: 2022-01-01

## 2022-01-17 NOTE — DISCHARGE INSTRUCTIONS
Although you are being discharged from the ED today, I encourage you to return for worsening symptoms. Things can, and do, change such that treatment at home with medication may not be adequate. Specifically I recommend returning for severe or intractable abdominal pain, vomiting, high fever or any other worsening or alarming symptoms.     Rest. Drink plenty of fluids.  Continue your iron supplementation as prescribed.  Eat a bland diet.  Follow up with your GI doctor for further evaluation and management.  Follow up with primary care provider for further management and to have blood pressure rechecked.  Take your medications as prescribed.

## 2022-01-17 NOTE — ED PROVIDER NOTES
EMERGENCY DEPARTMENT ENCOUNTER    Room Number:  04/04  Date seen:  1/17/2022  Time seen: 11:49 EST  PCP: Naomi Rivera APRN  Historian: patient      HPI:  Chief Complaint: abdominal pain    Context: Kellen Parmar is a 86 y.o. female who presents to the ED for evaluation of abdominal pain.  Patient states she has had abdominal pain for a couple of weeks.  She states she was diagnosed with diverticulitis at [facility] couple of weeks ago and just finished Augmentin for it last week.  She states she is still having abdominal pain on and off, worse when she eats.  She has had C. difficile before in the past although states she does not have any watery stool at this time.  She denies any blood in her stool.  She denies any nausea or vomiting.  She denies any fever or chills.  She has had a cholecystectomy and appendectomy.  She states she has had a colonoscopy but has been several years.  She is not aware of any abnormal findings on colonoscopies in the past.      PAST MEDICAL HISTORY  Active Ambulatory Problems     Diagnosis Date Noted   • Clostridium difficile colitis 07/15/2021   • HTN (hypertension) 07/15/2021   • CKD (chronic kidney disease) stage 4, GFR 15-29 ml/min ([facility]) 07/15/2021   • JEANIE (acute kidney injury) ([facility]) 07/15/2021   • Hyponatremia 07/15/2021   • Hypokalemia 07/15/2021   • Anemia 07/15/2021   • Leukocytosis 07/15/2021   • Acute metabolic encephalopathy 07/15/2021   • Dementia without behavioral disturbance ([facility]) 07/15/2021   • Carotid stenosis, bilateral 07/17/2021   • Diarrhea 09/23/2021     Resolved Ambulatory Problems     Diagnosis Date Noted   • No Resolved Ambulatory Problems     Past Medical History:   Diagnosis Date   • Asthma    • Cancer (HCC)    • Dementia (HCC)    • Diabetes mellitus (HCC)    • Disease of thyroid gland    • GERD (gastroesophageal reflux disease)    • Hypertension          PAST SURGICAL HISTORY  Past Surgical History:   Procedure Laterality Date   •  CHOLECYSTECTOMY           FAMILY HISTORY  History reviewed. No pertinent family history.      SOCIAL HISTORY  Social History     Socioeconomic History   • Marital status: Single   Tobacco Use   • Smoking status: Former Smoker     Packs/day: 0.50     Years: 15.00     Pack years: 7.50     Quit date:      Years since quittin.0   • Smokeless tobacco: Former User   Substance and Sexual Activity   • Alcohol use: Not Currently   • Drug use: Never   • Sexual activity: Defer         ALLERGIES  Contrast dye and Shrimp        REVIEW OF SYSTEMS  Review of Systems   Constitutional: Negative for chills and fever.   Respiratory: Negative for cough and shortness of breath.    Cardiovascular: Negative for chest pain.   Gastrointestinal: Positive for abdominal pain and nausea. Negative for blood in stool, diarrhea and vomiting.   Genitourinary: Negative for dysuria and flank pain.   Musculoskeletal: Negative for back pain.   Neurological: Negative for light-headedness.   Psychiatric/Behavioral: Negative for confusion.   All other systems reviewed and are negative.       All systems reviewed and negative except for those discussed in HPI.       PHYSICAL EXAM  ED Triage Vitals   Temp Heart Rate Resp BP SpO2   22 1034 22 1035 22 1035 22 1035 22 1035   96.8 °F (36 °C) 68 16 (!) 203/75 96 %      Temp src Heart Rate Source Patient Position BP Location FiO2 (%)   22 1034 22 1035 -- -- --   Tympanic Monitor            GENERAL: not distressed  HENT: atraumatic  EYES: no scleral icterus  CV: regular rhythm, regular rate  RESPIRATORY: normal effort, no wheezes, rhonchi, rales.  ABDOMEN: soft, tender diffusely in the upper abdomen.  No distention.  Normal bowel sounds.  No guarding  MUSCULOSKELETAL: no deformity  NEURO: alert, moves all extremities, follows commands  SKIN: warm, dry    Vital signs and nursing notes reviewed.          LAB RESULTS  Recent Results (from the past 24 hour(s))    Urinalysis With Microscopic If Indicated (No Culture) - Urine, Clean Catch    Collection Time: 01/17/22 11:27 AM    Specimen: Urine, Clean Catch   Result Value Ref Range    Color, UA Yellow Yellow, Straw    Appearance, UA Clear Clear    pH, UA 5.5 5.0 - 8.0    Specific Gravity, UA 1.023 1.005 - 1.030    Glucose, UA Negative Negative    Ketones, UA Trace (A) Negative    Bilirubin, UA Negative Negative    Blood, UA Negative Negative    Protein, UA >=300 mg/dL (3+) (A) Negative    Leuk Esterase, UA Trace (A) Negative    Nitrite, UA Negative Negative    Urobilinogen, UA 0.2 E.U./dL 0.2 - 1.0 E.U./dL   Urinalysis, Microscopic Only - Urine, Clean Catch    Collection Time: 01/17/22 11:27 AM    Specimen: Urine, Clean Catch   Result Value Ref Range    RBC, UA 0-2 None Seen, 0-2 /HPF    WBC, UA 6-12 (A) None Seen, 0-2 /HPF    Bacteria, UA None Seen None Seen /HPF    Squamous Epithelial Cells, UA 0-2 None Seen, 0-2 /HPF    Hyaline Casts, UA 3-6 None Seen /LPF    Methodology Automated Microscopy    Comprehensive Metabolic Panel    Collection Time: 01/17/22 12:00 PM    Specimen: Blood   Result Value Ref Range    Glucose 122 (H) 65 - 99 mg/dL    BUN 17 8 - 23 mg/dL    Creatinine 1.77 (H) 0.57 - 1.00 mg/dL    Sodium 143 136 - 145 mmol/L    Potassium 3.1 (L) 3.5 - 5.2 mmol/L    Chloride 103 98 - 107 mmol/L    CO2 29.1 (H) 22.0 - 29.0 mmol/L    Calcium 8.9 8.6 - 10.5 mg/dL    Total Protein 6.3 6.0 - 8.5 g/dL    Albumin 3.60 3.50 - 5.20 g/dL    ALT (SGPT) 9 1 - 33 U/L    AST (SGOT) 15 1 - 32 U/L    Alkaline Phosphatase 73 39 - 117 U/L    Total Bilirubin 0.3 0.0 - 1.2 mg/dL    eGFR Non African Amer 27 (L) >60 mL/min/1.73    Globulin 2.7 gm/dL    A/G Ratio 1.3 g/dL    BUN/Creatinine Ratio 9.6 7.0 - 25.0    Anion Gap 10.9 5.0 - 15.0 mmol/L   CBC Auto Differential    Collection Time: 01/17/22 12:00 PM    Specimen: Blood   Result Value Ref Range    WBC 6.09 3.40 - 10.80 10*3/mm3    RBC 2.70 (L) 3.77 - 5.28 10*6/mm3    Hemoglobin 7.8 (L)  12.0 - 15.9 g/dL    Hematocrit 24.7 (L) 34.0 - 46.6 %    MCV 91.5 79.0 - 97.0 fL    MCH 28.9 26.6 - 33.0 pg    MCHC 31.6 31.5 - 35.7 g/dL    RDW 15.1 12.3 - 15.4 %    RDW-SD 49.8 37.0 - 54.0 fl    MPV 9.5 6.0 - 12.0 fL    Platelets 251 140 - 450 10*3/mm3    Neutrophil % 78.1 (H) 42.7 - 76.0 %    Lymphocyte % 13.6 (L) 19.6 - 45.3 %    Monocyte % 6.4 5.0 - 12.0 %    Eosinophil % 1.1 0.3 - 6.2 %    Basophil % 0.5 0.0 - 1.5 %    Immature Grans % 0.3 0.0 - 0.5 %    Neutrophils, Absolute 4.75 1.70 - 7.00 10*3/mm3    Lymphocytes, Absolute 0.83 0.70 - 3.10 10*3/mm3    Monocytes, Absolute 0.39 0.10 - 0.90 10*3/mm3    Eosinophils, Absolute 0.07 0.00 - 0.40 10*3/mm3    Basophils, Absolute 0.03 0.00 - 0.20 10*3/mm3    Immature Grans, Absolute 0.02 0.00 - 0.05 10*3/mm3    nRBC 0.0 0.0 - 0.2 /100 WBC   Lipase    Collection Time: 01/17/22 12:00 PM    Specimen: Blood   Result Value Ref Range    Lipase 42 13 - 60 U/L       Ordered the above labs and independently reviewed the results.        RADIOLOGY  CT Abdomen Pelvis Without Contrast    Result Date: 1/17/2022  Narrative: CT OF THE ABDOMEN AND PELVIS WITHOUT CONTRAST  HISTORY: 86-year-old female with history of central abdominal pain and a recent diagnosis of diverticulitis status post treatment.  TECHNIQUE: Contiguous axial images were obtained through the abdomen and pelvis without intravenous administration of nonionic contrast. Oral contrast  was not administered. Radiation dose reduction techniques were utilized, including automated exposure control and exposure modulation based on body size.  COMPARISON: CT of the abdomen and pelvis without contrast, 07/15/2021.  FINDINGS: The visualized portion of the lung bases are clear. The visualized portion of the heart has a normal appearance. There is stable atheromatous calcification of the descending thoracic aorta with a maximum cross-sectional dimension of 2.9 cm. The liver has a normal noncontrasted appearance. The pancreas  appears normal. There is stable mild bilateral renal cortical atrophy. A stable 3.7 cm cyst at the lower pole of the left kidney is noted. A 0.8 cm right renal cyst is noted. The adrenal glands appear normal. The spleen has a normal appearance. There is stranding surrounding the sigmoid colon that is in the same distribution as that seen on the prior CT from 07/15/2021. The degree of stranding is less than seen previously, but is persistent. There are multiple diverticula seen throughout this region and there is no central diverticulum to the area of stranding. A small amount of free fluid is seen within the pelvic cul-de-sac. The osseous structures of the lumbar spine and pelvis appear normal. Stable evidence of prior aortobiiliac graft placement is noted.      Impression: There is stranding seen surrounding the sigmoid colon with diverticula throughout the sigmoid colon. No central diverticulum to the stranding is appreciated. The imaging features are more typical of colitis of the sigmoid colon. Sigmoid diverticulosis is also noted.  This report was finalized on 1/17/2022 1:13 PM by Dr. Jermaine Mckeon M.D.        I ordered the above noted radiological studies. Reviewed by me and discussed with radiologist.  See dictation for official radiology interpretation.    MEDICATIONS GIVEN IN ER  Medications - No data to display    MEDICAL RECORD REVIEW  I reviewed the patient's records      PROGRESS, DATA ANALYSIS, CONSULTS, AND MEDICAL DECISION MAKING    All labs have been independently reviewed by me.  All radiology studies have been reviewed by me. Discussion below represents my analysis of pertinent findings related to patient's condition, differential diagnosis, treatment plan and final disposition.    DDX includes but not limited to diverticulitis, colitis, gastritis, gastroenteritis, pancreatitis, abdominal mass.      ED Course as of 01/17/22 1458   Mon Jan 17, 2022   1424 Patient rechecked.  She is resting  comfortably.  I informed her of lab and imaging findings.  Her creatinine is elevated although at her baseline.  Her hemoglobin is 7.8, although it was 7.8 approximately 2 weeks ago as well.  She is aware of her anemia and is currently taking iron supplementation for this.  Her PCP is following closely.  Her CT scan is showing chronic findings with no acute signs of infection at this time.  I recommended she follow-up with GI for further evaluation.  The patient has seen Dr. Nichols before in the past and is comfortable seeing him again.  I recommended she return with severe intractable abdominal pain, intractable vomiting, high fever, or lightheadedness, chest pain, shortness of breath, or syncope.  Patient agrees to plan of care. [AH]      ED Course User Index  [AH] Fior Harrison PA           Reviewed pt's history and workup with Dr. Murrieta.  After a bedside evaluation; they agree with the plan of care      Patient's disposition is discharge.    Patient was placed in face mask in first look. Patient was wearing facemask each time I entered the room and throughout our encounter. I wore full protective equipment throughout this patient encounter including a face mask, eye shield, gown and gloves. Hand hygiene was performed before donning protective equipment and after removal when leaving the room.        DIAGNOSIS  Final diagnoses:   Generalized abdominal pain   Chronic kidney disease, unspecified CKD stage   Chronic anemia   Colitis           Latest Documented Vital Signs:  As of 14:58 EST  BP- (!) 189/70 HR- 56 Temp- 96.8 °F (36 °C) (Tympanic) O2 sat- 98%         Fior Harrison PA  01/17/22 2871

## 2022-01-17 NOTE — ED PROVIDER NOTES
The JAY and I have discussed this patient's history, physical exam and treatment plan.  I provided a substantive portion of the care of this patient.  I have reviewed the documentation and personally had a face to face interaction with the patient and personally performed the physical exam, in its entirety.  I affirm the documentation and agree with the treatment and plan.  The following describes my personal findings.      The patient presents complaining of abdominal pain for several weeks.  Patient reports she was told she has diverticulitis, given a prescription for Augmentin which she reports she finished last week.  Patient reports she is having bowel movements 3 times daily and that this is normal for her, denies dysuria, hematuria, blood or black color stool, fever, vomiting.  Patient reports the pain was worse earlier today, improved in the ER.    Comprehensive Physical exam:  Patient is nontoxic appearing conversant, awake, alert  HEENT: normocephalic, atraumatic  Neck: Supple, no goiter, no pain with ROM  Pulmonary: Nontachypneic, breath sounds are well bilaterally nontachycardic, posterior tibial pulses intact bilateral lower extremities  cardiovascular: Nontachycardic, posterior tibial pulses intact  Abdomen: Mild tenderness to palpation suprapubic, changes on quickly, present at times during exam and not present at others  musculoskeletal: Good range of motion x4 extremities, no deformity  Neuro/psychiatric:calm, appropriate, cooperative  Skin:warm, dry      Patient was wearing facemask when I entered the room and throughout our encounter. Full protective equipment was worn throughout this patient encounter including a face mask, eye protection and gloves. Hand hygiene was performed before donning protective equipment and after removal when leaving the room.           Stephanie Murrieta MD  01/17/22 0518

## 2022-01-17 NOTE — ED TRIAGE NOTES
Pt has abd pain.  Was seen at [facility] and dx c diverticulitis.  She has taken her abx but she still has int pain.      Patient was placed in face mask during first look triage.  Patient was wearing a face mask throughout encounter.  I wore personal protective equipment throughout the encounter.  Hand hygiene was performed before and after patient encounter.

## 2022-03-13 NOTE — ED PROVIDER NOTES
EMERGENCY DEPARTMENT ENCOUNTER    Room Number:    Date of encounter:  3/14/2022  PCP: Provider, No Known  Historian: Patient      HPI:  Chief Complaint: Unwitnessed fall  A complete HPI/ROS/PMH/PSH/SH/FH are unobtainable due to: Dementia  Context: Kellen Parmar is a 86 y.o. female who was brought to the emergency department for further evaluation of an unwitnessed fall.  Patient is reportedly on anticoagulants and there was concern that she may have hit her head.  Patient was sent to the emergency department for further evaluation.    Patient is full code    PAST MEDICAL HISTORY  Active Ambulatory Problems     Diagnosis Date Noted   • Clostridium difficile colitis 07/15/2021   • HTN (hypertension) 07/15/2021   • CKD (chronic kidney disease) stage 4, GFR 15-29 ml/min ([facility]) 07/15/2021   • JEANIE (acute kidney injury) ([facility]) 07/15/2021   • Hyponatremia 07/15/2021   • Hypokalemia 07/15/2021   • Anemia 07/15/2021   • Leukocytosis 07/15/2021   • Acute metabolic encephalopathy 07/15/2021   • Dementia without behavioral disturbance (HCC) 07/15/2021   • Carotid stenosis, bilateral 2021   • Diarrhea 2021     Resolved Ambulatory Problems     Diagnosis Date Noted   • No Resolved Ambulatory Problems     Past Medical History:   Diagnosis Date   • Asthma    • Cancer (HCC)    • Dementia (HCC)    • Diabetes mellitus (HCC)    • Disease of thyroid gland    • GERD (gastroesophageal reflux disease)    • Hypertension          PAST SURGICAL HISTORY  Past Surgical History:   Procedure Laterality Date   • CHOLECYSTECTOMY           FAMILY HISTORY  No family history on file.      SOCIAL HISTORY  Social History     Socioeconomic History   • Marital status: Single   Tobacco Use   • Smoking status: Former Smoker     Packs/day: 0.50     Years: 15.00     Pack years: 7.50     Quit date:      Years since quittin.2   • Smokeless tobacco: Former User   Substance and Sexual Activity   • Alcohol use: Not Currently   • Drug  use: Never   • Sexual activity: Defer         ALLERGIES  Contrast dye and Shrimp        REVIEW OF SYSTEMS  Review of Systems   Unable to perform ROS: Dementia        All systems reviewed and negative except for those discussed in HPI.       PHYSICAL EXAM    I have reviewed the triage vital signs and nursing notes.    ED Triage Vitals [03/12/22 2338]   Temp Heart Rate Resp BP SpO2   99.7 °F (37.6 °C) 79 18 108/74 98 %      Temp src Heart Rate Source Patient Position BP Location FiO2 (%)   Tympanic Monitor Lying Right arm --       Physical Exam  GENERAL: Chronically ill-appearing, frail in appearance, does not appear uncomfortable   HENT: nares patent.  Small hematoma overlying the forehead.  No obvious laceration.  C-spine: No step-off deformity  EYES: no scleral icterus  CV: regular rhythm, regular rate, normal S1-S2  RESPIRATORY: normal effort, lungs CTA B  ABDOMEN: soft, nontender  MUSCULOSKELETAL: no deformity, moves upper and lower extremities without any difficulty, T-spine, L-spine appear unremarkable  NEURO: alert to person disoriented to date place and time, moves all extremities, follows commands  SKIN: warm, dry        LAB RESULTS  No results found for this or any previous visit (from the past 24 hour(s)).    Ordered the above labs and independently reviewed the results.        RADIOLOGY  No Radiology Exams Resulted Within Past 24 Hours    I ordered the above noted radiological studies. Reviewed by me and discussed with radiologist.  See dictation for official radiology interpretation.      PROCEDURES    Procedures      MEDICATIONS GIVEN IN ER    Medications - No data to display      PROGRESS, DATA ANALYSIS, CONSULTS, AND MEDICAL DECISION MAKING    All labs have been independently reviewed by me.  All radiology studies have been reviewed by me and discussed with radiologist dictating the report.   EKG's independently viewed and interpreted by me.  Discussion below represents my analysis of pertinent  findings related to patient's condition, differential diagnosis, treatment plan and final disposition.        ED Course as of 03/14/22 0658   Sun Mar 13, 2022   0108 CT head and C-spine show no acute process.  Patient is very well-appearing and ambulates without any difficulty.  There is no clinical indication for admission at this time.  Will DC back to the nursing home with close head injury instructions. [RC]   0129 Patient was formally discharged 30 minutes ago.  It is going to take EMS while to actually get the patient.  Her status was changed back to in process as a result. [RC]      ED Course User Index  [RC] Jose Batista III, PA           PPE: The patient wore a surgical mask throughout the entire patient encounter. I wore an N95.    AS OF 06:58 EDT VITALS:    BP - (!) 207/97  HR - 87  TEMP - 99.7 °F (37.6 °C) (Tympanic)  O2 SATS - 97%        DIAGNOSIS  Final diagnoses:   Dementia without behavioral disturbance, unspecified dementia type (HCC)   Fall, initial encounter   Injury of head, initial encounter         DISPOSITION  DISCHARGE    Patient discharged in stable condition.    Reviewed implications of results, diagnosis, meds, responsibility to follow up, warning signs and symptoms of possible worsening, potential complications and reasons to return to ER.    Patient/Family voiced understanding of above instructions.    Discussed plan for discharge, as there is no emergent indication for admission. Patient referred to primary care provider for BP management due to today's BP. Pt/family is agreeable and understands need for follow up and repeat testing.  Pt is aware that discharge does not mean that nothing is wrong but it indicates no emergency is present that requires admission and they must continue care with follow-up as given below or physician of their choice.     FOLLOW-UP  Your resident     In 2 days  For further evaluation and treatment, As needed         Medication List      No  changes were made to your prescriptions during this visit.                Jose Batista III, PA  03/14/22 0659

## 2022-03-13 NOTE — DISCHARGE INSTRUCTIONS
Return to the emergency department should the patient develop any new symptoms, develop unexplained vomiting, or complain of a sudden worsening headache.  Recommend a provider at the nursing facility reevaluate the patient within 48 hours to ensure she is at her baseline.

## 2022-03-13 NOTE — ED NOTES
Pt arrived by EMS, [facility] dx of dementia, fall at facility unwitnessed, pt is on blood thinners, pt is baseline at this time disoriented to situation. Pt is ambulatory at facility     Patient was placed in face mask during first look triage.  Patient was wearing a face mask throughout encounter.  I wore personal protective equipment throughout the encounter.  Hand hygiene was performed before and after patient encounter.

## 2022-03-21 PROBLEM — K92.2 GI BLEED: Status: ACTIVE | Noted: 2022-01-01

## 2022-03-21 NOTE — ED TRIAGE NOTES
Patient sent in from [facility], they had labs drawn and said she had several low labs. Patient hemaglobin was low. They states that her BUN was high.     Patient has no complaints at this time. Patient is A&ox3 at baseline. PAtient has on mask nurse has on proper ppe.

## 2022-03-22 PROBLEM — U07.1 COVID-19 VIRUS DETECTED: Status: ACTIVE | Noted: 2022-01-01

## 2022-03-22 PROBLEM — I25.10 CORONARY ARTERY DISEASE INVOLVING NATIVE CORONARY ARTERY OF NATIVE HEART WITHOUT ANGINA PECTORIS: Status: ACTIVE | Noted: 2022-01-01

## 2022-03-22 PROBLEM — R33.8 ACUTE URINARY RETENTION: Status: ACTIVE | Noted: 2022-01-01

## 2022-03-22 PROBLEM — K92.2 GASTROINTESTINAL HEMORRHAGE, UNSPECIFIED GASTROINTESTINAL HEMORRHAGE TYPE: Status: ACTIVE | Noted: 2022-01-01

## 2022-03-22 PROBLEM — I16.0 HYPERTENSIVE URGENCY: Status: ACTIVE | Noted: 2022-01-01

## 2022-03-22 NOTE — ED PROVIDER NOTES
EMERGENCY DEPARTMENT ENCOUNTER    Room Number:    Date of encounter:  3/21/2022  PCP: Provider, No Known  Historian: Patient, EMS report      HPI:  Chief Complaint: Abnormal labs  A complete HPI/ROS/PMH/PSH/SH/FH are unobtainable due to: Dementia    Context: Kellen Parmar is a 86 y.o. female who presents to the ED c/o abnormal labs.  Patient found to have low hemoglobin and elevated BUN per her facility.  No known bleeding in her stool.  No falls recently although patient reportedly fell 2 months ago.  Patient is on Plavix.    PAST MEDICAL HISTORY  Active Ambulatory Problems     Diagnosis Date Noted   • Clostridium difficile colitis 07/15/2021   • HTN (hypertension) 07/15/2021   • CKD (chronic kidney disease) stage 4, GFR 15-29 ml/min ([facility]) 07/15/2021   • JEANIE (acute kidney injury) ([facility]) 07/15/2021   • Hyponatremia 07/15/2021   • Hypokalemia 07/15/2021   • Anemia 07/15/2021   • Leukocytosis 07/15/2021   • Acute metabolic encephalopathy 07/15/2021   • Dementia without behavioral disturbance (HCC) 07/15/2021   • Carotid stenosis, bilateral 2021   • Diarrhea 2021     Resolved Ambulatory Problems     Diagnosis Date Noted   • No Resolved Ambulatory Problems     Past Medical History:   Diagnosis Date   • Asthma    • Cancer (HCC)    • Dementia (HCC)    • Diabetes mellitus (HCC)    • Disease of thyroid gland    • GERD (gastroesophageal reflux disease)    • Hypertension          PAST SURGICAL HISTORY  Past Surgical History:   Procedure Laterality Date   • CHOLECYSTECTOMY           FAMILY HISTORY  History reviewed. No pertinent family history.      SOCIAL HISTORY  Social History     Socioeconomic History   • Marital status: Single   Tobacco Use   • Smoking status: Former Smoker     Packs/day: 0.50     Years: 15.00     Pack years: 7.50     Quit date:      Years since quittin.2   • Smokeless tobacco: Former User   Substance and Sexual Activity   • Alcohol use: Not Currently   • Drug use: Never   •  Sexual activity: Defer         ALLERGIES  Contrast dye, Shrimp, and Shellfish-derived products        REVIEW OF SYSTEMS  Review of Systems     All systems reviewed and negative except for those discussed in HPI.       PHYSICAL EXAM    I have reviewed the triage vital signs and nursing notes.    ED Triage Vitals [03/21/22 1728]   Temp Heart Rate Resp BP SpO2   98.1 °F (36.7 °C) 70 16 180/60 98 %      Temp src Heart Rate Source Patient Position BP Location FiO2 (%)   -- -- -- -- --       Physical Exam  GENERAL: not distressed  HENT: nares patent  EYES: no scleral icterus  CV: regular rhythm, regular rate  RESPIRATORY: normal effort, clear to auscultation bilaterally  ABDOMEN: soft, nontender, brown stool, heme positive  MUSCULOSKELETAL: no deformity  NEURO: alert, moves all extremities, follows commands  SKIN: warm, dry        LAB RESULTS  Recent Results (from the past 24 hour(s))   Comprehensive Metabolic Panel    Collection Time: 03/21/22  5:54 PM    Specimen: Blood   Result Value Ref Range    Glucose 81 65 - 99 mg/dL    BUN 34 (H) 8 - 23 mg/dL    Creatinine 1.89 (H) 0.57 - 1.00 mg/dL    Sodium 137 136 - 145 mmol/L    Potassium 4.2 3.5 - 5.2 mmol/L    Chloride 104 98 - 107 mmol/L    CO2 20.4 (L) 22.0 - 29.0 mmol/L    Calcium 8.5 (L) 8.6 - 10.5 mg/dL    Total Protein 7.1 6.0 - 8.5 g/dL    Albumin 4.30 3.50 - 5.20 g/dL    ALT (SGPT) 19 1 - 33 U/L    AST (SGOT) 26 1 - 32 U/L    Alkaline Phosphatase 96 39 - 117 U/L    Total Bilirubin <0.2 0.0 - 1.2 mg/dL    Globulin 2.8 gm/dL    A/G Ratio 1.5 g/dL    BUN/Creatinine Ratio 18.0 7.0 - 25.0    Anion Gap 12.6 5.0 - 15.0 mmol/L    eGFR 25.9 (L) >60.0 mL/min/1.73   Type & Screen    Collection Time: 03/21/22  5:54 PM    Specimen: Blood   Result Value Ref Range    ABO Type O     RH type Negative     Antibody Screen Positive     T&S Expiration Date 3/24/2022 11:59:59 PM    CBC Auto Differential    Collection Time: 03/21/22  5:54 PM    Specimen: Blood   Result Value Ref Range     WBC 5.20 3.40 - 10.80 10*3/mm3    RBC 2.65 (L) 3.77 - 5.28 10*6/mm3    Hemoglobin 8.2 (L) 12.0 - 15.9 g/dL    Hematocrit 25.1 (L) 34.0 - 46.6 %    MCV 94.7 79.0 - 97.0 fL    MCH 30.9 26.6 - 33.0 pg    MCHC 32.7 31.5 - 35.7 g/dL    RDW 15.0 12.3 - 15.4 %    RDW-SD 51.0 37.0 - 54.0 fl    MPV 9.5 6.0 - 12.0 fL    Platelets 230 140 - 450 10*3/mm3    Neutrophil % 63.9 42.7 - 76.0 %    Lymphocyte % 23.1 19.6 - 45.3 %    Monocyte % 9.0 5.0 - 12.0 %    Eosinophil % 2.5 0.3 - 6.2 %    Basophil % 1.3 0.0 - 1.5 %    Immature Grans % 0.2 0.0 - 0.5 %    Neutrophils, Absolute 3.32 1.70 - 7.00 10*3/mm3    Lymphocytes, Absolute 1.20 0.70 - 3.10 10*3/mm3    Monocytes, Absolute 0.47 0.10 - 0.90 10*3/mm3    Eosinophils, Absolute 0.13 0.00 - 0.40 10*3/mm3    Basophils, Absolute 0.07 0.00 - 0.20 10*3/mm3    Immature Grans, Absolute 0.01 0.00 - 0.05 10*3/mm3    nRBC 0.0 0.0 - 0.2 /100 WBC       Ordered the above labs and independently reviewed the results.        RADIOLOGY  No Radiology Exams Resulted Within Past 24 Hours    I ordered the above noted radiological studies. Reviewed by me and discussed with radiologist.  See dictation for official radiology interpretation.      PROCEDURES    Procedures      MEDICATIONS GIVEN IN ER    Medications   sodium chloride 0.9 % flush 10 mL (has no administration in time range)   pantoprazole (PROTONIX) injection 80 mg (has no administration in time range)         PROGRESS, DATA ANALYSIS, CONSULTS, AND MEDICAL DECISION MAKING    All labs have been independently reviewed by me.  All radiology studies have been reviewed by me and discussed with radiologist dictating the report.   EKG's independently viewed and interpreted by me.  Discussion below represents my analysis of pertinent findings related to patient's condition, differential diagnosis, treatment plan and final disposition.    Patient presents with elevated BUN and stable hemoglobin.  She is on Plavix.  Logistically, it would be challenging  for her to get an outpatient EGD.  Therefore, admit for endoscopy given that she has newly elevated BUN on Plavix with heme positive stool.    ED Course as of 03/21/22 2009   Mon Mar 21, 2022   1901 Hemoglobin(!): 8.2 [TD]   1901 Creatinine(!): 1.89 [TD]   1934 Brown stool on rectal exam.  Heme positive. [TD]      ED Course User Index  [TD] Nayan Zuniga II, MD       I discussed the case with ANDREA Gonzales for observation medicine.  We reviewed the patient's labs, history.  She will admit.    PPE: The patient wore a surgical mask throughout the entire patient encounter. I wore an N95.    AS OF 20:09 EDT VITALS:    BP - (!) 214/80  HR - 74  TEMP - 98.1 °F (36.7 °C)  O2 SATS - 94%        DIAGNOSIS  Final diagnoses:   Gastrointestinal hemorrhage, unspecified gastrointestinal hemorrhage type         DISPOSITION  Admit           Nayan Zuniga II, MD  03/21/22 2009

## 2022-03-22 NOTE — H&P
[facility]   HISTORY AND PHYSICAL    Patient Name: Kellen Parmar  : 1935  MRN: [id number]  Primary Care Physician:  Provider, No Known  Date of admission: 3/21/2022    Subjective   Subjective     Chief Complaint:   Chief Complaint   Patient presents with   • Abnormal Lab         HPI:    Kellen Parmar is a 86 y.o. female with a history of hypertension, diabetes, hypothyroidism, and dementia who presents to [facility] ER with a low hemoglobin and elevated BUN per [facility].  Patient is alert and oriented to self only on my exam.  She is a poor historian is unable to tell me if she has had any bleeding in her stool.  She did say that she had a fall about a month ago as she has a large bruise on her left forehead.  She denies any complaints of pain at this time.    ER evaluation significant for hemoglobin 8.2, BUN 34, and creatinine of 1.89.  Stool hemoccult was positive. Patient was also noted COVID-19 positive however her daughter reports over found that she had Covid back in February. Patient was given a bolus of Protonix and admitted to the observation unit for further evaluation.    Review of Systems   To obtain a reliable review of systems due to patient's mental capacity    Personal History     Past Medical History:   Diagnosis Date   • Anemia    • Asthma    • Cancer (HCC)    • Dementia (HCC)    • Diabetes mellitus (HCC)    • Disease of thyroid gland    • GERD (gastroesophageal reflux disease)    • Hypertension        Past Surgical History:   Procedure Laterality Date   • CHOLECYSTECTOMY         Family History: family history is not on file. Otherwise pertinent FHx was reviewed and not pertinent to current issue.    Social History:  reports that she quit smoking about 22 years ago. She has a 7.50 pack-year smoking history. She has quit using smokeless tobacco. She reports previous alcohol use. She reports that she does not use drugs.    Home  Medications:  Clopidogrel Bisulfate, HYDROcodone-acetaminophen, Melatonin, allopurinol, amLODIPine, aspirin, atorvastatin, cetirizine, cholecalciferol, dicyclomine, docusate sodium, donepezil, famotidine, ferrous sulfate, furosemide, levothyroxine, losartan, montelukast, ondansetron, polycarbophil, potassium chloride, pravastatin, primidone, sertraline, traZODone, and vitamin C    Allergies:  Allergies   Allergen Reactions   • Contrast Dye Anaphylaxis   • Iodinated Diagnostic Agents Anaphylaxis   • Shrimp Nausea And Vomiting     Allergic to all sea food   • Shellfish-Derived Products Nausea And Vomiting       Objective   Objective     Vitals:   Temp:  [97.7 °F (36.5 °C)-98.1 °F (36.7 °C)] 97.7 °F (36.5 °C)  Heart Rate:  [63-79] 79  Resp:  [16] 16  BP: (180-222)/() 222/178     Physical Exam    Constitutional: 86-year-old female, well nourished, no acute distress on room air   Eyes: PERRLA, sclerae anicteric, no conjunctival injection   HENT: NCAT, mucous membranes moist   Neck: Supple, no thyromegaly, no lymphadenopathy, trachea midline   Respiratory: Clear to auscultation bilaterally, nonlabored respirations    Cardiovascular: RRR, no murmurs, rubs, or gallops, palpable pedal pulses bilaterally   Gastrointestinal: Positive bowel sounds, soft, nontender, nondistended   Musculoskeletal: No bilateral ankle edema, no clubbing or cyanosis to extremities   Psychiatric: Appropriate affect, cooperative   Neurologic: Oriented to self only, speech clear, sensation intact, follows commands, no focal neurological deficit noted   Skin: Pale, warm and dry, no rashes     Result Review    Result Review:  I have personally reviewed the results from the time of this admission to 3/21/2022 23:08 EDT and agree with these findings:  [x]  Laboratory  []  Microbiology  []  Radiology  []  EKG/Telemetry   []  Cardiology/Vascular   []  Pathology  []  Old records  []  Other:  Most notable findings include: Hemoglobin 8.2, BUN 34,  creatinine 1.89, Covid positive    Assessment/Plan   Assessment / Plan     Brief Patient Summary:  Kellen Parmar is a 86 y.o. female who is being admitted to the observation unit for GI bleed with plan for continued monitoring of hemoglobin levels and GI consultation.    Active Hospital Problems:  Active Hospital Problems    Diagnosis    • GI bleed      Plan:     GI bleed  -Hemoglobin 8.2, monitor every 6 hours, repeat hemoglobin 6.7  -Stool Hemoccult positive in ER  -No large bloody bowel movements noted by nursing staff  -Order placed for transfusion 2 units PRBC  -Protonix drip  -GI consulted  -Hemodynamically stable  -N.p.o.    COVID-19 infection  -Daughter reported patient was Covid positive in mid February  -Patient without complaints  -Stable on room air  -Isolation precautions    Type 2 diabetes  -Glucose 81  -Accu-Cheks with meals and at bedtime    Hypertension  -Continue home medications at this time.    Hypothyroidism  -Continue levothyroxine    Dementia  -Patient oriented to self only, daughter reported that is patient's baseline    DVT prophylaxis:  No DVT prophylaxis order currently exists.    CODE STATUS:    Code Status (Patient has no pulse and is not breathing): No CPR (Do Not Attempt to Resuscitate)  Medical Interventions (Patient has pulse or is breathing): Full Support    Admission Status:  I believe this patient meets observation status.    I wore an face mask, eye protection, and gloves during this patient encounter. Patient also wearing a surgical mask. Hand hygeine performed before and after seeing the patient.      Electronically signed by Katelynn Mishra PA-C, 03/21/22, 11:08 PM EDT.       **Addendum  This morning nursing staff noted patient with no urinary output overnight, bladder scan was performed that showed around 200 cc in the bladder.  A.m. labs are pending with blood transfusion running.  I spoke with on-call provider with [facility] who agrees to accept patient under   Shaista Cho.  Transfer orders have been placed at this time.

## 2022-03-22 NOTE — PLAN OF CARE
Goal Outcome Evaluation:  Plan of Care Reviewed With: patient        Progress: no change  Outcome Evaluation: Alert & oriented X3, no complaints of pain, Hgb 6.7, 2 units ordered, received 1st unit, GI consult pending, NPO since MN, protonix drip infusing, no signs of bleeding noted, VSS, will continue to monitor.

## 2022-03-22 NOTE — CASE MANAGEMENT/SOCIAL WORK
Discharge Planning Assessment  [facility]     Patient Name: Kellen Parmar  MRN: [id number]  Today's Date: 3/21/2022    Admit Date: 3/21/2022     Discharge Needs Assessment     Row Name 03/21/22 2125       Living Environment    People in Home facility resident  [facility]    Current Living Arrangements extended care facility  [facility]    Provides Primary Care For no one, unable/limited ability to care for self    Family Caregiver if Needed other (see comments)  Facility resident    Able to Return to Prior Arrangements yes       Resource/Environmental Concerns    Resource/Environmental Concerns none       Transition Planning    Patient/Family Anticipates Transition to long-term care facility    Transportation Anticipated health plan transportation       Discharge Needs Assessment    Current Outpatient/Agency/Support Group long-term acute care facility    Equipment Currently Used at Home other (see comments)  [facility]    Concerns to be Addressed discharge planning    Anticipated Changes Related to Illness none    Equipment Needed After Discharge none    Provided Post Acute Provider List? N/A    Provided Post Acute Provider Quality & Resource List? N/A               Discharge Plan     Row Name 03/21/22 2128       Plan    Plan Pt will be returning to [facility] upon discharge    Provided Post Acute Provider List? N/A    Provided Post Acute Provider Quality & Resource List? N/A    Plan Comments Pt is a long term resident of [facility] and will be returning To [facility]. Will require transportation upon discharge              Continued Care and Services - Admitted Since 3/21/2022    Coordination has not been started for this encounter.          Demographic Summary    No documentation.                Functional Status    No documentation.                Psychosocial    No documentation.                Abuse/Neglect    No documentation.                Legal     No documentation.                Substance Abuse    No documentation.                Patient Forms    No documentation.                   Nolvia Almaraz RN

## 2022-03-22 NOTE — NURSING NOTE
Per observation MICAH Arguello, okay to wait to draw 0600 H&H until after blood finishes infusing.

## 2022-03-22 NOTE — CONSULTS
Patient Name:  Kellen Parmar  YOB: 1935  MRN:  [id number]  Date of Admission:  3/21/2022  Date of Consult:  3/22/2022  Patient Care Team:  Provider, No Known as PCP - General    Inpatient Internal Medicine Consult  Consult performed by: Jossie Peña APRN  Consult ordered by: Katelynn Mishra PA-C        Subjective   History of Present Illness  Ms. Parmar is a 86 y.o. female that has been admitted to [facility] observation unit for anemia.  She has a history of HTN, DM 2, hypothyroidism, dementia oriented to self only that resides at [facility].  She can contribute very little to history.  She denies overt bleeding or abdominal pain.  She is not exactly sure why she is in the hospital.  Heart reviewed and patient's hemoglobin was 8.2 on arrival then dropped to 6.  She is being transfused 2 unit packed red blood cells.  No overt bleeding but a Hemoccult stool positive.  CT abdomen pelvis suggestive of possible colitis of the sigmoid colon.  She is positive for COVID-19 but is asymptomatic and per family tested positive in February.  [facility] has been asked to admit the patient for further work-up of her anemia.  Gastroenterology has evaluated the patient      Past Medical History:   Diagnosis Date   • Anemia    • Asthma    • Cancer (HCC)    • Dementia (HCC)    • Diabetes mellitus (HCC)    • Disease of thyroid gland    • GERD (gastroesophageal reflux disease)    • Hypertension      Past Surgical History:   Procedure Laterality Date   • CHOLECYSTECTOMY       History reviewed. No pertinent family history.  Social History     Tobacco Use   • Smoking status: Former Smoker     Packs/day: 0.50     Years: 15.00     Pack years: 7.50     Quit date:      Years since quittin.2   • Smokeless tobacco: Former User   Substance Use Topics   • Alcohol use: Not Currently   • Drug use: Never     Medications Prior to Admission   Medication Sig Dispense Refill Last Dose    • allopurinol (ZYLOPRIM) 100 MG tablet 100 mg Daily.      • amLODIPine (NORVASC) 10 MG tablet Take 1 tablet by mouth Daily.      • aspirin 81 MG EC tablet 81 mg Daily.      • atorvastatin (LIPITOR) 10 MG tablet Take 10 mg by mouth Daily.      • cetirizine (zyrTEC) 10 MG tablet Take 10 mg by mouth Daily.      • cholecalciferol (VITAMIN D3) 25 MCG (1000 UT) tablet Take 1,000 Units by mouth Daily.      • Clopidogrel Bisulfate (PLAVIX PO) Take 75 mg by mouth Daily.      • docusate sodium (COLACE) 250 MG capsule Take 250 mg by mouth Daily.      • donepezil (ARICEPT) 5 MG tablet Take 1 tablet by mouth Every Night.      • famotidine (PEPCID) 20 MG tablet Take 20 mg by mouth 2 (Two) Times a Day As Needed.      • ferrous sulfate 325 (65 FE) MG tablet Take 325 mg by mouth Daily With Breakfast.      • furosemide (LASIX) 80 MG tablet Take 40 mg by mouth Daily. Mon, Tues, Wed, Thurs, Fri      • furosemide (LASIX) 80 MG tablet Take 80 mg by mouth Daily. Sat, Sun      • levothyroxine (SYNTHROID, LEVOTHROID) 75 MCG tablet 75 mcg Daily. Mon, Tues, Wed, Thurs, Fri      • levothyroxine (SYNTHROID, LEVOTHROID) 75 MCG tablet Take 37.5 mcg by mouth Daily. Sat, Sun      • losartan (COZAAR) 50 MG tablet Take 50 mg by mouth Daily.      • potassium chloride 10 MEQ CR tablet Take 10 mEq by mouth Daily.      • primidone (MYSOLINE) 50 MG tablet Take 50 mg by mouth 3 (Three) Times a Day.      • sertraline (ZOLOFT) 50 MG tablet 100 mg Every Night.      • traZODone (DESYREL) 50 MG tablet Take 50 mg by mouth Every Night.      • vitamin C (ASCORBIC ACID) 250 MG tablet Take 500 mg by mouth 2 (Two) Times a Day.      • amLODIPine (NORVASC) 5 MG tablet 5 mg Daily.      • dicyclomine (BENTYL) 10 MG/5ML syrup Take 20 mg by mouth 4 (Four) Times a Day.      • HYDROcodone-acetaminophen (NORCO) 5-325 MG per tablet Take 1 tablet by mouth.      • Melatonin 5 MG capsule Take 10 mg by mouth Every Night.      • montelukast (SINGULAIR) 10 MG tablet Every Night.       • ondansetron (ZOFRAN) 4 MG tablet Take 1 tablet by mouth 4 (Four) Times a Day As Needed for Nausea or Vomiting. 12 tablet 0    • polycarbophil 625 MG tablet tablet Take 625 mg by mouth Daily.      • pravastatin (PRAVACHOL) 20 MG tablet Every Night.        Allergies:  Contrast dye, Iodinated diagnostic agents, Shrimp, and Shellfish-derived products    Review of Systems   Unable to perform ROS: Dementia       Objective      Vital Signs  Temp:  [97.7 °F (36.5 °C)-98.4 °F (36.9 °C)] 97.7 °F (36.5 °C)  Heart Rate:  [63-79] 69  Resp:  [16] 16  BP: (155-222)/() 218/73  Body mass index is 25.06 kg/m².    Physical Exam  Vitals and nursing note reviewed.   Constitutional:       Appearance: She is well-developed. She is ill-appearing.      Comments: Frail, chronically ill appearing.    HENT:      Head: Normocephalic and atraumatic.   Eyes:      Pupils: Pupils are equal, round, and reactive to light.   Cardiovascular:      Rate and Rhythm: Normal rate and regular rhythm.      Heart sounds: Normal heart sounds.   Pulmonary:      Effort: Pulmonary effort is normal. No respiratory distress.      Breath sounds: Normal breath sounds.   Abdominal:      General: Bowel sounds are normal. There is no distension or abdominal bruit.      Palpations: Abdomen is soft. Abdomen is not rigid. There is no shifting dullness, fluid wave, mass or pulsatile mass.      Tenderness: There is no abdominal tenderness. There is no guarding.      Hernia: No hernia is present.   Musculoskeletal:         General: Normal range of motion.   Skin:     General: Skin is warm and dry.      Comments: Healing bruise left forehead. Scattered ecchymosis.    Neurological:      General: No focal deficit present.      Mental Status: She is alert. Mental status is at baseline. She is disoriented.      Cranial Nerves: No cranial nerve deficit.   Psychiatric:         Behavior: Behavior normal.         Thought Content: Thought content normal.         Results  Review:   I reviewed the patient's new clinical results.  I reviewed the patient's new imaging results and agree with the interpretation.  I reviewed the patient's other test results and agree with the interpretation  Results from last 7 days   Lab Units 03/21/22  2359 03/21/22  1754   WBC 10*3/mm3 5.57 5.20   HEMOGLOBIN g/dL 6.7* 8.2*   HEMATOCRIT % 21.0* 25.1*   PLATELETS 10*3/mm3 200 230     Results from last 7 days   Lab Units 03/21/22  1754   SODIUM mmol/L 137   POTASSIUM mmol/L 4.2   CHLORIDE mmol/L 104   CO2 mmol/L 20.4*   BUN mg/dL 34*   CREATININE mg/dL 1.89*   CALCIUM mg/dL 8.5*   BILIRUBIN mg/dL <0.2   ALK PHOS U/L 96   ALT (SGPT) U/L 19   AST (SGOT) U/L 26   GLUCOSE mg/dL 81                    Assessment/Plan     Active Hospital Problems    Diagnosis  POA   • **GI bleed [K92.2]  Yes   • Gastrointestinal hemorrhage, unspecified gastrointestinal hemorrhage type [K92.2]  Yes   • Hypertensive urgency [I16.0]  Yes   • Coronary artery disease involving native coronary artery of native heart without angina pectoris [I25.10]  Yes   • COVID-19 virus detected [U07.1]  Yes   • Carotid stenosis, bilateral [I65.23]  Yes   • HTN (hypertension) [I10]  Yes   • CKD (chronic kidney disease) stage 4, GFR 15-29 ml/min ([facility]) [N18.4]  Yes   • Dementia without behavioral disturbance ([facility]) [F03.90]  Yes      Resolved Hospital Problems   No resolved problems to display.       Ms. Parmar is a 86 y.o. female admitted with acute on chronic anemia with occult stool positive.    Acute on chronic anemia  Occult stool positive but no overt bleeding.  Baseline hemoglobin 8-9, then 7.8 in the last 2 months.  Check anemia studies.  Transfuse 2 units packed red blood cells.  Continue Protonix.  She is on Plavix last dose 3/21 which has been held now. Gastroenterology is proposed to family EGD and colonoscopy and awaiting their decision.  Possible colitis on CT abdomen pelvis but she is completely asymptomatic without fever or  leukocytosis.  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  Left TKA CAR 2021.  She remains on Plavix which has been held on admission.    Hypertensive urgency  Her blood pressures been elevated since admission but she has not been given anything to address it.  Her home meds were resumed this morning 9 AM.  Given additional dose of IV Lopressor.  Add as needed hydralazine if remains elevated this afternoon.    CKD4   baseline crt 1.5-1.8in last 6mo currently 1.77    COVID-19  Specially diagnosed in February.  She is asymptomatic on room air.  Checking with infection control to see if she can come out of isolation    Urinary retention  Retaining 400 to 600 cc in the bladder scan.  Place Rodriguez catheter    Dementia  Pleasant but oriented to self only.  Worried she would not tolerate a bowel prep.    Hypothyroidism  Continue levothyroxine    DM2  External insulin check A1c avoid hypoglycemia    Thank you very much for asking [facility] to be involved in this patient's care. We will follow along with you.    DNR DNI    GI is considering endoscopy for anemia.  Await family's input guarding aggressive measures given comorbidities and dementia.    ANDREA Dai  [facility]  03/22/22  08:54 EDT

## 2022-03-22 NOTE — NURSING NOTE
Notified MD Shaista Cho of /73. MD Cho stated she wasn't familiar with pt as she is new admit to [facility] and she would call back.

## 2022-03-22 NOTE — DISCHARGE PLACEMENT REQUEST
"Kellen Monique (86 y.o. Female)             Date of Birth   1935    Social Security Number       Address   [address] [id number]    Home Phone   [phone]    MRN   [id number]       Worship   None    Marital Status   Single                            Admission Date   3/21/22    Admission Type   Emergency    Admitting Provider   Beck Lira MD    Attending Provider   Beck Lira MD    Department, Room/Bed   [facility] OBSERVATION, 121/1       Discharge Date       Discharge Disposition       Discharge Destination                               Attending Provider: Beck Lira MD    Allergies: Contrast Dye, Iodinated Diagnostic Agents, Shrimp, Shellfish-derived Products    Isolation: Enh Drop/Con   Infection: COVID (confirmed) (03/21/22)   Code Status: No CPR   Advance Care Planning Activity    Ht: 157.5 cm (62\")   Wt: 61.5 kg (135 lb 9.3 oz)    Admission Cmt: None   Principal Problem: None                Active Insurance as of 3/21/2022     Primary Coverage     Payor Plan Insurance Group Employer/Plan Group    MEDICARE MEDICARE A & B      Payor Plan Address Payor Plan Phone Number Payor Plan Fax Number Effective Dates    [address] [phone]  11/1/2002 - None Entered    [place] [address]       Subscriber Name Subscriber Birth Date Member ID       KELLEN MONIQUE 1935 [id number]                 Emergency Contacts      (Rel.) Home Phone Work Phone Mobile Phone    TRUONG ADLER (Daughter) [phone] -- [phone]    MelgarSarah (Grandchild) -- -- [phone]              "

## 2022-03-22 NOTE — ED NOTES
Daughter notified of Covid+ test, she reports pt tested positive last month and will try to get a copy of test done at [facility] home.

## 2022-03-22 NOTE — ED NOTES
Nursing report ED to floor  Kellen Parmar  86 y.o.  female    HPI (triage note):   Chief Complaint   Patient presents with   • Abnormal Lab       Admitting doctor:   Beck Lira MD    Admitting diagnosis:   The encounter diagnosis was Gastrointestinal hemorrhage, unspecified gastrointestinal hemorrhage type.    Code status:   Current Code Status     Date Active Code Status Order ID Comments User Context       Prior    Advance Care Planning Activity          Allergies:   Contrast dye, Shrimp, and Shellfish-derived products    Weight:   There were no vitals filed for this visit.    Most recent vitals:   Vitals:    03/21/22 1745 03/21/22 1746 03/21/22 1925 03/21/22 1928   BP: (!) 181/68   (!) 214/80   Pulse:  74     Resp:   16    Temp:       SpO2:  99% 94%        Active LDAs/IV Access:   Lines, Drains & Airways     Active LDAs     Name Placement date Placement time Site Days    Peripheral IV 03/21/22 1754 Right;Upper;Anterior Arm 03/21/22 1754  Arm  less than 1                Labs (abnormal labs have a star):   Labs Reviewed   COMPREHENSIVE METABOLIC PANEL - Abnormal; Notable for the following components:       Result Value    BUN 34 (*)     Creatinine 1.89 (*)     CO2 20.4 (*)     Calcium 8.5 (*)     eGFR 25.9 (*)     All other components within normal limits    Narrative:     GFR Normal >60  Chronic Kidney Disease <60  Kidney Failure <15     CBC WITH AUTO DIFFERENTIAL - Abnormal; Notable for the following components:    RBC 2.65 (*)     Hemoglobin 8.2 (*)     Hematocrit 25.1 (*)     All other components within normal limits   COVID PRE-OP / PRE-PROCEDURE SCREENING ORDER (NO ISOLATION)    Narrative:     The following orders were created for panel order COVID PRE-OP / PRE-PROCEDURE SCREENING ORDER (NO ISOLATION) - Swab, Nasopharynx.  Procedure                               Abnormality         Status                     ---------                               -----------         ------                      COVID-19, [facility]IMI IN-HOUSE...[[id number]]                      In process                   Please view results for these tests on the individual orders.   COVID-19, [facility]IMI IN-HOUSE CEPHEID/JANAY, NP SWAB IN TRANSPORT MEDIA 8-12 HR TAT   TYPE AND SCREEN   CBC AND DIFFERENTIAL    Narrative:     The following orders were created for panel order CBC & Differential.  Procedure                               Abnormality         Status                     ---------                               -----------         ------                     CBC Auto Differential[[id number]]        Abnormal            Final result                 Please view results for these tests on the individual orders.       EKG:   No orders to display       Meds given in ED:   Medications   sodium chloride 0.9 % flush 10 mL (has no administration in time range)   pantoprazole (PROTONIX) injection 80 mg (80 mg Intravenous Given 3/21/22 2013)       Imaging results:  No radiology results for the last day    Ambulatory status:   - assist x1    Social issues:   Social History     Socioeconomic History   • Marital status: Single   Tobacco Use   • Smoking status: Former Smoker     Packs/day: 0.50     Years: 15.00     Pack years: 7.50     Quit date:      Years since quittin.2   • Smokeless tobacco: Former User   Substance and Sexual Activity   • Alcohol use: Not Currently   • Drug use: Never   • Sexual activity: Defer

## 2022-03-22 NOTE — CONSULTS
"[facility]  Initial Inpatient Consult Note    Referring Provider: Dr. FRANCIA Lira    Reason for Consultation: GI bleeding    Subjective     History of present illness:    86 y.o. female with asthma, dementia, DM, GERD, h/o Covid 19 in the NH in 2/22, CAD (her last dose of Plavix was 3/21/22) and HTN who was transferred from her Nursing Home after she was found to be anemic (Hgb 8.2 yesterday) and her stool was found to be heme positive in the ER. The majority of the information is from the daughter, her RN and NP in the Observation Unit.        Patient has been seen by Dr. Aaron Banks in 7 of 2021 with colitis.  The daughter states that the patient has nausea intermittently with postprandial abdominal pain and occasional diarrhea.  Her weight has been stable.  Patient states she does occasionally take ibuprofen.  She is a non-smoker and denies alcohol use.  Patient is a  and has 3 children.  She does have a history of diverticulitis that was last diagnosed at [facility] about a year ago.  She also has a history of C. difficile colitis that was last diagnosed about a year ago.  She has no history of GI bleeding.  Her last EGD and colonoscopy were a long time ago.  The patient's hemoglobin this morning was 6.7, hematocrit 21, MCV of 96.  The patient did have a CT of the abdomen and pelvis on 1/17/2022 that showed \"stranding seen surrounding the sigmoid colon with diverticuli throughout the sigmoid colon.  No central diverticulum to the stranding is appreciated.  The imaging features are more typical of colitis of the sigmoid colon.  Sigmoid diverticulosis is also noted.\"    Past Medical History:  Past Medical History:   Diagnosis Date   • Anemia    • Asthma    • Cancer (HCC)    • Dementia (HCC)    • Diabetes mellitus (HCC)    • Disease of thyroid gland    • GERD (gastroesophageal reflux disease)    • Hypertension      Past Surgical History:  Past Surgical History:   Procedure " Laterality Date   • CHOLECYSTECTOMY        Social History:   Social History     Tobacco Use   • Smoking status: Former Smoker     Packs/day: 0.50     Years: 15.00     Pack years: 7.50     Quit date:      Years since quittin.2   • Smokeless tobacco: Former User   Substance Use Topics   • Alcohol use: Not Currently      Family History:  History reviewed. No pertinent family history.    Home Meds:  Medications Prior to Admission   Medication Sig Dispense Refill Last Dose   • allopurinol (ZYLOPRIM) 100 MG tablet 100 mg Daily.      • amLODIPine (NORVASC) 10 MG tablet Take 1 tablet by mouth Daily.      • aspirin 81 MG EC tablet 81 mg Daily.      • atorvastatin (LIPITOR) 10 MG tablet Take 10 mg by mouth Daily.      • cetirizine (zyrTEC) 10 MG tablet Take 10 mg by mouth Daily.      • cholecalciferol (VITAMIN D3) 25 MCG (1000 UT) tablet Take 1,000 Units by mouth Daily.      • Clopidogrel Bisulfate (PLAVIX PO) Take 75 mg by mouth Daily.      • docusate sodium (COLACE) 250 MG capsule Take 250 mg by mouth Daily.      • donepezil (ARICEPT) 5 MG tablet Take 1 tablet by mouth Every Night.      • famotidine (PEPCID) 20 MG tablet Take 20 mg by mouth 2 (Two) Times a Day As Needed.      • ferrous sulfate 325 (65 FE) MG tablet Take 325 mg by mouth Daily With Breakfast.      • furosemide (LASIX) 80 MG tablet Take 40 mg by mouth Daily. Mon, Tues, Wed, Thurs, Fri      • furosemide (LASIX) 80 MG tablet Take 80 mg by mouth Daily. Sat, Sun      • levothyroxine (SYNTHROID, LEVOTHROID) 75 MCG tablet 75 mcg Daily. Mon, Tues, Wed, Thurs, Fri      • levothyroxine (SYNTHROID, LEVOTHROID) 75 MCG tablet Take 37.5 mcg by mouth Daily. Sat, Sun      • losartan (COZAAR) 50 MG tablet Take 50 mg by mouth Daily.      • potassium chloride 10 MEQ CR tablet Take 10 mEq by mouth Daily.      • primidone (MYSOLINE) 50 MG tablet Take 50 mg by mouth 3 (Three) Times a Day.      • sertraline (ZOLOFT) 50 MG tablet 100 mg Every Night.      • traZODone  (DESYREL) 50 MG tablet Take 50 mg by mouth Every Night.      • vitamin C (ASCORBIC ACID) 250 MG tablet Take 500 mg by mouth 2 (Two) Times a Day.      • amLODIPine (NORVASC) 5 MG tablet 5 mg Daily.      • dicyclomine (BENTYL) 10 MG/5ML syrup Take 20 mg by mouth 4 (Four) Times a Day.      • HYDROcodone-acetaminophen (NORCO) 5-325 MG per tablet Take 1 tablet by mouth.      • Melatonin 5 MG capsule Take 10 mg by mouth Every Night.      • montelukast (SINGULAIR) 10 MG tablet Every Night.      • ondansetron (ZOFRAN) 4 MG tablet Take 1 tablet by mouth 4 (Four) Times a Day As Needed for Nausea or Vomiting. 12 tablet 0    • polycarbophil 625 MG tablet tablet Take 625 mg by mouth Daily.      • pravastatin (PRAVACHOL) 20 MG tablet Every Night.        Current Meds:   amLODIPine, 10 mg, Oral, Daily  furosemide, 40 mg, Oral, Daily  levothyroxine, 75 mcg, Oral, Once per day on Mon Tue Wed Thu Fri  losartan, 50 mg, Oral, Daily      Allergies:  Allergies   Allergen Reactions   • Contrast Dye Anaphylaxis   • Iodinated Diagnostic Agents Anaphylaxis   • Shrimp Nausea And Vomiting     Allergic to all sea food   • Shellfish-Derived Products Nausea And Vomiting     Review of Systems  The following systems were reviewed and negative;  constitution, ENT, respiratory, cardiovascular and musculoskeletal     Objective     Vital Signs  Temp:  [97.7 °F (36.5 °C)-98.4 °F (36.9 °C)] 97.9 °F (36.6 °C)  Heart Rate:  [63-79] 68  Resp:  [16] 16  BP: (155-222)/() 182/57  Physical Exam:  General Appearance:    Alert, cooperative, in no acute distress   Head:    Normocephalic, without obvious abnormality, atraumatic   Eyes:            Lids and lashes normal, conjunctivae and sclerae normal, no   icterus   Throat:   No oral lesions, no thrush, oral mucosa moist   Neck:   No adenopathy, supple, trachea midline, no thyromegaly, no   carotid bruit, no JVD   Lungs:     Clear to auscultation,respirations regular, even and                   unlabored     Heart:    Regular rhythm and normal rate, normal S1 and S2, no            murmur, no gallop, no rub, no click   Chest Wall:    No abnormalities observed   Abdomen:     Normal bowel sounds, no masses, no organomegaly, soft        nontender, nondistended, no guarding, no rebound                 tenderness   Rectal:     Deferred   Extremities:   no edema, no cyanosis, no redness   Skin:   No bleeding, bruising or rash   Lymph nodes:   No palpable adenopathy   Psychiatric:  Judgement and insight: normal   Orientation to person place and time: normal   Mood and affect: normal   Results Review:   I reviewed the patient's new clinical results.    Results from last 7 days   Lab Units 03/21/22  2359 03/21/22  1754   WBC 10*3/mm3 5.57 5.20   HEMOGLOBIN g/dL 6.7* 8.2*   HEMATOCRIT % 21.0* 25.1*   PLATELETS 10*3/mm3 200 230     Results from last 7 days   Lab Units 03/21/22  1754   SODIUM mmol/L 137   POTASSIUM mmol/L 4.2   CHLORIDE mmol/L 104   CO2 mmol/L 20.4*   BUN mg/dL 34*   CREATININE mg/dL 1.89*   CALCIUM mg/dL 8.5*   BILIRUBIN mg/dL <0.2   ALK PHOS U/L 96   ALT (SGPT) U/L 19   AST (SGOT) U/L 26   GLUCOSE mg/dL 81         Lab Results   Lab Value Date/Time    LIPASE 42 01/17/2022 1200    LIPASE 56 10/28/2021 1144    LIPASE 108 (H) 07/14/2021 2242    LIPASE 18 07/10/2021 0949    LIPASE 47 (H) 06/09/2021 1117    LIPASE 46 (H) 05/31/2021 1335    LIPASE 106 11/09/2020 2231    LIPASE 152 08/30/2020 1943       Radiology:  No orders to display       Assessment/Plan   Assessment:   1.  86 y.o. female with asthma, dementia, DM, GERD, h/o Covid 19 in the NH in 2/22, CAD (her last dose of Plavix was 3/21/22) and HTN who was transferred from her Nursing Home after she was found to be anemic (Hgb 8.2 yesterday) and her stool was found to be heme positive in the ER.  2.  The patient is on Plavix.  Her last dose of Plavix was 3/21/2022.  Sounds like the patient does have a stent in her heart and in her neck according to the  daughter.      Plan:    1.  I agree with checking serial H&H's and transfusing as necessary.  2.  I would hold the Plavix for now until the family decides if they want her to have a GI evaluation such as an EGD and colonoscopy.  3.  I did discuss with the daughter the possibility of doing an EGD and colonoscopy.  She is unsure if she wants to do invasive testing given the patient's age and comorbidities.  The daughter is going to talk to other family members and decide how aggressive they want to be?  4.  I would continue Protonix for now.  5.  I will check anemia labs.    I discussed the patient's findings and my recommendations with patient, family and nursing staff.         Main Cruz M.D.  [facility]  [address] [place] [address]  Office: [phone]

## 2022-03-23 NOTE — PLAN OF CARE
Goal Outcome Evaluation:  Plan of Care Reviewed With: patient        Progress: no change  Outcome Evaluation: Patient had no c/o pain through the night. Remains alert to self only. IV Protonix drip continued. Clear liquid diet. BP has been elevated but controlled with PRN hydralazine. Rodriguez to bedside drainage. ACHS. Q8 H & H, Hgb currently stable at 10.8. No BM's this evening. VSS overall. Will continue to monitor.

## 2022-03-23 NOTE — PROGRESS NOTES
Name: Kellen Parmar ADMIT: 3/21/2022   : 1935  PCP: Provider, No Known    MRN: [id number] LOS: 1 days   AGE/SEX: 86 y.o. female  ROOM: [facility]     Subjective   Subjective   Patient feeling better today.  No overt bleeding but no BM.  She would like to leave soon.    Review of Systems   Unable to perform ROS: Dementia        Objective   Objective   Vital Signs  Temp:  [98 °F (36.7 °C)-98.7 °F (37.1 °C)] 98.7 °F (37.1 °C)  Heart Rate:  [70-93] 83  Resp:  [16-18] 18  BP: (149-203)/(50-90) 191/76  SpO2:  [95 %-98 %] 96 %  on   ;   Device (Oxygen Therapy): room air  Body mass index is 21.4 kg/m².  Physical Exam  Vitals reviewed.   Constitutional:       Appearance: She is well-developed.   HENT:      Head: Normocephalic and atraumatic.   Pulmonary:      Effort: Pulmonary effort is normal. No respiratory distress.      Breath sounds: Normal breath sounds.   Abdominal:      General: Bowel sounds are normal. There is no distension.      Palpations: Abdomen is soft. There is no mass.      Tenderness: There is no abdominal tenderness.      Hernia: No hernia is present.   Skin:     General: Skin is warm and dry.      Findings: Bruising present.   Neurological:      Mental Status: She is alert and oriented to person, place, and time.   Psychiatric:         Behavior: Behavior normal.         Thought Content: Thought content normal.         Judgment: Judgment normal.         Results Review     I reviewed the patient's new clinical results.  Results from last 7 days   Lab Units 22  0552 22  2338 22  1632 22  2359 22  1754   WBC 10*3/mm3 6.86  --   --  5.57 5.20   HEMOGLOBIN g/dL 10.6* 10.8* 11.3* 6.7* 8.2*   PLATELETS 10*3/mm3 209  --   --  200 230     Results from last 7 days   Lab Units 22  0552 22  0915 22  1754   SODIUM mmol/L 135* 140 137   POTASSIUM mmol/L 4.0 3.8 4.2   CHLORIDE mmol/L 105 115* 104   CO2 mmol/L 18.7* 18.0* 20.4*   BUN mg/dL 27* 25* 34*    CREATININE mg/dL 1.51* 1.29* 1.89*   GLUCOSE mg/dL 106* 81 81   EGFR mL/min/1.73 33.9* 41.0* 25.9*     Results from last 7 days   Lab Units 03/21/22  1754   ALBUMIN g/dL 4.30   BILIRUBIN mg/dL <0.2   ALK PHOS U/L 96   AST (SGOT) U/L 26   ALT (SGPT) U/L 19     Results from last 7 days   Lab Units 03/23/22  0552 03/22/22  0915 03/21/22  1754   CALCIUM mg/dL 8.4* 6.6* 8.5*   ALBUMIN g/dL  --   --  4.30       Glucose   Date/Time Value Ref Range Status   03/23/2022 1107 116 70 - 130 mg/dL Final     Comment:     Meter: [id number] : [id number] Fields Indigo NA   03/23/2022 0617 120 70 - 130 mg/dL Final     Comment:     Meter: [id number] : [id number] Nunes Elyssa NA   03/22/2022 2002 245 (H) 70 - 130 mg/dL Final     Comment:     Meter: [id number] : [id number] Nunes Elyssa NA   03/22/2022 1627 122 70 - 130 mg/dL Final     Comment:     Meter: [id number] : [id number] Altmansalvador Sanchezjs NA   03/22/2022 1147 100 70 - 130 mg/dL Final     Comment:     Meter: [id number] : [id number] Bistareric DegrootAylin NA   03/22/2022 0750 106 70 - 130 mg/dL Final     Comment:     Meter: [id number] : [id number] Bistareric Aylin NA       CT Cervical Spine Without Contrast  Narrative: CT CERVICAL SPINE WO CONTRAST-     CLINICAL HISTORY: Patient fell. Neck pain.     TECHNIQUE: Transverse 1 mm thick images were obtained through the  cervical spine. Coronal and sagittal reconstructions were produced.     Radiation dose reduction techniques were utilized, including automated  exposure control and exposure modulation based on body size.     COMPARISON: None     FINDINGS: There is satisfactory alignment. All of the vertebral bodies  are normal in height. The facet joints are mildly narrowed but appear  intact. There is no evidence of recent or old fracture or subluxation.  No focal disc herniations are identified. At C5-C6 there is motion  artifact degrading the images. However, at least mild narrowing of the  spinal canal is evident due  to endplate bony spurring. There is no  significant encroachment on spinal canal at any other level. A stent is  in place within the left internal carotid artery.     Impression: Mild degenerative disc changes as noted. There is no  evidence of acute fracture or subluxation.     This report was finalized on 3/13/2022 12:52 AM by Dr. Ruy Chavez M.D.     CT Head Without Contrast  Narrative: CT HEAD WO CONTRAST-     CLINICAL HISTORY: Dementia. Patient fell. Head injury. Patient  anticoagulated.     TECHNIQUE: Transverse 3 mm thick images were obtained from the base of  the skull to the vertex without IV contrast.     Radiation dose reduction techniques were utilized, including automated  exposure control and exposure modulation based on body size.     COMPARISON: CT head dated 07/15/2021.     FINDINGS: There is moderate diffuse cortical atrophy. There is no  evidence of recent or old infarct or hemorrhage. There is no mass  effect. Bone window images demonstrate no evidence of skull fracture.  There is moderate lobular mucosal thickening in the left maxillary sinus  that is new since the preceding study.     Impression: Diffuse cortical atrophy. No acute intracranial  abnormalities identified.     This report was finalized on 3/13/2022 12:45 AM by Dr. Ruy Chavez M.D.       Scheduled Medications  allopurinol, 100 mg, Oral, Daily  amLODIPine, 10 mg, Oral, Daily  atorvastatin, 40 mg, Oral, Daily  donepezil, 5 mg, Oral, Nightly  furosemide, 40 mg, Oral, Daily  hydrALAZINE, 25 mg, Oral, Q8H  levothyroxine, 75 mcg, Oral, Once per day on Mon Tue Wed Thu Fri  losartan, 50 mg, Oral, Daily  primidone, 50 mg, Oral, TID  sertraline, 100 mg, Oral, Nightly  tamsulosin, 0.4 mg, Oral, Nightly  traZODone, 50 mg, Oral, Nightly    Infusions  pantoprazole, 8 mg/hr, Last Rate: 8 mg/hr (03/23/22 1034)    Diet  Diet Clear Liquid       Assessment/Plan     Active Hospital Problems    Diagnosis  POA   • **GI bleed [K92.2]  Yes   •  Gastrointestinal hemorrhage, unspecified gastrointestinal hemorrhage type [K92.2]  Yes   • Hypertensive urgency [I16.0]  Yes   • Coronary artery disease involving native coronary artery of native heart without angina pectoris [I25.10]  Yes   • COVID-19 virus detected [U07.1]  Yes   • Acute urinary retention [R33.8]  Yes   • Carotid stenosis, bilateral [I65.23]  Yes   • HTN (hypertension) [I10]  Yes   • CKD (chronic kidney disease) stage 4, GFR 15-29 ml/min ([facility]) [N18.4]  Yes   • Dementia without behavioral disturbance ([facility]) [F03.90]  Yes      Resolved Hospital Problems   No resolved problems to display.        Ghulam is a 86 y.o. female admitted with acute on chronic anemia with occult stool positive.     Acute on chronic anemia  Occult stool positive but no overt bleeding.  Baseline hemoglobin 8-9, then 7.8 in the last 2 months compared to 6.7 on admit which shows likely falsely low.  s/p 2 units RBC now Hgb 10.6. Iron stores elevated but lab checked this morning after transfusion so not reliable.    Stop Protonix drip.   Plavix last dose 3/21. Risk of endoscopy outweighs benefit and family does not want endoscopy.  Monitor for now. Decrease H&H checks due to skin risk and lack of overt bleeding.      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  Left TKA CAR 2021.    Resume Plavix given no plans for intervention     Hypertensive urgency  Her blood pressures been elevated despite home meds. Hydralazine added scheduled.  Monitor response     CKD4   baseline crt 1.5-1.8in last 6mo. crt labile but in baseline parameters.      COVID-19  Specially diagnosed in February.  She is asymptomatic on room air. Out of isolation      Urinary retention    Rodriguez catheter present, start voiding trial in anticipation of discharg      Dementia  Pleasant but oriented to self only.  .     Hypothyroidism  Continue levothyroxine.  TSH is 10.6.  Check thyroid studies. Increase synthroid Question whether medications  administered appropriately at long-term care facility.     DNR DNI  Discharge back to extended care facility tomorrow pending labs .       ANDREA Dai  [facility]  03/23/22  12:27 EDT

## 2022-03-23 NOTE — PROGRESS NOTES
[facility]  Inpatient Progress Note    Reason for Follow Up:  Anemia, heme positive stool    Subjective     Interval History:   Hemoglobin improved with transfusion--her hemoglobin is actually 10.6 after 2 units and as such, the 6.7 from the evening of 3/21 was probably artificially low.      No overt bleeding    Current Facility-Administered Medications:   •  allopurinol (ZYLOPRIM) tablet 100 mg, 100 mg, Oral, Daily, Jossie Peña APRN, 100 mg at 03/23/22 1341  •  amLODIPine (NORVASC) tablet 10 mg, 10 mg, Oral, Daily, Katelynn Mishra PA-C, 10 mg at 03/23/22 0820  •  ascorbic acid (VITAMIN C) tablet 500 mg, 500 mg, Oral, Daily, Royal Aguirre,   •  atorvastatin (LIPITOR) tablet 10 mg, 10 mg, Oral, Daily, Royal Aguirre, DO  •  cetirizine (zyrTEC) tablet 5 mg, 5 mg, Oral, Daily, Royal Aguirre,   •  cholecalciferol (VITAMIN D3) tablet 1,000 Units, 1,000 Units, Oral, Daily, Royal Aguirre, DO  •  dicyclomine (BENTYL) capsule 10 mg, 10 mg, Oral, TID PRN, Jossie Peña APRN  •  donepezil (ARICEPT) tablet 5 mg, 5 mg, Oral, Nightly, Jossie Peña APRN  •  furosemide (LASIX) tablet 40 mg, 40 mg, Oral, Daily, Katelynn Mishra PA-C, 40 mg at 03/23/22 0820  •  hydrALAZINE (APRESOLINE) injection 10 mg, 10 mg, Intravenous, Q4H PRN, Royal Aguirre DO, 10 mg at 03/23/22 1229  •  hydrALAZINE (APRESOLINE) tablet 25 mg, 25 mg, Oral, Q8H, Royal Aguirre DO, 25 mg at 03/23/22 1344  •  HYDROcodone-acetaminophen (NORCO) 5-325 MG per tablet 1 tablet, 1 tablet, Oral, Q6H PRN, Jossie Peña APRN  •  levothyroxine (SYNTHROID, LEVOTHROID) tablet 37.5 mcg, 37.5 mcg, Oral, Daily, Royal Aguirre, DO  •  losartan (COZAAR) tablet 50 mg, 50 mg, Oral, Daily, Katelynn Mishra PA-C, 50 mg at 03/23/22 0820  •  montelukast (SINGULAIR) tablet 10 mg, 10 mg, Oral, Nightly, Royal Aguirre, DO  •  nitroglycerin (NITROSTAT) SL tablet 0.4 mg, 0.4  mg, Sublingual, Q5 Min PRN, Katelynn Mishra PA-C  •  ondansetron (ZOFRAN) injection 4 mg, 4 mg, Intravenous, Q6H PRN, Katelynn Mishra PA-C  •  pantoprazole (PROTONIX) 40 mg in 100 mL NS (VTB), 8 mg/hr, Intravenous, Continuous, Katelynn Mishra PA-C, Last Rate: 20 mL/hr at 03/23/22 1034, 8 mg/hr at 03/23/22 1034  •  primidone (MYSOLINE) tablet 50 mg, 50 mg, Oral, TID, Jossie Peña, APRN  •  sertraline (ZOLOFT) tablet 100 mg, 100 mg, Oral, Nightly, Jossie Peña, ANDREA  •  [COMPLETED] Insert peripheral IV, , , Once **AND** sodium chloride 0.9 % flush 10 mL, 10 mL, Intravenous, PRN, Katelynn Mishra PA-C  •  tamsulosin (FLOMAX) 24 hr capsule 0.4 mg, 0.4 mg, Oral, Nightly, Royal Aguirre, DO  •  traZODone (DESYREL) tablet 50 mg, 50 mg, Oral, Nightly, Jossie Peña, APRN  Review of Systems:   The following systems were reviewed and negative: Constitution, pulmonary, cardiac, gastrointestinal, genitourinary        Objective     Vital Signs  Temp:  [97.2 °F (36.2 °C)-98.7 °F (37.1 °C)] 97.2 °F (36.2 °C)  Heart Rate:  [70-93] 89  Resp:  [16-18] 16  BP: (144-203)/(50-90) 144/90  Body mass index is 21.4 kg/m².    Intake/Output Summary (Last 24 hours) at 3/23/2022 1349  Last data filed at 3/23/2022 1342  Gross per 24 hour   Intake 870 ml   Output 2000 ml   Net -1130 ml     I/O this shift:  In: 660 [P.O.:660]  Out: 1250 [Urine:1250]     Physical Exam:   General: patient awake, alert and cooperative, elderly and frail   Eyes: Normal lids and lashes, no scleral icterus   Neck: supple, normal ROM   Skin: warm and dry, not jaundiced   Cardiovascular: regular rhythm and rate, no murmurs auscultated   Pulm: clear to auscultation bilaterally, regular and unlabored   Abdomen: soft, nontender, nondistended; normal bowel sounds   Rectal: deferred   Extremities: no rash or edema   Psychiatric: Normal mood and behavior; poor historian     Results Review:     I reviewed the patient's new clinical  results.    Results from last 7 days   Lab Units 03/23/22  0552 03/22/22  2338 03/22/22  1632 03/21/22  2359 03/21/22  1754   WBC 10*3/mm3 6.86  --   --  5.57 5.20   HEMOGLOBIN g/dL 10.6* 10.8* 11.3* 6.7* 8.2*   HEMATOCRIT % 32.7* 33.7* 36.0 21.0* 25.1*   PLATELETS 10*3/mm3 209  --   --  200 230     Results from last 7 days   Lab Units 03/23/22  0552 03/22/22  0915 03/21/22  1754   SODIUM mmol/L 135* 140 137   POTASSIUM mmol/L 4.0 3.8 4.2   CHLORIDE mmol/L 105 115* 104   CO2 mmol/L 18.7* 18.0* 20.4*   BUN mg/dL 27* 25* 34*   CREATININE mg/dL 1.51* 1.29* 1.89*   CALCIUM mg/dL 8.4* 6.6* 8.5*   BILIRUBIN mg/dL  --   --  <0.2   ALK PHOS U/L  --   --  96   ALT (SGPT) U/L  --   --  19   AST (SGOT) U/L  --   --  26   GLUCOSE mg/dL 106* 81 81         Lab Results   Lab Value Date/Time    LIPASE 42 01/17/2022 1200    LIPASE 56 10/28/2021 1144    LIPASE 108 (H) 07/14/2021 2242    LIPASE 18 07/10/2021 0949    LIPASE 47 (H) 06/09/2021 1117    LIPASE 46 (H) 05/31/2021 1335    LIPASE 106 11/09/2020 2231    LIPASE 152 08/30/2020 1943       Radiology:  No orders to display       Assessment/Plan     Patient Active Problem List   Diagnosis   • Clostridium difficile colitis   • HTN (hypertension)   • CKD (chronic kidney disease) stage 4, GFR 15-29 ml/min ([facility])   • JEANIE (acute kidney injury) ([facility])   • Hyponatremia   • Hypokalemia   • Anemia   • Leukocytosis   • Acute metabolic encephalopathy   • Dementia without behavioral disturbance ([facility])   • Carotid stenosis, bilateral   • Diarrhea   • GI bleed   • Gastrointestinal hemorrhage, unspecified gastrointestinal hemorrhage type   • Hypertensive urgency   • Coronary artery disease involving native coronary artery of native heart without angina pectoris   • COVID-19 virus detected   • Acute urinary retention       Impression  1.  Acute on chronic anemia: Given response to blood transfusion, I suspect her actual hemoglobin at admission was more at her baseline around 8    2.  Heme positive  stool: No overt bleeding    3.  History of colitis: From imaging January 2022    4.  Clopidogrel therapy: Last dose 3/21/2022    Plan  Daughter present in the room.  She woke with Dr. Cruz yesterday.  They are not interested in aggressive interventions unless she has evidence of a life-threatening GI bleed    Advance diet as tolerated    Follow hemoglobin    Monitor for overt bleeding    From my standpoint, if she remains stable she can go home tomorrow    Will stop the PPI drip and change to oral    I discussed the patients findings and my recommendations with patient, family and nursing staff.    All necessary PPE, including face mask and eye protection, were worn during this encounter.  Hand sanitization was performed both before and after the patient interaction.    Over 25 minutes was spent reviewing the patient's chart and records, in discussion with the patient, in examination of the patient, and in discussion with members of the patient's medical team.    Rissa Beard MD

## 2022-03-24 PROBLEM — K92.2 GI BLEED: Status: RESOLVED | Noted: 2022-01-01 | Resolved: 2022-01-01

## 2022-03-24 PROBLEM — I16.0 HYPERTENSIVE URGENCY: Status: RESOLVED | Noted: 2022-01-01 | Resolved: 2022-01-01

## 2022-03-24 PROBLEM — E03.9 HYPOTHYROIDISM (ACQUIRED): Status: ACTIVE | Noted: 2022-01-01

## 2022-03-24 PROBLEM — R33.8 ACUTE URINARY RETENTION: Status: RESOLVED | Noted: 2022-01-01 | Resolved: 2022-01-01

## 2022-03-24 PROBLEM — U07.1 COVID-19 VIRUS DETECTED: Status: RESOLVED | Noted: 2022-01-01 | Resolved: 2022-01-01

## 2022-03-24 PROBLEM — K92.2 GASTROINTESTINAL HEMORRHAGE, UNSPECIFIED GASTROINTESTINAL HEMORRHAGE TYPE: Status: RESOLVED | Noted: 2022-01-01 | Resolved: 2022-01-01

## 2022-03-24 NOTE — CASE MANAGEMENT/SOCIAL WORK
Case Management Discharge Note      Final Note: DC'd to IC level bed at [facility] with family to transport. Janelle Trinh RN    Provided Post Acute Provider List?: N/A  Provided Post Acute Provider Quality & Resource List?: N/A    Selected Continued Care - Discharged on 3/24/2022 Admission date: 3/21/2022 - Discharge disposition: Long Term Care (DC - External)    Destination Coordination complete.    Service Provider Selected Services Address Phone Fax Patient Preferred    [facility] [address] [id number] [phone] --          Durable Medical Equipment    No services have been selected for the patient.              Dialysis/Infusion    No services have been selected for the patient.              Home Medical Care    No services have been selected for the patient.              Therapy    No services have been selected for the patient.              Community Resources    No services have been selected for the patient.              Community & DME    No services have been selected for the patient.                  Transportation Services  Private: Car    Final Discharge Disposition Code: 04 - intermediate care facility

## 2022-03-24 NOTE — DISCHARGE SUMMARY
Patient Name: Kellen Parmar  : 1935  MRN: [id number]    Date of Admission: 3/21/2022  Date of Discharge:  3/24/2022  Primary Care Physician: Provider, No Known      Chief Complaint:   Abnormal Lab      Discharge Diagnoses     Active Hospital Problems    Diagnosis  POA   • Hypothyroidism (acquired) [E03.9]  Yes   • Coronary artery disease involving native coronary artery of native heart without angina pectoris [I25.10]  Yes   • Carotid stenosis, bilateral [I65.23]  Yes   • HTN (hypertension) [I10]  Yes   • CKD (chronic kidney disease) stage 4, GFR 15-29 ml/min ([facility]) [N18.4]  Yes   • Dementia without behavioral disturbance (HCC) [F03.90]  Yes      Resolved Hospital Problems    Diagnosis Date Resolved POA   • **GI bleed [K92.2] 2022 Yes   • Gastrointestinal hemorrhage, unspecified gastrointestinal hemorrhage type [K92.2] 2022 Yes   • Hypertensive urgency [I16.0] 2022 Yes   • COVID-19 virus detected [U07.1] 2022 Yes   • Acute urinary retention [R33.8] 2022 Yes        Hospital Course     Ms. Parmar is a 86 y.o. female with a history of HTN, dementia, CKD 4, hypothyroidism, bilateral carotid stenosis on Plavix, DM2 that was admitted with acute on chronic anemia.  No overt bleeding but occult stool positive with a hemoglobin of 6 on admission compared to baseline in the 8s.  Gastroenterology was consulted and she was transfused 2 units of packed red blood cells with a hemoglobin response to 11 suggesting that the value of 6 was incorrect.  Iron stores elevated but were checked after transfusion so not reliable.  GI and family did not want to pursue bidirectional endoscopy.  Her Plavix was held on admission but resumed given lack of overt bleeding and stable hemoglobin.  She did have urinary retention on admission and a Rodriguez catheter was placed.  TSH is 10.6 so recommend increasing levothyroxine ensuring taking properly at facility.  Her Rodriguez catheter was removed and she is  now voiding without issue.  Her blood pressures have been elevated since admission but I suspect that her dementia and anxiety has been contributing to increased values.  BP is better today so I will not make adjustments in her antihypertensive agents at discharge.  She did test positive for COVID-19 on admission but this was initially diagnosed in February and she is asymptomatic on room air so has not been in isolation.  She is stable for discharge today back to [facility] care facility.  Recommend repeating thyroid studies in several weeks and hemoglobin/ anemia studies next week.     Day of Discharge     Subjective:  No new complaints. No BM or overt bleeding.  No abdominal pain nausea vomiting dysphagia odynophagia chest pain or shortness of breath.    Physical Exam:  Temp:  [97.2 °F (36.2 °C)-98.1 °F (36.7 °C)] 98.1 °F (36.7 °C)  Heart Rate:  [73-89] 87  Resp:  [16-18] 18  BP: (139-191)/(55-90) 139/55  Body mass index is 20.59 kg/m².  Physical Exam  Vitals reviewed.   Constitutional:       Appearance: She is well-developed.      Comments: Frail elderly chronically ill-appearing   HENT:      Head: Normocephalic and atraumatic.   Cardiovascular:      Rate and Rhythm: Normal rate and regular rhythm.   Pulmonary:      Effort: Pulmonary effort is normal. No respiratory distress.      Breath sounds: Normal breath sounds.   Abdominal:      General: Bowel sounds are normal. There is no distension.      Palpations: Abdomen is soft. There is no mass.      Tenderness: There is no abdominal tenderness.      Hernia: No hernia is present.   Skin:     General: Skin is warm and dry.   Neurological:      Mental Status: She is alert and oriented to person, place, and time.   Psychiatric:         Behavior: Behavior normal.         Thought Content: Thought content normal.         Judgment: Judgment normal.         Consultants     Consult Orders (all) (From admission, onward)     Start     Ordered    03/22/22 0880  Inpatient  Palliative Care Nurse Consult  Once        Provider:  (Not yet assigned)    03/22/22 0854    03/22/22 0631  Inpatient Internal Medicine Consult  Once        Specialty:  Internal Medicine  Provider:  Shaista Cho DO    03/22/22 0630    03/21/22 2303  Gastroenterology Consult  Once        Specialty:  Gastroenterology  Provider:  Jagdish Novak MD    03/21/22 2307    03/21/22 2245  Inpatient Case Management  Consult  Once        Provider:  (Not yet assigned)    03/21/22 2246              Procedures     * Surgery not found *      Imaging Results (All)     None            Pertinent Labs     Results from last 7 days   Lab Units 03/24/22  0744 03/24/22  0514 03/23/22  2000 03/23/22  0552 03/22/22  1632 03/21/22  2359 03/21/22  1754   WBC 10*3/mm3  --  5.72  --  6.86  --  5.57 5.20   HEMOGLOBIN g/dL 9.5* 9.5* 11.4* 10.6*   < > 6.7* 8.2*   PLATELETS 10*3/mm3  --  164  --  209  --  200 230    < > = values in this interval not displayed.     Results from last 7 days   Lab Units 03/24/22  0514 03/23/22  0552 03/22/22  0915 03/21/22  1754   SODIUM mmol/L 136 135* 140 137   POTASSIUM mmol/L 3.8 4.0 3.8 4.2   CHLORIDE mmol/L 105 105 115* 104   CO2 mmol/L 21.0* 18.7* 18.0* 20.4*   BUN mg/dL 30* 27* 25* 34*   CREATININE mg/dL 1.72* 1.51* 1.29* 1.89*   GLUCOSE mg/dL 103* 106* 81 81   EGFR mL/min/1.73 29.0* 33.9* 41.0* 25.9*     Results from last 7 days   Lab Units 03/21/22  1754   ALBUMIN g/dL 4.30   BILIRUBIN mg/dL <0.2   ALK PHOS U/L 96   AST (SGOT) U/L 26   ALT (SGPT) U/L 19     Results from last 7 days   Lab Units 03/24/22  0514 03/23/22  0552 03/22/22  0915 03/21/22  1754   CALCIUM mg/dL 8.0* 8.4* 6.6* 8.5*   ALBUMIN g/dL  --   --   --  4.30       Results from last 7 days   Lab Units 03/24/22  0514   PROBNP pg/mL 1,193.0     Results from last 7 days   Lab Units 03/24/22  0514   URIC ACID mg/dL 4.9         Invalid input(s): LDLCALC      Results from last 7 days   Lab Units 03/21/22  1959   COVID19   Detected*       Test Results Pending at Discharge     Pending Labs     Order Current Status    Folate RBC In process          Discharge Details        Discharge Medications      New Medications      Instructions Start Date   pantoprazole 40 MG EC tablet  Commonly known as: PROTONIX   40 mg, Oral, Every Early Morning   Start Date: March 25, 2022     tamsulosin 0.4 MG capsule 24 hr capsule  Commonly known as: FLOMAX   0.4 mg, Oral, Nightly         Changes to Medications      Instructions Start Date   amLODIPine 10 MG tablet  Commonly known as: NORVASC  What changed: Another medication with the same name was removed. Continue taking this medication, and follow the directions you see here.   1 tablet, Oral, Daily      levothyroxine 75 MCG tablet  Commonly known as: SYNTHROID, LEVOTHROID  What changed:   · how much to take  · additional instructions  · Another medication with the same name was removed. Continue taking this medication, and follow the directions you see here.   75 mcg, Oral, Daily   Start Date: March 25, 2022        Continue These Medications      Instructions Start Date   allopurinol 100 MG tablet  Commonly known as: ZYLOPRIM   100 mg, Daily      aspirin 81 MG EC tablet   81 mg, Daily      atorvastatin 10 MG tablet  Commonly known as: LIPITOR   10 mg, Oral, Daily      cetirizine 10 MG tablet  Commonly known as: zyrTEC   10 mg, Oral, Daily      cholecalciferol 25 MCG (1000 UT) tablet  Commonly known as: VITAMIN D3   1,000 Units, Oral, Daily      dicyclomine 10 MG/5ML syrup  Commonly known as: BENTYL   20 mg, Oral, 4 Times Daily      docusate sodium 250 MG capsule  Commonly known as: COLACE   250 mg, Oral, Daily      donepezil 5 MG tablet  Commonly known as: ARICEPT   1 tablet, Oral, Nightly      famotidine 20 MG tablet  Commonly known as: PEPCID   20 mg, Oral, 2 Times Daily PRN      ferrous sulfate 325 (65 FE) MG tablet   325 mg, Oral, Daily With Breakfast      furosemide 80 MG tablet  Commonly known as:  LASIX   40 mg, Oral, Daily, Mon, Tues, Wed, Thurs, Fri      furosemide 80 MG tablet  Commonly known as: LASIX   80 mg, Oral, Daily, Sat, Sun      HYDROcodone-acetaminophen 5-325 MG per tablet  Commonly known as: NORCO   1 tablet, Oral      losartan 50 MG tablet  Commonly known as: COZAAR   50 mg, Oral, Daily      Melatonin 5 MG capsule   10 mg, Oral, Nightly      montelukast 10 MG tablet  Commonly known as: SINGULAIR   Nightly      ondansetron 4 MG tablet  Commonly known as: ZOFRAN   4 mg, Oral, 4 Times Daily PRN      PLAVIX PO   75 mg, Oral, Daily      polycarbophil 625 MG tablet tablet   625 mg, Oral, Daily      potassium chloride 10 MEQ CR tablet   10 mEq, Oral, Daily      pravastatin 20 MG tablet  Commonly known as: PRAVACHOL   Nightly      primidone 50 MG tablet  Commonly known as: MYSOLINE   50 mg, Oral, 3 Times Daily      sertraline 50 MG tablet  Commonly known as: ZOLOFT   100 mg, Nightly      traZODone 50 MG tablet  Commonly known as: DESYREL   50 mg, Oral, Nightly      vitamin C 250 MG tablet  Commonly known as: ASCORBIC ACID   500 mg, Oral, 2 Times Daily             Allergies   Allergen Reactions   • Contrast Dye Anaphylaxis   • Iodinated Diagnostic Agents Anaphylaxis   • Shrimp Nausea And Vomiting     Allergic to all sea food   • Shellfish-Derived Products Nausea And Vomiting       Discharge Disposition:  Long Term Care (DC - External)      Discharge Diet:  Diet Order   Procedures   • Diet Regular; GI Soft       Discharge Activity:       CODE STATUS:    Code Status and Medical Interventions:   Ordered at: 03/21/22 2807     Code Status (Patient has no pulse and is not breathing):    No CPR (Do Not Attempt to Resuscitate)     Medical Interventions (Patient has pulse or is breathing):    Full Support       Future Appointments   Date Time Provider Department Center   3/25/2022  1:00 PM EMS MED 1  [facility]      Follow-up Information     Provider, No Known .    Contact information:  [facility]  SYSTEM  [facility] [id number]  [phone]                         Time Spent on Discharge:  Greater than 37 minutes      ANDREA Dai  [facility]  03/24/22  11:21 EDT

## 2022-03-24 NOTE — PROGRESS NOTES
[facility]  Inpatient Progress Note    Reason for Follow-up:  Anemia, heme positive stool    Subjective     Interval History:   No complaints.  Hemoglobin stable at 9.5 g/dL. No overt signs of GI bleed.     Current Facility-Administered Medications:   •  allopurinol (ZYLOPRIM) tablet 100 mg, 100 mg, Oral, Daily, Jossie Peña APRN, 100 mg at 03/24/22 0804  •  amLODIPine (NORVASC) tablet 10 mg, 10 mg, Oral, Daily, Katelynn Mishra PA-C, 10 mg at 03/24/22 0804  •  ascorbic acid (VITAMIN C) tablet 500 mg, 500 mg, Oral, Daily, Royal Aguirre DO, 500 mg at 03/24/22 0804  •  atorvastatin (LIPITOR) tablet 10 mg, 10 mg, Oral, Daily, Royal Aguirre DO, 10 mg at 03/24/22 0804  •  cetirizine (zyrTEC) tablet 5 mg, 5 mg, Oral, Daily, Royal Aguirre DO, 5 mg at 03/24/22 0804  •  cholecalciferol (VITAMIN D3) tablet 1,000 Units, 1,000 Units, Oral, Daily, Royal Aguirre DO, 1,000 Units at 03/24/22 0804  •  clopidogrel (PLAVIX) tablet 75 mg, 75 mg, Oral, Daily, Jossie Peña, APRN, 75 mg at 03/24/22 0907  •  dicyclomine (BENTYL) capsule 10 mg, 10 mg, Oral, TID PRN, Jossie Peña, APRN  •  donepezil (ARICEPT) tablet 5 mg, 5 mg, Oral, Nightly, Jossie Peña, APRN, 5 mg at 03/23/22 2107  •  hydrALAZINE (APRESOLINE) injection 10 mg, 10 mg, Intravenous, Q4H PRN, Royal Aguirre DO, 10 mg at 03/23/22 1229  •  hydrALAZINE (APRESOLINE) tablet 25 mg, 25 mg, Oral, Q8H, Royal Aguirre DO, 25 mg at 03/24/22 1330  •  HYDROcodone-acetaminophen (NORCO) 5-325 MG per tablet 1 tablet, 1 tablet, Oral, Q6H PRN, Jossie Peña, ANDREA  •  levothyroxine (SYNTHROID, LEVOTHROID) tablet 75 mcg, 75 mcg, Oral, Daily, Jossie Peña APRN, 75 mcg at 03/24/22 0804  •  losartan (COZAAR) tablet 50 mg, 50 mg, Oral, Daily, Katelynn Mishra PA-C, 50 mg at 03/24/22 0804  •  montelukast (SINGULAIR) tablet 10 mg, 10 mg, Oral, Nightly, Royal Aguirre, , 10 mg at  03/23/22 2107  •  nitroglycerin (NITROSTAT) SL tablet 0.4 mg, 0.4 mg, Sublingual, Q5 Min PRN, Katelynn Mishra PA-C  •  ondansetron (ZOFRAN) injection 4 mg, 4 mg, Intravenous, Q6H PRN, Katelynn Mishra PA-C  •  pantoprazole (PROTONIX) EC tablet 40 mg, 40 mg, Oral, Q AM, Rissa Beard MD, 40 mg at 03/24/22 0605  •  primidone (MYSOLINE) tablet 50 mg, 50 mg, Oral, TID, Jossie Peña, APRN, 50 mg at 03/24/22 0804  •  sertraline (ZOLOFT) tablet 100 mg, 100 mg, Oral, Nightly, Jossie Peña, APRN, 100 mg at 03/23/22 2107  •  [COMPLETED] Insert peripheral IV, , , Once **AND** sodium chloride 0.9 % flush 10 mL, 10 mL, Intravenous, PRN, Katelynn Mishra PA-C  •  tamsulosin (FLOMAX) 24 hr capsule 0.4 mg, 0.4 mg, Oral, Nightly, Royal Aguirre, DO, 0.4 mg at 03/23/22 2107  •  traZODone (DESYREL) tablet 50 mg, 50 mg, Oral, Nightly, Jossie Peña, APRN, 50 mg at 03/23/22 2207    Review of Systems:    Constitutional: No fevers, chills, sweats   Respiratory: No shortness of breath, cough   Cardiovascular: No Chest pain, palpitations   Gastrointestinal: No nausea, vomiting, diarrhea   Genitourinary: No hematuria, dysuria    Objective     Vital Signs  Temp:  [97.3 °F (36.3 °C)-98.1 °F (36.7 °C)] 98.1 °F (36.7 °C)  Heart Rate:  [73-88] 88  Resp:  [16-18] 18  BP: (136-188)/(55-89) 136/89  Body mass index is 20.59 kg/m².    Intake/Output Summary (Last 24 hours) at 3/24/2022 8779  Last data filed at 3/24/2022 1242  Gross per 24 hour   Intake 1040 ml   Output 950 ml   Net 90 ml     I/O this shift:  In: 680 [P.O.:680]  Out: -      Physical Exam:   General: Awake, alert and conversive. No acute distress.   Eyes: Normal lids and lashes, no scleral icterus.   Neck: Supple and symmetric. Trachea midline.    Skin: Warm and dry, not jaundiced.    Cardiovascular: Regular rate and rhythm.    Pulm: Quiet, even, nonlabored breathing.    Abdomen: Soft, nondistended, nontender. No rebound or guarding.     Extremities: No rashes or edema.   Psychiatric: Appropriate mood and affect. Cooperative.     Results Review:     I reviewed the patient's new clinical results.    Results from last 7 days   Lab Units 03/24/22  0744 03/24/22  0514 03/23/22 2000 03/23/22  0552 03/22/22  1632 03/21/22  2359   WBC 10*3/mm3  --  5.72  --  6.86  --  5.57   HEMOGLOBIN g/dL 9.5* 9.5* 11.4* 10.6*   < > 6.7*   HEMATOCRIT % 29.4* 29.0* 33.9* 32.7*   < > 21.0*   PLATELETS 10*3/mm3  --  164  --  209  --  200    < > = values in this interval not displayed.     Results from last 7 days   Lab Units 03/24/22  0514 03/23/22  0552 03/22/22  0915 03/21/22  1754   SODIUM mmol/L 136 135* 140 137   POTASSIUM mmol/L 3.8 4.0 3.8 4.2   CHLORIDE mmol/L 105 105 115* 104   CO2 mmol/L 21.0* 18.7* 18.0* 20.4*   BUN mg/dL 30* 27* 25* 34*   CREATININE mg/dL 1.72* 1.51* 1.29* 1.89*   CALCIUM mg/dL 8.0* 8.4* 6.6* 8.5*   BILIRUBIN mg/dL  --   --   --  <0.2   ALK PHOS U/L  --   --   --  96   ALT (SGPT) U/L  --   --   --  19   AST (SGOT) U/L  --   --   --  26   GLUCOSE mg/dL 103* 106* 81 81         Lab Results   Lab Value Date/Time    LIPASE 42 01/17/2022 1200    LIPASE 56 10/28/2021 1144    LIPASE 108 (H) 07/14/2021 2242    LIPASE 18 07/10/2021 0949    LIPASE 47 (H) 06/09/2021 1117    LIPASE 46 (H) 05/31/2021 1335    LIPASE 106 11/09/2020 2231    LIPASE 152 08/30/2020 1943       Radiology:  No orders to display       Assessment/Plan     Patient Active Problem List   Diagnosis   • Clostridium difficile colitis   • HTN (hypertension)   • CKD (chronic kidney disease) stage 4, GFR 15-29 ml/min (HCC)   • JEANIE (acute kidney injury) ([facility])   • Hyponatremia   • Hypokalemia   • Anemia   • Leukocytosis   • Acute metabolic encephalopathy   • Dementia without behavioral disturbance ([facility])   • Carotid stenosis, bilateral   • Diarrhea   • Coronary artery disease involving native coronary artery of native heart without angina pectoris   • Hypothyroidism (acquired)        Assessment:  1.  Acute on chronic anemia: Given response to blood transfusion, I suspect her actual hemoglobin at admission was more at her baseline around 8    2.  Heme positive stool: No overt bleeding     3.  History of colitis: From imaging January 2022     4.  Clopidogrel therapy: Last dose 3/21/2022      Plan:  · Plans for discharge noted.  · Continue p.o. PPI.  · Follow up with Dr. Nichols, patient's normal GI in the next month.     I discussed the patient's findings and my recommendations with patient, nursing staff and Dr. Beard.    Dragon dictation used throughout this note.            ASHLEIGH Ledesma  [facility]  [address] [place] [address]  Office: [phone]

## 2022-03-24 NOTE — PROGRESS NOTES
Continued Stay Note  [facility]     Patient Name: Kellen Parmar  MRN: [id number]  Today's Date: 3/24/2022    Admit Date: 3/21/2022     Discharge Plan     Row Name 03/24/22 1234       Plan    Plan Comments DC orders in EPIC.  Spoke with [facility] and they can accept back.  Transfer packet updated and dc summary faxed.  No scripts seen.  Daughter to transport.  Patient will dc to  level bed at [facility] via private auto. Janelle Trinh RN               Discharge Codes    No documentation.               Expected Discharge Date and Time     Expected Discharge Date Expected Discharge Time    Mar 24, 2022             Janelle Trinh RN

## 2022-03-24 NOTE — PLAN OF CARE
Goal Outcome Evaluation:  Plan of Care Reviewed With: daughter           Outcome Evaluation: Patient stable and discharged back to [facility]

## 2022-03-24 NOTE — PLAN OF CARE
Goal Outcome Evaluation:  Plan of Care Reviewed With: patient        Progress: improving  Outcome Evaluation: Pt alert to self and situation. Pt on RA, SR on monitor. Rodriguez in place, will be discontinues this morning. No BM this shift. Resting well through the night. Possible D/C today. Will continue to monitor.

## 2022-05-16 NOTE — ED PROVIDER NOTES
MD ATTESTATION NOTE    The JAY and I have discussed this patient's history, physical exam, and treatment plan.  I have reviewed the documentation and personally had a face to face interaction with the patient. I affirm the documentation and agree with the treatment and plan.  The attached note describes my personal findings.    I provided a substantive portion of the care of this patient. I personally performed the physical exam, in its entirety.    Kellen Parmar is a 86 y.o. female who presents to the ED c/o none witnessed fall.  This occurred at her nursing facility.  She is not able provide any history.  She denies any pain.  She is disoriented to time but apparently that is her baseline.  This reportedly occurred just prior to arrival.  She is on aspirin and Plavix daily.  She did report some left foot pain to the nursing home staff.      On exam:  GENERAL: Awake, alert, no acute distress  SKIN: Warm, dry  HENT: Normocephalic, atraumatic  EYES: no scleral icterus  CV: regular rhythm, regular rate  RESPIRATORY: normal effort, lungs clear  ABDOMEN: soft, nontender, nondistended  MUSCULOSKELETAL: no deformity.  No open wounds to the left foot.  NEURO: alert, moves all extremities, follows commands    Labs  No results found for this or any previous visit (from the past 24 hour(s)).    Radiology  CT Cervical Spine Without Contrast, CT Head Without Contrast    Result Date: 5/16/2022  CT HEAD AND CERVICAL SPINE WITHOUT CONTRAST  CLINICAL HISTORY:Fall with laceration to forehead. Confusion and neck pain. On blood thinners.  TECHNIQUE: CT scan of the head was obtained with 2 mm axial bone algorithm and 3 mm axial soft tissue algorithm images. Sagittal and coronal reconstructed images were obtained.  COMPARISON: Comparison is made to previous CT scan of the head dated 03/13/2020.  FINDINGS:  There is no evidence for a calvarial fracture or an area of acute extra-axial hemorrhage.  The ventricles, sulci, and cisterns  are age appropriate. The gray-white matter differentiation is within normal limits. There are relatively mild changes of chronic small vessel ischemic phenomena. Old lacunar disease is again noted within the left caudate. The posterior fossa structures are within normal limits. Incidental atherosclerotic changes are appreciated within the intracranial vasculature.  Mild mucosal thickening is seen within the left maxillary sinus and mucous retention cysts are also noted within the left maxillary sinus. Partial opacification of the left anterior and posterior ethmoid air cells is noted.       No evidence for acute traumatic intracranial pathology.  Incidental note is made of mild changes of chronic small vessel ischemic phenomena and old lacunar disease.  Incidental note is also made of changes of inflammatory paranasal sinus disease as discussed in detail above.   TECHNIQUE: CT scan of the cervical spine was obtained with 1 mm axial bone algorithm and 2 mm axial soft tissue algorithm images. Sagittal and coronal reconstructed images were obtained.  FINDINGS:  There is no evidence for acute fracture or bony malalignment within the cervical spine. There is degenerative retrolisthesis of C3 on C4, C4 on C5, and C5 on C6 by approximately 3 mm. A disc osteophyte complex at C5-C6 results in a mild-to-moderate degree of canal stenosis. Mild degrees of canal narrowing are noted at C3-C4 and C4-C5. Multilevel foraminal narrowing is identified from C3-C4 down to C5-C6 bilaterally.  Incidental note is made of a left-sided carotid stent.  IMPRESSION:  No evidence for acute fracture or bony malalignment within the cervical spine.  Incidental degenerative phenomena discussed in detail above.  Incidental note is also made of a left-sided carotid stent.      Radiation dose reduction techniques were utilized, including automated exposure control and exposure modulation based on body size.  This report was finalized on 5/16/2022 9:58  AM by Dr. Rick Lord M.D.      XR Foot 3+ View Bilateral    Result Date: 5/16/2022  XR FOOT 3+ VW BILATERAL-  Clinical: Fell with pain  FINDINGS: There is bone hypertrophy demonstrated at each Achilles insertion point. Small inferior calcaneal spurs noted bilaterally. No hind mid or forefoot fracture is demonstrated. The soft tissues have a satisfactory appearance. Articulations relatively well preserved.  CONCLUSION: No acute osseous nor articular abnormality is demonstrated.  This report was finalized on 5/16/2022 9:27 AM by Dr. Todd Shabazz M.D.        Medical Decision Making:  ED Course as of 05/16/22 1002   Mon May 16, 2022   0721 Patient presents from nursing home with reports of unwitnessed fall.  Patient is demented.  She has no complaints.  Fairly benign exam.  Patient is on Plavix.  Plan to obtain CT scan of the head and neck.  Patient had initially complained of bilateral foot pain, we will x-ray these as well. [EE]   0925 Bilateral foot films interpreted myself show no acute fracture. [EE]   0931 Patient has stable vitals and no obvious injuries.  Plan to discharge. [EE]      ED Course User Index  [EE] Emanuel Holder PA       With her fall, being on blood thinners, plan to CT her head and cervical spine to rule out fracture and intracranial hemorrhage.  Plan to x-ray her foot to rule out fracture or dislocation.  Overall she appears well without external signs of trauma.  She denies complaints.    PPE: The patient wore a mask and I wore an N95 mask throughout the entire patient encounter.      The patient has started, but not completed, their COVID-19 vaccination series.    Diagnosis  Final diagnoses:   Fall, initial encounter   Contusion of right foot, initial encounter   Contusion of left foot, initial encounter   Contusion of scalp, initial encounter        Beck Lira MD  05/16/22 1002

## 2022-05-16 NOTE — CASE MANAGEMENT/SOCIAL WORK
VM left for [facility] to see if patient could be transported back to facility any earlier today

## 2022-05-16 NOTE — CASE MANAGEMENT/SOCIAL WORK
Spoke w/ daughter, Chacha, per request from RN, to ask if she can transport patient back to facility and she said that she will do so. RN and CN updated. Ambulance cancelled

## 2022-05-16 NOTE — CASE MANAGEMENT/SOCIAL WORK
Per request from RN, spoke w/ [facility], to arrange transport back to [facility]. [id number]. RN updated

## 2022-05-16 NOTE — PROGRESS NOTES
Clinical Pharmacy Services: Medication History    Kellen Parmar is a 86 y.o. female presenting to [facility] for   Chief Complaint   Patient presents with   • Fall   • Foot Pain     bilateral       She  has a past medical history of Anemia, Asthma, Cancer (HCC), Dementia (HCC), Diabetes mellitus (HCC), Disease of thyroid gland, GERD (gastroesophageal reflux disease), and Hypertension.    Allergies as of 05/16/2022 - Reviewed 05/16/2022   Allergen Reaction Noted   • Contrast dye Anaphylaxis 07/14/2021   • Iodinated diagnostic agents Anaphylaxis 01/16/2013   • Shrimp Nausea And Vomiting 06/29/2020   • Shellfish-derived products Nausea And Vomiting 03/21/2022       Medication information was obtained from: Nursing Home   Pharmacy and Phone Number:     Prior to Admission Medications     Prescriptions Last Dose Informant Patient Reported? Taking?    acetaminophen (TYLENOL) 325 MG tablet  Nursing Home Yes Yes    Take 650 mg by mouth Every 6 (Six) Hours As Needed for Mild Pain .    allopurinol (ZYLOPRIM) 100 MG tablet  Nursing Home Yes Yes    Take 100 mg by mouth Daily.    amLODIPine (NORVASC) 10 MG tablet  Nursing Home Yes Yes    Take 1 tablet by mouth Daily.    aspirin 81 MG EC tablet  Nursing Home Yes Yes    Take 81 mg by mouth Daily.    atorvastatin (LIPITOR) 10 MG tablet  Nursing Home Yes Yes    Take 10 mg by mouth Every Night.    Cholecalciferol (Vitamin D) 50 MCG (2000 UT) tablet  Nursing Home Yes Yes    Take 2,000 Units by mouth Daily.    Clopidogrel Bisulfate (PLAVIX PO)  Nursing Home Yes Yes    Take 75 mg by mouth Daily.    docusate sodium (COLACE) 250 MG capsule  Nursing Home Yes Yes    Take 250 mg by mouth Daily.    ferrous sulfate 325 (65 FE) MG tablet  Nursing Home Yes Yes    Take 325 mg by mouth Daily With Breakfast.    furosemide (LASIX) 80 MG tablet  Nursing Home Yes Yes    Take 40 mg by mouth See Admin Instructions. Take 1 tablet every Mon, Tues, Wed, Thurs, Fri    furosemide (LASIX) 80  MG tablet  Nursing Home Yes Yes    Take 80 mg by mouth See Admin Instructions. Take 1 tablet on Sat, Sun    levothyroxine (SYNTHROID, LEVOTHROID) 100 MCG tablet  Nursing Home Yes Yes    Take 100 mcg by mouth Daily.    losartan (COZAAR) 100 MG tablet  Nursing Home Yes Yes    Take 100 mg by mouth Daily.    Menthol, Topical Analgesic, (Biofreeze) 4 % gel  Medication Bottle Yes Yes    Apply 1 application topically 2 (Two) Times a Day.    montelukast (SINGULAIR) 10 MG tablet  Nursing Home Yes Yes    Every Night.    OMEPRAZOLE 2MG/ML LIQUID  Nursing Home Yes Yes    Take 20 mL by mouth Daily.    potassium chloride 10 MEQ CR tablet  Nursing Home Yes Yes    Take 10 mEq by mouth Daily.    primidone (MYSOLINE) 50 MG tablet  Nursing Home Yes Yes    Take 50 mg by mouth 3 (Three) Times a Day.    sertraline (ZOLOFT) 50 MG tablet  Nursing Home Yes Yes    Take 100 mg by mouth Every Night.    tamsulosin (FLOMAX) 0.4 MG capsule 24 hr capsule  Nursing Home No Yes    Take 1 capsule by mouth Every Night.            Medication notes:     This medication list is complete to the best of my knowledge as of 5/16/2022    Please call if questions.    Tio Tolentino  Medication History Technician  [phone]    5/16/2022 10:13 EDT

## 2022-05-16 NOTE — ED PROVIDER NOTES
EMERGENCY DEPARTMENT ENCOUNTER    Room Number:  EDWR/WR  Date of encounter:  5/16/2022  PCP: Provider, No Known  Historian: Patient, EMS      I used full protective equipment while examining this patient.  This includes face mask, gloves and protective eyewear.  I washed my hands before entering the room and immediately upon leaving the room      HPI:  Chief Complaint: Fall  A complete HPI/ROS/PMH/PSH/SH/FH are unobtainable due to: Dementia    Context: Kellen Parmar is a 86 y.o. female who presents to the ED c/o injuries sustained in a fall prior to arrival.  Patient is a DNR from a nursing facility.  Patient was found on the ground this morning by nursing staff.  Patient has severe dementia and is unable to give any reliable history.  The patient initially complained of bilateral foot pain, however at this time patient denies any complaints.  Specifically she denies any headache, neck pain, back pain, chest pain, shortness of breath.    Review of Medical Records  I reviewed patient's last admission from 3/21/2022.  Patient admitted for GI bleed.    PAST MEDICAL HISTORY  Active Ambulatory Problems     Diagnosis Date Noted   • Clostridium difficile colitis 07/15/2021   • HTN (hypertension) 07/15/2021   • CKD (chronic kidney disease) stage 4, GFR 15-29 ml/min ([facility]) 07/15/2021   • JEANIE (acute kidney injury) ([facility]) 07/15/2021   • Hyponatremia 07/15/2021   • Hypokalemia 07/15/2021   • Anemia 07/15/2021   • Leukocytosis 07/15/2021   • Acute metabolic encephalopathy 07/15/2021   • Dementia without behavioral disturbance ([facility]) 07/15/2021   • Carotid stenosis, bilateral 07/17/2021   • Diarrhea 09/23/2021   • Coronary artery disease involving native coronary artery of native heart without angina pectoris 03/22/2022   • Hypothyroidism (acquired) 03/24/2022     Resolved Ambulatory Problems     Diagnosis Date Noted   • GI bleed 03/21/2022   • Gastrointestinal hemorrhage, unspecified gastrointestinal hemorrhage type 03/22/2022    • Hypertensive urgency 2022   • COVID-19 virus detected 2022   • Acute urinary retention 2022     Past Medical History:   Diagnosis Date   • Asthma    • Cancer (HCC)    • Dementia (HCC)    • Diabetes mellitus (HCC)    • Disease of thyroid gland    • GERD (gastroesophageal reflux disease)    • Hypertension          PAST SURGICAL HISTORY  Past Surgical History:   Procedure Laterality Date   • CHOLECYSTECTOMY           FAMILY HISTORY  History reviewed. No pertinent family history.      SOCIAL HISTORY  Social History     Socioeconomic History   • Marital status: Single   Tobacco Use   • Smoking status: Former Smoker     Packs/day: 0.50     Years: 15.00     Pack years: 7.50     Quit date:      Years since quittin.3   • Smokeless tobacco: Former User   Substance and Sexual Activity   • Alcohol use: Not Currently   • Drug use: Never   • Sexual activity: Defer         ALLERGIES  Contrast dye, Iodinated diagnostic agents, Shrimp, and Shellfish-derived products        REVIEW OF SYSTEMS  Unobtainable secondary to dementia      PHYSICAL EXAM    I have reviewed the triage vital signs and nursing notes.    ED Triage Vitals [22 0703]   Temp Heart Rate Resp BP SpO2   97.7 °F (36.5 °C) 50 16 152/59 96 %      Temp src Heart Rate Source Patient Position BP Location FiO2 (%)   -- -- -- -- --       Physical Exam  GENERAL: Alert, oriented to self, elderly, not distressed  HENT: head atraumatic, no cervical tenderness or vertebral step-off  EYES: no scleral icterus, EOMI  CV: regular rhythm, regular rate, no murmur  RESPIRATORY: normal effort, CTA  ABDOMEN: soft, nontender  MUSCULOSKELETAL: no deformity, FROM, no calf swelling or tenderness  NEURO: alert, pleasantly confused, 5/5 strength in all extremities  SKIN: warm, dry      RADIOLOGY  CT Cervical Spine Without Contrast, CT Head Without Contrast    Result Date: 2022  CT HEAD AND CERVICAL SPINE WITHOUT CONTRAST  CLINICAL HISTORY:Fall with  laceration to forehead. Confusion and neck pain. On blood thinners.  TECHNIQUE: CT scan of the head was obtained with 2 mm axial bone algorithm and 3 mm axial soft tissue algorithm images. Sagittal and coronal reconstructed images were obtained.  COMPARISON: Comparison is made to previous CT scan of the head dated 03/13/2020.  FINDINGS:  There is no evidence for a calvarial fracture or an area of acute extra-axial hemorrhage.  The ventricles, sulci, and cisterns are age appropriate. The gray-white matter differentiation is within normal limits. There are relatively mild changes of chronic small vessel ischemic phenomena. Old lacunar disease is again noted within the left caudate. The posterior fossa structures are within normal limits. Incidental atherosclerotic changes are appreciated within the intracranial vasculature.  Mild mucosal thickening is seen within the left maxillary sinus and mucous retention cysts are also noted within the left maxillary sinus. Partial opacification of the left anterior and posterior ethmoid air cells is noted.       No evidence for acute traumatic intracranial pathology.  Incidental note is made of mild changes of chronic small vessel ischemic phenomena and old lacunar disease.  Incidental note is also made of changes of inflammatory paranasal sinus disease as discussed in detail above.   TECHNIQUE: CT scan of the cervical spine was obtained with 1 mm axial bone algorithm and 2 mm axial soft tissue algorithm images. Sagittal and coronal reconstructed images were obtained.  FINDINGS:  There is no evidence for acute fracture or bony malalignment within the cervical spine. There is degenerative retrolisthesis of C3 on C4, C4 on C5, and C5 on C6 by approximately 3 mm. A disc osteophyte complex at C5-C6 results in a mild-to-moderate degree of canal stenosis. Mild degrees of canal narrowing are noted at C3-C4 and C4-C5. Multilevel foraminal narrowing is identified from C3-C4 down to C5-C6  bilaterally.  Incidental note is made of a left-sided carotid stent.  IMPRESSION:  No evidence for acute fracture or bony malalignment within the cervical spine.  Incidental degenerative phenomena discussed in detail above.  Incidental note is also made of a left-sided carotid stent.      Radiation dose reduction techniques were utilized, including automated exposure control and exposure modulation based on body size.  This report was finalized on 5/16/2022 9:58 AM by Dr. Rick Lord M.D.      XR Foot 3+ View Bilateral    Result Date: 5/16/2022  XR FOOT 3+ VW BILATERAL-  Clinical: Fell with pain  FINDINGS: There is bone hypertrophy demonstrated at each Achilles insertion point. Small inferior calcaneal spurs noted bilaterally. No hind mid or forefoot fracture is demonstrated. The soft tissues have a satisfactory appearance. Articulations relatively well preserved.  CONCLUSION: No acute osseous nor articular abnormality is demonstrated.  This report was finalized on 5/16/2022 9:27 AM by Dr. Todd Shabazz M.D.        I ordered the above noted radiological studies. Reviewed by me and discussed with radiologist.  See dictation for official radiology interpretation.      MEDICATIONS GIVEN IN ER    Medications - No data to display      PROGRESS, DATA ANALYSIS, CONSULTS, AND MEDICAL DECISION MAKING    All labs have been independently reviewed by me.  All radiology studies have been reviewed by me and discussed with radiologist dictating the report.   EKG's independently viewed and interpreted by me.  Discussion below represents my analysis of pertinent findings related to patient's condition, differential diagnosis, treatment plan and final disposition.    I have discussed case with Dr. Lira, emergency room physician.  He has performed his own bedside examination and agrees with treatment plan.    ED Course as of 05/16/22 1515   Mon May 16, 2022   0721 Patient presents from nursing home with reports of unwitnessed  fall.  Patient is demented.  She has no complaints.  Fairly benign exam.  Patient is on Plavix.  Plan to obtain CT scan of the head and neck.  Patient had initially complained of bilateral foot pain, we will x-ray these as well. [EE]   0925 Bilateral foot films interpreted myself show no acute fracture. [EE]   0931 Patient has stable vitals and no obvious injuries.  Plan to discharge. [EE]      ED Course User Index  [EE] Emanuel Holder PA       AS OF 15:15 EDT VITALS:    BP - 180/70  HR - 50  TEMP - 97.7 °F (36.5 °C)  O2 SATS - 95%        DIAGNOSIS  Final diagnoses:   Fall, initial encounter   Contusion of right foot, initial encounter   Contusion of left foot, initial encounter   Contusion of scalp, initial encounter         DISPOSITION  Discharged      Dictated utilizing Dragon dictation     Emanuel Holder PA  05/16/22 9910

## 2022-05-16 NOTE — ED TRIAGE NOTES
Pt to ER from [facility] via [facility] (incident # [id number]) with c/o fall, foot pain.    Per report, pt had unwitnessed fall out of bed at an unknown time. EMS states NH staff had placed pt back in to bed.    Pt c/o bilateral foot pain.    Unknown LOC or if pt struck head. Pt is on plavix.    Pt has hx dementia, oriented to self only.        Pt masked in triage, staff in appropriate ppe.

## 2022-08-06 PROBLEM — R06.02 SHORTNESS OF BREATH: Status: ACTIVE | Noted: 2022-01-01

## 2022-08-06 NOTE — PROGRESS NOTES
Patient sent for bilateral lower extremity venous duplex bilateral. Scan completed and negative for DVT results given to Tere Alvares RN. She reached out to Katherine Owusu MD attending doctor by message to make her aware of negative results as Dr. Beck Lira is no longer on the floor and patient is being admitted.

## 2022-08-06 NOTE — ED TRIAGE NOTES
Patient to ed from [facility] via ems with complaints of an elevated d-dimer and SOA per facility. Per ems, facility states that her feet are swollen. D-dimer 10.14

## 2022-08-06 NOTE — ED PROVIDER NOTES
EMERGENCY DEPARTMENT ENCOUNTER    Room Number:  18/18  Date of encounter:  8/6/2022  PCP: ROSEMARIE Arredondo  Patient Care Team:  ROSEMARIE Arredondo as PCP - General (Peripheral Vascular Disease)   Historian: EMS, nursing home records, patient    HPI:  Chief Complaint: Shortness of breath, elevated D-dimer  A complete HPI/ROS/PMH/PSH/SH/FH are unobtainable due to: Dementia    Context: Kellen Parmar is a 86 y.o. female who presents to the ED c/o having shortness of breath and an elevated D-dimer.  She presents from a nursing home.  She apparently has been complaining of shortness of breath.  They ordered a D-dimer and was elevated at 10.  She was sent here for further evaluation.  The patient is not able provide any history due to her dementia.    Prior record review: ER visit 5/16/2022 for a fall    PAST MEDICAL HISTORY  Active Ambulatory Problems     Diagnosis Date Noted   • Clostridium difficile colitis 07/15/2021   • HTN (hypertension) 07/15/2021   • CKD (chronic kidney disease) stage 4, GFR 15-29 ml/min ([facility]) 07/15/2021   • JEANIE (acute kidney injury) ([facility]) 07/15/2021   • Hyponatremia 07/15/2021   • Hypokalemia 07/15/2021   • Anemia 07/15/2021   • Leukocytosis 07/15/2021   • Acute metabolic encephalopathy 07/15/2021   • Dementia without behavioral disturbance (HCC) 07/15/2021   • Carotid stenosis, bilateral 07/17/2021   • Diarrhea 09/23/2021   • Coronary artery disease involving native coronary artery of native heart without angina pectoris 03/22/2022   • Hypothyroidism (acquired) 03/24/2022     Resolved Ambulatory Problems     Diagnosis Date Noted   • GI bleed 03/21/2022   • Gastrointestinal hemorrhage, unspecified gastrointestinal hemorrhage type 03/22/2022   • Hypertensive urgency 03/22/2022   • COVID-19 virus detected 03/22/2022   • Acute urinary retention 03/22/2022     Past Medical History:   Diagnosis Date   • Asthma    • Cancer (HCC)    • Dementia (HCC)    • Diabetes mellitus (HCC)    • Disease of  thyroid gland    • GERD (gastroesophageal reflux disease)    • Hypertension        The patient has started, but not completed, their COVID-19 vaccination series.    PAST SURGICAL HISTORY  Past Surgical History:   Procedure Laterality Date   • CHOLECYSTECTOMY           FAMILY HISTORY  No family history on file.      SOCIAL HISTORY  Social History     Socioeconomic History   • Marital status: Single   Tobacco Use   • Smoking status: Former Smoker     Packs/day: 0.50     Years: 15.00     Pack years: 7.50     Quit date: 2000     Years since quittin.6   • Smokeless tobacco: Former User   Substance and Sexual Activity   • Alcohol use: Not Currently   • Drug use: Never   • Sexual activity: Defer         ALLERGIES  Contrast dye, Iodinated diagnostic agents, Shrimp, and Shellfish-derived products        REVIEW OF SYSTEMS  Review of Systems   Positive shortness of breath, with the rest of her review of systems is not available due to her dementia  All systems reviewed and negative except for those discussed in HPI.       PHYSICAL EXAM    I have reviewed the triage vital signs and nursing notes.    ED Triage Vitals [22 1620]   Temp Heart Rate Resp BP SpO2   98 °F (36.7 °C) 70 16 (!) 200/76 96 %      Temp src Heart Rate Source Patient Position BP Location FiO2 (%)   Tympanic -- -- -- --       Physical Exam  GENERAL: Awake, alert, no acute distress  SKIN: Warm, dry  HENT: Normocephalic, atraumatic  EYES: no scleral icterus  CV: regular rhythm, regular rate  RESPIRATORY: normal effort, lungs clear  ABDOMEN: soft, nontender, nondistended  MUSCULOSKELETAL: no deformity, no asymmetry between the bilateral lower extremities.  Pulses intact.  No significant edema.  NEURO: alert, moves all extremities, follows commands          LAB RESULTS  Recent Results (from the past 24 hour(s))   ECG 12 Lead    Collection Time: 22  4:49 PM   Result Value Ref Range    QT Interval 382 ms   Comprehensive Metabolic Panel    Collection  Time: 08/06/22  4:50 PM    Specimen: Blood   Result Value Ref Range    Glucose 98 65 - 99 mg/dL    BUN 47 (H) 8 - 23 mg/dL    Creatinine 1.89 (H) 0.57 - 1.00 mg/dL    Sodium 141 136 - 145 mmol/L    Potassium 4.5 3.5 - 5.2 mmol/L    Chloride 108 (H) 98 - 107 mmol/L    CO2 19.0 (L) 22.0 - 29.0 mmol/L    Calcium 8.2 (L) 8.6 - 10.5 mg/dL    Total Protein 6.2 6.0 - 8.5 g/dL    Albumin 3.70 3.50 - 5.20 g/dL    ALT (SGPT) 11 1 - 33 U/L    AST (SGOT) 15 1 - 32 U/L    Alkaline Phosphatase 87 39 - 117 U/L    Total Bilirubin <0.2 0.0 - 1.2 mg/dL    Globulin 2.5 gm/dL    A/G Ratio 1.5 g/dL    BUN/Creatinine Ratio 24.9 7.0 - 25.0    Anion Gap 14.0 5.0 - 15.0 mmol/L    eGFR 25.9 (L) >60.0 mL/min/1.73   BNP    Collection Time: 08/06/22  4:50 PM    Specimen: Blood   Result Value Ref Range    proBNP 694.0 0.0 - 1,800.0 pg/mL   Troponin    Collection Time: 08/06/22  4:50 PM    Specimen: Blood   Result Value Ref Range    Troponin T 0.017 0.000 - 0.030 ng/mL   CBC Auto Differential    Collection Time: 08/06/22  4:50 PM    Specimen: Blood   Result Value Ref Range    WBC 6.07 3.40 - 10.80 10*3/mm3    RBC 2.55 (L) 3.77 - 5.28 10*6/mm3    Hemoglobin 8.1 (L) 12.0 - 15.9 g/dL    Hematocrit 25.2 (L) 34.0 - 46.6 %    MCV 98.8 (H) 79.0 - 97.0 fL    MCH 31.8 26.6 - 33.0 pg    MCHC 32.1 31.5 - 35.7 g/dL    RDW 12.9 12.3 - 15.4 %    RDW-SD 45.7 37.0 - 54.0 fl    MPV 10.0 6.0 - 12.0 fL    Platelets 174 140 - 450 10*3/mm3    Neutrophil % 68.1 42.7 - 76.0 %    Lymphocyte % 20.4 19.6 - 45.3 %    Monocyte % 8.6 5.0 - 12.0 %    Eosinophil % 2.3 0.3 - 6.2 %    Basophil % 0.3 0.0 - 1.5 %    Immature Grans % 0.3 0.0 - 0.5 %    Neutrophils, Absolute 4.13 1.70 - 7.00 10*3/mm3    Lymphocytes, Absolute 1.24 0.70 - 3.10 10*3/mm3    Monocytes, Absolute 0.52 0.10 - 0.90 10*3/mm3    Eosinophils, Absolute 0.14 0.00 - 0.40 10*3/mm3    Basophils, Absolute 0.02 0.00 - 0.20 10*3/mm3    Immature Grans, Absolute 0.02 0.00 - 0.05 10*3/mm3    nRBC 0.0 0.0 - 0.2 /100 WBC    COVID-19, [facility]I IN-HOUSE CEPHEID/JANAY NP SWAB IN TRANSPORT MEDIA 8-12 HR TAT - Swab, Nasopharynx    Collection Time: 08/06/22  4:50 PM    Specimen: Nasopharynx; Swab   Result Value Ref Range    COVID19 Not Detected Not Detected - Ref. Range   D-dimer, Quantitative    Collection Time: 08/06/22  4:50 PM    Specimen: Blood   Result Value Ref Range    D-Dimer, Quantitative 9.52 (H) 0.00 - 0.49 MCGFEU/mL       Ordered the above labs and independently reviewed the results.        RADIOLOGY  XR Chest 1 View    Result Date: 8/6/2022  PORTABLE CHEST X-RAY  HISTORY: Shortness of breath with dizziness and weakness and.  Portable chest x-ray is provided. Correlation: None.  FINDINGS: The cardiomediastinal silhouette is normal. The lungs are clear. The costophrenic sulci are dry and the bones appear normal. There is no pneumothorax.      Negative.  This report was finalized on 8/6/2022 5:16 PM by Dr. Bebeto Mcqueen M.D.        I ordered the above noted radiological studies. Reviewed by me and discussed with radiologist.  See dictation for official radiology interpretation.      PROCEDURES    Procedures      MEDICATIONS GIVEN IN ER    Medications   sodium chloride 0.9 % flush 10 mL (10 mL Intravenous Given 8/6/22 1655)         PROGRESS, DATA ANALYSIS, CONSULTS, AND MEDICAL DECISION MAKING    All labs have been independently reviewed by me.  All radiology studies have been reviewed by me and discussed with radiologist dictating the report.   EKG's independently viewed and interpreted by me.  Discussion below represents my analysis of pertinent findings related to patient's condition, differential diagnosis, treatment plan and final disposition.    Differential diagnosis includes but is not limited to CHF, pneumonia, PE, acute aortic syndrome, pneumothorax.    ED Course as of 08/06/22 1806   Sat Aug 06, 2022   1635 The patient has an IV contrast allergy.  Her GFR on last visit was also 36.  I do not suspect that she will be able  to have a PE protocol CT to rule out PE. [TR]   1636 I spoke with [facility] library.  There is no one in house to do a VQ scan until tomorrow.  Plan to admit her to the hospital for VQ scan, ultrasounds of her bilateral lower extremities.  She has no hypoxia or tachycardia currently. [TR]   1711 EKG          EKG time: 1649  Rhythm/Rate: Normal sinus, rate 76  P waves and DE: Normal P, normal DE  QRS, axis: Narrow QRS, normal axis  ST and T waves: Normal ST and T waves    Interpreted Contemporaneously by me, independently viewed  Not significantly changed compared to prior 9/22/2021   [TR]   1725 I reviewed work-up and findings with the patient's daughter at the bedside.  She reports she has had intermittent shortness of breath recently.  She states her prior allergy to contrast dye is anaphylaxis.  She reports she does not have good kidney function.  I advised her that she will need a VQ scan to rule out PE and this will likely require her to be admitted to the hospital given the availability of VQ scan currently. [TR]   1730 Hemoglobin has been trending down.  Rectal exam shows no blood.  Heme-negative. [TR]   1747 D-Dimer, Quant(!): 9.52 [TR]   1747 Hemoglobin(!): 8.1 [TR]   1747 Creatinine(!): 1.89 [TR]   1747 eGFR(!): 25.9 [TR]   1804 I spoke with the film library.  They will have VQ scan present tomorrow at 6 AM. [TR]   1804 I spoke with Dr. Owusu with A.  She will admit. [TR]   1806 I reviewed work-up and findings with the patient and family at the bedside.  Answered all questions.  Plan admission.  They are agreeable. [TR]      ED Course User Index  [TR] Beck Lira MD           PPE: The patient wore a mask and I wore an N95 mask throughout the entire patient encounter.       AS OF 18:06 EDT VITALS:    BP - (!) 200/76  HR - 70  TEMP - 98 °F (36.7 °C) (Tympanic)  O2 SATS - 96%        DIAGNOSIS  Final diagnoses:   Shortness of breath   Elevated d-dimer         DISPOSITION  ED Disposition     ED Disposition    Decision to Admit    Condition   --    Comment   Level of Care: Telemetry [5]   Diagnosis: Shortness of breath [786.05.ICD-9-CM]   Admitting Physician: RENEA NEELY [[id number]]   Attending Physician: RENEA NEELY [[id number]]                     Beck Lira MD  08/06/22 6755

## 2022-08-06 NOTE — ED NOTES
Pt to ED via ems from [email or web address]] for SOB, elevated d-dimer of 10. Pt denies CP but then had a sharp pain when lying flat for ecg. Reports improved when set up in bed.     Pt wearing face mask during their stay in ER. This RN wore appropriate ppe while providing patient care.

## 2022-08-07 NOTE — PROGRESS NOTES
"    Name: Kellen Parmar ADMIT: 2022   : 1935  PCP: ROSEMARIE Arredondo    MRN: [id number] LOS: 0 days   AGE/SEX: 86 y.o. female  ROOM: [facility]     Subjective   Subjective   Patient without complaint.    Review of Systems   Unable to perform ROS: Dementia      Objective   Objective   Vital Signs  Temp:  [98 °F (36.7 °C)-98.6 °F (37 °C)] 98.6 °F (37 °C)  Heart Rate:  [60-75] 60  Resp:  [16-18] 16  BP: (119-212)/(53-84) 157/61  SpO2:  [94 %-96 %] 95 %  on   ;   Device (Oxygen Therapy): room air  Body mass index is 22.67 kg/m².    Physical Exam  Constitutional:       General: She is not in acute distress.     Appearance: Normal appearance. She is not toxic-appearing.   HENT:      Head: Normocephalic and atraumatic.   Cardiovascular:      Rate and Rhythm: Normal rate and regular rhythm.   Pulmonary:      Effort: Pulmonary effort is normal. No respiratory distress.      Breath sounds: Normal breath sounds. No wheezing, rhonchi or rales.   Abdominal:      General: Bowel sounds are normal.      Palpations: Abdomen is soft.      Tenderness: There is no abdominal tenderness. There is no guarding or rebound.   Musculoskeletal:         General: No swelling.      Right lower leg: No edema.      Left lower leg: No edema.   Skin:     General: Skin is warm and dry.   Neurological:      General: No focal deficit present.      Mental Status: She is alert.      Comments: Oriented to self and hospital. Year 2019. Situation \"I am sick\"   Psychiatric:         Mood and Affect: Mood normal.         Behavior: Behavior normal.         Cognition and Memory: Memory is impaired.     Results Review  I reviewed the patient's new clinical results.  Results from last 7 days   Lab Units 22  0742 22  1650   WBC 10*3/mm3 4.16 6.07   HEMOGLOBIN g/dL 7.2* 8.1*   PLATELETS 10*3/mm3 152 174     Results from last 7 days   Lab Units 22  0742 22  1650   SODIUM mmol/L 141 141   POTASSIUM mmol/L 4.3 4.5   CHLORIDE mmol/L " 108* 108*   CO2 mmol/L 21.0* 19.0*   BUN mg/dL 43* 47*   CREATININE mg/dL 1.66* 1.89*   GLUCOSE mg/dL 108* 98     Lab Results   Component Value Date    ANIONGAP 12.0 08/07/2022     Estimated Creatinine Clearance: 23.9 mL/min (A) (by C-G formula based on SCr of 1.66 mg/dL (H)).    Results from last 7 days   Lab Units 08/06/22  1650   ALBUMIN g/dL 3.70   BILIRUBIN mg/dL <0.2   ALK PHOS U/L 87   AST (SGOT) U/L 15   ALT (SGPT) U/L 11     Results from last 7 days   Lab Units 08/07/22  0742 08/06/22  1650   CALCIUM mg/dL 8.1* 8.2*   ALBUMIN g/dL  --  3.70         Scheduled Meds  allopurinol, 100 mg, Oral, Daily  amLODIPine, 10 mg, Oral, Daily  aspirin, 81 mg, Oral, Daily  atorvastatin, 10 mg, Oral, Nightly  cholecalciferol, 2,000 Units, Oral, Daily  clopidogrel, 75 mg, Oral, Daily  [START ON 8/8/2022] furosemide, 40 mg, Oral, Once per day on Mon Tue Wed Thu Fri  furosemide, 80 mg, Oral, Once per day on Sun Sat  levothyroxine, 100 mcg, Oral, Q AM  losartan, 100 mg, Oral, Daily  memantine, 5 mg, Oral, Q12H  montelukast, 10 mg, Oral, Nightly  potassium chloride, 10 mEq, Oral, Daily  primidone, 50 mg, Oral, TID  sertraline, 100 mg, Oral, Nightly  tamsulosin, 0.4 mg, Oral, Nightly    Continuous Infusions   PRN Meds  •  acetaminophen  •  melatonin  •  nitroglycerin  •  ondansetron **OR** ondansetron  •  [COMPLETED] Insert peripheral IV **AND** sodium chloride     Diet  Diet Regular; Cardiac    I have personally reviewed:  [x]  Laboratory   [x]  Microbiology   [x]  Radiology   [x]  EKG/Telemetry   []  Cardiology/Vascular   []  Pathology   [x]  Records     Assessment/Plan     Active Hospital Problems    Diagnosis  POA   • Shortness of breath [R06.02]  Yes   • Hypothyroidism (acquired) [E03.9]  Yes   • Coronary artery disease involving native coronary artery of native heart without angina pectoris [I25.10]  Yes   • HTN (hypertension) [I10]  Yes   • CKD (chronic kidney disease) stage 4, GFR 15-29 ml/min (HCC) [N18.4]  Yes   •  Dementia without behavioral disturbance (HCC) [F03.90]  Yes      Resolved Hospital Problems   No resolved problems to display.     86 y.o. female sent from [email or web address] due to elevated dimer and shortness of breath.    Elevated dimer and reported dyspnea: Patient currently not dyspneic though history limited. Dopplers preliminary report negative. Perfusion scan indeterminate. Will check echocardiogram and consult pulm.    Anemia: Continue to monitor. Check occult blood and iron profile (has been low in the past)    CAD: no chest pain    Hypertension: BP improved today continue to monitor    CKD probably around baseline.    Dementia    SCDs for DVT prophylaxis    Discussed with patient and nursing staff    Discharge: JIGNA Morin MD  [facility]  08/07/22

## 2022-08-07 NOTE — H&P
HISTORY AND PHYSICAL   [facility]        Date of Admission: 2022  Patient Identification:  Name: Kellen Parmar  Age: 86 y.o.  Sex: female  :  1935  MRN: [id number]                     Primary Care Physician: ROSEMARIE Arredondo    Chief Complaint:  86 year old female who presented to the emergency room with abnormal labs; she had an elevated d dimer at her nursing home; she had been short of air; she was sent to the ED for evaluation; she has dementia and cannot contribute to the history     History of Present Illness:   As above    Past Medical History:  Past Medical History:   Diagnosis Date   • Anemia    • Asthma    • Cancer (HCC)    • Dementia (HCC)    • Diabetes mellitus (HCC)    • Disease of thyroid gland    • GERD (gastroesophageal reflux disease)    • Hypertension      Past Surgical History:  Past Surgical History:   Procedure Laterality Date   • CHOLECYSTECTOMY        Home Meds:  (Not in a hospital admission)      Allergies:  Allergies   Allergen Reactions   • Contrast Dye Anaphylaxis   • Iodinated Diagnostic Agents Anaphylaxis   • Shrimp Nausea And Vomiting     Allergic to all sea food   • Shellfish-Derived Products Nausea And Vomiting     Immunizations:  Immunization History   Administered Date(s) Administered   • COVID-19 (PFIZER) PURPLE CAP 09/15/2021, 10/06/2021     Social History:   Social History     Social History Narrative   • Not on file     Social History     Socioeconomic History   • Marital status: Single   Tobacco Use   • Smoking status: Former Smoker     Packs/day: 0.50     Years: 15.00     Pack years: 7.50     Quit date:      Years since quittin.6   • Smokeless tobacco: Former User   Substance and Sexual Activity   • Alcohol use: Not Currently   • Drug use: Never   • Sexual activity: Defer       Family History:  History reviewed. No pertinent family history.     Review of Systems  Not obtainable from the patient    Objective:  T Max 24 hrs: Temp  "(24hrs), Av °F (36.7 °C), Min:98 °F (36.7 °C), Max:98 °F (36.7 °C)    Vitals Ranges:   Temp:  [98 °F (36.7 °C)] 98 °F (36.7 °C)  Heart Rate:  [70-75] 75  Resp:  [16] 16  BP: (200-212)/(76-84) 212/84      Exam:  BP (!) 212/84 Comment: dr álvarez notified   Pulse 75   Temp 98 °F (36.7 °C) (Tympanic)   Resp 16   Ht 162.6 cm (64\")   Wt 65.8 kg (145 lb)   SpO2 94%   BMI 24.89 kg/m²     General Appearance:    Alert, cooperative, no distress, appears stated age   Head:    Normocephalic, without obvious abnormality, atraumatic   Eyes:    PERRL, conjunctivae/corneas clear, EOM's intact, both eyes   Ears:    Normal external ear canals, both ears   Nose:   Nares normal, septum midline, mucosa normal, no drainage    or sinus tenderness   Throat:   Lips, mucosa, and tongue normal   Neck:   Supple, symmetrical, trachea midline, no adenopathy;     thyroid:  no enlargement/tenderness/nodules; no carotid    bruit or JVD   Back:     Symmetric, no curvature, ROM normal, no CVA tenderness   Lungs:     Decreased breath sounds bilaterally, respirations unlabored   Chest Wall:    No tenderness or deformity    Heart:    Regular rate and rhythm, S1 and S2 normal, no murmur, rub   or gallop   Abdomen:     Soft, nontender, bowel sounds active all four quadrants,     no masses, no hepatomegaly, no splenomegaly   Extremities:   Extremities normal, atraumatic, no cyanosis or edema   Pulses:   2+ and symmetric all extremities   Skin:   Skin color, texture, turgor normal, no rashes or lesions   Lymph nodes:   Cervical, supraclavicular, and axillary nodes normal   Neurologic:   CNII-XII intact, normal strength, sensation intact throughout      .    Data Review:  Labs in chart were reviewed.  WBC   Date Value Ref Range Status   2022 6.07 3.40 - 10.80 10*3/mm3 Final     Hemoglobin   Date Value Ref Range Status   2022 8.1 (L) 12.0 - 15.9 g/dL Final     Hematocrit   Date Value Ref Range Status   2022 25.2 (L) 34.0 - 46.6 % " Final     Platelets   Date Value Ref Range Status   08/06/2022 174 140 - 450 10*3/mm3 Final     Sodium   Date Value Ref Range Status   08/06/2022 141 136 - 145 mmol/L Final     Potassium   Date Value Ref Range Status   08/06/2022 4.5 3.5 - 5.2 mmol/L Final     Chloride   Date Value Ref Range Status   08/06/2022 108 (H) 98 - 107 mmol/L Final     CO2   Date Value Ref Range Status   08/06/2022 19.0 (L) 22.0 - 29.0 mmol/L Final     BUN   Date Value Ref Range Status   08/06/2022 47 (H) 8 - 23 mg/dL Final     Creatinine   Date Value Ref Range Status   08/06/2022 1.89 (H) 0.57 - 1.00 mg/dL Final     Glucose   Date Value Ref Range Status   08/06/2022 98 65 - 99 mg/dL Final     Calcium   Date Value Ref Range Status   08/06/2022 8.2 (L) 8.6 - 10.5 mg/dL Final     AST (SGOT)   Date Value Ref Range Status   08/06/2022 15 1 - 32 U/L Final     ALT (SGPT)   Date Value Ref Range Status   08/06/2022 11 1 - 33 U/L Final     Alkaline Phosphatase   Date Value Ref Range Status   08/06/2022 87 39 - 117 U/L Final                Imaging Results (All)     Procedure Component Value Units Date/Time    XR Chest 1 View [[id number]] Collected: 08/06/22 1716     Updated: 08/06/22 1719    Narrative:      PORTABLE CHEST X-RAY     HISTORY: Shortness of breath with dizziness and weakness and.     Portable chest x-ray is provided. Correlation: None.     FINDINGS: The cardiomediastinal silhouette is normal. The lungs are  clear. The costophrenic sulci are dry and the bones appear normal. There  is no pneumothorax.       Impression:      Negative.     This report was finalized on 8/6/2022 5:16 PM by Dr. Bebeto Mcqueen M.D.               Assessment:  Active Hospital Problems    Diagnosis  POA   • Shortness of breath [R06.02]  Yes      Resolved Hospital Problems   No resolved problems to display.   dementia  Hypertension  ckd4  anemia    Plan:  Will continue her home medications  vq scan has been ordered but can't be done until tomorrow  Monitor on  telemetry  Trend hgb  Mental status appears to be at baseline  D.w patient, family and ED Provider    Katherine Owusu MD  8/6/2022  20:51 EDT

## 2022-08-07 NOTE — CONSULTS
[facility]  [phone]  Dr. Sylvester Santa      Subjective   LOS: 0 days     Thank you for this consultation.  86-year-old female who was more short of breath at her nursing home.  Baseline dementia.  They apparently checked a D-dimer which was elevated and patient was sent here.  Daughter at bedside.  States she has had previous episodes of shortness of breath related to COPD.  Also daughter feels that her legs are more swollen than they have been.  Patient herself is asleep and unable to give me history because of dementia.  She is not on any supplemental oxygen.  According to the daughter patient was a heavy smoker in the past.  Unable to quantify but perhaps up to 2 packs of cigarettes a day.  Please note this is different from reported smoking history in epic.  There is no report of recent chills or rigors.  No recent nausea or vomiting is reported.  No prior history of blood clots.  Patient does have chronic kidney disease.  In the past she has been treated for CHF.    Kellen PARKER Parmar  reports previous alcohol use.,  reports that she quit smoking about 22 years ago. She has a 7.50 pack-year smoking history. She has quit using smokeless tobacco.     Past Hx:  has a past medical history of Anemia, Asthma, Cancer (HCC), Dementia (HCC), Diabetes mellitus (HCC), Disease of thyroid gland, GERD (gastroesophageal reflux disease), and Hypertension.  Surg Hx:  has a past surgical history that includes Cholecystectomy.  FH: family history is not on file.  SH:  reports that she quit smoking about 22 years ago. She has a 7.50 pack-year smoking history. She has quit using smokeless tobacco. She reports previous alcohol use. She reports that she does not use drugs.    Medications Prior to Admission   Medication Sig Dispense Refill Last Dose   • acetaminophen (TYLENOL) 325 MG tablet Take 650 mg by mouth Every 6 (Six) Hours As Needed for Mild Pain .      • allopurinol (ZYLOPRIM) 100 MG tablet Take 100 mg by  mouth Daily.      • amLODIPine (NORVASC) 10 MG tablet Take 1 tablet by mouth Daily.      • aspirin 81 MG EC tablet Take 81 mg by mouth Daily.      • atorvastatin (LIPITOR) 10 MG tablet Take 10 mg by mouth Every Night.      • Cholecalciferol (Vitamin D) 50 MCG (2000 UT) tablet Take 2,000 Units by mouth Daily.      • Clopidogrel Bisulfate (PLAVIX PO) Take 75 mg by mouth Daily.      • docusate sodium (COLACE) 250 MG capsule Take 250 mg by mouth Daily.      • ferrous sulfate 325 (65 FE) MG tablet Take 325 mg by mouth Daily With Breakfast.      • furosemide (LASIX) 80 MG tablet Take 40 mg by mouth See Admin Instructions. Take 1 tablet every Mon, Tues, Wed, Thurs, Fri      • furosemide (LASIX) 80 MG tablet Take 80 mg by mouth See Admin Instructions. Take 1 tablet on Sat, Sun      • levothyroxine (SYNTHROID, LEVOTHROID) 100 MCG tablet Take 100 mcg by mouth Daily.      • losartan (COZAAR) 100 MG tablet Take 100 mg by mouth Daily.      • memantine (NAMENDA) 5 MG tablet Take 5 mg by mouth 2 (Two) Times a Day.      • Menthol, Topical Analgesic, (Biofreeze) 4 % gel Apply 1 application topically 2 (Two) Times a Day.      • montelukast (SINGULAIR) 10 MG tablet Every Night.      • OMEPRAZOLE 2MG/ML LIQUID Take 20 mL by mouth Daily.      • potassium chloride 10 MEQ CR tablet Take 10 mEq by mouth Daily.      • primidone (MYSOLINE) 50 MG tablet Take 50 mg by mouth 3 (Three) Times a Day.      • sertraline (ZOLOFT) 50 MG tablet Take 100 mg by mouth Every Night.      • tamsulosin (FLOMAX) 0.4 MG capsule 24 hr capsule Take 1 capsule by mouth Every Night. 30 capsule 0      Allergies   Allergen Reactions   • Contrast Dye Anaphylaxis   • Iodinated Diagnostic Agents Anaphylaxis   • Shrimp Nausea And Vomiting     Allergic to all sea food   • Shellfish-Derived Products Nausea And Vomiting       Review of Systems   Unable to perform ROS: Dementia     Vital Signs past 24hrs  BP range: BP: (119-212)/(53-84) 173/69  Pulse range: Heart Rate:   [60-75] 60  Resp rate range: Resp:  [14-18] 14  Temp range: Temp (24hrs), Av.2 °F (36.8 °C), Min:98 °F (36.7 °C), Max:98.6 °F (37 °C)    Oxygen range: SpO2:  [94 %-96 %] 96 %;  ;   Device (Oxygen Therapy): room air  59.9 kg (132 lb 1.6 oz); Body mass index is 22.67 kg/m².  I/O this shift:  In: 840 [P.O.:840]  Out: -     Adult female who is elderly and looks older than stated age.  She is currently sleeping.  When aroused to be examined she basically moans and complains about the cold stethoscope but otherwise does not interact.  Pupils are reactive.  Oropharynx is dry.  JVP does not appear to be elevated though difficult to assess with patient position.  Trachea midline.  Lungs reveal bilateral air entry.  No wheezing.  Air entry appears diminished.  Perhaps some basilar rales.  Percussion note resonant chest expansion equal no chest wall deformity or tenderness.  Heart examination S1-S2 present rhythm regular no murmurs.  Mild edema lower extremities.  Abdomen is soft nontender bowel sounds present no liver spleen enlargement.  No peripheral cyanosis clubbing.  Unable to do detailed neuro exam.  No obvious adenopathy.    Results Review:    I have reviewed the laboratory and imaging data from current admission. My annotations are as noted in assessment and plan.    Medication Review:  I have reviewed the current MAR. My annotations are as noted in assessment and plan.    Plan   PCCM Problems  Dyspnea  Elevated D-dimer  Indeterminate VQ scan  Scarring left lung  CKD 3B  Suspected fluid overload  Anemia  COPD reported  Ex-smoker  Dementia      Plan of Treatment    D-dimer elevated but CKD 3B.  Her chest x-ray appears to have scarring in the left lung.  I also reviewed a report of CT chest done at [facility] in .  There was scarring in the lingula on CT chest done there.  It is therefore likely that the indeterminate VQ scan with decreased perfusion in left lung is a consequence of ventilation impairment and  therefore is not a mismatch defect.  This cannot be confirmed at present time but appears likely.    She does have some leg swelling.  She does have CKD 3B.  She is on high-dose of oral Lasix.  I will give her a dose of IV Lasix to see if any improvement.    COPD reported though I cannot independently verify from the patient apparently significant smoking history.  She is not wheezing at this time.  We will obtain an ABG to review in the morning.  For now just use bronchodilators scheduled.    Electronically signed by Sylvester Santa MD, 08/07/22, 2:21 PM EDT.      Part of this note may be an electronic transcription/translation of spoken language to printed text using the Dragon Dictation System.

## 2022-08-07 NOTE — PLAN OF CARE
Goal Outcome Evaluation:  Patient admitted from ED with SOA. D dimer elevated, sent for felipa LE doppler which was negative. Plan for VQ scan this am as patient is severely allergic to contrast dye and unable to have a CTA. Patients BP was elevated 200s/80s. Clonidine PO x1 ordered and now BP WNL. Patient is forgetful to situation and place, continue to reorient. Will continue to monitor.

## 2022-08-08 NOTE — PROGRESS NOTES
[facility]  [phone]  Dr. Sylvester Santa    Subjective:  LOS: 0    Chief Complaint: Dyspnea    More awake and alert today.  She thinks her breathing is improved.  Denies cough or phlegm.    Objective   Vital Signs past 24hrs    Temp range: Temp (24hrs), Av.2 °F (36.8 °C), Min:98 °F (36.7 °C), Max:98.6 °F (37 °C)    BP range: BP: (168-181)/() 172/60  Pulse range: Heart Rate:  [54-70] 61  Resp rate range: Resp:  [14-20] 20    Device (Oxygen Therapy): room air   Oxygen range:SpO2:  [90 %-100 %] 97 %      57.4 kg (126 lb 9.6 oz); Body mass index is 21.73 kg/m².    Intake/Output Summary (Last 24 hours) at 2022 0957  Last data filed at 2022 0809  Gross per 24 hour   Intake 780 ml   Output 2200 ml   Net -1420 ml       Physical Exam  Eyes:      Pupils: Pupils are equal, round, and reactive to light.   Cardiovascular:      Rate and Rhythm: Normal rate and regular rhythm.      Heart sounds: No murmur heard.  Pulmonary:      Effort: Pulmonary effort is normal.      Breath sounds: Normal breath sounds.   Abdominal:      General: Bowel sounds are normal.      Palpations: Abdomen is soft. There is no mass.      Tenderness: There is no abdominal tenderness.   Musculoskeletal:         General: No swelling.   Neurological:      Mental Status: She is alert.       Results Review:    I have reviewed the laboratory and imaging data since the last note by [facility] physician.  My annotations are noted in assessment and plan.    Medication Review:  I have reviewed the current MAR.  My annotations are noted in assessment and plan.       Plan   PCCM Problems  Dyspnea  Elevated D-dimer  Indeterminate VQ scan  Scarring left lung  CKD 3B  Suspected fluid overload  Anemia  COPD reported  Ex-smoker  Dementia    Plan of Treatment    Patient is improved after additional diuretic with IV Lasix yesterday.  ABG obtained this morning shows adequate oxygenation on room air.  There is nothing at all to suggest a PE in this  patient.  Will order renal profile and then give her additional dose of IV Lasix.  She may need better control of her blood pressure.  From the pulmonary standpoint no objections to transfer back to nursing home.    Please note her D-dimer may remain chronically elevated in setting of chronic kidney disease.  Further checks of D-dimer unlikely to be helpful.  She also has scarring of the left lung and therefore her VQ scan likely does not represent a VQ mismatch.    Sylvester Santa MD  08/08/22  09:57 EDT    While in the room and during my examination of the patient I wore gloves, gown, mask, eye protection and followed enhanced droplet/contact isolation protocol and precautions.  I washed my hands before and after this patient encounter.    Part of this note may be an electronic transcription/translation of spoken language to printed text using the Dragon Dictation System.

## 2022-08-08 NOTE — PROGRESS NOTES
Name: Kellen Parmar ADMIT: 2022   : 1935  PCP: ROSEMARIE Arredondo    MRN: [id number] LOS: 0 days   AGE/SEX: 86 y.o. female  ROOM: [facility]     Subjective   Subjective   Patient without new complaints. Sitting in chair by window.    Review of Systems   Unable to perform ROS: Dementia      Objective   Objective   Vital Signs  Temp:  [98 °F (36.7 °C)-98.6 °F (37 °C)] 98.5 °F (36.9 °C)  Heart Rate:  [54-70] 64  Resp:  [14-20] 18  BP: (168-181)/() 179/64  SpO2:  [90 %-100 %] 97 %  on   ;   Device (Oxygen Therapy): room air  Body mass index is 21.73 kg/m².    Physical Exam  Constitutional:       General: She is not in acute distress.     Appearance: Normal appearance. She is not toxic-appearing.   HENT:      Head: Normocephalic and atraumatic.   Cardiovascular:      Rate and Rhythm: Normal rate and regular rhythm.   Pulmonary:      Effort: Pulmonary effort is normal. No respiratory distress.      Breath sounds: Normal breath sounds. No wheezing, rhonchi or rales.   Abdominal:      General: Bowel sounds are normal.      Palpations: Abdomen is soft.      Tenderness: There is no abdominal tenderness. There is no guarding or rebound.   Musculoskeletal:         General: No swelling.      Right lower leg: No edema.      Left lower leg: No edema.   Skin:     General: Skin is warm and dry.   Neurological:      General: No focal deficit present.      Mental Status: She is alert.   Psychiatric:         Mood and Affect: Mood normal.         Behavior: Behavior normal.         Cognition and Memory: Memory is impaired.     Results Review  I reviewed the patient's new clinical results.  Results from last 7 days   Lab Units 22  0742 22  1650   WBC 10*3/mm3 4.16 6.07   HEMOGLOBIN g/dL 7.2* 8.1*   PLATELETS 10*3/mm3 152 174     Results from last 7 days   Lab Units 22  0549 22  0742 22  1650   SODIUM mmol/L 138 141 141   POTASSIUM mmol/L 3.9 4.3 4.5   CHLORIDE mmol/L 102 108* 108*   CO2  mmol/L 20.2* 21.0* 19.0*   BUN mg/dL 47* 43* 47*   CREATININE mg/dL 1.79* 1.66* 1.89*   GLUCOSE mg/dL 116* 108* 98     Lab Results   Component Value Date    ANIONGAP 15.8 (H) 08/08/2022     Estimated Creatinine Clearance: 21.2 mL/min (A) (by C-G formula based on SCr of 1.79 mg/dL (H)).    Results from last 7 days   Lab Units 08/08/22  0549 08/06/22  1650   ALBUMIN g/dL 3.50 3.70   BILIRUBIN mg/dL  --  <0.2   ALK PHOS U/L  --  87   AST (SGOT) U/L  --  15   ALT (SGPT) U/L  --  11     Results from last 7 days   Lab Units 08/08/22  0549 08/07/22  0742 08/06/22  1650   CALCIUM mg/dL 8.4* 8.1* 8.2*   ALBUMIN g/dL 3.50  --  3.70   PHOSPHORUS mg/dL 4.9*  --   --          Scheduled Meds  allopurinol, 100 mg, Oral, Daily  amLODIPine, 10 mg, Oral, Daily  arformoterol, 15 mcg, Nebulization, BID - RT  aspirin, 81 mg, Oral, Daily  atorvastatin, 10 mg, Oral, Nightly  cholecalciferol, 2,000 Units, Oral, Daily  clopidogrel, 75 mg, Oral, Daily  furosemide, 80 mg, Intravenous, Once  furosemide, 40 mg, Oral, Once per day on Mon Tue Wed Thu Fri  furosemide, 80 mg, Oral, Once per day on Sun Sat  levothyroxine, 100 mcg, Oral, Q AM  losartan, 100 mg, Oral, Daily  memantine, 5 mg, Oral, Q12H  montelukast, 10 mg, Oral, Nightly  potassium chloride, 10 mEq, Oral, Daily  primidone, 50 mg, Oral, TID  sertraline, 100 mg, Oral, Nightly  tamsulosin, 0.4 mg, Oral, Nightly    Continuous Infusions   PRN Meds  •  acetaminophen  •  melatonin  •  nitroglycerin  •  ondansetron **OR** ondansetron  •  [COMPLETED] Insert peripheral IV **AND** sodium chloride     Diet  Diet Regular; Cardiac    I have personally reviewed:  [x]  Laboratory   []  Microbiology   []  Radiology   []  EKG/Telemetry   []  Cardiology/Vascular   []  Pathology   []  Records    Results for orders placed during the hospital encounter of 08/06/22    Adult Transthoracic Echo Complete W/ Cont if Necessary Per Protocol    Interpretation Summary  · Left ventricular ejection fraction appears to  be 56 - 60%. Left ventricular systolic function is normal. Normal left ventricular cavity size and wall thickness noted. All left ventricular wall segments contract normally. Left ventricular diastolic function is consistent with (grade I) impaired relaxation.  · Normal right ventricular cavity size and systolic function noted.  · Estimated right ventricular systolic pressure from tricuspid regurgitation is normal (<35 mmHg).        Assessment/Plan     Active Hospital Problems    Diagnosis  POA   • Shortness of breath [R06.02]  Yes   • Hypothyroidism (acquired) [E03.9]  Yes   • Coronary artery disease involving native coronary artery of native heart without angina pectoris [I25.10]  Yes   • HTN (hypertension) [I10]  Yes   • CKD (chronic kidney disease) stage 4, GFR 15-29 ml/min ([facility]) [N18.4]  Yes   • Dementia without behavioral disturbance ([facility]) [F03.90]  Yes      Resolved Hospital Problems   No resolved problems to display.     86 y.o. female sent from [facility] due to elevated dimer and shortness of breath.    Elevated dimer and reported dyspnea: Patient currently not dyspneic though history limited. Creatinine elevated. Dopplers preliminary report negative. Perfusion scan indeterminate. Appreciate pulmonology-do not recommend checking D-dimer anymore. Likely to remain elevated in setting of chronic kidney disease.    Anemia: Occult blood is positive. In March family did not want to pursue endoscopy. Recheck hemoglobin    CAD: no chest pain    Hypertension: BP improved today continue to monitor    CKD probably around baseline.    Dementia    SCDs for DVT prophylaxis    Discussed with patient and nursing staff    Discharge: Left message with daughter to confirm they still do not want to pursue endoscopy. Back to [facility] once hgb stable.    Cullen Morin MD  [facility]  08/08/22

## 2022-08-08 NOTE — CASE MANAGEMENT/SOCIAL WORK
Discharge Planning Assessment  [facility]     Patient Name: Kellen Parmar  MRN: [id number]  Today's Date: 8/8/2022    Admit Date: 8/6/2022     Discharge Needs Assessment     Row Name 08/08/22 1447       Living Environment    People in Home facility resident    Current Living Arrangements extended care facility    Provides Primary Care For no one, unable/limited ability to care for self    Family Caregiver if Needed child(sneha), adult    Quality of Family Relationships supportive       Resource/Environmental Concerns    Resource/Environmental Concerns none       Transition Planning    Patient/Family Anticipates Transition to long-term care facility    Patient/Family Anticipated Services at Transition     Transportation Anticipated health plan transportation       Discharge Needs Assessment    Discharge Facility/Level of Care Needs nursing facility, skilled               Discharge Plan     Row Name 08/08/22 1448       Plan    Plan Return to [facility]    Patient/Family in Agreement with Plan yes    Plan Comments Pt from [facility].  Spoke with [facility] who states pt is long term care resident with Medicaid bedhold.  Spoke with pts daughter/Chacha who confirms that plan will be fore pt to return to [facility] at discharge.  CCP will follow.  VICENTA Brizuela RN              Continued Care and Services - Admitted Since 8/6/2022     Destination Coordination complete.    Service Provider Request Status Selected Services Address Phone Fax Patient Preferred    [facility]   Selected Intermediate Care [address] [address] [phone] --              Expected Discharge Date and Time     Expected Discharge Date Expected Discharge Time    Aug 10, 2022          Demographic Summary     Row Name 08/08/22 1446       General Information    Admission Type observation    Referral Source admission list    Reason for Consult discharge planning    Preferred Language  English       Contact Information    Permission Granted to Share Info With family/designee  Chacha Castellanos (daughter) [phone]               Functional Status    No documentation.                Psychosocial    No documentation.                Abuse/Neglect    No documentation.                Legal    No documentation.                Substance Abuse    No documentation.                Patient Forms    No documentation.                   Bela Brizuela RN

## 2022-08-08 NOTE — PLAN OF CARE
Goal Outcome Evaluation:              Outcome Evaluation: VSS. Pt transferred to . Pt A&Ox1 (only alert to self) and anxious upon arrival. Calmed with explaination of care and setting. IV diuretics per pulmonolgy. Plan to return to [facility] when hgb is stable. Will continue to monitor.

## 2022-08-09 PROBLEM — N17.9 AKI (ACUTE KIDNEY INJURY) (HCC): Status: ACTIVE | Noted: 2022-01-01

## 2022-08-09 NOTE — CONSULTS
"Nephrology Progress Note:     LOS: 1 day   Patient Care Team:  ROSEMARIE Arredondo as PCP - General (Peripheral Vascular Disease)      Subjective     Interval History:   Asked to evaluate for JEANIE on CKD 3..  86 year old female admitted from nursing home for abnormal  D dimer. Workup negative for DVT or PE but renal asked to evaluate for JEANIE following diuresis. Baseline creatinine 1.8 to 1.9 and up to 2.1 today. She has been treated with IV lasix but currently completely asymptomatic. No chest pain, SOB, fever or chills. No change in UOP.    PMHx:  CKD 4  (1.8 to 1.9 baseline)  DM  HTN  PVD  COPD/SHANON  UTI's  Afib  Dementia  Gout  Hyperlipidemia  Hypothyroid  AAA  COVID    PSurgicalHx:  AAA repair 2019  Ileofemoral bypass 2020  Cholecystecomy  Cataracts    SHx:  No ETOH  Distant tobacco  Nursing home    FHx:  HTN  ASCAD      Review of Systems:   Swelling on admission resolved otherwise negative  No chest pain  No F/C/C  No hemoptysis PND/Orthopnea or SOB  NO N/V or blood loss  No weight loss  No psych or neuro issues     Objective     Vital Signs  /58 (BP Location: Left arm, Patient Position: Lying)   Pulse 72   Temp 98.6 °F (37 °C) (Oral)   Resp 18   Ht 162.6 cm (64\")   Wt 57.4 kg (126 lb 9.6 oz)   SpO2 94%   BMI 21.73 kg/m²     Intake/Output  I/O last 3 completed shifts:  In: 60 [P.O.:60]  Out: 1200 [Urine:1200]  I/O this shift:  In: 120 [P.O.:120]  Out: -       Physical Exam:  Awake in NAD  No icterus  No JVD  RRR  Lungs with slight rhonchi diminshed otherwise clear  Soft abdomen positive bowel sounds  No C/C/Edema  No rashes  No focal neuro      Results Review:       Imaging Results (Last 24 Hours)     Procedure Component Value Units Date/Time    XR Abdomen KUB [[id number]] Collected: 08/09/22 1403     Updated: 08/09/22 1407    Narrative:      XR ABDOMEN KUB-     INDICATIONS: Abdominal pain     TECHNIQUE: Supine views of the abdomen     COMPARISON:  image from CT from 01/17/2022     FINDINGS:      " The bowel gas pattern is nonobstructive. No supine evidence for free  intraperitoneal gas. Mild colonic fecal retention is apparent.  Nonspecific pelvic soft tissue calcifications are seen. If there is  further clinical concern, CT can be obtained for further evaluation.  Aortobiiliac stent is noted.       Impression:         As described.     This report was finalized on 8/9/2022 2:04 PM by Dr. Marquis Cuadra M.D.             Medication Review:  allopurinol, 100 mg, Oral, Daily  amLODIPine, 10 mg, Oral, Daily  arformoterol, 15 mcg, Nebulization, BID - RT  aspirin, 81 mg, Oral, Daily  atorvastatin, 10 mg, Oral, Nightly  cholecalciferol, 2,000 Units, Oral, Daily  clopidogrel, 75 mg, Oral, Daily  levothyroxine, 100 mcg, Oral, Q AM  memantine, 5 mg, Oral, Q12H  montelukast, 10 mg, Oral, Nightly  primidone, 50 mg, Oral, TID  sertraline, 100 mg, Oral, Nightly  tamsulosin, 0.4 mg, Oral, Nightly        Assessment & Plan   1. CKD 4. Stable renal function and volume. Slight increase in azotemia strictly from diuresis. Nothing to suggest acute renal injury.  2. D Dimer elevation without clotting issues  3. HTN labile. Would continue losartin.  4. Mild dementia  5. DM with stable sugars  6. PVD with prior AAA repair and LE bypass. On plavix   7. Treated hypothyroid      HTN (hypertension)    CKD (chronic kidney disease) stage 4, GFR 15-29 ml/min (HCC)    Dementia without behavioral disturbance (HCC)    Coronary artery disease involving native coronary artery of native heart without angina pectoris    Hypothyroidism (acquired)    Shortness of breath    JEANIE (acute kidney injury) ([facility])      Star Thurston MD  08/09/22  17:38 EDT

## 2022-08-09 NOTE — PLAN OF CARE
Goal Outcome Evaluation:            BP still elevated this shift, but improved from last shift. Creatinine elevated, MD aware, lasix and potassium held. Losartan d/c. Pt pleasanly confused this shift, education provided to prevent falls. Up to bsc with assist x1. Pt complaining of abdominal pain, MD aware. PRN zofran given. KUB obtained. Hgb 8.2. Will continue to monitor.

## 2022-08-09 NOTE — PLAN OF CARE
Goal Outcome Evaluation:  Plan of Care Reviewed With: patient        Progress: improving  Outcome Evaluation: Pt hypertensive, Lasix given to see if that will help per MD, SBP still 200s manually at shift change, LHA paged and one time dose BP med ordered, BP improved at midnight assessment. Pt refused purewick, does not even want to try, regardless education provided on high risk for fall with multiple fall history, and unable to response to call light in time when we are short staffed, pt insisted to get up and use BSC. Pt also need a new bed d/t malfunctioning bed alarm, currently using chair alarm. Hgb increased to 8.8 at this point. VSS, will continue to monitor.

## 2022-08-09 NOTE — PROGRESS NOTES
Name: Kellen Parmar ADMIT: 2022   : 1935  PCP: ROSEMARIE Arredondo    MRN: [id number] LOS: 1 days   AGE/SEX: 86 y.o. female  ROOM: [facility]/     Subjective   Subjective   Lying in bed complaining of her stomach hurting. Denies any other complaints. No chest pain or shortness of breath. Discussed with nursing staff she received some Zofran for nausea earlier.    Review of Systems   Unable to perform ROS: Dementia      Objective   Objective   Vital Signs  Temp:  [98 °F (36.7 °C)-98.8 °F (37.1 °C)] 98 °F (36.7 °C)  Heart Rate:  [59-76] 65  Resp:  [16-18] 16  BP: (125-218)/(54-91) 181/77  SpO2:  [93 %-100 %] 96 %  on   ;   Device (Oxygen Therapy): room air  Body mass index is 21.73 kg/m².    Physical Exam  Constitutional:       General: She is not in acute distress.     Appearance: Normal appearance. She is not toxic-appearing.   HENT:      Head: Normocephalic and atraumatic.   Cardiovascular:      Rate and Rhythm: Normal rate and regular rhythm.   Pulmonary:      Effort: Pulmonary effort is normal. No respiratory distress.      Breath sounds: Normal breath sounds. No wheezing, rhonchi or rales.   Abdominal:      General: Bowel sounds are normal.      Palpations: Abdomen is soft.      Tenderness: There is no abdominal tenderness. There is no guarding or rebound.   Musculoskeletal:         General: No swelling.      Right lower leg: No edema.      Left lower leg: No edema.   Skin:     General: Skin is warm and dry.   Neurological:      General: No focal deficit present.      Mental Status: She is alert.   Psychiatric:         Mood and Affect: Mood normal.         Behavior: Behavior normal.         Cognition and Memory: Memory is impaired.     Results Review  I reviewed the patient's new clinical results.  Results from last 7 days   Lab Units 22  0836 22  2147 22  0742 22  1650   WBC 10*3/mm3  --   --  4.16 6.07   HEMOGLOBIN g/dL 8.2* 8.8* 7.2* 8.1*   PLATELETS 10*3/mm3  --   --   152 174     Results from last 7 days   Lab Units 08/09/22  0836 08/08/22  0549 08/07/22  0742 08/06/22  1650   SODIUM mmol/L 137 138 141 141   POTASSIUM mmol/L 4.1 3.9 4.3 4.5   CHLORIDE mmol/L 101 102 108* 108*   CO2 mmol/L 21.0* 20.2* 21.0* 19.0*   BUN mg/dL 55* 47* 43* 47*   CREATININE mg/dL 2.10* 1.79* 1.66* 1.89*   GLUCOSE mg/dL 111* 116* 108* 98     Lab Results   Component Value Date    ANIONGAP 15.0 08/09/2022     Estimated Creatinine Clearance: 18.1 mL/min (A) (by C-G formula based on SCr of 2.1 mg/dL (H)).    Results from last 7 days   Lab Units 08/09/22  0836 08/08/22  0549 08/06/22  1650   ALBUMIN g/dL 3.90 3.50 3.70   BILIRUBIN mg/dL  --   --  <0.2   ALK PHOS U/L  --   --  87   AST (SGOT) U/L  --   --  15   ALT (SGPT) U/L  --   --  11     Results from last 7 days   Lab Units 08/09/22 0836 08/08/22  0549 08/07/22  0742 08/06/22  1650   CALCIUM mg/dL 8.9 8.4* 8.1* 8.2*   ALBUMIN g/dL 3.90 3.50  --  3.70   PHOSPHORUS mg/dL 4.9* 4.9*  --   --          Scheduled Meds  allopurinol, 100 mg, Oral, Daily  amLODIPine, 10 mg, Oral, Daily  arformoterol, 15 mcg, Nebulization, BID - RT  aspirin, 81 mg, Oral, Daily  atorvastatin, 10 mg, Oral, Nightly  cholecalciferol, 2,000 Units, Oral, Daily  clopidogrel, 75 mg, Oral, Daily  furosemide, 40 mg, Oral, Once per day on Mon Tue Wed Thu Fri  furosemide, 80 mg, Oral, Once per day on Sun Sat  levothyroxine, 100 mcg, Oral, Q AM  losartan, 100 mg, Oral, Daily  memantine, 5 mg, Oral, Q12H  montelukast, 10 mg, Oral, Nightly  potassium chloride, 10 mEq, Oral, Daily  primidone, 50 mg, Oral, TID  sertraline, 100 mg, Oral, Nightly  tamsulosin, 0.4 mg, Oral, Nightly    Continuous Infusions   PRN Meds  •  acetaminophen  •  hydrOXYzine  •  melatonin  •  nitroglycerin  •  ondansetron **OR** ondansetron  •  [COMPLETED] Insert peripheral IV **AND** sodium chloride     Diet  Diet Regular; Cardiac    I have personally reviewed:  [x]  Laboratory   []  Microbiology   []  Radiology   []   EKG/Telemetry   []  Cardiology/Vascular   []  Pathology   []  Records    Results for orders placed during the hospital encounter of 08/06/22    Adult Transthoracic Echo Complete W/ Cont if Necessary Per Protocol    Interpretation Summary  · Left ventricular ejection fraction appears to be 56 - 60%. Left ventricular systolic function is normal. Normal left ventricular cavity size and wall thickness noted. All left ventricular wall segments contract normally. Left ventricular diastolic function is consistent with (grade I) impaired relaxation.  · Normal right ventricular cavity size and systolic function noted.  · Estimated right ventricular systolic pressure from tricuspid regurgitation is normal (<35 mmHg).        Assessment/Plan     Active Hospital Problems    Diagnosis  POA   • Shortness of breath [R06.02]  Yes   • Hypothyroidism (acquired) [E03.9]  Yes   • Coronary artery disease involving native coronary artery of native heart without angina pectoris [I25.10]  Yes   • HTN (hypertension) [I10]  Yes   • CKD (chronic kidney disease) stage 4, GFR 15-29 ml/min ([facility]) [N18.4]  Yes   • Dementia without behavioral disturbance ([facility]) [F03.90]  Yes      Resolved Hospital Problems   No resolved problems to display.     86 y.o. female sent from [facility] due to elevated dimer and shortness of breath.    Today with nausea and abdominal discomfort: Exam is unremarkable we will check KUB.    Elevated dimer and reported dyspnea: Patient currently not dyspneic though history limited. Creatinine elevated. Dopplers preliminary report negative. Perfusion scan indeterminate. Appreciate pulmonology-do not recommend checking D-dimer anymore. Likely to remain elevated in setting of chronic kidney disease.    Anemia: Occult blood is positive. In March family did not want to pursue endoscopy. Monitoring hemoglobin seems to be stable    CAD: no chest pain    Hypertension: BP improved today continue to monitor    JEANIE on CKD. She received  IV furosemide yesterday for elevated blood pressure and also a dose of clonidine x1 and blood pressure dropped from 200s to 125. Creatinine is higher today may worsen will consult nephrology. Hold furosemide and potassium, Cozaar    Dementia    SCDs for DVT prophylaxis    Discussed with patient and nursing staff    Discharge: Left message again with daughter # to confirm they still do not want to pursue endoscopy. Eventually back to [facility] but need to make sure her renal function stable first    Cullen Jesus Morin MD  [facility]  08/09/22

## 2022-08-09 NOTE — PROGRESS NOTES
[facility]  [phone]  Dr. Sylvester Santa    Subjective:  LOS: 1    Chief Complaint: Dyspnea    More awake and alert today.  Continues to report daily improvement.  Denies cough or phlegm.  No wheezing.    Objective   Vital Signs past 24hrs    Temp range: Temp (24hrs), Av.3 °F (36.8 °C), Min:98 °F (36.7 °C), Max:98.8 °F (37.1 °C)    BP range: BP: (125-218)/(54-91) 181/77  Pulse range: Heart Rate:  [59-76] 65  Resp rate range: Resp:  [16-18] 16    Device (Oxygen Therapy): room air   Oxygen range:SpO2:  [93 %-100 %] 96 %      57.4 kg (126 lb 9.6 oz); Body mass index is 21.73 kg/m².    Intake/Output Summary (Last 24 hours) at 2022 0941  Last data filed at 2022 0504  Gross per 24 hour   Intake 0 ml   Output --   Net 0 ml       Physical Exam  Eyes:      Pupils: Pupils are equal, round, and reactive to light.   Cardiovascular:      Rate and Rhythm: Normal rate and regular rhythm.      Heart sounds: No murmur heard.  Pulmonary:      Effort: Pulmonary effort is normal.      Breath sounds: Normal breath sounds.   Abdominal:      General: Bowel sounds are normal.      Palpations: Abdomen is soft. There is no mass.      Tenderness: There is no abdominal tenderness.   Musculoskeletal:         General: No swelling.   Neurological:      Mental Status: She is alert.       Results Review:    I have reviewed the laboratory and imaging data [facility]  My annotations are noted in assessment and plan.    Medication Review:  I have reviewed the current MAR.  My annotations are noted in assessment and plan.       Plan   PCCM Problems  Dyspnea  Elevated D-dimer  Indeterminate VQ scan  Scarring left lung  CKD 3B  Suspected fluid overload  Anemia  COPD reported  Ex-smoker  Dementia    Plan of Treatment    ABG obtained shows adequate oxygenation on room air.      There is nothing at all to suggest a PE in this patient.  Please note her D-dimer may remain chronically elevated in  setting of chronic kidney disease.  Further checks of D-dimer unlikely to be helpful.  She also has scarring of the left lung and therefore her VQ scan likely does not represent a VQ mismatch.    Note bump in creatinine after additional diuretic yesterday.  Overall much improved.  Consider adjustment of Lasix outpatient with monitoring of electrolytes.    Will sign off.    Sylvester Santa MD  08/09/22  09:41 EDT    While in the room and during my examination of the patient I wore gloves, gown, mask, eye protection and followed enhanced droplet/contact isolation protocol and precautions.  I washed my hands before and after this patient encounter.    Part of this note may be an electronic transcription/translation of spoken language to printed text using the Dragon Dictation System.

## 2022-08-10 NOTE — PLAN OF CARE
Goal Outcome Evaluation:  Plan of Care Reviewed With: patient        Progress: improving  Outcome Evaluation: No major changes throughout the night. Up to BSC with assist x1. VSS, will continue to monitor.

## 2022-08-10 NOTE — PLAN OF CARE
Problem: Adult Inpatient Plan of Care  Goal: Plan of Care Review  Outcome: Ongoing, Progressing  Flowsheets (Taken 8/10/2022 1512)  Plan of Care Reviewed With: patient  Outcome Evaluation: cardiac monitoring discontinued, orders to transfer to med surg when bed available, miralax added, vss, will continue to monitor

## 2022-08-10 NOTE — PROGRESS NOTES
Name: Kellen Parmar ADMIT: 2022   : 1935  PCP: ROSEMARIE Arredondo    MRN: [id number] LOS: 2 days   AGE/SEX: 86 y.o. female  ROOM: [facility]     Subjective   Subjective    Complaining of abdominal cramping but no pain. She tried to have bowel movement but without resolved. No chest pain or shortness of breath.    Review of Systems   Unable to perform ROS: Dementia      Objective   Objective   Vital Signs  Temp:  [98.2 °F (36.8 °C)-98.6 °F (37 °C)] 98.4 °F (36.9 °C)  Heart Rate:  [64-72] 71  Resp:  [18-20] 18  BP: (158-181)/(57-75) 181/75  SpO2:  [92 %-94 %] 94 %  on   ;   Device (Oxygen Therapy): room air  Body mass index is 22.14 kg/m².    Physical Exam  Constitutional:       General: She is not in acute distress.     Appearance: Normal appearance. She is not toxic-appearing.   HENT:      Head: Normocephalic and atraumatic.   Cardiovascular:      Rate and Rhythm: Normal rate and regular rhythm.   Pulmonary:      Effort: Pulmonary effort is normal. No respiratory distress.      Breath sounds: Normal breath sounds. No wheezing, rhonchi or rales.   Abdominal:      General: Bowel sounds are normal. There is no distension.      Palpations: Abdomen is soft.      Tenderness: There is no abdominal tenderness. There is no guarding or rebound.   Musculoskeletal:         General: No swelling.      Right lower leg: No edema.      Left lower leg: No edema.   Skin:     General: Skin is warm and dry.   Neurological:      General: No focal deficit present.      Mental Status: She is alert.   Psychiatric:         Mood and Affect: Mood normal.         Behavior: Behavior normal.         Cognition and Memory: Memory is impaired.     Results Review  I reviewed the patient's new clinical results.  Results from last 7 days   Lab Units 08/10/22  0404 22  1541 22  0836 22  2147 22  0742 22  1650   WBC 10*3/mm3  --   --   --   --  4.16 6.07   HEMOGLOBIN g/dL 7.8* 8.7* 8.2* 8.8* 7.2* 8.1*    PLATELETS 10*3/mm3  --   --   --   --  152 174     Results from last 7 days   Lab Units 08/10/22  0404 08/09/22  1541 08/09/22  0836 08/08/22  0549   SODIUM mmol/L 138 135* 137 138   POTASSIUM mmol/L 4.1 4.4 4.1 3.9   CHLORIDE mmol/L 103 101 101 102   CO2 mmol/L 23.2 22.1 21.0* 20.2*   BUN mg/dL 62* 56* 55* 47*   CREATININE mg/dL 2.16* 2.16* 2.10* 1.79*   GLUCOSE mg/dL 123* 121* 111* 116*     Lab Results   Component Value Date    ANIONGAP 11.8 08/10/2022     Estimated Creatinine Clearance: 17.9 mL/min (A) (by C-G formula based on SCr of 2.16 mg/dL (H)).    Results from last 7 days   Lab Units 08/10/22  0404 08/09/22  1541 08/09/22  0836 08/08/22  0549 08/06/22  1650   ALBUMIN g/dL 3.60 3.50 3.90 3.50 3.70   BILIRUBIN mg/dL  --   --   --   --  <0.2   ALK PHOS U/L  --   --   --   --  87   AST (SGOT) U/L  --   --   --   --  15   ALT (SGPT) U/L  --   --   --   --  11     Results from last 7 days   Lab Units 08/10/22  0404 08/09/22  1541 08/09/22  0836 08/08/22  0549   CALCIUM mg/dL 8.3* 8.5* 8.9 8.4*   ALBUMIN g/dL 3.60 3.50 3.90 3.50   PHOSPHORUS mg/dL 4.9* 5.4* 4.9* 4.9*       XR Abdomen KUB    Result Date: 8/9/2022   As described.  This report was finalized on 8/9/2022 2:04 PM by Dr. Marquis Cuadra M.D.        Scheduled Meds  allopurinol, 100 mg, Oral, Daily  amLODIPine, 10 mg, Oral, Daily  arformoterol, 15 mcg, Nebulization, BID - RT  aspirin, 81 mg, Oral, Daily  atorvastatin, 10 mg, Oral, Nightly  cholecalciferol, 2,000 Units, Oral, Daily  clopidogrel, 75 mg, Oral, Daily  levothyroxine, 100 mcg, Oral, Q AM  losartan, 50 mg, Oral, BID  memantine, 5 mg, Oral, Q12H  montelukast, 10 mg, Oral, Nightly  primidone, 50 mg, Oral, TID  sertraline, 100 mg, Oral, Nightly  tamsulosin, 0.4 mg, Oral, Nightly    Continuous Infusions   PRN Meds  •  acetaminophen  •  hydrOXYzine  •  melatonin  •  nitroglycerin  •  ondansetron **OR** ondansetron  •  [COMPLETED] Insert peripheral IV **AND** sodium chloride     Diet  Diet  Regular; Cardiac    I have personally reviewed:  [x]  Laboratory   []  Microbiology   []  Radiology   []  EKG/Telemetry   []  Cardiology/Vascular   []  Pathology   []  Records    Results for orders placed during the hospital encounter of 08/06/22    Adult Transthoracic Echo Complete W/ Cont if Necessary Per Protocol    Interpretation Summary  · Left ventricular ejection fraction appears to be 56 - 60%. Left ventricular systolic function is normal. Normal left ventricular cavity size and wall thickness noted. All left ventricular wall segments contract normally. Left ventricular diastolic function is consistent with (grade I) impaired relaxation.  · Normal right ventricular cavity size and systolic function noted.  · Estimated right ventricular systolic pressure from tricuspid regurgitation is normal (<35 mmHg).        Assessment/Plan     Active Hospital Problems    Diagnosis  POA   • JEANIE (acute kidney injury) ([facility]) [N17.9]  Unknown   • Shortness of breath [R06.02]  Yes   • Hypothyroidism (acquired) [E03.9]  Yes   • Coronary artery disease involving native coronary artery of native heart without angina pectoris [I25.10]  Yes   • HTN (hypertension) [I10]  Yes   • CKD (chronic kidney disease) stage 4, GFR 15-29 ml/min ([facility]) [N18.4]  Yes   • Dementia without behavioral disturbance ([facility]) [F03.90]  Yes      Resolved Hospital Problems   No resolved problems to display.     86 y.o. female sent from [facility] due to elevated dimer and shortness of breath.    She complains of abdominal cramping and constipation. KUB nonobstructive. Abdominal exam unremarkable. Add bowel medications    Elevated dimer and reported dyspnea: Patient without dyspnea though history limited. Creatinine elevated. Dopplers preliminary report negative. Perfusion scan indeterminate. Appreciate pulmonology-do not recommend checking D-dimer anymore. Likely to remain elevated in setting of chronic kidney disease.     Anemia: Occult blood is positive.  In March family did not want to pursue endoscopy. Monitoring hemoglobin a little lower today    CAD: no chest pain    Hypertension: Cozaar resumed    JEANIE on CKD. Creatinine stable. Appreciate nephrology increase in azotemia due to diuresis and nothing to suggest acute renal injury.     Dementia    SCDs for DVT prophylaxis    Discussed with patient and nursing staff    Discharge: Left message again with daughter 11:22 EDT to confirm they still do not want to pursue endoscopy. Eventually back to [facility] if hemoglobin and renal function stable and pain improved after bowel movement and assuming daughter does not want to pursue endoscopy.    Cullen Morin MD  [facility]  08/10/22

## 2022-08-11 NOTE — PLAN OF CARE
Alert to self only. BP elevated, LHA aware. Assist x 1 to restroom. Bed alarm active. Turns self well. Meds given whole with water.     Problem: Adult Inpatient Plan of Care  Goal: Absence of Hospital-Acquired Illness or Injury  Intervention: Identify and Manage Fall Risk  Recent Flowsheet Documentation  Taken 8/11/2022 0412 by Paulette Yun RN  Safety Promotion/Fall Prevention: safety round/check completed  Taken 8/11/2022 0150 by Paulette Yun RN  Safety Promotion/Fall Prevention: safety round/check completed  Taken 8/11/2022 0016 by Paulette Yun RN  Safety Promotion/Fall Prevention: safety round/check completed  Taken 8/10/2022 2155 by Paulette Yun RN  Safety Promotion/Fall Prevention: safety round/check completed  Taken 8/10/2022 1931 by Paulette Yun RN  Safety Promotion/Fall Prevention: safety round/check completed   Goal Outcome Evaluation:

## 2022-08-11 NOTE — PLAN OF CARE
Goal Outcome Evaluation:              Outcome Evaluation: VSS. Pt c/o right rib pain making it hard for her to breathe. MD aware. Xray obtained. Continue bowel regimen. No BM this shift. Possible D/c back to [facility] tomorrow. Will continue to monitor.

## 2022-08-11 NOTE — PROGRESS NOTES
Renal function remains stable. On room air with stable sats. BP labile but controlled. Resume oral diuretics today. We will follow up later today.

## 2022-08-11 NOTE — PROGRESS NOTES
Name: Kellen Parmar ADMIT: 2022   : 1935  PCP: ROSEMARIE Arredondo    MRN: [id number] LOS: 3 days   AGE/SEX: 86 y.o. female  ROOM: [facility]     Subjective   Subjective    Complaining of right-sided rib pain worse with inspiration. No shortness of breath or abdominal pain today. She thinks she had a bowel movement but nothing in the chart. She denies any feeling of constipation at this time.    Review of Systems   Unable to perform ROS: Dementia      Objective   Objective   Vital Signs  Temp:  [97.6 °F (36.4 °C)-98.4 °F (36.9 °C)] 97.6 °F (36.4 °C)  Heart Rate:  [64-77] 70  Resp:  [18] 18  BP: (153-199)/(50-82) 165/75  SpO2:  [92 %-100 %] 92 %  on   ;   Device (Oxygen Therapy): room air  Body mass index is 22.25 kg/m².    Physical Exam  Constitutional:       General: She is not in acute distress.     Appearance: Normal appearance. She is not toxic-appearing.   HENT:      Head: Normocephalic and atraumatic.   Cardiovascular:      Rate and Rhythm: Normal rate and regular rhythm.   Pulmonary:      Effort: Pulmonary effort is normal. No respiratory distress.      Breath sounds: Normal breath sounds. No wheezing, rhonchi or rales.   Chest:      Chest wall: Tenderness (right lateral chest) present.   Abdominal:      General: Bowel sounds are normal. There is no distension.      Palpations: Abdomen is soft.      Tenderness: There is no abdominal tenderness. There is no guarding or rebound.   Musculoskeletal:         General: No swelling.      Right lower leg: No edema.      Left lower leg: No edema.   Skin:     General: Skin is warm and dry.   Neurological:      General: No focal deficit present.      Mental Status: She is alert.   Psychiatric:         Mood and Affect: Mood normal.         Behavior: Behavior normal.         Cognition and Memory: Memory is impaired.     Results Review  I reviewed the patient's new clinical results.  Results from last 7 days   Lab Units 22  0403 08/10/22  0406  08/09/22  1541 08/09/22  0836 08/08/22  2147 08/07/22  0742 08/06/22  1650   WBC 10*3/mm3  --   --   --   --   --  4.16 6.07   HEMOGLOBIN g/dL 7.4* 7.8* 8.7* 8.2*   < > 7.2* 8.1*   PLATELETS 10*3/mm3  --   --   --   --   --  152 174    < > = values in this interval not displayed.     Results from last 7 days   Lab Units 08/11/22  0403 08/10/22  0404 08/09/22  1541 08/09/22  0836   SODIUM mmol/L 136 138 135* 137   POTASSIUM mmol/L 4.0 4.1 4.4 4.1   CHLORIDE mmol/L 102 103 101 101   CO2 mmol/L 22.1 23.2 22.1 21.0*   BUN mg/dL 56* 62* 56* 55*   CREATININE mg/dL 1.77* 2.16* 2.16* 2.10*   GLUCOSE mg/dL 115* 123* 121* 111*     Lab Results   Component Value Date    ANIONGAP 11.9 08/11/2022     Estimated Creatinine Clearance: 22 mL/min (A) (by C-G formula based on SCr of 1.77 mg/dL (H)).    Results from last 7 days   Lab Units 08/11/22  0403 08/10/22  0404 08/09/22  1541 08/09/22  0836 08/08/22  0549 08/06/22  1650   ALBUMIN g/dL 3.50 3.60 3.50 3.90   < > 3.70   BILIRUBIN mg/dL  --   --   --   --   --  <0.2   ALK PHOS U/L  --   --   --   --   --  87   AST (SGOT) U/L  --   --   --   --   --  15   ALT (SGPT) U/L  --   --   --   --   --  11    < > = values in this interval not displayed.     Results from last 7 days   Lab Units 08/11/22  0403 08/10/22  0404 08/09/22  1541 08/09/22  0836   CALCIUM mg/dL 8.2* 8.3* 8.5* 8.9   ALBUMIN g/dL 3.50 3.60 3.50 3.90   PHOSPHORUS mg/dL 4.4 4.9* 5.4* 4.9*       XR Abdomen KUB    Result Date: 8/9/2022   As described.  This report was finalized on 8/9/2022 2:04 PM by Dr. Marquis Cuadra M.D.        Scheduled Meds  allopurinol, 100 mg, Oral, Daily  amLODIPine, 10 mg, Oral, Daily  arformoterol, 15 mcg, Nebulization, BID - RT  aspirin, 81 mg, Oral, Daily  atorvastatin, 10 mg, Oral, Nightly  bisacodyl, 10 mg, Rectal, Daily  cholecalciferol, 2,000 Units, Oral, Daily  clopidogrel, 75 mg, Oral, Daily  furosemide, 40 mg, Oral, Daily  levothyroxine, 100 mcg, Oral, Q AM  losartan, 50 mg, Oral,  BID  memantine, 5 mg, Oral, Q12H  montelukast, 10 mg, Oral, Nightly  polyethylene glycol, 17 g, Oral, BID  primidone, 50 mg, Oral, TID  senna-docusate sodium, 2 tablet, Oral, Nightly  sertraline, 100 mg, Oral, Nightly  tamsulosin, 0.4 mg, Oral, Nightly    Continuous Infusions   PRN Meds  •  acetaminophen  •  hydrOXYzine  •  melatonin  •  nitroglycerin  •  ondansetron **OR** ondansetron  •  [COMPLETED] Insert peripheral IV **AND** sodium chloride     Diet  Diet Regular; Cardiac    I have personally reviewed:  [x]  Laboratory   []  Microbiology   []  Radiology   []  EKG/Telemetry   []  Cardiology/Vascular   []  Pathology   []  Records    Results for orders placed during the hospital encounter of 08/06/22    Adult Transthoracic Echo Complete W/ Cont if Necessary Per Protocol    Interpretation Summary  · Left ventricular ejection fraction appears to be 56 - 60%. Left ventricular systolic function is normal. Normal left ventricular cavity size and wall thickness noted. All left ventricular wall segments contract normally. Left ventricular diastolic function is consistent with (grade I) impaired relaxation.  · Normal right ventricular cavity size and systolic function noted.  · Estimated right ventricular systolic pressure from tricuspid regurgitation is normal (<35 mmHg).       Assessment/Plan     Active Hospital Problems    Diagnosis  POA   • JEANIE (acute kidney injury) ([facility]) [N17.9]  Unknown   • Shortness of breath [R06.02]  Yes   • Hypothyroidism (acquired) [E03.9]  Yes   • Coronary artery disease involving native coronary artery of native heart without angina pectoris [I25.10]  Yes   • HTN (hypertension) [I10]  Yes   • CKD (chronic kidney disease) stage 4, GFR 15-29 ml/min ([facility]) [N18.4]  Yes   • Dementia without behavioral disturbance ([facility]) [F03.90]  Yes      Resolved Hospital Problems   No resolved problems to display.     86 y.o. female sent from "[facility] due to elevated dimer and shortness of breath.    Denies  any constipation or abdominal pain today. Still working on getting her to have a bowel movement. Today however she is complaining of right-sided rib pain she states it is worse when she takes a deep breath. Will check rib films    Elevated dimer and reported dyspnea: Patient without dyspnea though history limited. Creatinine elevated. Dopplers preliminary report negative. Perfusion scan indeterminate. Appreciate pulmonology-do not recommend checking D-dimer anymore. Likely to remain elevated in setting of chronic kidney disease.     Anemia: Occult blood is positive. In March family did not want to pursue endoscopy. Monitoring hemoglobin a little lower today continuing to monitor    CAD: no chest pain    Hypertension: Cozaar resumed. Control a little better today.    JEANIE on CKD. Creatinine improved.     Dementia    SCDs for DVT prophylaxis    Discussed with patient and nursing staff    Discharge: [facility] tomorrow. Discussed with daughter 16:19 EDT occult blood positive. Discussed goals of care. Again she does not wish to pursue endoscopy and aware could be missing something like cancer but if she had cancer she would not want to put her through treatment, etc.    Cullen Morin MD  [facility]  08/11/22

## 2022-08-12 NOTE — DISCHARGE SUMMARY
[facility]    Date of Discharge:  8/12/2022    PCP: ROSEMARIE Arredondo    Discharge Diagnosis:   Active Hospital Problems    Diagnosis  POA   • JEANIE (acute kidney injury) ([facility]) [N17.9]  Unknown   • Shortness of breath [R06.02]  Yes   • Hypothyroidism (acquired) [E03.9]  Yes   • Coronary artery disease involving native coronary artery of native heart without angina pectoris [I25.10]  Yes   • HTN (hypertension) [I10]  Yes   • CKD (chronic kidney disease) stage 4, GFR 15-29 ml/min ([facility]) [N18.4]  Yes   • Dementia without behavioral disturbance ([facility]) [F03.90]  Yes      Resolved Hospital Problems   No resolved problems to display.          Consults     Date and Time Order Name Status Description    8/9/2022  1:01 PM Inpatient Nephrology Consult      8/7/2022 10:54 AM Inpatient Pulmonology Consult Completed     8/6/2022  6:01 PM LHA (on-call MD unless specified) Details          Hospital Course  86 y.o. female sent from facility due to elevated D-dimer and shortness of breath. Patient did not have much shortness of breath though she is a poor historian however she did not look to be having any respiratory distress with any visits during hospitalization. Dopplers were negative. She had an indeterminate perfusion scan. Pulmonology was asked to review she has scarring of the left lung and they did not feel that it represented mismatch. She has some renal insufficiency and dimer may be chronically elevated in setting of chronic kidney disease. Pulmonology did not recommend any further checks of dimer.     She also was anemic. She was occult blood positive and again occult blood positive here. Family in March did not want to proceed with any work-up. I discussed with patient's daughter again and she confirms she would not want to pursue any work-up and if patient had cancer she would not want her put through any treatment.    She had some other various minor issues: She  had some constipation. She had a moderate bowel movement on the ninth. Bowel medications have been added. She had some increased azotemia related to diuresis that improved. She had some right-sided musculoskeletal/rib pain however rib films were negative. Rib pain that she had yesterday has resolved today.    I discussed the patient's findings and my recommendations with patient and nursing staff.    Condition on Discharge: Stable. Patient is feeling happy and she is going home.     Temp:  [97.9 °F (36.6 °C)-99 °F (37.2 °C)] 99 °F (37.2 °C)  Heart Rate:  [56-81] 56  Resp:  [16-18] 16  BP: (166-188)/(75-89) 188/89  Body mass index is 22.5 kg/m².    Physical Exam  Constitutional:       General: She is not in acute distress.     Appearance: Normal appearance. She is not toxic-appearing.   HENT:      Head: Normocephalic and atraumatic.   Cardiovascular:      Rate and Rhythm: Normal rate and regular rhythm.   Pulmonary:      Effort: Pulmonary effort is normal. No respiratory distress.      Breath sounds: Normal breath sounds. No wheezing, rhonchi or rales.   Chest:      Chest wall: No tenderness.   Abdominal:      General: Bowel sounds are normal.      Palpations: Abdomen is soft.      Tenderness: There is no abdominal tenderness. There is no guarding or rebound.   Musculoskeletal:         General: No swelling.   Skin:     General: Skin is warm and dry.   Neurological:      General: No focal deficit present.      Mental Status: She is alert.       Disposition: Skilled Nursing Facility (DC - External)       Discharge Medications      New Medications      Instructions Start Date   arformoterol 15 MCG/2ML nebulizer solution  Commonly known as: BROVANA   15 mcg, Nebulization, 2 Times Daily - RT      pantoprazole 40 MG EC tablet  Commonly known as: Protonix   40 mg, Oral, Daily      polyethylene glycol 17 g packet  Commonly known as: MIRALAX   17 g, Oral, 2 Times Daily      sennosides-docusate 8.6-50 MG per tablet  Commonly  known as: PERICOLACE   2 tablets, Oral, Nightly         Changes to Medications      Instructions Start Date   furosemide 40 MG tablet  Commonly known as: LASIX  What changed:   · medication strength  · when to take this  · additional instructions  · Another medication with the same name was removed. Continue taking this medication, and follow the directions you see here.   40 mg, Oral, 2 Times Daily      losartan 50 MG tablet  Commonly known as: COZAAR  What changed:   · medication strength  · how much to take  · when to take this   50 mg, Oral, 2 Times Daily         Continue These Medications      Instructions Start Date   acetaminophen 325 MG tablet  Commonly known as: TYLENOL   650 mg, Oral, Every 6 Hours PRN      allopurinol 100 MG tablet  Commonly known as: ZYLOPRIM   100 mg, Oral, Daily      amLODIPine 10 MG tablet  Commonly known as: NORVASC   1 tablet, Oral, Daily      aspirin 81 MG EC tablet   81 mg, Oral, Daily      atorvastatin 10 MG tablet  Commonly known as: LIPITOR   10 mg, Oral, Nightly      Biofreeze 4 % gel  Generic drug: Menthol (Topical Analgesic)   1 application, Apply externally, 2 Times Daily      docusate sodium 250 MG capsule  Commonly known as: COLACE   250 mg, Oral, Daily      ferrous sulfate 325 (65 FE) MG tablet   325 mg, Oral, Daily With Breakfast      levothyroxine 100 MCG tablet  Commonly known as: SYNTHROID, LEVOTHROID   100 mcg, Oral, Daily      memantine 5 MG tablet  Commonly known as: NAMENDA   5 mg, Oral, 2 Times Daily      montelukast 10 MG tablet  Commonly known as: SINGULAIR   Nightly      PLAVIX PO   75 mg, Oral, Daily      potassium chloride 10 MEQ CR tablet   10 mEq, Oral, Daily      primidone 50 MG tablet  Commonly known as: MYSOLINE   50 mg, Oral, 3 Times Daily      sertraline 50 MG tablet  Commonly known as: ZOLOFT   100 mg, Oral, Nightly      tamsulosin 0.4 MG capsule 24 hr capsule  Commonly known as: FLOMAX   0.4 mg, Oral, Nightly      Vitamin D 50 MCG (2000 UT)  tablet   2,000 Units, Oral, Daily         Stop These Medications    Omeprazole 2 MG/ML suspension  Commonly known as: PRILOSEC           Diet Instructions     Diet: Regular, Cardiac      Discharge Diet:  Regular  Cardiac            Activity Instructions     Activity as Tolerated           Additional Instructions for the Follow-ups that You Need to Schedule     Call MD for problems / concerns.   As directed         Contact information for follow-up providers     ROSEMARIE Arredondo .    Specialty: Peripheral Vascular Disease  Contact information:  [address] [address]  [phone]                   Contact information for after-discharge care     Destination     [facility]    Service: Intermediate Care  Contact information:  [address] [facility] [address] [phone]                               [facility] Jesus Morin MD  [facility]  08/12/22    Discharge time spent greater than 30 minutes.

## 2022-08-12 NOTE — PROGRESS NOTES
"Nephrology Progress Note:     LOS: 3 days   Patient Care Team:  ROSEMARIE Arredondo as PCP - General (Peripheral Vascular Disease)      Subjective     Interval History:   Renal follow up of CKD 3.  Feels \"OK\"  No chest pain  Breathing stable  Rib pain  Review of Systems:        Objective     Vital Signs  BP (!) 187/82 (BP Location: Left arm, Patient Position: Lying)   Pulse 76   Temp 97.9 °F (36.6 °C) (Oral)   Resp 18   Ht 162.6 cm (64\")   Wt 58.8 kg (129 lb 10.1 oz)   SpO2 95%   BMI 22.25 kg/m²     Intake/Output  I/O last 3 completed shifts:  In: 25 [P.O.:25]  Out: -   No intake/output data recorded.      Physical Exam:  NAD  No icterus  No JVD  RRR  Lungs clear  Soft abdomen positive bowel sounds  No C/C/E   Mild confusion but pleasant   Results Review:       Imaging Results (Last 24 Hours)     Procedure Component Value Units Date/Time    XR Ribs Right With PA Chest [[id number]] Collected: 08/11/22 1126     Updated: 08/11/22 1133    Narrative:      XR RIBS RIGHT W PA CHEST-     HISTORY:  Right chest wall pain, shortness of breath.     COMPARISON:  Chest radiograph 08/06/2022     FINDINGS:    5 views of the chest and right ribs were obtained.     Support Devices:  None.  Cardiac Silhouette/Mediastinum/Christelle:  The cardiac silhouette is normal  in size. Mediastinal hilar contours are not significantly changed. There  is calcific aortic atherosclerosis.  Lungs/Pleural Spaces:  There is a trace left pleural effusion. There is  no focal consolidation. There is no pneumothorax.  Chest Wall/Diaphragm/Upper Abdomen:  There is multilevel degenerative  disc disease. No definite acute displaced right rib fracture is  identified. There is an abdominal aortobiiliac stent graft and a stent  graft in the left neck, incompletely assessed. There is calcific  atherosclerosis in the abdomen.         CONCLUSION(S):       1.  No definite acute displaced right rib fracture.  2.  Trace left pleural effusion.     This report was " finalized on 8/11/2022 11:30 AM by Dr. Joy Childs M.D.             Medication Review:  allopurinol, 100 mg, Oral, Daily  amLODIPine, 10 mg, Oral, Daily  arformoterol, 15 mcg, Nebulization, BID - RT  aspirin, 81 mg, Oral, Daily  atorvastatin, 10 mg, Oral, Nightly  bisacodyl, 10 mg, Rectal, Daily  cholecalciferol, 2,000 Units, Oral, Daily  clopidogrel, 75 mg, Oral, Daily  furosemide, 40 mg, Oral, Daily  levothyroxine, 100 mcg, Oral, Q AM  losartan, 50 mg, Oral, BID  memantine, 5 mg, Oral, Q12H  montelukast, 10 mg, Oral, Nightly  polyethylene glycol, 17 g, Oral, BID  primidone, 50 mg, Oral, TID  senna-docusate sodium, 2 tablet, Oral, Nightly  sertraline, 100 mg, Oral, Nightly  tamsulosin, 0.4 mg, Oral, Nightly        Assessment & Plan   1. CKD 3 stable.  2. Acute hypoxic respiratory failure improved  3. Dementia  OK for D/C tomorrow.  Would D/C on Lasix 40 mg po BID    HTN (hypertension)    CKD (chronic kidney disease) stage 4, GFR 15-29 ml/min (HCC)    Dementia without behavioral disturbance (HCC)    Coronary artery disease involving native coronary artery of native heart without angina pectoris    Hypothyroidism (acquired)    Shortness of breath    JEANIE (acute kidney injury) (HCC)      Star Thurston MD  08/11/22  20:44 EDT

## 2022-08-12 NOTE — PLAN OF CARE
Goal Outcome Evaluation:  Plan of Care Reviewed With: patient        Progress: improving  Outcome Evaluation: Pt refused stool softeners regardless education given on no BM. No significant changes throughout the night, up to [facility] muliple times. VSS, will continue to monitor. Possible d/c today.

## 2022-08-12 NOTE — PLAN OF CARE
Goal Outcome Evaluation:      No c/o pain or soa. Patient discharged back to [facility], granddaughter transporting

## 2022-08-12 NOTE — DISCHARGE PLACEMENT REQUEST
"Kellen Monique (86 y.o. Female)             Date of Birth   1935    Social Security Number       Address   [address] [address]    Home Phone   [phone]    MRN   [id number]       Holiness   None    Marital Status   Single                            Admission Date   8/6/22    Admission Type   Emergency    Admitting Provider   Katherine Owusu MD    Attending Provider   Cullen Morin MD    Department, Room/Bed   [address], [address]/1       Discharge Date       Discharge Disposition   Skilled Nursing Facility (DC - External)    Discharge Destination                               Attending Provider: Cullen Morin MD    Allergies: Contrast Dye, Iodinated Diagnostic Agents, Shrimp, Shellfish-derived Products    Isolation: None   Infection: COVID Screen (preop/placement) (08/12/22)   Code Status: CPR   Advance Care Planning Activity    Ht: 162.6 cm (64\")   Wt: 59.5 kg (131 lb 1.6 oz)    Admission Cmt: None   Principal Problem: None                Active Insurance as of 8/6/2022     Primary Coverage     Payor Plan Insurance Group Employer/Plan Group    MEDICARE MEDICARE A & B      Payor Plan Address Payor Plan Phone Number Payor Plan Fax Number Effective Dates    [address] [phone]  11/1/2002 - None Entered    [facility] [id number]       Subscriber Name Subscriber Birth Date Member ID       KELLEN MONIQUE 1935 [id number]           Secondary Coverage     Payor Plan Insurance Group Employer/Plan Group    [place]      Payor Plan Address Payor Plan Phone Number Payor Plan Fax Number Effective Dates    [address] [phone]  3/12/2022 - None Entered    [place] [address]       Subscriber Name Subscriber Birth Date Member ID       KELLEN MONIQUE 1935 [id number]                 Emergency Contacts      (Rel.) Home Phone Work Phone Mobile Phone    TRUONG ADLER " (Daughter) [phone] -- [phone]    Sarah Melgar (Grandchild) [phone] -- [phone]                 Discharge Summary      Cullen Morin MD at 08/12/22 0813                              [facility]    Date of Discharge:  8/12/2022    PCP: ROSEMARIE Arredondo    Discharge Diagnosis:   Active Hospital Problems    Diagnosis  POA   • JEANIE (acute kidney injury) (HCC) [N17.9]  Unknown   • Shortness of breath [R06.02]  Yes   • Hypothyroidism (acquired) [E03.9]  Yes   • Coronary artery disease involving native coronary artery of native heart without angina pectoris [I25.10]  Yes   • HTN (hypertension) [I10]  Yes   • CKD (chronic kidney disease) stage 4, GFR 15-29 ml/min (HCC) [N18.4]  Yes   • Dementia without behavioral disturbance (HCC) [F03.90]  Yes      Resolved Hospital Problems   No resolved problems to display.          Consults     Date and Time Order Name Status Description    8/9/2022  1:01 PM Inpatient Nephrology Consult      8/7/2022 10:54 AM Inpatient Pulmonology Consult Completed     8/6/2022  6:01 PM [facility] (on-call MD unless specified) Details          Hospital Course  86 y.o. female sent from facility due to elevated D-dimer and shortness of breath. Patient did not have much shortness of breath though she is a poor historian however she did not look to be having any respiratory distress with any visits during hospitalization. Dopplers were negative. She had an indeterminate perfusion scan. Pulmonology was asked to review she has scarring of the left lung and they did not feel that it represented mismatch. She has some renal insufficiency and dimer may be chronically elevated in setting of chronic kidney disease. Pulmonology did not recommend any further checks of dimer.     She also was anemic. She was occult blood positive and again occult blood positive here. Family in March did not want to proceed with any work-up. I discussed with patient's daughter again  and she confirms she would not want to pursue any work-up and if patient had cancer she would not want her put through any treatment.    She had some other various minor issues: She had some constipation. She had a moderate bowel movement on the ninth. Bowel medications have been added. She had some increased azotemia related to diuresis that improved. She had some right-sided musculoskeletal/rib pain however rib films were negative. Rib pain that she had yesterday has resolved today.    I discussed the patient's findings and my recommendations with patient and nursing staff.    Condition on Discharge: Stable. Patient is feeling happy and she is going home.     Temp:  [97.9 °F (36.6 °C)-99 °F (37.2 °C)] 99 °F (37.2 °C)  Heart Rate:  [56-81] 56  Resp:  [16-18] 16  BP: (166-188)/(75-89) 188/89  Body mass index is 22.5 kg/m².    Physical Exam  Constitutional:       General: She is not in acute distress.     Appearance: Normal appearance. She is not toxic-appearing.   HENT:      Head: Normocephalic and atraumatic.   Cardiovascular:      Rate and Rhythm: Normal rate and regular rhythm.   Pulmonary:      Effort: Pulmonary effort is normal. No respiratory distress.      Breath sounds: Normal breath sounds. No wheezing, rhonchi or rales.   Chest:      Chest wall: No tenderness.   Abdominal:      General: Bowel sounds are normal.      Palpations: Abdomen is soft.      Tenderness: There is no abdominal tenderness. There is no guarding or rebound.   Musculoskeletal:         General: No swelling.   Skin:     General: Skin is warm and dry.   Neurological:      General: No focal deficit present.      Mental Status: She is alert.       Disposition: Skilled Nursing Facility (DC - External)       Discharge Medications      New Medications      Instructions Start Date   arformoterol 15 MCG/2ML nebulizer solution  Commonly known as: BROVANA   15 mcg, Nebulization, 2 Times Daily - RT      pantoprazole 40 MG EC tablet  Commonly known  as: Protonix   40 mg, Oral, Daily      polyethylene glycol 17 g packet  Commonly known as: MIRALAX   17 g, Oral, 2 Times Daily      sennosides-docusate 8.6-50 MG per tablet  Commonly known as: PERICOLACE   2 tablets, Oral, Nightly         Changes to Medications      Instructions Start Date   furosemide 40 MG tablet  Commonly known as: LASIX  What changed:   · medication strength  · when to take this  · additional instructions  · Another medication with the same name was removed. Continue taking this medication, and follow the directions you see here.   40 mg, Oral, 2 Times Daily      losartan 50 MG tablet  Commonly known as: COZAAR  What changed:   · medication strength  · how much to take  · when to take this   50 mg, Oral, 2 Times Daily         Continue These Medications      Instructions Start Date   acetaminophen 325 MG tablet  Commonly known as: TYLENOL   650 mg, Oral, Every 6 Hours PRN      allopurinol 100 MG tablet  Commonly known as: ZYLOPRIM   100 mg, Oral, Daily      amLODIPine 10 MG tablet  Commonly known as: NORVASC   1 tablet, Oral, Daily      aspirin 81 MG EC tablet   81 mg, Oral, Daily      atorvastatin 10 MG tablet  Commonly known as: LIPITOR   10 mg, Oral, Nightly      Biofreeze 4 % gel  Generic drug: Menthol (Topical Analgesic)   1 application, Apply externally, 2 Times Daily      docusate sodium 250 MG capsule  Commonly known as: COLACE   250 mg, Oral, Daily      ferrous sulfate 325 (65 FE) MG tablet   325 mg, Oral, Daily With Breakfast      levothyroxine 100 MCG tablet  Commonly known as: SYNTHROID, LEVOTHROID   100 mcg, Oral, Daily      memantine 5 MG tablet  Commonly known as: NAMENDA   5 mg, Oral, 2 Times Daily      montelukast 10 MG tablet  Commonly known as: GÓMEZ   Nightly      PLAVIX PO   75 mg, Oral, Daily      potassium chloride 10 MEQ CR tablet   10 mEq, Oral, Daily      primidone 50 MG tablet  Commonly known as: MYSOLINE   50 mg, Oral, 3 Times Daily      sertraline 50 MG  tablet  Commonly known as: ZOLOFT   100 mg, Oral, Nightly      tamsulosin 0.4 MG capsule 24 hr capsule  Commonly known as: FLOMAX   0.4 mg, Oral, Nightly      Vitamin D 50 MCG (2000 UT) tablet   2,000 Units, Oral, Daily         Stop These Medications    Omeprazole 2 MG/ML suspension  Commonly known as: PRILOSEC           Diet Instructions     Diet: Regular, Cardiac      Discharge Diet:  Regular  Cardiac            Activity Instructions     Activity as Tolerated           Additional Instructions for the Follow-ups that You Need to Schedule     Call MD for problems / concerns.   As directed         Contact information for follow-up providers     ROSEMARIE Arredondo .    Specialty: Peripheral Vascular Disease  Contact information:  [address] [facility] [address]  [place] [address]  [phone]                   Contact information for after-discharge care     Destination     [facility] .    Service: Intermediate Care  Contact information:  [address] [facility] [address] [phone]                               Cullen Morin MD  [facility]  08/12/22    Discharge time spent greater than 30 minutes.      Electronically signed by Cullen Morin MD at 08/12/22 0852       Discharge Order (From admission, onward)     Start     Ordered    08/12/22 0811  Discharge patient  Once        Expected Discharge Date: 08/12/22    Discharge Disposition: Skilled Nursing Facility (DC - External)    Physician of Record for Attribution - Please select from Treatment Team: CULLEN MORIN [[id number]]    Review needed by CMO to determine Physician of Record: No       Question Answer Comment   Physician of Record for Attribution - Please select from Treatment Team CULLEN MORIN    Review needed by CMO to determine Physician of Record No        08/12/22 0812

## 2022-08-12 NOTE — CASE MANAGEMENT/SOCIAL WORK
Case Management Discharge Note      Final Note: Return to [facility]. Transport by granddaughter         Selected Continued Care - Admitted Since 8/6/2022     Destination Coordination complete.    Service Provider Selected Services Address Phone Fax Patient Preferred    [facility] [address] [address] [facility] [address] [phone] --          Durable Medical Equipment    No services have been selected for the patient.              Dialysis/Infusion    No services have been selected for the patient.              Home Medical Care    No services have been selected for the patient.              Therapy    No services have been selected for the patient.              Community Resources    No services have been selected for the patient.              Community & DME    No services have been selected for the patient.                  Transportation Services  Private: Car    Final Discharge Disposition Code: 04 - intermediate care facility

## 2022-10-30 PROBLEM — S72.142A CLOSED INTERTROCHANTERIC FRACTURE OF HIP, LEFT, INITIAL ENCOUNTER: Status: ACTIVE | Noted: 2022-01-01

## 2022-10-30 NOTE — ANESTHESIA PROCEDURE NOTES
Airway  Urgency: elective    Airway not difficult    General Information and Staff    Patient location during procedure: OR  Anesthesiologist: Emilee De León MD  CRNA/CAA: Shirley Garcia CRNA    Indications and Patient Condition  Indications for airway management: airway protection    Preoxygenated: yes  Mask difficulty assessment: 1 - vent by mask    Final Airway Details  Final airway type: endotracheal airway      Successful airway: ETT  Cuffed: yes   Successful intubation technique: direct laryngoscopy  Facilitating devices/methods: intubating stylet  Endotracheal tube insertion site: oral  Blade: Yvette  Blade size: 3  ETT size (mm): 6.5  Cormack-Lehane Classification: grade I - full view of glottis  Placement verified by: capnometry   Measured from: lips  ETT/EBT  to lips (cm): 20  Number of attempts at approach: 1  Assessment: lips, teeth, and gum same as pre-op and atraumatic intubation

## 2022-10-30 NOTE — ANESTHESIA PREPROCEDURE EVALUATION
Anesthesia Evaluation     NPO Solid Status: > 8 hours             Airway   Mallampati: III  Neck ROM: limited  Possible difficult intubation  Dental    (+) poor dentition    Comment: Edentulous upper, very poor lower.    Pulmonary    (+) a smoker Former, asthma,shortness of breath,   (-) wheezes  Cardiovascular     ECG reviewed  Rhythm: regular    (+) hypertension, valvular problems/murmurs, CAD,  carotid artery disease    ROS comment: Echo AI,TI,MI.   EF 60%  Hx of carotid stent, aortic stent    Hbg 8.7  PE comment: Murmer not apparent at this time    Neuro/Psych  GI/Hepatic/Renal/Endo    (+)  GERD,  renal disease, diabetes mellitus, thyroid problem     ROS Comment: Stage 4 CKD    Musculoskeletal     Abdominal    Substance History      OB/GYN          Other                      Anesthesia Plan    ASA 4     general     (  D/W R&B of GA including but not limited to: heart, lung, liver, kidney, neurologic problems, positioning injuries, dental damage, corneal abrasion and TMJ.  .)  intravenous induction     Anesthetic plan, risks, benefits, and alternatives have been provided, discussed and informed consent has been obtained with: patient.        CODE STATUS:

## 2022-10-31 NOTE — ANESTHESIA POSTPROCEDURE EVALUATION
"Patient: Kellen Parmar    Procedure Summary     Date: 10/30/22 Room / Location: [facility] OR  / [facility] MAIN OR    Anesthesia Start: 1644 Anesthesia Stop: 1759    Procedure: HIP INTERTROCHANTERIC NAILING (Left: Thigh) Diagnosis:       Closed intertrochanteric fracture of hip, left, initial encounter ([facility])      (Closed intertrochanteric fracture of hip, left, initial encounter ([facility]) [S72.142A])    Surgeons: Asael Mccabe MD Provider: Kar Webber MD    Anesthesia Type: general ASA Status: 4          Anesthesia Type: general    Vitals  Vitals Value Taken Time   /62 10/30/22 1945   Temp 36.6 °C (97.8 °F) 10/30/22 1756   Pulse 81 10/30/22 1956   Resp 16 10/30/22 1945   SpO2 98 % 10/30/22 1957   Vitals shown include unvalidated device data.        Post Anesthesia Care and Evaluation    Pain management: adequate    Airway patency: patent  Anesthetic complications: No anesthetic complications    Cardiovascular status: acceptable  Respiratory status: acceptable  Hydration status: acceptable    Comments: /65 (BP Location: Right arm, Patient Position: Lying)   Pulse 82   Temp 36.6 °C (97.8 °F) (Oral)   Resp 16   Ht 162.6 cm (64\")   Wt 59.4 kg (131 lb)   SpO2 98%   BMI 22.49 kg/m²         "

## 2022-11-01 NOTE — PROGRESS NOTES
"Daily progress note    Primary care physician   Dr. JORGENSEN    Chief complaint  Awake and alert with no new complaints.  Patient is pleasantly confused but no respiratory distress.    History of present illness  86-year-old female with history of diabetes hypertension hypothyroidism and chronic kidney disease stage who also have severe dementia brought to the emergency room from nursing home by the family after the mechanical fall with left hip pain.  According to patient she fell backwards struck her head and hurting in the left hip since then.  Patient denies any loss of consciousness.  Patient denies any dizziness lightheadedness chest pain shortness of breath palpitation prior to the discharge.  Patient work-up in ER revealed acute left intertrochanteric hip fracture and acute UTI admit for management.     REVIEW OF SYSTEMS  All systems reviewed and negative except for those discussed in HPI.      PHYSICAL EXAM   Blood pressure 155/85, pulse 87, temperature 98.2 °F (36.8 °C), temperature source Oral, resp. rate 18, height 162.6 cm (64\"), weight 59.4 kg (131 lb), SpO2 100 %, not currently breastfeeding.    GENERAL: Alert, not distressed  HEENT; unremarkable  NECK:  Supple  CV: regular rhythm, regular rate  RESPIRATORY: normal effort moving air bilaterally  ABDOMEN: soft/nontender nondistended bowel sounds positive  MUSCULOSKELETAL: left leg is shortened and externally rotated, moderate tenderness to the left hip  NEURO: alert, moves all extremities, follows commands  SKIN: warm, dry     LAB RESULTS  Lab Results (last 24 hours)     Procedure Component Value Units Date/Time    POC Glucose Once [[id number]]  (Abnormal) Collected: 11/01/22 1119    Specimen: Blood Updated: 11/01/22 1121     Glucose 136 mg/dL      Comment: Meter: [id number] : [id number] Kevan SHIN       Blood Culture - Blood, Arm, Left [[id number]]  (Normal) Collected: 10/30/22 1004    Specimen: Blood from Arm, Left Updated: 11/01/22 1100     " Blood Culture No growth at 2 days    Blood Culture - Blood, Hand, Right [[id number]]  (Normal) Collected: 10/30/22 1012    Specimen: Blood from Hand, Right Updated: 11/01/22 1100     Blood Culture No growth at 2 days    POC Glucose Once [[id number]]  (Abnormal) Collected: 11/01/22 0636    Specimen: Blood Updated: 11/01/22 0638     Glucose 154 mg/dL      Comment: Meter: [id number] : [id number] Yessi Diamonique NA       CBC & Differential [[id number]]  (Abnormal) Collected: 11/01/22 0458    Specimen: Blood Updated: 11/01/22 0543    Narrative:      The following orders were created for panel order CBC & Differential.  Procedure                               Abnormality         Status                     ---------                               -----------         ------                     CBC Auto Differential[[id number]]        Abnormal            Final result                 Please view results for these tests on the individual orders.    CBC Auto Differential [[id number]]  (Abnormal) Collected: 11/01/22 0458    Specimen: Blood Updated: 11/01/22 0543     WBC 7.42 10*3/mm3      RBC 1.91 10*6/mm3      Hemoglobin 6.2 g/dL      Hematocrit 18.1 %      MCV 94.8 fL      MCH 32.5 pg      MCHC 34.3 g/dL      RDW 13.3 %      RDW-SD 46.1 fl      MPV 9.4 fL      Platelets 108 10*3/mm3      Neutrophil % 77.4 %      Lymphocyte % 10.1 %      Monocyte % 9.0 %      Eosinophil % 2.0 %      Basophil % 0.4 %      Immature Grans % 1.1 %      Neutrophils, Absolute 5.74 10*3/mm3      Lymphocytes, Absolute 0.75 10*3/mm3      Monocytes, Absolute 0.67 10*3/mm3      Eosinophils, Absolute 0.15 10*3/mm3      Basophils, Absolute 0.03 10*3/mm3      Immature Grans, Absolute 0.08 10*3/mm3      nRBC 0.1 /100 WBC     Basic Metabolic Panel [[id number]]  (Abnormal) Collected: 11/01/22 0458    Specimen: Blood Updated: 11/01/22 0534     Glucose 136 mg/dL      BUN 42 mg/dL      Creatinine 1.76 mg/dL      Sodium 137 mmol/L      Potassium 4.1 mmol/L       Chloride 107 mmol/L      CO2 20.9 mmol/L      Calcium 7.6 mg/dL      BUN/Creatinine Ratio 23.9     Anion Gap 9.1 mmol/L      eGFR 28.2 mL/min/1.73      Comment: National Kidney Foundation and American Society of Nephrology (ASN) Task Force recommended calculation based on the Chronic Kidney Disease Epidemiology Collaboration (CKD-EPI) equation refit without adjustment for race.       Narrative:      GFR Normal >60  Chronic Kidney Disease <60  Kidney Failure <15    The GFR formula is only valid for adults with stable renal function between ages 18 and 70.    POC Glucose Once [[id number]]  (Abnormal) Collected: 10/31/22 2144    Specimen: Blood Updated: 10/31/22 2145     Glucose 202 mg/dL      Comment: Meter: [id number] : [id number] Yessi Levant PowerkevenDafiti NA       POC Glucose Once [[id number]]  (Abnormal) Collected: 10/31/22 1648    Specimen: Blood Updated: 10/31/22 1651     Glucose 141 mg/dL      Comment: Meter: [id number] : [id number] Kevan SHIN              Imaging Results (Last 24 Hours)     ** No results found for the last 24 hours. **        ECG 12 Lead   Component Ref Range & Units 02:14   (10/30/22) 2 mo ago   (8/6/22) 1 yr ago   (9/22/21) 1 yr ago   (7/14/21)   QT Interval ms 429 P  382  394  406    Resulting Agency  [facility] ECG BH ECG [facility] ECG BH ECG             HEART RATE= 71  bpm  RR Interval= 845  ms  WY Interval= 171  ms  P Horizontal Axis= -15  deg  P Front Axis= 66  deg  QRSD Interval= 101  ms  QT Interval= 429  ms  QRS Axis= 33  deg  T Wave Axis= 61  deg  - NORMAL ECG -  Sinus rhythm             Current Facility-Administered Medications:   •  acetaminophen (TYLENOL) tablet 650 mg, 650 mg, Oral, Q6H PRN, Asael Mccabe MD  •  allopurinol (ZYLOPRIM) tablet 100 mg, 100 mg, Oral, Daily, Asael Mccabe MD, 100 mg at 11/01/22 0909  •  amLODIPine (NORVASC) tablet 10 mg, 10 mg, Oral, Daily, Asael Mccabe MD, 10 mg at 11/01/22 0908  •  arformoterol (BROVANA) nebulizer solution 15  mcg, 15 mcg, Nebulization, BID - RT, Asael Mccabe MD, 15 mcg at 11/01/22 0837  •  aspirin chewable tablet 81 mg, 81 mg, Oral, Daily, Asael Mccabe MD, 81 mg at 11/01/22 0909  •  atorvastatin (LIPITOR) tablet 10 mg, 10 mg, Oral, Nightly, Asael Mccabe MD, 10 mg at 10/31/22 2036  •  cefTRIAXone (ROCEPHIN) 1 g in sodium chloride 0.9 % 100 mL IVPB-VTB, 1 g, Intravenous, Q24H, Asael Mccabe MD, Last Rate: 200 mL/hr at 11/01/22 0538, 1 g at 11/01/22 0538  •  cholecalciferol (VITAMIN D3) tablet 1,000 Units, 1,000 Units, Oral, Daily, Asael Mccabe MD, 1,000 Units at 11/01/22 0909  •  clomiPRAMINE (ANAFRANIL) capsule 50 mg, 50 mg, Oral, Nightly, Asael Mccabe MD  •  clonazePAM (KlonoPIN) tablet 0.5 mg, 0.5 mg, Oral, Nightly PRN, Asael Mccabe MD  •  docusate sodium (COLACE) capsule 200 mg, 200 mg, Oral, BID, Asael Mccabe MD, 200 mg at 11/01/22 0909  •  Enoxaparin Sodium (LOVENOX) syringe 30 mg, 30 mg, Subcutaneous, Nightly, Asael Mccabe MD, 30 mg at 10/31/22 2036  •  famotidine (PEPCID) tablet 20 mg, 20 mg, Oral, BID, Chavez Uribe MD, 20 mg at 11/01/22 0908  •  hydrALAZINE (APRESOLINE) injection 10 mg, 10 mg, Intravenous, Q4H PRN, Asael Mccabe MD  •  HYDROcodone-acetaminophen (NORCO) 5-325 MG per tablet 1 tablet, 1 tablet, Oral, Q4H PRN, Asael Mccabe MD, 1 tablet at 11/01/22 0908  •  insulin lispro (ADMELOG) injection 0-7 Units, 0-7 Units, Subcutaneous, 4x Daily With Meals & Nightly, Asael Mccabe MD, 2 Units at 11/01/22 0909  •  levothyroxine (SYNTHROID, LEVOTHROID) tablet 100 mcg, 100 mcg, Oral, Q AM, Asael Mccabe MD, 100 mcg at 11/01/22 0539  •  losartan (COZAAR) tablet 100 mg, 100 mg, Oral, Q24H, Asael Mccabe MD, 100 mg at 11/01/22 0909  •  memantine (NAMENDA) tablet 5 mg, 5 mg, Oral, Q12H, Asael Mccabe MD, 5 mg at 11/01/22 0909  •  montelukast  (SINGULAIR) tablet 10 mg, 10 mg, Oral, Nightly, Asael Mccabe MD, 10 mg at 10/31/22 2036  •  morphine injection 2 mg, 2 mg, Intravenous, Q4H PRN, Asael Mccabe MD, 2 mg at 10/31/22 1446  •  ondansetron (ZOFRAN) injection 4 mg, 4 mg, Intravenous, Q6H PRN, Asael Mccabe MD, 4 mg at 10/30/22 2247  •  polyethylene glycol (MIRALAX) packet 17 g, 17 g, Oral, BID, Asael Mccabe MD, 17 g at 11/01/22 0908  •  primidone (MYSOLINE) tablet 50 mg, 50 mg, Oral, TID, Asael Mccabe MD, 50 mg at 11/01/22 0909  •  sennosides-docusate (PERICOLACE) 8.6-50 MG per tablet 2 tablet, 2 tablet, Oral, Nightly, Asael Mccabe MD, 2 tablet at 10/31/22 2036  •  sertraline (ZOLOFT) tablet 100 mg, 100 mg, Oral, Nightly, Asael Mccabe MD, 100 mg at 10/31/22 2036  •  [COMPLETED] Insert peripheral IV, , , Once **AND** sodium chloride 0.9 % flush 10 mL, 10 mL, Intravenous, PRN, Asael Mccabe MD  •  tamsulosin (FLOMAX) 24 hr capsule 0.4 mg, 0.4 mg, Oral, Nightly, Asael Mccabe MD, 0.4 mg at 10/31/22 2036     ASSESSMENT  Acute left intertrochanteric hip fracture s/p intertrochanteric nailing  Acute UTI  Diabetes mellitus  Hypertension  Hypothyroidism  Severe dementia  Chronic anemia with drop in H&H  Chronic kidney disease stage IV  Gastroesophageal reflux disease    PLAN  CPM  Postop care  Continue IV antibiotics  Pain management  Transfuse 2 more units of packed RBC  Orthopedic surgery consult appreciated  Adjust nursing home medications   Stress ulcer DVT prophylaxis  Accu-Chek with sliding scale insulin  Supportive care  DNR  PT OT  Discussed with nursing staff  Follow closely and further recommendation current hospital course    ALYSHA AHMED MD

## 2022-11-01 NOTE — DISCHARGE PLACEMENT REQUEST
"Kellen Monique (86 y.o. Female)     Date of Birth   1935    Social Security Number       Address   [address] [facility] [place] [address]    Home Phone   [phone]    MRN   [id number]       Druze   None    Marital Status   Single                            Admission Date   10/30/22    Admission Type   Emergency    Admitting Provider   Chavez Uribe MD    Attending Provider   Chavez Uribe MD    Department, Room/Bed   [address], P888/1       Discharge Date       Discharge Disposition       Discharge Destination                               Attending Provider: Chavez Uribe MD    Allergies: Contrast Dye, Iodinated Diagnostic Agents, Shrimp, Shellfish-derived Products    Isolation: None   Infection: None   Code Status: No CPR    Ht: 162.6 cm (64\")   Wt: 59.4 kg (131 lb)    Admission Cmt: None   Principal Problem: Closed intertrochanteric fracture of hip, left, initial encounter ([facility]) [S72.142A]                 Active Insurance as of 10/30/2022     Primary Coverage     Payor Plan Insurance Group Employer/Plan Group    MEDICARE MEDICARE A & B      Payor Plan Address Payor Plan Phone Number Payor Plan Fax Number Effective Dates    [address] [phone]  11/1/2002 - None Entered    [facility] [id number]       Subscriber Name Subscriber Birth Date Member ID       KELLEN MONIQUE 1935 [id number]           Secondary Coverage     Payor Plan Insurance Group Employer/Plan Group    [place]      Payor Plan Address Payor Plan Phone Number Payor Plan Fax Number Effective Dates    [address] [phone]  3/12/2022 - None Entered    [place] [address]       Subscriber Name Subscriber Birth Date Member ID       KELLEN MONIQUE 1935 [id number]                 Emergency Contacts      (Rel.) Home Phone Work Phone Mobile Phone    TRUONG ADLER (Daughter) [phone] -- [phone]    Sarah Melgar " (Grandchild) [phone] -- [phone]

## 2022-11-01 NOTE — PLAN OF CARE
"A/Ox2. O2 at 2L/min per NC. Tried to wean, pt desats to 83-85% on room air. POD#2 L hip IM nailing. WBAT. PRN Norco for pain. On Lovenox at HS. On IV Rocephin q24H x 5 days for UTI. Voiding fine per [facility]. Tried to have BM this morning, no results. On scheduled laxatives. Pt is max assist x2, pt was very anxious during transfers from bed to bedside commode. Encouraged to do I.S.    0545: Hgb: 6.2. Dr. Uribe notifed w/ given order to transfuse 2 \"u\" PRBC.     0637: VSS. 1 \"u\" PRBC started. Will continue to monitor.   "

## 2022-11-01 NOTE — CASE MANAGEMENT/SOCIAL WORK
Discharge Planning Assessment  [facility]     Patient Name: Kellen Parmar  MRN: [id number]  Today's Date: 11/1/2022    Admit Date: 10/30/2022    Plan: [facility]   Discharge Needs Assessment     Row Name 11/01/22 1408       Living Environment    People in Home facility resident    Current Living Arrangements extended care facility    Primary Care Provided by self;other (see comments)    Provides Primary Care For no one    Family Caregiver if Needed child(sneha), adult    Quality of Family Relationships unable to assess    Able to Return to Prior Arrangements yes       Resource/Environmental Concerns    Resource/Environmental Concerns none       Transition Planning    Patient/Family Anticipates Transition to long-term care facility    Patient/Family Anticipated Services at Transition skilled nursing       Discharge Needs Assessment    Readmission Within the Last 30 Days no previous admission in last 30 days    Equipment Currently Used at Home walker, rolling;wheelchair    Equipment Needed After Discharge none    Discharge Facility/Level of Care Needs nursing facility, intermediate    Provided Post Acute Provider List? N/A               Discharge Plan     Row Name 11/01/22 1409       Plan    Plan [facility]    Patient/Family in Agreement with Plan unable to assess  vm left with daughter    Plan Comments Spoke with Kasey at [facility] pt is LTC with Medicaid bed hold. VM left for pt’s daughter to confirm plan. CCP to follow.              Continued Care and Services - Admitted Since 10/30/2022     Destination Coordination complete.    Service Provider Request Status Selected Services Address Phone Fax Patient Preferred    [facility]  Selected Intermediate Care [address] [address] [phone] --            Selected Continued Care - Prior Encounters Includes continued care and service providers with selected services from prior encounters from 8/1/2022 to  11/1/2022    Discharged on 8/12/2022 Admission date: 8/6/2022 - Discharge disposition: Skilled Nursing Facility (DC - External)    Destination     Service Provider Selected Services Address Phone Fax Patient Preferred    [address] [address] [phone] --                       Demographic Summary    No documentation.                Functional Status    No documentation.                Psychosocial    No documentation.                Abuse/Neglect    No documentation.                Legal    No documentation.                Substance Abuse    No documentation.                Patient Forms    No documentation.                   JJ Tamez

## 2022-11-02 NOTE — THERAPY TREATMENT NOTE
Patient Name: Kellen Parmar  : 1935    MRN: [id number]                              Today's Date: 2022       Admit Date: 10/30/2022    Visit Dx:     ICD-10-CM ICD-9-CM   1. Closed intertrochanteric fracture of hip, left, initial encounter ([facility])  S72.142A 820.21   2. Anemia, unspecified type  D64.9 285.9   3. Alzheimer's dementia, unspecified dementia severity, unspecified timing of dementia onset, unspecified whether behavioral, psychotic, or mood disturbance or anxiety ([facility])  G30.9 331.0    F02.80 294.10   4. Type 2 diabetes mellitus without complication, unspecified whether long term insulin use ([facility])  E11.9 250.00   5. Hypertension, unspecified type  I10 401.9   6. Hyperlipidemia, unspecified hyperlipidemia type  E78.5 272.4   7. Hypothyroidism, unspecified type  E03.9 244.9   8. Chronic kidney disease, unspecified CKD stage  N18.9 585.9     Patient Active Problem List   Diagnosis   • Clostridium difficile colitis   • HTN (hypertension)   • CKD (chronic kidney disease) stage 4, GFR 15-29 ml/min ([facility])   • JEANIE (acute kidney injury) ([facility])   • Hyponatremia   • Hypokalemia   • Anemia   • Leukocytosis   • Acute metabolic encephalopathy   • Dementia without behavioral disturbance ([facility])   • Carotid stenosis, bilateral   • Diarrhea   • Coronary artery disease involving native coronary artery of native heart without angina pectoris   • Hypothyroidism (acquired)   • Shortness of breath   • JEANIE (acute kidney injury) ([facility])   • Closed intertrochanteric fracture of hip, left, initial encounter ([facility])     Past Medical History:   Diagnosis Date   • Anemia    • Asthma    • Cancer ([facility])    • Dementia ([facility])    • Diabetes mellitus ([facility])    • Disease of thyroid gland    • GERD (gastroesophageal reflux disease)    • Hypertension      Past Surgical History:   Procedure Laterality Date   • CHOLECYSTECTOMY        General Information     Row Name 22 1327          Physical Therapy Time and Intention    Document Type  therapy note (daily note)  -     Mode of Treatment co-treatment;physical therapy;occupational therapy  -     Row Name 11/02/22 1327          General Information    Patient Profile Reviewed yes  -     Existing Precautions/Restrictions fall  -     Row Name 11/02/22 1327          Cognition    Orientation Status (Cognition) oriented to;person;disoriented to;place;time;situation;verbal cues/prompts needed for orientation  -     Row Name 11/02/22 1327          Safety Issues, Functional Mobility    Impairments Affecting Function (Mobility) balance;endurance/activity tolerance;strength;pain;range of motion (ROM);cognition  -     Cognitive Impairments, Mobility Safety/Performance awareness, need for assistance;impulsivity;insight into deficits/self-awareness;judgment;problem-solving/reasoning;safety precaution awareness;safety precaution follow-through;attention  -           User Key  (r) = Recorded By, (t) = Taken By, (c) = Cosigned By    Initials Name Provider Type     Katherin Orozco, PT Physical Therapist               Mobility     Row Name 11/02/22 1328          Bed Mobility    Bed Mobility rolling left;rolling right;scooting/bridging  -     Rolling Left [place] (Bed Mobility) maximum assist (25% patient effort);verbal cues;nonverbal cues (demo/gesture)  -     Rolling Right [place] (Bed Mobility) maximum assist (25% patient effort);verbal cues;nonverbal cues (demo/gesture)  -     Scooting/Bridging [place] (Bed Mobility) maximum assist (25% patient effort);2 person assist  -     Comment, (Bed Mobility) Noted soiled bed on arrival - multiple rolls to either side to change sheets with nsg aid assist  -     Row Name 11/02/22 1328          Transfers    Comment, (Transfers) Per RN, max A x2 lifting pt onto [facility] -  dementia and pt does not understand voiding in brief  -     Row Name 11/02/22 1328          Bed-Chair Transfer    Bed-Chair [place] (Transfers) unable to assess  -      Row Name 11/02/22 1328          Mobility    Extremity Weight-bearing Status left lower extremity  -     Left Lower Extremity (Weight-bearing Status) weight-bearing as tolerated (WBAT)  [facility]           User Key  (r) = Recorded By, (t) = Taken By, (c) = Cosigned By    Initials Name Provider Type     Katherin Orozco, PT Physical Therapist               Obj/Interventions    No documentation.                Goals/Plan    No documentation.                Clinical Impression     [facility] Name 11/02/22 1329          Pain    Pretreatment Pain Rating 10/10  [facility]     Posttreatment Pain Rating 10/10  -     Pain Location - Side/Orientation Left  -     Pain Location - hip  -     Pain Intervention(s) Repositioned;Rest  -[facility] Name 11/02/22 1329          Plan of Care Review    Plan of Care Reviewed With patient  -     Progress no change  -     Outcome Evaluation Pt seen for PT/OT co-tx this AM. Noted soiled bed on arrival, needing complete bed change. Pt does not tolerate mobility well, yelling about pain prior to any assisted movement. Pt completed multiple rolls to either side with max A x1 - tolerated rolling to R>L. Required assist to maintain sidelying position bilaterally, notably fatigued following. Per RN, pt has been lifted max A x2 to [facility] as d/t dementia, pt does not understand voiding in brief. Requested keeping pt in bed as she is impulsive and a heavy assist for all mobility. PT will continue to follow to progress mobility as tolerated. Anticipate DC to SNF.  -     Row Name 11/02/22 1329          Therapy Assessment/Plan (PT)    Therapy Frequency (PT) 5 times/wk  From C  -[facility] Name 11/02/22 1329          Vital Signs    O2 Delivery Pre Treatment room air  -     O2 Delivery Intra Treatment room air  -     O2 Delivery Post Treatment room air  [facility] Name 11/02/22 1329          Positioning and Restraints    Pre-Treatment Position in bed  -     Post Treatment Position bed  -     In Bed  supine;call light within reach;encouraged to call for assist;exit alarm on;notified [facility]  -           User Key  (r) = Recorded By, (t) = Taken By, (c) = Cosigned By    Initials Name Provider Type     Katherin Orozco, PT Physical Therapist               Outcome Measures     Row Name 11/02/22 1335          How much help from another person do you currently need...    Turning from your back to your side while in flat bed without using bedrails? 2  -     Moving from lying on back to sitting on the side of a flat bed without bedrails? 2  -BH     Moving to and from a bed to a chair (including a wheelchair)? 2  -BH     Standing up from a chair using your arms (e.g., wheelchair, bedside chair)? 2  -BH     Climbing 3-5 steps with a railing? 1  -     To walk in hospital room? 1  -     AM-PAC 6 Clicks Score (PT) 10  -     Highest level of mobility 4 --> Transferred to chair/commode  -     Row Name 11/02/22 1238          Modified Winkler Scale    Modified Mary Scale 5 - Severe disability.  Bedridden, incontinent, and requiring constant nursing care and attention.  -     Row Name 11/02/22 1335 11/02/22 1238       Functional Assessment    Outcome Measure Options AM-PAC 6 Clicks Basic Mobility (PT)  - AM-PAC 6 Clicks Daily Activity (OT);Modified Mary  -          User Key  (r) = Recorded By, (t) = Taken By, (c) = Cosigned By    Initials Name Provider Type     Katherin Orozco, PT Physical Therapist     Marietta Centeno OT Occupational Therapist                             Physical Therapy Education     Title: PT OT SLP Therapies (In Progress)     Topic: Physical Therapy (Done)     Point: Mobility training (Done)     Learning Progress Summary           Patient Acceptance, E,TB,D, VU,NR by  at 11/2/2022 1335    Acceptance, E,TB,D, VU,NR by  at 10/31/2022 1416                   Point: Home exercise program (Done)     Learning Progress Summary           Patient Acceptance, E,TB,D, VU,NR by  at  10/31/2022 1416                   Point: Body mechanics (Done)     Learning Progress Summary           Patient Acceptance, E,TB,D, VU,NR by  at 11/2/2022 1335    Acceptance, E,TB,D, VU,NR by  at 10/31/2022 1416                   Point: Precautions (Done)     Learning Progress Summary           Patient Acceptance, E,TB,D, VU,NR by  at 11/2/2022 1335    Acceptance, E,TB,D, VU,NR by  at 10/31/2022 1416                               User Key     Initials Effective Dates Name Provider Type Discipline     04/08/22 -  Katherin Orozco, PT Physical Therapist PT              PT Recommendation and Plan  Planned Therapy Interventions (PT): balance training, bed mobility training, gait training, home exercise program, patient/family education, ROM (range of motion), strengthening, transfer training  Plan of Care Reviewed With: patient  Progress: no change  Outcome Evaluation: Pt seen for PT/OT co-tx this AM. Noted soiled bed on arrival, needing complete bed change. Pt does not tolerate mobility well, yelling about pain prior to any assisted movement. Pt completed multiple rolls to either side with max A x1 - tolerated rolling to R>L. Required assist to maintain sidelying position bilaterally, notably fatigued following. Per RN, pt has been lifted max A x2 to [facility] as d/t dementia, pt does not understand voiding in brief. Requested keeping pt in bed as she is impulsive and a heavy assist for all mobility. PT will continue to follow to progress mobility as tolerated. Anticipate DC to SNF.     Time Calculation:    PT Charges     Row Name 11/02/22 1335             Time Calculation    Start Time 1010  -      Stop Time 1030  -      Time Calculation (min) 20 min  -      PT Received On 11/02/22  -      PT - Next Appointment 11/03/22  -         Time Calculation- PT    Total Timed Code Minutes- PT 20 minute(s)  -         Timed Charges    77004 - PT Therapeutic Activity Minutes 20  -         Total Minutes    Timed  Charges Total Minutes 20  -BH       Total Minutes 20  -BH            User Key  (r) = Recorded By, (t) = Taken By, (c) = Cosigned By    Initials Name Provider Type     Katherin Orozco, PT Physical Therapist              Therapy Charges for Today     Code Description Service Date Service Provider Modifiers Qty    [id number]  PT THERAPEUTIC ACT EA 15 MIN 11/2/2022 Katherin Orozco PT GP 1          PT G-Codes  Outcome Measure Options: AM-PAC 6 Clicks Basic Mobility (PT)  AM-PAC 6 Clicks Score (PT): 10  AM-PAC 6 Clicks Score (OT): 10  Modified Sibley Scale: 5 - Severe disability.  Bedridden, incontinent, and requiring constant nursing care and attention.    Katherin Orozco PT  11/2/2022

## 2022-11-02 NOTE — PROGRESS NOTES
"Daily progress note    Primary care physician   Dr. JORGENSEN    Chief complaint  Doing same with no new complaints but blood pressure remains high and denies any headache chest pain shortness of breath.  Patient alert oriented and follow commands.    History of present illness  86-year-old female with history of diabetes hypertension hypothyroidism and chronic kidney disease stage who also have severe dementia brought to the emergency room from nursing home by the family after the mechanical fall with left hip pain.  According to patient she fell backwards struck her head and hurting in the left hip since then.  Patient denies any loss of consciousness.  Patient denies any dizziness lightheadedness chest pain shortness of breath palpitation prior to the discharge.  Patient work-up in ER revealed acute left intertrochanteric hip fracture and acute UTI admit for management.     REVIEW OF SYSTEMS  Unremarkable except generalized weakness     PHYSICAL EXAM   Blood pressure (!) 209/78, pulse 103, temperature 98.2 °F (36.8 °C), temperature source Oral, resp. rate 18, height 162.6 cm (64\"), weight 59.4 kg (131 lb), SpO2 93 %, not currently breastfeeding.    GENERAL: Alert, not distressed  HEENT; unremarkable  NECK:  Supple  CV: regular rhythm, regular rate  RESPIRATORY: normal effort moving air bilaterally  ABDOMEN: soft/nontender nondistended bowel sounds positive  MUSCULOSKELETAL: left leg is shortened and externally rotated, moderate tenderness to the left hip  NEURO: alert, moves all extremities, follows commands  SKIN: warm, dry     LAB RESULTS  Lab Results (last 24 hours)     Procedure Component Value Units Date/Time    POC Glucose Once [[id number]]  (Normal) Collected: 11/02/22 1140    Specimen: Blood Updated: 11/02/22 1142     Glucose 127 mg/dL      Comment: Meter: [id number] : [id number] Kevan SHIN       Blood Culture - Blood, Arm, Left [[id number]]  (Normal) Collected: 10/30/22 1004    Specimen: Blood " from Arm, Left Updated: 11/02/22 1100     Blood Culture No growth at 3 days    Blood Culture - Blood, Hand, Right [[id number]]  (Normal) Collected: 10/30/22 1012    Specimen: Blood from Hand, Right Updated: 11/02/22 1100     Blood Culture No growth at 3 days    CBC & Differential [[id number]]  (Abnormal) Collected: 11/02/22 0428    Specimen: Blood Updated: 11/02/22 0610    Narrative:      The following orders were created for panel order CBC & Differential.  Procedure                               Abnormality         Status                     ---------                               -----------         ------                     CBC Auto Differential[[id number]]        Abnormal            Final result                 Please view results for these tests on the individual orders.    CBC Auto Differential [[id number]]  (Abnormal) Collected: 11/02/22 0428    Specimen: Blood Updated: 11/02/22 0610     WBC 7.11 10*3/mm3      RBC 2.91 10*6/mm3      Hemoglobin 8.9 g/dL      Hematocrit 26.3 %      MCV 90.4 fL      MCH 30.6 pg      MCHC 33.8 g/dL      RDW 15.9 %      RDW-SD 52.6 fl      MPV 9.8 fL      Platelets 123 10*3/mm3      Neutrophil % 68.5 %      Lymphocyte % 16.2 %      Monocyte % 10.8 %      Eosinophil % 3.2 %      Basophil % 0.6 %      Immature Grans % 0.7 %      Neutrophils, Absolute 4.87 10*3/mm3      Lymphocytes, Absolute 1.15 10*3/mm3      Monocytes, Absolute 0.77 10*3/mm3      Eosinophils, Absolute 0.23 10*3/mm3      Basophils, Absolute 0.04 10*3/mm3      Immature Grans, Absolute 0.05 10*3/mm3      nRBC 0.1 /100 WBC     POC Glucose Once [[id number]]  (Abnormal) Collected: 11/02/22 0605    Specimen: Blood Updated: 11/02/22 0607     Glucose 132 mg/dL      Comment: Meter: [id number] : [id number] Crys Janee NA       Basic Metabolic Panel [[id number]]  (Abnormal) Collected: 11/02/22 0428    Specimen: Blood Updated: 11/02/22 0532     Glucose 106 mg/dL      BUN 36 mg/dL      Creatinine 1.51 mg/dL      Sodium 137  mmol/L      Potassium 4.1 mmol/L      Chloride 107 mmol/L      CO2 18.7 mmol/L      Calcium 8.5 mg/dL      BUN/Creatinine Ratio 23.8     Anion Gap 11.3 mmol/L      eGFR 33.9 mL/min/1.73      Comment: National Kidney Foundation and American Society of Nephrology (ASN) Task Force recommended calculation based on the Chronic Kidney Disease Epidemiology Collaboration (CKD-EPI) equation refit without adjustment for race.       Narrative:      GFR Normal >60  Chronic Kidney Disease <60  Kidney Failure <15    The GFR formula is only valid for adults with stable renal function between ages 18 and 70.    POC Glucose Once [[id number]]  (Abnormal) Collected: 11/01/22 2057    Specimen: Blood Updated: 11/01/22 2058     Glucose 175 mg/dL      Comment: Meter: [id number] : [id number] Yessi SHIN       POC Glucose Once [[id number]]  (Normal) Collected: 11/01/22 1644    Specimen: Blood Updated: 11/01/22 1646     Glucose 129 mg/dL      Comment: Meter: [id number] : [id number] Kevan SHIN              Imaging Results (Last 24 Hours)     ** No results found for the last 24 hours. **        ECG 12 Lead   Component Ref Range & Units 02:14   (10/30/22) 2 mo ago   (8/6/22) 1 yr ago   (9/22/21) 1 yr ago   (7/14/21)   QT Interval ms 429 P  382  394  406    Resulting Agency  [facility] ECG  ECG  ECG  ECG             HEART RATE= 71  bpm  RR Interval= 845  ms  OH Interval= 171  ms  P Horizontal Axis= -15  deg  P Front Axis= 66  deg  QRSD Interval= 101  ms  QT Interval= 429  ms  QRS Axis= 33  deg  T Wave Axis= 61  deg  - NORMAL ECG -  Sinus rhythm             Current Facility-Administered Medications:   •  acetaminophen (TYLENOL) tablet 650 mg, 650 mg, Oral, Q6H PRN, Asael Mccabe MD  •  allopurinol (ZYLOPRIM) tablet 100 mg, 100 mg, Oral, Daily, Asael Mccabe MD, 100 mg at 11/02/22 0930  •  amLODIPine (NORVASC) tablet 10 mg, 10 mg, Oral, Daily, Asael Mccabe MD, 10 mg at 11/02/22 6864  •   arformoterol (BROVANA) nebulizer solution 15 mcg, 15 mcg, Nebulization, BID - RT, Asael Mccabe MD, 15 mcg at 11/02/22 0820  •  aspirin chewable tablet 81 mg, 81 mg, Oral, Daily, Asael Mccabe MD, 81 mg at 11/02/22 0930  •  atorvastatin (LIPITOR) tablet 10 mg, 10 mg, Oral, Nightly, Asael Mccabe MD, 10 mg at 11/01/22 2055  •  cefTRIAXone (ROCEPHIN) 1 g in sodium chloride 0.9 % 100 mL IVPB-VTB, 1 g, Intravenous, Q24H, Asael Mccabe MD, Last Rate: 200 mL/hr at 11/02/22 0532, 1 g at 11/02/22 0532  •  cholecalciferol (VITAMIN D3) tablet 1,000 Units, 1,000 Units, Oral, Daily, Asael Mccabe MD, 1,000 Units at 11/02/22 0931  •  clomiPRAMINE (ANAFRANIL) capsule 50 mg, 50 mg, Oral, Nightly, Asael Mccabe MD  •  clonazePAM (KlonoPIN) tablet 0.5 mg, 0.5 mg, Oral, Nightly PRN, Asael Mccabe MD, 0.5 mg at 11/01/22 2055  •  docusate sodium (COLACE) capsule 200 mg, 200 mg, Oral, BID, Asael Mccabe MD, 200 mg at 11/01/22 2055  •  Enoxaparin Sodium (LOVENOX) syringe 30 mg, 30 mg, Subcutaneous, Nightly, Asael Mccabe MD, 30 mg at 11/01/22 2055  •  famotidine (PEPCID) tablet 20 mg, 20 mg, Oral, BID, Chavez Uribe MD, 20 mg at 11/02/22 0931  •  hydrALAZINE (APRESOLINE) injection 10 mg, 10 mg, Intravenous, Q4H PRN, Asael Mccabe MD, 10 mg at 11/02/22 0422  •  HYDROcodone-acetaminophen (NORCO) 5-325 MG per tablet 1 tablet, 1 tablet, Oral, Q4H PRN, Asael Mccabe MD, 1 tablet at 11/01/22 2226  •  insulin lispro (ADMELOG) injection 0-7 Units, 0-7 Units, Subcutaneous, 4x Daily With Meals & Nightly, Asael Mccabe MD, 2 Units at 11/01/22 2148  •  levothyroxine (SYNTHROID, LEVOTHROID) tablet 100 mcg, 100 mcg, Oral, Q AM, Asael Mccabe MD, 100 mcg at 11/02/22 0532  •  LORazepam (ATIVAN) injection 0.5 mg, 0.5 mg, Intravenous, Q4H PRN, Chavez Uribe MD, 0.5 mg at 11/02/22 0704  •  losartan (COZAAR) tablet  100 mg, 100 mg, Oral, Q24H, Asael Mccabe MD, 100 mg at 11/02/22 0931  •  memantine (NAMENDA) tablet 5 mg, 5 mg, Oral, Q12H, Asael Mccabe MD, 5 mg at 11/02/22 0930  •  montelukast (SINGULAIR) tablet 10 mg, 10 mg, Oral, Nightly, Asael Mccabe MD, 10 mg at 11/01/22 2055  •  ondansetron (ZOFRAN) injection 4 mg, 4 mg, Intravenous, Q6H PRN, Asael Mccabe MD, 4 mg at 10/30/22 2247  •  polyethylene glycol (MIRALAX) packet 17 g, 17 g, Oral, BID, Asael Mccabe MD, 17 g at 11/01/22 2055  •  primidone (MYSOLINE) tablet 50 mg, 50 mg, Oral, TID, Asael Mccabe MD, 50 mg at 11/02/22 0930  •  sennosides-docusate (PERICOLACE) 8.6-50 MG per tablet 2 tablet, 2 tablet, Oral, Nightly, Asael Mccabe MD, 2 tablet at 11/01/22 2055  •  sertraline (ZOLOFT) tablet 100 mg, 100 mg, Oral, Nightly, Asael Mccabe MD, 100 mg at 11/01/22 2055  •  [COMPLETED] Insert peripheral IV, , , Once **AND** sodium chloride 0.9 % flush 10 mL, 10 mL, Intravenous, PRN, Asael Mccabe MD  •  tamsulosin (FLOMAX) 24 hr capsule 0.4 mg, 0.4 mg, Oral, Nightly, Asael Mccabe MD, 0.4 mg at 11/01/22 2055     ASSESSMENT  Acute left intertrochanteric hip fracture s/p intertrochanteric nailing  Acute UTI  Diabetes mellitus  Hypertension with elevated BP  Hypothyroidism  Severe dementia  Chronic anemia  Chronic kidney disease stage IV  Gastroesophageal reflux disease    PLAN  CPM  Postop care  Stabilize blood pressure   Continue IV antibiotics  Pain management  Transfuse PRN  Orthopedic surgery consult appreciated  Adjust nursing home medications   Stress ulcer DVT prophylaxis  Accu-Chek with sliding scale insulin  Supportive care  DNR  PT OT  Discussed with nursing staff  Discharge planning    ALYSHA POSEY MD

## 2022-11-02 NOTE — CASE MANAGEMENT/SOCIAL WORK
Continued Stay Note  [facility]     Patient Name: Kellen Parmar  MRN: [id number]  Today's Date: 11/2/2022    Admit Date: 10/30/2022    Plan: Green Qureshi.   Discharge Plan     Row Name 11/02/22 1652       Plan    Plan Green Qureshi.    Patient/Family in Agreement with Plan yes    Plan Comments CCP called and left a message for Kasey/Cristofer Qureshi to discuss case. Await her call back.               Discharge Codes    No documentation.               Expected Discharge Date and Time     Expected Discharge Date Expected Discharge Time    Nov 3, 2022             Alyson PAGE RN

## 2022-11-02 NOTE — THERAPY EVALUATION
Patient Name: Kellen Parmar  : 1935    MRN: [id number]                              Today's Date: 2022       Admit Date: 10/30/2022    Visit Dx:     ICD-10-CM ICD-9-CM   1. Closed intertrochanteric fracture of hip, left, initial encounter (Formerly [facility])  S72.142A 820.21   2. Anemia, unspecified type  D64.9 285.9   3. Alzheimer's dementia, unspecified dementia severity, unspecified timing of dementia onset, unspecified whether behavioral, psychotic, or mood disturbance or anxiety (Formerly [facility])  G30.9 331.0    F02.80 294.10   4. Type 2 diabetes mellitus without complication, unspecified whether long term insulin use (Formerly [facility])  E11.9 250.00   5. Hypertension, unspecified type  I10 401.9   6. Hyperlipidemia, unspecified hyperlipidemia type  E78.5 272.4   7. Hypothyroidism, unspecified type  E03.9 244.9   8. Chronic kidney disease, unspecified CKD stage  N18.9 585.9     Patient Active Problem List   Diagnosis   • Clostridium difficile colitis   • HTN (hypertension)   • CKD (chronic kidney disease) stage 4, GFR 15-29 ml/min (Formerly [facility])   • JEANIE (acute kidney injury) (Formerly [facility])   • Hyponatremia   • Hypokalemia   • Anemia   • Leukocytosis   • Acute metabolic encephalopathy   • Dementia without behavioral disturbance (Formerly [facility])   • Carotid stenosis, bilateral   • Diarrhea   • Coronary artery disease involving native coronary artery of native heart without angina pectoris   • Hypothyroidism (acquired)   • Shortness of breath   • JEANIE (acute kidney injury) (Formerly [facility])   • Closed intertrochanteric fracture of hip, left, initial encounter (Formerly [facility])     Past Medical History:   Diagnosis Date   • Anemia    • Asthma    • Cancer (Formerly [facility])    • Dementia (Formerly [facility])    • Diabetes mellitus (Formerly [facility])    • Disease of thyroid gland    • GERD (gastroesophageal reflux disease)    • Hypertension      Past Surgical History:   Procedure Laterality Date   • CHOLECYSTECTOMY        General Information     Row Name 22 1227          OT Time and Intention    Document Type evaluation  -      Mode of Treatment co-treatment;occupational therapy;physical therapy  -     Row Name 11/02/22 1227          General Information    Patient Profile Reviewed yes  -MW     Prior Level of Function --  hx dementia, unable to obtain prior level info due to impaired cognition, Rwx per chart  -MW     Existing Precautions/Restrictions fall  -     Barriers to Rehab cognitive status  -     Row Name 11/02/22 1227          Living Environment    People in Home facility resident  -     Row Name 11/02/22 1227          Home Main Entrance    Number of Stairs, Main Entrance none  -     Row Name 11/02/22 1227          Cognition    Orientation Status (Cognition) oriented to;person;disoriented to;place;time;situation;verbal cues/prompts needed for orientation  -     Row Name 11/02/22 1227          Safety Issues, Functional Mobility    Safety Issues Affecting Function (Mobility) insight into deficits/self-awareness;safety precautions follow-through/compliance;safety precaution awareness;sequencing abilities;judgment  -     Impairments Affecting Function (Mobility) balance;endurance/activity tolerance;strength;pain;range of motion (ROM);cognition  -MW     Cognitive Impairments, Mobility Safety/Performance safety precaution awareness;safety precaution follow-through;impulsivity;insight into deficits/self-awareness;problem-solving/reasoning;awareness, need for assistance;attention  -           User Key  (r) = Recorded By, (t) = Taken By, (c) = Cosigned By    Initials Name Provider Type    Marietta Eden OT Occupational Therapist                 Mobility/ADL's     Row Name 11/02/22 123          Bed Mobility    Bed Mobility rolling left;rolling right;scooting/bridging  -     Rolling Left [place] (Bed Mobility) maximum assist (25% patient effort);verbal cues;nonverbal cues (demo/gesture)  -     Rolling Right [place] (Bed Mobility) maximum assist (25% patient effort);verbal cues;nonverbal cues  (demo/gesture)  -     Scooting/Bridging [place] (Bed Mobility) maximum assist (25% patient effort);2 person assist  -     Comment, (Bed Mobility) multiple bouts of rolling completed for full bed change required upon arrival; RN reports total A x2 for lifting pt onto BSC  -     Row Name 11/02/22 1230          Transfers    Comment, (Transfers) NT  -     Row Name 11/02/22 1230          Bed-Chair Transfer    Bed-Chair [place] (Transfers) unable to assess  -     Row Name 11/02/22 1230          Sit-Stand Transfer    Sit-Stand [place] (Transfers) unable to assess  -     Row Name 11/02/22 1230          Activities of Daily Living    BADL Assessment/Intervention toileting;lower body dressing  -[facility] Name 11/02/22 1230          Mobility    Extremity Weight-bearing Status left lower extremity  -     Left Lower Extremity (Weight-bearing Status) weight-bearing as tolerated (WBAT)  -[facility] Name 11/02/22 1230          Toileting Assessment/Training    [place] Level (Toileting) dependent (less than 25% patient effort);toileting skills;adjust/manage clothing;change pad/brief;perform perineal hygiene;maximum assist (25% patient effort)  -     Position (Toileting) supine  -[facility] Name 11/02/22 1230          Lower Body Dressing Assessment/Training    [place] Level (Lower Body Dressing) pants/bottoms;maximum assist (25% patient effort);dependent (less than 25% patient effort)  -     Position (Lower Body Dressing) supine  -           User Key  (r) = Recorded By, (t) = Taken By, (c) = Cosigned By    Initials Name Provider Type    Marietta Eden OT Occupational Therapist               Obj/Interventions     Row Name 11/02/22 1231          Sensory Assessment (Somatosensory)    Sensory Assessment (Somatosensory) unable/difficult to assess  -[facility] Name 11/02/22 1231          Vision Assessment/Intervention    Visual Impairment/Limitations unable/difficult to assess  -[facility]  Name 11/02/22 1231          Range of Motion Comprehensive    Comment, General Range of Motion functionally assessed, WFL BUE  -MW     Row Name 11/02/22 1231          Strength Comprehensive (MMT)    General Manual Muscle Testing (MMT) Assessment upper extremity strength deficits identified  -MW     Comment, General Manual Muscle Testing (MMT) Assessment generalized post op weakness, BUE grossly 3+/5  -MW     Row Name 11/02/22 1231          Motor Skills    Motor Skills functional endurance  -MW     Functional Endurance poor 2/2 pain  -MW     Row Name 11/02/22 1231          Balance    Comment, Balance unable to assess this date  -MW           User Key  (r) = Recorded By, (t) = Taken By, (c) = Cosigned By    Initials Name Provider Type    Marietta Eden OT Occupational Therapist               Goals/Plan     Row Name 11/02/22 1238          Transfer Goal 1 (OT)    Activity/Assistive Device (Transfer Goal 1, OT) sit-to-stand/stand-to-sit;bed-to-chair/chair-to-bed;toilet  -MW     [place] Level/Cues Needed (Transfer Goal 1, OT) moderate assist (50-74% patient effort)  -MW     Time Frame (Transfer Goal 1, OT) short term goal (STG);2 weeks  -MW     Progress/Outcome (Transfer Goal 1, OT) goal ongoing  -     Row Name 11/02/22 1238          Bathing Goal 1 (OT)    Activity/Device (Bathing Goal 1, OT) upper body bathing  -MW     [place] Level/Cues Needed (Bathing Goal 1, OT) minimum assist (75% or more patient effort);verbal cues required  -MW     Time Frame (Bathing Goal 1, OT) short term goal (STG);2 weeks  -MW     Progress/Outcomes (Bathing Goal 1, OT) goal ongoing  -     Row Name 11/02/22 1238          Toileting Goal 1 (OT)    Activity/Device (Toileting Goal 1, OT) toileting skills, all  -MW     [place] Level/Cues Needed (Toileting Goal 1, OT) moderate assist (50-74% patient effort)  -MW     Time Frame (Toileting Goal 1, OT) short term goal (STG);2 weeks  -MW     Progress/Outcome (Toileting Goal 1, OT)  goal ongoing  -MW     Row Name 11/02/22 1238          Grooming Goal 1 (OT)    Activity/Device (Grooming Goal 1, OT) grooming skills, all  -MW     [place] (Grooming Goal 1, OT) supervision required;verbal cues required  -MW     Time Frame (Grooming Goal 1, OT) short term goal (STG);2 weeks  -MW     Progress/Outcome (Grooming Goal 1, OT) goal ongoing  -MW     Row Name 11/02/22 1238          Therapy Assessment/Plan (OT)    Planned Therapy Interventions (OT) activity tolerance training;functional balance retraining;BADL retraining;neuromuscular control/coordination retraining;occupation/activity based interventions;ROM/therapeutic exercise;strengthening exercise;transfer/mobility retraining;patient/caregiver education/training  -MW           User Key  (r) = Recorded By, (t) = Taken By, (c) = Cosigned By    Initials Name Provider Type    Marietta Eden, MARTHA Occupational Therapist               Clinical Impression     Row Name 11/02/22 1232          Pain Assessment    Pretreatment Pain Rating 10/10  -MW     Posttreatment Pain Rating 10/10  -MW     Pain Location - Side/Orientation Left  -MW     Pain Location - hip  -MW     Row Name 11/02/22 1232          Plan of Care Review    Plan of Care Reviewed With patient  -MW     Progress no change  -MW     Outcome Evaluation Pt is an 85 yo F admitted following a fall at her facility resulting in L hip fx. Pt is now POD 3 L hip nailing and is WBAT for transfers and short distance gait. Hx severe dementia. Per chart, pt is AOx1 at BL, today A&O to self only, cues for orientation throughout session. Per PT note, pt used Rwx at baseline and unknown ADL baseline due to impaired cognition. Pt from [facility]. Pt presents this date with generalized post op weakness, impaired BUE weakness, impaired func transfers, mobility, ADLs, balance, safety, impaired cognition, and increased pain with all functional movement. Upon arrival, pt's bed soiled and required full bed change.  Max Ax1 for rolling and to maintain sidelying for increased time due thorough hygiene required. Pt requires max/total A for all toileting and LBD tasks. RN reports transferring to [facility] with Ax2. Pt will continue to benefit from skilled OT to address noted functional deficits and promote improved level of (I) with functional activity. Recommending SNF at d/c.  -     Row Name 11/02/22 1232          Therapy Assessment/Plan (OT)    Rehab Potential (OT) good, to achieve stated therapy goals  -     Criteria for Skilled Therapeutic Interventions Met (OT) yes;meets criteria;skilled treatment is necessary  -     Therapy Frequency (OT) 5 times/wk  -     Row Name 11/02/22 1232          Therapy Plan Review/Discharge Plan (OT)    Anticipated Discharge Disposition (OT) skilled nursing facility;extended care facility  -     Row Name 11/02/22 1232          Vital Signs    O2 Delivery Pre Treatment room air  -MW     O2 Delivery Post Treatment room air  -MW     Pre Patient Position Supine  -MW     Intra Patient Position Supine  -MW     Post Patient Position Supine  -MW     Row Name 11/02/22 1232          Positioning and Restraints    Pre-Treatment Position in bed  -MW     Post Treatment Position bed  -MW     In Bed notified nsg;side rails up x3;call light within reach;encouraged to call for assist;exit alarm on;fowlers;patient within staff view  -           User Key  (r) = Recorded By, (t) = Taken By, (c) = Cosigned By    Initials Name Provider Type    Marietta Eden, MARTHA Occupational Therapist               Outcome Measures     Row Name 11/02/22 1238          How much help from another is currently needed...    Putting on and taking off regular lower body clothing? 1  -MW     Bathing (including washing, rinsing, and drying) 1  -MW     Toileting (which includes using toilet bed pan or urinal) 1  -MW     Putting on and taking off regular upper body clothing 2  -MW     Taking care of personal grooming (such as brushing  teeth) 2  -MW     Eating meals 3  -MW     AM-PAC 6 Clicks Score (OT) 10  -     Row Name 11/02/22 1238          Modified [place] Scale    Modified [place] Scale 5 - Severe disability.  Bedridden, incontinent, and requiring constant nursing care and attention.  -     Row Name 11/02/22 1238          Functional Assessment    Outcome Measure Options AM-PAC 6 Clicks Daily Activity (OT);Modified [place]  -           User Key  (r) = Recorded By, (t) = Taken By, (c) = Cosigned By    Initials Name Provider Type     Marietta Centeno OT Occupational Therapist                Occupational Therapy Education     Title: PT OT SLP Therapies (In Progress)     Topic: Occupational Therapy (In Progress)     Point: ADL training (In Progress)     Description:   Instruct learner(s) on proper safety adaptation and remediation techniques during self care or transfers.   Instruct in proper use of assistive devices.              Learning Progress Summary           Patient Acceptance, E, NL by  at 11/2/2022 1239    Comment: role of OT, hip precautions                   Point: Precautions (In Progress)     Description:   Instruct learner(s) on prescribed precautions during self-care and functional transfers.              Learning Progress Summary           Patient Acceptance, E, NL by  at 11/2/2022 1239    Comment: role of OT, hip precautions                   Point: Body mechanics (In Progress)     Description:   Instruct learner(s) on proper positioning and spine alignment during self-care, functional mobility activities and/or exercises.              Learning Progress Summary           Patient Acceptance, E, NL by  at 11/2/2022 1239    Comment: role of OT, hip precautions                               User Key     Initials Effective Dates Name Provider Type Discipline     08/20/21 -  Marietta Centeno OT Occupational Therapist OT              OT Recommendation and Plan  Planned Therapy Interventions (OT): activity tolerance  training, functional balance retraining, BADL retraining, neuromuscular control/coordination retraining, occupation/activity based interventions, ROM/therapeutic exercise, strengthening exercise, transfer/mobility retraining, patient/caregiver education/training  Therapy Frequency (OT): 5 times/wk  Plan of Care Review  Plan of Care Reviewed With: patient  Progress: no change  Outcome Evaluation: Pt is an 87 yo F admitted following a fall at her facility resulting in L hip fx. Pt is now POD 3 L hip nailing and is WBAT for transfers and short distance gait. Hx severe dementia. Per chart, pt is AOx1 at [facility], today A&O to self only, cues for orientation throughout session. Per PT note, pt used Rwx at baseline and unknown ADL baseline due to impaired cognition. Pt from [facility]. Pt presents this date with generalized post op weakness, impaired BUE weakness, impaired func transfers, mobility, ADLs, balance, safety, impaired cognition, and increased pain with all functional movement. Upon arrival, pt's bed soiled and required full bed change. Max Ax1 for rolling and to maintain sidelying for increased time due thorough hygiene required. Pt requires max/total A for all toileting and LBD tasks. RN reports [facility] to [facility] with Ax[facility]. Pt will continue to benefit from skilled OT to address noted functional deficits and promote improved level of (I) with functional activity. Recommending SNF at d/c.     Time Calculation:    Time Calculation- OT     Row Name 11/02/22 1239             Time Calculation- OT    OT Start Time 1011  -MW      OT Stop Time 1030  -MW      OT Time Calculation (min) 19 min  -MW      Total Timed Code Minutes- OT 12 minute(s)  -MW      OT Received On 11/02/22  -MW      OT - Next Appointment 11/03/22  -MW      OT Goal Re-Cert Due Date 11/16/22  -MW         Timed Charges    95964 - OT Self Care/Mgmt Minutes 12  -MW         Untimed Charges    OT Eval/Re-eval Minutes 7  -MW         Total Minutes    Timed  Charges Total Minutes 12  -MW      Untimed Charges Total Minutes 7  -MW       Total Minutes 19  -MW            User Key  (r) = Recorded By, (t) = Taken By, (c) = Cosigned By    Initials Name Provider Type    Marietta Eden OT Occupational Therapist              Therapy Charges for Today     Code Description Service Date Service Provider Modifiers Qty    [id number] HC OT SELF CARE/MGMT/TRAIN EA 15 MIN 11/2/2022 Marietta Centeno OT GO 1    [id number]  OT EVAL MOD COMPLEXITY 3 11/2/2022 Marietta Centeno OT GO 1               Marietta Centeno OT  11/2/2022

## 2022-11-02 NOTE — PLAN OF CARE
Goal Outcome Evaluation:  Plan of Care Reviewed With: patient        Progress: no change  Outcome Evaluation: Pt seen for PT/OT co-tx this AM. Noted soiled bed on arrival, needing complete bed change. Pt does not tolerate mobility well, yelling about pain prior to any assisted movement. Pt completed multiple rolls to either side with max A x1 - tolerated rolling to R>L. Required assist to maintain sidelying position bilaterally, notably fatigued following. Per RN, pt has been lifted max A x2 to BSC as d/t dementia, pt does not understand voiding in brief. Requested keeping pt in bed as she is impulsive and a heavy assist for all mobility. PT will continue to follow to progress mobility as tolerated. Anticipate DC to SNF.   [   ] ICU                                          [   ] CCU                                      [ X  ] Medical Floor    Patient is a 56 year old female with diarrhea and dilated CBD. GI consulted to evaluate.        56 year old female with a past medical history significant for DM and HLD and surgical history of cholecystectomy presented to the emergency room with 2 days history of watery, non bloody diarrhea associated with intermittent crampy non radiating, 5-6/10 intensity periumbilical abdominal painone episode of non bloody vomiting. Patient denies hematemesis, hematochezia, melena, fever, chills, chest pain, SOB, cough, hematuria, dysuria or recent traveling or antibiotic use.      Patient appears comfortable. No new complaints reported, No abdominal pain, N/V, hematemesis, hematochezia, melena, fever, chills, chest pain, SOB, cough or diarrhea reported.      PAIN MANAGEMENT:  Pain Scale:                 2/10  Pain Location:  Periumbilical abdominal pain      Prior Colonoscopy: Unknown      PAST MEDICAL HISTORY  Diabetes mellitus  Hypertension      PAST SURGICAL HISTORY  Cholecystectomy        Allergies    No Known Allergies    Intolerances  None         MEDICATIONS  (STANDING):  atorvastatin 20 milliGRAM(s) Oral at bedtime  dextrose 5%. 1000 milliLiter(s) (50 mL/Hr) IV Continuous <Continuous>  enoxaparin Injectable 40 milliGRAM(s) SubCutaneous every 24 hours  glucagon  Injectable 1 milliGRAM(s) IntraMuscular once  insulin lispro (ADMELOG) corrective regimen sliding scale   SubCutaneous at bedtime  insulin lispro (ADMELOG) corrective regimen sliding scale.   SubCutaneous every 8 hours  pantoprazole    Tablet 40 milliGRAM(s) Oral before breakfast  potassium chloride  10 mEq/100 mL IVPB 10 milliEquivalent(s) IV Intermittent every 1 hour  potassium phosphate IVPB 30 milliMole(s) IV Intermittent once  sodium chloride 0.9%. 1000 milliLiter(s) (75 mL/Hr) IV Continuous <Continuous>    MEDICATIONS  (PRN):  ondansetron Injectable 4 milliGRAM(s) IV Push once PRN Nausea and/or Vomiting      SOCIAL HISTORY  Advanced Directives:       [ X ] Full Code       [  ] DNR  Marital Status:         [  ] M      [ X ] S      [  ] D       [  ] W  Children:       [ X ] Yes      [  ] No  Occupation:        [  ] Employed       [X  ] Unemployed       [  ] Retired  Diet:       [ X ] Regular       [  ] PEG feeding          [  ] NG tube feeding  Drug Use:           [ X ] Patient denied          [  ] Yes  Alcohol:           [ X ] No             [  ] Yes (socially)         [  ] Yes (chronic)  Tobacco:           [  ] Yes           [ X ] No      FAMILY HISTORY  [ X ] Heart Disease            [ X ] Diabetes             [ X ] HTN             [  ] Colon Cancer             [  ] Stomach Cancer              [  ] Pancreatic Cancer      VITALS  Vital Signs Last 24 Hrs  T(C): 36.6 (06 Jul 2022 05:05), Max: 36.6 (05 Jul 2022 12:52)  T(F): 97.9 (06 Jul 2022 05:05), Max: 97.9 (05 Jul 2022 20:40)  HR: 53 (06 Jul 2022 05:05) (53 - 76)  BP: 104/62 (06 Jul 2022 05:05) (104/62 - 129/59)   RR: 17 (06 Jul 2022 05:05) (16 - 18)  SpO2: 98% (06 Jul 2022 05:05) (98% - 99%)       MEDICATIONS  (STANDING):  atorvastatin 20 milliGRAM(s) Oral at bedtime  dextrose 5%. 1000 milliLiter(s) (50 mL/Hr) IV Continuous <Continuous>  enoxaparin Injectable 40 milliGRAM(s) SubCutaneous every 24 hours  glucagon  Injectable 1 milliGRAM(s) IntraMuscular once  insulin lispro (ADMELOG) corrective regimen sliding scale   SubCutaneous at bedtime  insulin lispro (ADMELOG) corrective regimen sliding scale   SubCutaneous three times a day before meals  levoFLOXacin  Tablet 500 milliGRAM(s) Oral every 24 hours  pantoprazole    Tablet 40 milliGRAM(s) Oral before breakfast  sodium chloride 0.9%. 1000 milliLiter(s) (75 mL/Hr) IV Continuous <Continuous>    MEDICATIONS  (PRN):  ondansetron Injectable 4 milliGRAM(s) IV Push once PRN Nausea and/or Vomiting                            14.5   5.97  )-----------( 203      ( 06 Jul 2022 06:58 )             42.8       07-06    139  |  104  |  13  ----------------------------<  179<H>  4.2   |  28  |  0.47<L>    Ca    9.0      06 Jul 2022 06:58    TPro  6.7  /  Alb  3.1<L>  /  TBili  0.4  /  DBili  x   /  AST  37  /  ALT  84<H>  /  AlkPhos  72  07-06

## 2022-11-02 NOTE — PLAN OF CARE
Goal Outcome Evaluation:  Plan of Care Reviewed With: patient        Progress: no change  Outcome Evaluation: Pt is an 87 yo F admitted following a fall at her facility resulting in L hip fx. Pt is now POD 3 L hip nailing and is WBAT for transfers and short distance gait. Hx severe dementia. Per chart, pt is AOx1 at [facility], today A&O to self only, cues for orientation throughout session. Per PT note, pt used Rwx at baseline and unknown ADL baseline due to impaired cognition. Pt from [facility]. Pt presents this date with generalized post op weakness, impaired BUE weakness, impaired func transfers, mobility, ADLs, balance, safety, impaired cognition, and increased pain with all functional movement. Upon arrival, pt's bed soiled and required full bed change. Max Ax1 for rolling and to maintain sidelying for increased time due thorough hygiene required. Pt requires max/total A for all toileting and LBD tasks. RN reports transferring to [facility] with Ax2. Pt will continue to benefit from skilled OT to address noted functional deficits and promote improved level of (I) with functional activity. Recommending SNF at d/c.

## 2022-11-03 NOTE — CASE MANAGEMENT/SOCIAL WORK
"Physicians Statement of Medical Necessity for  Ambulance Transportation    GENERAL INFORMATION     Name: Kellen Parmar  YOB: 1935  Medicare #: [id number] (See Facesheet)  Transport Date: 11/3/2022 (Valid for round trips this date, or for scheduled repetitive trips for 60 days from the date signed below.)  Origin: [facility]  Destination: [facility].  Is the Patient's stay covered under Medicare Part A (PPS/DRG?)Yes  Closest appropriate facility? Yes  If this a hosp-hosp transfer? No  Is this a hospice patient? No    MEDICAL NECESSITY QUESTIONAIRE    Ambulance Transportation is medically necessary only if other means of transportation are contraindicated or would be potentially harmful to the patient.  To meet this requirement, the patient must be either \"bed confined\" or suffer from a condition such that transport by means other than an ambulance is contraindicated by the patient's condition.  The following questions must be answered by the healthcare professional signing below for this form to be valid:     1) Describe the MEDICAL CONDITION (physical and/or mental) of this patient AT THE TIME OF AMBULANCE TRANSPORT that requires the patient to be transported in an ambulance, and why transport by other means is contraindicated by the patient's condition:   Past Medical History:   Diagnosis Date   • Anemia    • Asthma    • Cancer (HCC)    • Dementia (HCC)    • Diabetes mellitus (HCC)    • Disease of thyroid gland    • GERD (gastroesophageal reflux disease)    • Hypertension       Past Surgical History:   Procedure Laterality Date   • CHOLECYSTECTOMY        2) Is this patient \"bed confined\" as defined below?Yes   To be \"bed confined\" the patient must satisfy all three of the following criteria:  (1) unable to get up from bed without assistance; AND (2) unable to ambulate;  AND (3) unable to sit in a chair or wheelchair.  3) Can this patient safely be transported by car or " wheelchair van (I.e., may safely sit during transport, without an attendant or monitoring?)No   4. In addition to completing questions 1-3 above, please check any of the following conditions that apply*:          *Note: supporting documentation for any boxes checked must be maintained in the patient's medical records Patient is confused and Unable to tolerate seated position for time needed to transport      SIGNATURE OF PHYSICIAN OR OTHER AUTHORIZED HEALTHCARE PROFESSIONAL    I certify that the above information is true and correct based on my evaluation of this patient, and represent that the patient requires transport by ambulance and that other forms of transport are contraindicated.  I understand that this information will be used by the Centers for Medicare and Medicaid Services (CMS) to support the determiniation of medical necessity for ambulance services, and I represent that I have personal knowledge of the patient's condition at the time of transport.       If this box is checked, I also certify that the patient is physically or mentally incapable of signing the ambulance service's claim form and that the institution with which I am affiliated has furnished care, services or assistance to the patient.  My signature below is made on behalf of the patient pursuant to 42 .36(b)(4). In accordance with 42 .37, the specific reason(s) that the patient is physically or mentally incapable of signing the claim for is as follows:     Signature of Physician or Healthcare Professional  Date/Time:        (For Scheduled repetitive transport, this form is not valid for transports performed more than 60 days after this date).                                                                                                                                            --------------------------------------------------------------------------------------------  Printed Name and Credentials of Physician or  Authorized Healthcare Professional     *Form must be signed by patient's attending physician for scheduled, repetitive transports,.  For non-repetitive ambulance transports, if unable to obtain the signature of the attending physician, any of the following may sign (please select below):     Physician  Clinical Nurse Specialist  Registered Nurse     Physician Assistant  Discharge Planner  Licensed Practical Nurse     Nurse Practitioner

## 2022-11-03 NOTE — DISCHARGE SUMMARY
Discharge summary    Date of admission 10/30/2022  Date of discharge 11/3/2022    Final diagnosis  Acute left intertrochanteric hip fracture s/p intertrochanteric nailing  Acute UTI completed antibiotics  Diabetes mellitus diet-controlled  Hypertension  Hypothyroidism  Severe dementia  Chronic anemia  Chronic kidney disease stage IV  Gastroesophageal reflux disease    Discharge medications    Current Facility-Administered Medications:   •  allopurinol (ZYLOPRIM) tablet 100 mg, 100 mg, Oral, Daily, Asael Mccabe MD, 100 mg at 11/03/22 0938  •  amLODIPine (NORVASC) tablet 10 mg, 10 mg, Oral, Daily, Asael Mccabe MD, 10 mg at 11/03/22 0937  •  arformoterol (BROVANA) nebulizer solution 15 mcg, 15 mcg, Nebulization, BID - RT, Asael Mccabe MD, 15 mcg at 11/02/22 1939  •  aspirin chewable tablet 81 mg, 81 mg, Oral, Daily, Asael Mccabe MD, 81 mg at 11/03/22 0938  •  atorvastatin (LIPITOR) tablet 10 mg, 10 mg, Oral, Nightly, Asael Mccabe MD, 10 mg at 11/02/22 2114  •  cholecalciferol (VITAMIN D3) tablet 1,000 Units, 1,000 Units, Oral, Daily, Asael Mccabe MD, 1,000 Units at 11/03/22 0938  •  clomiPRAMINE (ANAFRANIL) capsule 50 mg, 50 mg, Oral, Nightly, Asael Mccabe MD  •  docusate sodium (COLACE) capsule 200 mg, 200 mg, Oral, BID, Asael Mccabe MD, 200 mg at 11/03/22 0937  •  Enoxaparin Sodium (LOVENOX) syringe 30 mg, 30 mg, Subcutaneous, Nightly, Asael Mccabe MD, 30 mg at 11/02/22 2113  •  famotidine (PEPCID) tablet 20 mg, 20 mg, Oral, BID, Chavez Uribe MD, 20 mg at 11/03/22 0937  •  hydrALAZINE (APRESOLINE) tablet 50 mg, 50 mg, Oral, Q8H, Chavez Uribe MD, 50 mg at 11/03/22 0512  •  HYDROcodone-acetaminophen (NORCO) 5-325 MG per tablet 1 tablet, 1 tablet, Oral, Q4H PRN, Asael Mccabe MD, 1 tablet at 11/03/22 9357  •  insulin lispro (ADMELOG) injection 0-7 Units, 0-7 Units, Subcutaneous, 4x Daily With Meals &  Nightly, Asael Mccabe MD, 2 Units at 11/01/22 2148  •  levothyroxine (SYNTHROID, LEVOTHROID) tablet 100 mcg, 100 mcg, Oral, Q AM, Asael Mccabe MD, 100 mcg at 11/03/22 0512  •  losartan (COZAAR) tablet 100 mg, 100 mg, Oral, Q24H, Asael Mccabe MD, 100 mg at 11/03/22 0938  •  memantine (NAMENDA) tablet 5 mg, 5 mg, Oral, Q12H, Asael Mccabe MD, 5 mg at 11/03/22 0938  •  montelukast (SINGULAIR) tablet 10 mg, 10 mg, Oral, Nightly, Asael Mccabe MD, 10 mg at 11/02/22 2114  •  polyethylene glycol (MIRALAX) packet 17 g, 17 g, Oral, BID, Asael Mccabe MD, 17 g at 11/03/22 0938  •  primidone (MYSOLINE) tablet 50 mg, 50 mg, Oral, TID, Asael Mccabe MD, 50 mg at 11/03/22 0938  •  sennosides-docusate (PERICOLACE) 8.6-50 MG per tablet 2 tablet, 2 tablet, Oral, Nightly, Asael Mccabe MD, 2 tablet at 11/02/22 2115  •  sertraline (ZOLOFT) tablet 100 mg, 100 mg, Oral, Nightly, Asael Mccabe MD, 100 mg at 11/02/22 2114  •  [COMPLETED] Insert peripheral IV, , , Once **AND** sodium chloride 0.9 % flush 10 mL, 10 mL, Intravenous, PRN, Asael Mccabe MD  •  tamsulosin (FLOMAX) 24 hr capsule 0.4 mg, 0.4 mg, Oral, Nightly, Asael Mccabe MD, 0.4 mg at 11/02/22 2114     Consults obtained  Orthopedic surgery    Procedures  Left hip intertrochanteric nailing    Hospital course  86-year white female with history of diabetes hypertension hypothyroidism and chronic kidney disease admitted through emergency room with left hip pain after the fall.  Patient work-up in ER revealed left hip intertrochanteric fracture admit for management.  Patient admitted stabilized and orthopedic surgery consult obtained.  Patient underwent left hip intertrochanteric nailing after medical clearance and postoperatively stable.  Patient has UTI treated with antibiotics which she completed.  Postoperatively patient has low hemoglobin and required  blood transfusion.  Patient blood pressure medication also adjusted during this hospitalization.  Patient was on Plavix which was held and restarted at the time of discharge.  Patient also remained on DVT prophylaxis and will continue for total of 14 days.  Patient remained DNR throughout hospital course.    Discharge diet regular    Activity per PT OT    Medication as above    Further care per accepting physician at nursing home and follow-up with orthopedic surgery per their instruction and take medication as directed.    ALYSHA POSEY MD

## 2022-11-03 NOTE — CASE MANAGEMENT/SOCIAL WORK
Continued Stay Note  [facility]     Patient Name: Kellen Parmar  MRN: [id number]  Today's Date: 11/3/2022    Admit Date: 10/30/2022    Plan: Return to [facility]   Discharge Plan     Row Name 11/03/22 1516       Plan    Plan Return to [facility]    Patient/Family in Agreement with Plan yes    Plan Comments Discharge orders noted. Covid-19 Test is pending - discussed with JUAN Howe. Scheduled a [facility] for today at 2130 (spoke with Naomi). Updated the patient, her daughter/Chacha and Kasey/Cristofer [facility] who are all agreeable with the d/c plan. Later, 11/3/22 Covid-19 Test resulted negative. No other needs identified. Packet is on the chart.               Discharge Codes    No documentation.               Expected Discharge Date and Time     Expected Discharge Date Expected Discharge Time    Nov 3, 2022             Alyson PAGE RN

## 2022-11-03 NOTE — CASE MANAGEMENT/SOCIAL WORK
Continued Stay Note  [facility]     Patient Name: Kellen Parmar  MRN: [id number]  Today's Date: 11/3/2022    Admit Date: 10/30/2022    Plan: Return to [facility]   Discharge Plan     Row Name 11/03/22 1259       Plan    Plan Return to [facility]    Patient/Family in Agreement with Plan yes    Plan Comments Spoke with Kasey/[facility] who said the patient is a Medicaid bed hold and can return anytime. Kasey requests a Covid-19 Test on the day of discharge. [facility] called and left another message for the patient's daughter/Chacha. Updated JUAN Rodriguez who will discuss with A.               Discharge Codes    No documentation.               Expected Discharge Date and Time     Expected Discharge Date Expected Discharge Time    Nov 3, 2022             Alyson PAGE RN

## 2022-11-03 NOTE — PLAN OF CARE
Goal Outcome Evaluation:              Outcome Evaluation: POD 4 L hip nailing, Alert to self only, h/o dementia, elevated BP, RA, family at bedside, dressing intact, brief in place, max assist to bsc this shift, EMS lizzy'd @2130 for rehab, unable to educated due to dementia, CTM

## 2022-11-03 NOTE — PROGRESS NOTES
"Daily progress note    Primary care physician   Dr. JORGENSEN    Chief complaint  Doing better with no new complaints and agreeable to go back to nursing home.    History of present illness  86-year-old female with history of diabetes hypertension hypothyroidism and chronic kidney disease stage who also have severe dementia brought to the emergency room from nursing home by the family after the mechanical fall with left hip pain.  According to patient she fell backwards struck her head and hurting in the left hip since then.  Patient denies any loss of consciousness.  Patient denies any dizziness lightheadedness chest pain shortness of breath palpitation prior to the discharge.  Patient work-up in ER revealed acute left intertrochanteric hip fracture and acute UTI admit for management.     REVIEW OF SYSTEMS  Unremarkable except generalized weakness     PHYSICAL EXAM   Blood pressure 154/72, pulse 87, temperature 98.3 °F (36.8 °C), temperature source Oral, resp. rate 16, height 162.6 cm (64\"), weight 59.4 kg (131 lb), SpO2 94 %, not currently breastfeeding.    GENERAL: Alert, not distressed  HEENT; unremarkable  NECK:  Supple  CV: regular rhythm, regular rate  RESPIRATORY: normal effort moving air bilaterally  ABDOMEN: soft/nontender nondistended bowel sounds positive  MUSCULOSKELETAL: left leg is shortened and externally rotated, moderate tenderness to the left hip  NEURO: alert, moves all extremities, follows commands  SKIN: warm, dry     LAB RESULTS  Lab Results (last 24 hours)     Procedure Component Value Units Date/Time    POC Glucose Once [[id number]]  (Normal) Collected: 11/03/22 1105    Specimen: Blood Updated: 11/03/22 1107     Glucose 121 mg/dL      Comment: Meter: [id number] : [id number] Kevan SHIN       Blood Culture - Blood, Arm, Left [[id number]]  (Normal) Collected: 10/30/22 1004    Specimen: Blood from Arm, Left Updated: 11/03/22 1102     Blood Culture No growth at 4 days    Blood Culture - " Blood, Hand, Right [[id number]]  (Normal) Collected: 10/30/22 1012    Specimen: Blood from Hand, Right Updated: 11/03/22 1102     Blood Culture No growth at 4 days    Basic Metabolic Panel [[id number]]  (Abnormal) Collected: 11/03/22 0432    Specimen: Blood Updated: 11/03/22 0605     Glucose 127 mg/dL      BUN 32 mg/dL      Creatinine 1.52 mg/dL      Sodium 136 mmol/L      Potassium 4.6 mmol/L      Comment: Slight hemolysis detected by analyzer. Results may be affected.        Chloride 106 mmol/L      CO2 19.0 mmol/L      Calcium 8.8 mg/dL      BUN/Creatinine Ratio 21.1     Anion Gap 11.0 mmol/L      eGFR 33.7 mL/min/1.73      Comment: National Kidney Foundation and American Society of Nephrology (ASN) Task Force recommended calculation based on the Chronic Kidney Disease Epidemiology Collaboration (CKD-EPI) equation refit without adjustment for race.       Narrative:      GFR Normal >60  Chronic Kidney Disease <60  Kidney Failure <15    The GFR formula is only valid for adults with stable renal function between ages 18 and 70.    POC Glucose Once [[id number]]  (Normal) Collected: 11/03/22 0544    Specimen: Blood Updated: 11/03/22 0546     Glucose 126 mg/dL      Comment: Meter: [id number] : [id number] Chris SHIN       CBC & Differential [[id number]]  (Abnormal) Collected: 11/03/22 0432    Specimen: Blood Updated: 11/03/22 0520    Narrative:      The following orders were created for panel order CBC & Differential.  Procedure                               Abnormality         Status                     ---------                               -----------         ------                     CBC Auto Differential[[id number]]        Abnormal            Final result                 Please view results for these tests on the individual orders.    CBC Auto Differential [[id number]]  (Abnormal) Collected: 11/03/22 0432    Specimen: Blood Updated: 11/03/22 0520     WBC 6.87 10*3/mm3      RBC 3.53 10*6/mm3      Hemoglobin  10.8 g/dL      Hematocrit 32.6 %      MCV 92.4 fL      MCH 30.6 pg      MCHC 33.1 g/dL      RDW 15.4 %      RDW-SD 52.0 fl      MPV 9.7 fL      Platelets 135 10*3/mm3      Neutrophil % 77.3 %      Lymphocyte % 11.6 %      Monocyte % 7.9 %      Eosinophil % 1.9 %      Basophil % 0.4 %      Immature Grans % 0.9 %      Neutrophils, Absolute 5.31 10*3/mm3      Lymphocytes, Absolute 0.80 10*3/mm3      Monocytes, Absolute 0.54 10*3/mm3      Eosinophils, Absolute 0.13 10*3/mm3      Basophils, Absolute 0.03 10*3/mm3      Immature Grans, Absolute 0.06 10*3/mm3      nRBC 0.0 /100 WBC     POC Glucose Once [[id number]]  (Normal) Collected: 11/02/22 2101    Specimen: Blood Updated: 11/02/22 2103     Glucose 118 mg/dL      Comment: Meter: [id number] : [id number] Chris SHIN       POC Glucose Once [[id number]]  (Abnormal) Collected: 11/02/22 1638    Specimen: Blood Updated: 11/02/22 1639     Glucose 142 mg/dL      Comment: Meter: [id number] : [id number] Kevan SHIN              Imaging Results (Last 24 Hours)     ** No results found for the last 24 hours. **        ECG 12 Lead   Component Ref Range & Units 02:14   (10/30/22) 2 mo ago   (8/6/22) 1 yr ago   (9/22/21) 1 yr ago   (7/14/21)   QT Interval ms 429 P  382  394  406    Resulting Agency   ECG  ECG  ECG  ECG             HEART RATE= 71  bpm  RR Interval= 845  ms  SD Interval= 171  ms  P Horizontal Axis= -15  deg  P Front Axis= 66  deg  QRSD Interval= 101  ms  QT Interval= 429  ms  QRS Axis= 33  deg  T Wave Axis= 61  deg  - NORMAL ECG -  Sinus rhythm             Current Facility-Administered Medications:   •  allopurinol (ZYLOPRIM) tablet 100 mg, 100 mg, Oral, Daily, Asael Mccabe MD, 100 mg at 11/03/22 0938  •  amLODIPine (NORVASC) tablet 10 mg, 10 mg, Oral, Daily, Asael Mccabe MD, 10 mg at 11/03/22 0937  •  arformoterol (BROVANA) nebulizer solution 15 mcg, 15 mcg, Nebulization, BID - RT, Asael Mccabe MD, 15 mcg at  11/02/22 1939  •  aspirin chewable tablet 81 mg, 81 mg, Oral, Daily, Asael Mccabe MD, 81 mg at 11/03/22 0938  •  atorvastatin (LIPITOR) tablet 10 mg, 10 mg, Oral, Nightly, Asael Mccabe MD, 10 mg at 11/02/22 2114  •  cholecalciferol (VITAMIN D3) tablet 1,000 Units, 1,000 Units, Oral, Daily, Asael Mccabe MD, 1,000 Units at 11/03/22 0938  •  clomiPRAMINE (ANAFRANIL) capsule 50 mg, 50 mg, Oral, Nightly, Asael Mccabe MD  •  docusate sodium (COLACE) capsule 200 mg, 200 mg, Oral, BID, Asael Mccabe MD, 200 mg at 11/03/22 0937  •  Enoxaparin Sodium (LOVENOX) syringe 30 mg, 30 mg, Subcutaneous, Nightly, Asael Mccabe MD, 30 mg at 11/02/22 2113  •  famotidine (PEPCID) tablet 20 mg, 20 mg, Oral, BID, Chavez Uribe MD, 20 mg at 11/03/22 0937  •  hydrALAZINE (APRESOLINE) tablet 50 mg, 50 mg, Oral, Q8H, Chavez Uribe MD, 50 mg at 11/03/22 0512  •  HYDROcodone-acetaminophen (NORCO) 5-325 MG per tablet 1 tablet, 1 tablet, Oral, Q4H PRN, Asael Mccabe MD, 1 tablet at 11/03/22 0419  •  insulin lispro (ADMELOG) injection 0-7 Units, 0-7 Units, Subcutaneous, 4x Daily With Meals & Nightly, Asael Mccabe MD, 2 Units at 11/01/22 2148  •  levothyroxine (SYNTHROID, LEVOTHROID) tablet 100 mcg, 100 mcg, Oral, Q AM, Asael Mccabe MD, 100 mcg at 11/03/22 0512  •  losartan (COZAAR) tablet 100 mg, 100 mg, Oral, Q24H, Asael Mccabe MD, 100 mg at 11/03/22 0938  •  memantine (NAMENDA) tablet 5 mg, 5 mg, Oral, Q12H, Asael Mccabe MD, 5 mg at 11/03/22 0938  •  montelukast (SINGULAIR) tablet 10 mg, 10 mg, Oral, Nightly, Asael Mccabe MD, 10 mg at 11/02/22 2114  •  polyethylene glycol (MIRALAX) packet 17 g, 17 g, Oral, BID, Asael Mccabe MD, 17 g at 11/03/22 0938  •  primidone (MYSOLINE) tablet 50 mg, 50 mg, Oral, TID, Asael Mccabe MD, 50 mg at 11/03/22 0938  •  sennosides-docusate (PERICOLACE)  8.6-50 MG per tablet 2 tablet, 2 tablet, Oral, Nightly, Asael Mccabe MD, 2 tablet at 11/02/22 2115  •  sertraline (ZOLOFT) tablet 100 mg, 100 mg, Oral, Nightly, Asael Mccabe MD, 100 mg at 11/02/22 2114  •  [COMPLETED] Insert peripheral IV, , , Once **AND** sodium chloride 0.9 % flush 10 mL, 10 mL, Intravenous, PRN, Asael Mccabe MD  •  tamsulosin (FLOMAX) 24 hr capsule 0.4 mg, 0.4 mg, Oral, Nightly, Asael Mccabe MD, 0.4 mg at 11/02/22 2114     ASSESSMENT  Acute left intertrochanteric hip fracture s/p intertrochanteric nailing  Acute UTI  Diabetes mellitus  Hypertension with elevated BP  Hypothyroidism  Severe dementia  Chronic anemia  Chronic kidney disease stage IV  Gastroesophageal reflux disease    PLAN  Discharge back to nursing home  Discharge summary dictated    ALYSHA POSEY MD

## 2022-11-04 NOTE — CASE MANAGEMENT/SOCIAL WORK
Case Management Discharge Note      Final Note: Returned to [facility].    Provided Post Acute Provider List?: N/A    Selected Continued Care - Discharged on 11/3/2022 Admission date: 10/30/2022 - Discharge disposition: Skilled Nursing Facility (DC - External)    Destination Coordination complete.    Service Provider Selected Services Address Phone Fax Patient Preferred    [facility] [address] [place] [address] [phone] --          Durable Medical Equipment    No services have been selected for the patient.              Dialysis/Infusion    No services have been selected for the patient.              Home Medical Care    No services have been selected for the patient.              Therapy    No services have been selected for the patient.              Community Resources    No services have been selected for the patient.              Community & DME    No services have been selected for the patient.                Selected Continued Care - Prior Encounters Includes continued care and service providers with selected services from prior encounters from 8/1/2022 to 11/3/2022    Discharged on 8/12/2022 Admission date: 8/6/2022 - Discharge disposition: Skilled Nursing Facility (DC - External)    Destination     Service Provider Selected Services Address Phone Fax Patient Preferred    [facility] [address] [place] [address] [phone] --                         Final Discharge Disposition Code: 04 - intermediate care facility

## 2022-11-28 PROBLEM — D64.9 SYMPTOMATIC ANEMIA: Status: ACTIVE | Noted: 2022-01-01

## 2022-12-01 NOTE — PROGRESS NOTES
[facility] Clinical Pharmacy Services: Oseltamivir Consult    Pt Name: Kellen Parmar   : 1935  Weight: 61.6 kg (135 lb 12.8 oz)    Relevant clinical data and objective history reviewed:    Creatinine   Date Value Ref Range Status   2022 1.46 (H) 0.57 - 1.00 mg/dL Final   2022 1.47 (H) 0.57 - 1.00 mg/dL Final   2022 1.47 (H) 0.57 - 1.00 mg/dL Final   2021 1.5 0.7 - 1.5 mg/dL Final   2021 1.7 (H) 0.7 - 1.5 mg/dL Final   2021 1.6 (H) 0.7 - 1.5 mg/dL Final   2018 19.5 15 - 500 mg/dL Final     BUN   Date Value Ref Range Status   2022 31 (H) 8 - 23 mg/dL Final   2021 23 (H) 7 - 20 mg/dL Final     Estimated Creatinine Clearance: 27.4 mL/min (A) (by C-G formula based on SCr of 1.46 mg/dL (H)).  Temp Readings from Last 3 Encounters:   22 98.4 °F (36.9 °C) (Oral)   22 98.1 °F (36.7 °C) (Oral)   22 99 °F (37.2 °C) (Oral)        Due to the national shortage of oseltamivir (Tamiflu) restriction criteria on inpatient and ED use has been implemented in order to preserve stock for those who are at the highest risk of experiencing poor outcomes secondary to influenza    Restriction criteria  -(+) influenza PCR  -Symptom onset within 48 hrs  -Has one or more of the health and age risk factors that are known to increase a person’s risk of getting serious flu complications as outlined by the CDC    -Adults 65 years and older  -Children younger than 2 years old1  -Asthma  -Neurologic and neurodevelopment conditions  -Blood disorders (such as sickle cell disease)  -Chronic lung disease (such as chronic obstructive pulmonary disease [COPD] and  cystic fibrosis)  -Endocrine disorders (such as diabetes mellitus)  -Heart disease (such as congenital heart disease, congestive heart failure and coronary artery disease)  -Kidney diseases  -Liver disorders  -Metabolic disorders (such as inherited metabolic disorders and mitochondrial disorders)  -People who  are obese with a body mass index [BMI] of 40 or higher  -People younger than 19 years old on long-term aspirin- or salicylate-containing medications.  -People with a weakened immune system due to disease (such as people with HIV or AIDS, or some cancers such as leukemia) or medications (such as those receiving chemotherapy or radiation treatment for cancer, or persons with chronic conditions requiring chronic corticosteroids or other drugs that suppress the immune system)  -People who have had a stroke    Per ordering providers consult, Kellen Parmar meets criteria for receiving Oseltamivir for the treatment of influenza     Assessment/Plan    Ordered Oseltamivir 30 mg daily for a total of 5 days. Will monitor and adjust if culture data or pertinent lab values indicate this is best for the patient.     Thank you for this consult and please contact pharmacy with any questions or concerns.     Kasey Toribio, PharmD  Clinical Pharmacist

## 2022-12-01 NOTE — PLAN OF CARE
BP elevated overnight, PRN Hydralazine and PRN Labetalol administered per MAR. Catheter care complete. Meds crushed apple sauce. Turned q2. Bed alarm active, call light within reach.      Problem: Adult Inpatient Plan of Care  Goal: Plan of Care Review  Outcome: Ongoing, Progressing  Flowsheets (Taken 12/1/2022 0521)  Progress: no change  Plan of Care Reviewed With:  • patient  • family  Goal: Patient-Specific Goal (Individualized)  Outcome: Ongoing, Progressing  Goal: Absence of Hospital-Acquired Illness or Injury  Outcome: Ongoing, Progressing  Intervention: Identify and Manage Fall Risk  Description: Perform standard risk assessment on admission using a validated tool or comprehensive approach appropriate to the patient; reassess fall risk frequently, with change in status or transfer to another level of care.  Communicate fall injury risk to interprofessional healthcare team.  Determine need for increased observation, equipment and environmental modification, such as low bed, signage and supportive, nonskid footwear.  Adjust safety measures to individual developmental age, stage and identified risk factors.  Reinforce the importance of safety and physical activity with patient and family.  Perform regular intentional rounding to assess need for position change, pain assessment and personal needs, including assistance with toileting.  Recent Flowsheet Documentation  Taken 12/1/2022 0445 by Kristen Rea, RN  Safety Promotion/Fall Prevention: safety round/check completed  Taken 12/1/2022 0230 by Kristen Rea, RN  Safety Promotion/Fall Prevention: safety round/check completed  Taken 12/1/2022 0045 by Kristen Rea, RN  Safety Promotion/Fall Prevention:  • safety round/check completed  • activity supervised  • assistive device/personal items within reach  • clutter free environment maintained  • fall prevention program maintained  • gait belt  • lighting adjusted  • nonskid shoes/slippers when out of  bed  • room organization consistent  Taken 11/30/2022 2245 by Kristen Rea RN  Safety Promotion/Fall Prevention: safety round/check completed  Taken 11/30/2022 2045 by Kristen Rea RN  Safety Promotion/Fall Prevention:  • safety round/check completed  • activity supervised  • assistive device/personal items within reach  • clutter free environment maintained  • fall prevention program maintained  • gait belt  • lighting adjusted  • nonskid shoes/slippers when out of bed  • room organization consistent  Intervention: Prevent Skin Injury  Description: Perform a screening for skin injury risk, such as pressure or moisture associated skin damage on admission and at regular intervals throughout hospital stay.  Keep all areas of skin (especially folds) clean and dry.  Maintain adequate skin hydration.  Relieve and redistribute pressure and protect bony prominences; implement measures based on patient-specific risk factors.  Match turning and repositioning schedule to clinical condition.  Encourage weight shift frequently; assist with reposition if unable to complete independently.  Float heels off bed; avoid pressure on the Achilles tendon.  Keep skin free from extended contact with medical devices.  Encourage functional activity and mobility, as early as tolerated.  Use aids (e.g., slide boards, mechanical lift) during transfer.  Recent Flowsheet Documentation  Taken 12/1/2022 0045 by Kristen Rea RN  Skin Protection:  • adhesive use limited  • incontinence pads utilized  • tubing/devices free from skin contact  Taken 11/30/2022 2045 by Kristen Rea RN  Body Position: supine, legs elevated  Skin Protection:  • adhesive use limited  • incontinence pads utilized  • tubing/devices free from skin contact  Intervention: Prevent and Manage VTE (Venous Thromboembolism) Risk  Description: Assess for VTE (venous thromboembolism) risk.  Encourage and assist with early ambulation.  Initiate and maintain  compression or other therapy, as indicated, based on identified risk in accordance with organizational protocol and provider order.  Encourage both active and passive leg exercises while in bed, if unable to ambulate.  Recent Flowsheet Documentation  Taken 12/1/2022 0230 by Kristen Rea RN  Activity Management: activity adjusted per tolerance  Taken 12/1/2022 0045 by Kristen Rea RN  Activity Management: activity adjusted per tolerance  VTE Prevention/Management:  • sequential compression devices off  • patient refused intervention  Range of Motion: active ROM (range of motion) encouraged  Taken 11/30/2022 2045 by Kristen Rea RN  Activity Management: activity adjusted per tolerance  VTE Prevention/Management:  • sequential compression devices off  • patient refused intervention  Range of Motion: active ROM (range of motion) encouraged  Intervention: Prevent Infection  Description: Maintain skin and mucous membrane integrity; promote hand, oral and pulmonary hygiene.  Optimize fluid balance, nutrition, sleep and glycemic control to maximize infection resistance.  Identify potential sources of infection early to prevent or mitigate progression of infection (e.g., wound, lines, devices).  Evaluate ongoing need for invasive devices; remove promptly when no longer indicated.  Recent Flowsheet Documentation  Taken 12/1/2022 0445 by Kristen Rea RN  Infection Prevention:  • hand hygiene promoted  • rest/sleep promoted  • visitors restricted/screened  • single patient room provided  Taken 12/1/2022 0230 by Kristen Rea RN  Infection Prevention:  • hand hygiene promoted  • rest/sleep promoted  • single patient room provided  • visitors restricted/screened  Taken 12/1/2022 0045 by Kristen Rea RN  Infection Prevention:  • hand hygiene promoted  • rest/sleep promoted  • single patient room provided  • visitors restricted/screened  Taken 11/30/2022 2245 by Kristne Rea RN  Infection  Prevention:  • hand hygiene promoted  • rest/sleep promoted  • single patient room provided  • visitors restricted/screened  Taken 11/30/2022 2045 by Kristen Rea RN  Infection Prevention:  • hand hygiene promoted  • rest/sleep promoted  • single patient room provided  • visitors restricted/screened  Goal: Optimal Comfort and Wellbeing  Outcome: Ongoing, Progressing  Intervention: Monitor Pain and Promote Comfort  Description: Assess pain level, treatment efficacy and patient response at regular intervals using a consistent pain scale.  Consider the presence and impact of preexisting chronic pain.  Encourage patient and caregiver involvement in pain assessment, interventions and safety measures.  Recent Flowsheet Documentation  Taken 11/30/2022 2045 by Kristen Rea RN  Pain Management Interventions: see MAR  Intervention: Provide Person-Centered Care  Description: Use a family-focused approach to care.  Develop trust and rapport by proactively providing information, encouraging questions, addressing concerns and offering reassurance.  Acknowledge emotional response to hospitalization.  Recognize and utilize personal coping strategies.  Honor spiritual and cultural preferences.  Recent Flowsheet Documentation  Taken 12/1/2022 0045 by Kristen Rea RN  Trust Relationship/Rapport:  • care explained  • choices provided  Taken 11/30/2022 2045 by Kristen Rea RN  Trust Relationship/Rapport:  • care explained  • choices provided  • emotional support provided  • empathic listening provided  • questions encouraged  • questions answered  • reassurance provided  • thoughts/feelings acknowledged  Goal: Readiness for Transition of Care  Outcome: Ongoing, Progressing

## 2022-12-01 NOTE — PROGRESS NOTES
Daily progress note    Primary care physician  Dr. Arredondo    Chief complaint  Awake and alert and complaint of cough congestion but no increase shortness of breath.  Patient denies any chest pain palpitation 2.  Patient has no fever chills.    History of present illness  86-year-old white female with very complex past medical history and well-known to our service with history of diabetes hypertension hypothyroidism chronic anemia chronic kidney disease stage IV and severe dementia brought to the emergency room with complaint of generalized weakness fatigue tired and no energy and work-up at nursing home revealed hemoglobin of 6.3 admitted for management.  Patient is unable to give detailed history most of the history obtained from the chart nursing staff old record.  Patient has no black stools bloody stools nausea vomiting abdominal pain and her Hemoccult test negative in ER.  Patient is DNR per her wishes.     REVIEW OF SYSTEMS  Unremarkable except for cough congestion and generalized weakness     PHYSICAL EXAM   Blood pressure 159/64, pulse 74, temperature 98.4 °F (36.9 °C), temperature source Oral, resp. rate 20, weight 61.6 kg (135 lb 12.8 oz), SpO2 100 %, not currently breastfeeding.    GENERAL:  Awake and alert in no distress  HEENT:  Unremarkable  NECK:  Supple  CV: regular rhythm, regular rate S1-S2  RESPIRATORY: normal effort, clear to auscultation bilateral  ABDOMEN: soft, nontender nondistended bowel sounds positive  MUSCULOSKELETAL: no deformity  NEURO:  Awake and following commands and moves all 4 extremities  SKIN: warm, dry    LAB RESULTS  Lab Results (last 24 hours)     Procedure Component Value Units Date/Time    Respiratory Panel PCR w/COVID-19(SARS-CoV-2) [facility] In-House, NP Swab in UTM/VTM, 3-4 HR TAT - Swab, Nasopharynx [[id number]]  (Abnormal) Collected: 12/01/22 1216    Specimen: Swab from Nasopharynx Updated: 12/01/22 1336     ADENOVIRUS, PCR Not Detected     Coronavirus  229E Not Detected     Coronavirus HKU1 Not Detected     Coronavirus NL63 Not Detected     Coronavirus OC43 Not Detected     COVID19 Not Detected     Human Metapneumovirus Not Detected     Human Rhinovirus/Enterovirus Not Detected     Influenza A H1 2009 PCR Detected     Influenza B PCR Not Detected     Parainfluenza Virus 1 Not Detected     Parainfluenza Virus 2 Not Detected     Parainfluenza Virus 3 Not Detected     Parainfluenza Virus 4 Not Detected     RSV, PCR Not Detected     Bordetella pertussis pcr Not Detected     Bordetella parapertussis PCR Not Detected     Chlamydophila pneumoniae PCR Not Detected     Mycoplasma pneumo by PCR Not Detected    Narrative:      In the setting of a positive respiratory panel with a viral infection PLUS a negative procalcitonin without other underlying concern for bacterial infection, consider observing off antibiotics or discontinuation of antibiotics and continue supportive care. If the respiratory panel is positive for atypical bacterial infection (Bordetella pertussis, Chlamydophila pneumoniae, or Mycoplasma pneumoniae), consider antibiotic de-escalation to target atypical bacterial infection.    Basic Metabolic Panel [[id number]]  (Abnormal) Collected: 12/01/22 0723    Specimen: Blood Updated: 12/01/22 0822     Glucose 111 mg/dL      BUN 31 mg/dL      Creatinine 1.46 mg/dL      Sodium 140 mmol/L      Potassium 4.1 mmol/L      Comment: Slight hemolysis detected by analyzer. Results may be affected.        Chloride 110 mmol/L      CO2 18.2 mmol/L      Calcium 8.2 mg/dL      BUN/Creatinine Ratio 21.2     Anion Gap 11.8 mmol/L      eGFR 35.1 mL/min/1.73      Comment: National Kidney Foundation and American Society of Nephrology (ASN) Task Force recommended calculation based on the Chronic Kidney Disease Epidemiology Collaboration (CKD-EPI) equation refit without adjustment for race.       Narrative:      GFR Normal >60  Chronic Kidney Disease <60  Kidney Failure <15    The GFR  formula is only valid for adults with stable renal function between ages 18 and 70.    Troponin [[id number]]  (Normal) Collected: 12/01/22 0723    Specimen: Blood Updated: 12/01/22 0822     Troponin T 0.013 ng/mL     Narrative:      Troponin T Reference Range:  <= 0.03 ng/mL-   Negative for AMI  >0.03 ng/mL-     Abnormal for myocardial necrosis.  Clinicians would have to utilize clinical acumen, EKG, Troponin and serial changes to determine if it is an Acute Myocardial Infarction or myocardial injury due to an underlying chronic condition.       Results may be falsely decreased if patient taking Biotin.      CBC & Differential [[id number]]  (Abnormal) Collected: 12/01/22 0723    Specimen: Blood Updated: 12/01/22 0802    Narrative:      The following orders were created for panel order CBC & Differential.  Procedure                               Abnormality         Status                     ---------                               -----------         ------                     CBC Auto Differential[[id number]]        Abnormal            Final result                 Please view results for these tests on the individual orders.    CBC Auto Differential [[id number]]  (Abnormal) Collected: 12/01/22 0723    Specimen: Blood Updated: 12/01/22 0802     WBC 4.64 10*3/mm3      RBC 3.46 10*6/mm3      Hemoglobin 10.1 g/dL      Hematocrit 30.2 %      MCV 87.3 fL      MCH 29.2 pg      MCHC 33.4 g/dL      RDW 16.0 %      RDW-SD 50.5 fl      MPV 9.3 fL      Platelets 130 10*3/mm3      Neutrophil % 65.6 %      Lymphocyte % 19.8 %      Monocyte % 11.4 %      Eosinophil % 2.6 %      Basophil % 0.2 %      Immature Grans % 0.4 %      Neutrophils, Absolute 3.04 10*3/mm3      Lymphocytes, Absolute 0.92 10*3/mm3      Monocytes, Absolute 0.53 10*3/mm3      Eosinophils, Absolute 0.12 10*3/mm3      Basophils, Absolute 0.01 10*3/mm3      Immature Grans, Absolute 0.02 10*3/mm3      nRBC 0.0 /100 WBC         Imaging Results (Last 24 Hours)     ** No  results found for the last 24 hours. **          Current Facility-Administered Medications:   •  allopurinol (ZYLOPRIM) tablet 100 mg, 100 mg, Oral, Daily, Chaevz Uribe MD, 100 mg at 12/01/22 0815  •  amLODIPine (NORVASC) tablet 10 mg, 10 mg, Oral, Q24H, Chavez Uribe MD, 10 mg at 12/01/22 0815  •  arformoterol (BROVANA) nebulizer solution 15 mcg, 15 mcg, Nebulization, BID - RT, Chavez Uribe MD, 15 mcg at 12/01/22 0805  •  atorvastatin (LIPITOR) tablet 10 mg, 10 mg, Oral, Nightly, Chavez Uribe MD, 10 mg at 11/30/22 2153  •  carvedilol (COREG) tablet 25 mg, 25 mg, Oral, BID With Meals, Echo Meyer, APRN, 25 mg at 12/01/22 0815  •  cholecalciferol (VITAMIN D3) tablet 1,000 Units, 1,000 Units, Oral, Daily, Chavez Uribe MD, 1,000 Units at 12/01/22 0815  •  clomiPRAMINE (ANAFRANIL) capsule 50 mg, 50 mg, Oral, Nightly, Chavez Uribe MD  •  clonazePAM (KlonoPIN) tablet 0.5 mg, 0.5 mg, Oral, TID PRN, Chavez Uribe MD, 0.5 mg at 12/01/22 1022  •  docusate sodium (COLACE) capsule 200 mg, 200 mg, Oral, BID, Chavez Uribe MD, 200 mg at 12/01/22 0815  •  hydrALAZINE (APRESOLINE) injection 10 mg, 10 mg, Intravenous, Q4H PRN, Chavez Uribe MD, 10 mg at 11/30/22 2330  •  hydrALAZINE (APRESOLINE) tablet 100 mg, 100 mg, Oral, TID, Chavez Uribe MD, 100 mg at 12/01/22 0815  •  HYDROcodone-acetaminophen (NORCO) 5-325 MG per tablet 1 tablet, 1 tablet, Oral, Q4H PRN, Chavez Uribe MD, 1 tablet at 12/01/22 1323  •  Influenza Vac High-Dose Quad (FLUZONE HIGH DOSE) injection 0.7 mL, 0.7 mL, Intramuscular, During Hospitalization, Chavez Uribe MD  •  isosorbide mononitrate (IMDUR) 24 hr tablet 30 mg, 30 mg, Oral, Q24H, Jossie Oconnell APRN  •  labetalol (NORMODYNE,TRANDATE) injection 10 mg, 10 mg, Intravenous, Q2H PRN, Echo Meyer APRN, 10 mg at 12/01/22 0504  •  levothyroxine (SYNTHROID, LEVOTHROID) tablet 100 mcg, 100 mcg, Oral, Daily, Chavez Uribe MD, 100 mcg at 12/01/22 0815  •  losartan (COZAAR) tablet 100 mg, 100  mg, Oral, Q24H, Alysha Uribe MD, 100 mg at 12/01/22 0815  •  memantine (NAMENDA) tablet 10 mg, 10 mg, Oral, Q12H, Alysha Uribe MD, 10 mg at 12/01/22 0815  •  montelukast (SINGULAIR) tablet 10 mg, 10 mg, Oral, Nightly, Alysha Uribe MD, 10 mg at 11/30/22 2153  •  nystatin (MYCOSTATIN) 100,000 unit/mL suspension 500,000 Units, 5 mL, Swish & Swallow, 4x Daily, Alysha Uribe MD  •  pantoprazole (PROTONIX) EC tablet 40 mg, 40 mg, Oral, BID AC, Alysha Uribe MD, 40 mg at 12/01/22 0815  •  polyethylene glycol (MIRALAX) packet 17 g, 17 g, Oral, BID, Alysha Uribe MD, 17 g at 12/01/22 0815  •  primidone (MYSOLINE) tablet 50 mg, 50 mg, Oral, TID, Alysha Uribe MD, 50 mg at 12/01/22 0815  •  sennosides-docusate (PERICOLACE) 8.6-50 MG per tablet 2 tablet, 2 tablet, Oral, Nightly, Alysha Uribe MD, 2 tablet at 11/30/22 2153  •  [COMPLETED] Insert Peripheral IV, , , Once **AND** sodium chloride 0.9 % flush 10 mL, 10 mL, Intravenous, PRN, JordyRuy MD, 10 mL at 12/01/22 0816  •  tamsulosin (FLOMAX) 24 hr capsule 0.4 mg, 0.4 mg, Oral, Nightly, Alysha Uribe MD, 0.4 mg at 11/30/22 2152     ASSESSMENT  Acute influenza A infection  Symptomatic anemia s/p transfusion  Chronic anemia   Nonsustained V. tach  Paroxysmal SVT  COPD  Oral thrush  Diabetes mellitus  Hypertension   Hyperlipidemia  Hypothyroidism  Osteoarthritis  Chronic kidney disease stage III  Gastroesophageal reflux disease    PLAN  CPM  Transfuse PRN  Tamiflu for 5 days  Nystatin swish and swallow for 5 days  Hematology consult appreciated  Cardiology to follow patient  Stabilize blood pressure  Adjust nursing home medications  Stress ulcer DVT prophylaxis  Supportive care  DNR  PT/OT  Discussed with nursing staff  Follow closely further recommendation current hospital course    ALYSHA URIBE MD

## 2022-12-01 NOTE — PROGRESS NOTES
[facility]  Cardiology Progress Note    Patient Identification:  Name: Kellen Parmar  Age: 87 y.o.  Sex: female  :  1935  MRN: [id number]                 Follow Up / Chief Complaint:  34 beat run of SVT    Interval History: Beta-blocker increased yesterday.  Blood pressure elevated this a.m.  We will have nurse retake.       Subjective:   Pleasantly confused      Objective:    Past Medical History:  Past Medical History:   Diagnosis Date   • Anemia    • Asthma    • Cancer (HCC)    • Dementia (HCC)    • Diabetes mellitus (HCC)    • Disease of thyroid gland    • GERD (gastroesophageal reflux disease)    • Hypertension      Past Surgical History:  Past Surgical History:   Procedure Laterality Date   • CHOLECYSTECTOMY     • HIP INTERTROCHANTERIC NAILING Left 10/30/2022    Procedure: HIP INTERTROCHANTERIC NAILING;  Surgeon: Asael Mccabe MD;  Location: [facility];  Service: Orthopedics;  Laterality: Left;        Social History:   Social History     Tobacco Use   • Smoking status: Former     Packs/day: 0.50     Years: 15.00     Pack years: 7.50     Types: Cigarettes     Quit date:      Years since quittin.9   • Smokeless tobacco: Former   Substance Use Topics   • Alcohol use: Not Currently      Family History:  History reviewed. No pertinent family history.       Allergies:  Allergies   Allergen Reactions   • Contrast Dye Anaphylaxis   • Iodinated Diagnostic Agents Anaphylaxis   • Shrimp Nausea And Vomiting     Allergic to all sea food   • Shellfish-Derived Products Nausea And Vomiting     Scheduled Meds:  allopurinol, 100 mg, Daily  amLODIPine, 10 mg, Q24H  arformoterol, 15 mcg, BID - RT  atorvastatin, 10 mg, Nightly  carvedilol, 25 mg, BID With Meals  cholecalciferol, 1,000 Units, Daily  clomiPRAMINE, 50 mg, Nightly  docusate sodium, 200 mg, BID  hydrALAZINE, 100 mg, TID  levothyroxine, 100 mcg, Daily  losartan, 100 mg, Q24H  memantine, 10 mg, Q12H  montelukast, 10  mg, Nightly  nystatin, 5 mL, 4x Daily  pantoprazole, 40 mg, BID AC  polyethylene glycol, 17 g, BID  primidone, 50 mg, TID  sennosides-docusate, 2 tablet, Nightly  tamsulosin, 0.4 mg, Nightly            INTAKE AND OUTPUT:    Intake/Output Summary (Last 24 hours) at 12/1/2022 1225  Last data filed at 12/1/2022 0445  Gross per 24 hour   Intake --   Output 1950 ml   Net -1950 ml       Review of Systems: pleasantly confused.        Constitutional:  Temp:  [98.1 °F (36.7 °C)-98.7 °F (37.1 °C)] 98.4 °F (36.9 °C)  Heart Rate:  [65-83] 70  Resp:  [16-20] 20  BP: (161-206)/(68-95) 174/91    Physical Exam:  General:  Appears in no acute distress, resting in bed  Eyes: eom normal no conjunctival drainage  HEENT:  No JVD. Thyroid not visibly enlarged. No mucosal pallor or cyanosis  Respiratory: Respirations regular and unlabored at rest. BBS with good air entry in all fields. No crackles, rubs or wheezes auscultated  Cardiovascular: S1S2 Regular rate and rhythm. No murmur, rub or gallop. No carotid bruits. DP/PT pulses     . No pretibial pitting edema  Gastrointestinal: Abdomen soft, flat, non tender. Bowel sounds present. No hepatosplenomegaly. No ascites  Skin:   Skin warm and dry to touch. No rashes    Neuro: AAO x3 CN II-XII grossly intact  Psych: Mood and affect normal, pleasant and cooperative          I reviewed the patient's new clinical results, and personally reviewed and interpreted the patient's ECG and telemetry data from the last 24 hours    Cardiographics     Echocardiogram:   Interpretation Summary    • Left ventricular ejection fraction appears to be 56 - 60%. Left ventricular systolic function is normal. Normal left ventricular cavity size and wall thickness noted. All left ventricular wall segments contract normally. Left ventricular diastolic function is consistent with (grade I) impaired relaxation.  • Normal right ventricular cavity size and systolic function noted.  • Estimated right ventricular systolic  "pressure from tricuspid regurgitation is normal (<35 mmHg).        Previous 11/2/22        Telemetry:  SR               Lab Review   Results from last 7 days   Lab Units 12/01/22  0723   TROPONIN T ng/mL 0.013     Results from last 7 days   Lab Units 11/30/22  0728   MAGNESIUM mg/dL 1.9     Results from last 7 days   Lab Units 12/01/22  0723   SODIUM mmol/L 140   POTASSIUM mmol/L 4.1   BUN mg/dL 31*   CREATININE mg/dL 1.46*   CALCIUM mg/dL 8.2*        Results from last 7 days   Lab Units 12/01/22  0723 11/30/22  0728 11/29/22  0500   WBC 10*3/mm3 4.64 4.54 5.18   HEMOGLOBIN g/dL 10.1* 11.0* 6.7*   HEMATOCRIT % 30.2* 32.5* 20.5*   PLATELETS 10*3/mm3 130* 123* 138*     Results from last 7 days   Lab Units 11/28/22  1738   INR  1.04     The following medical decision was discussed in detail with Dr. Solitario      Assessment/plan:  Symptomatic anemia  Chronic anemia  Hypertension  Hyperlipidemia  Dementia  Paroxysmal SVT  CKD creatinine 1.46.  Baseline between 1.5 and 1.7    SR on the monitor without any events overnight.  Blood pressure remains elevated.  Carvedilol was increased yesterday.  Discussed with nurse and she states patient was given both PRN blood pressure medications at night.  I will add Imdur 30 mg as a nighttime dose.          )12/1/2022  ANDREA Oates/Transcription:   \"Dictated utilizing Dragon dictation\".     "

## 2022-12-01 NOTE — PROGRESS NOTES
REASON FOR FOLLOW-UP:  anemia      INTERVAL HISTORY:  Hemoglobin reported that 10.1 today after transfusion 2 units PRBCs 11/29/2022.  CBC otherwise fairly unremarkable except platelets 130.  Ferritin is 458, iron sat 27% with TIBC 179, B12 632 and folate 5.37.  She is disoriented- thinks she is on a train    HISTORY OF PRESENT ILLNESS:   This is a 89-year-old woman with dementia, diabetes, hypertension, gastric reflux disease and asthma who was recently discharged on 11/3/2022 after a left intertrochanteric hip fracture which was treated with an intertrochanteric nail.  She was treated for urinary tract infection with antibiotics.     The patient was sent to the ER from her nursing center secondary to lab work showing hemoglobin of 6.3.  Hematology consulted for anemia.  Reviewing the patient's labs, she appears to have a chronic anemia with varying degrees of severity since at least 2019.  She was admitted in August of this year with acute kidney injury and anemia at which time the patient had a positive stool occult blood but the family did not want any further work-up including endoscopy.  Her hemoglobin during that hospital stay looks to have run in the 7-8 range.  At the time of her hip surgery in early November her hemoglobin was 8.7 and dropped to 6.2 after surgery for which she was transfused packed red blood cells.  She was discharged on 11/3/2022 with a hemoglobin of 10.8.  On presentation here her hemoglobin is 7.4 and now down to 6.7.  Her white blood cell count and differential and platelet count are both normal to mild thrombocytopenia.  She is noted to have chronic kidney disease with serum creatinine baseline running between 1.5-1.7.     Patient is quite somnolent in really cannot provide me any additional history at present.         Past Medical History, Past Surgical History, Social History, Family History have been reviewed and are without significant changes except as mentioned.    Review of  Systems   Unreliable secondary to dementia    Medications:  The current medication list was reviewed in the EMR    ALLERGIES:    Allergies   Allergen Reactions   • Contrast Dye Anaphylaxis   • Iodinated Diagnostic Agents Anaphylaxis   • Shrimp Nausea And Vomiting     Allergic to all sea food   • Shellfish-Derived Products Nausea And Vomiting       Objective      Vitals:    12/01/22 0815   BP: 170/72   Pulse: 72   Resp:    Temp:    SpO2:           Physical Exam    CONSTITUTIONAL: Somewhat chronic ill-appearing elderly woman  HEENT: no icterus, no thrush, moist membranes, poor dentition  GI: soft, non-tender, no splenomegaly, +bs  MUSC: no edema  NEURO: moderate global weakness.  She is awake and alert but disoriented  PSYCH: Cannot assess  Skin: Scattered ecchymoses  RECENT LABS:  Hematology Results from last 7 days   Lab Units 12/01/22  0723 11/30/22  0728 11/29/22  0500   WBC 10*3/mm3 4.64 4.54 5.18   HEMOGLOBIN g/dL 10.1* 11.0* 6.7*   HEMATOCRIT % 30.2* 32.5* 20.5*   PLATELETS 10*3/mm3 130* 123* 138*     2  Lab Results   Component Value Date    GLUCOSE 111 (H) 12/01/2022    BUN 31 (H) 12/01/2022    CREATININE 1.46 (H) 12/01/2022    EGFRIFNONA 27 (L) 01/17/2022    BCR 21.2 12/01/2022    CO2 18.2 (L) 12/01/2022    CALCIUM 8.2 (L) 12/01/2022    ALBUMIN 2.90 (L) 11/30/2022    LABIL2 1.0 (L) 07/29/2021    AST 18 11/30/2022    ALT 8 11/30/2022       Lab Results   Component Value Date    IRON 49 11/30/2022    TIBC 179 (L) 11/30/2022    FERRITIN 458.00 (H) 11/30/2022       Lab Results   Component Value Date    CIOAKVCR94 632 11/30/2022       Lab Results   Component Value Date    FOLATE 5.37 11/30/2022          Assessment & Plan   *Acute on chronic anemia, hypoproliferative  • Patient appears to have a long history of chronic anemia varying degrees of severity but has required transfusion support on at least 3 occasions over the past 1 year, most recently after hip surgery earlier this month with a discharge hemoglobin  11.3 22 of -10.8  • Red cells are normocytic normochromic, white cell count and differential normal, platelets mildly low 138  • Hemoccult has been positive in the past- family declined endoscopy  • The patient has stage IV CKD with presumed decreased erythropoietin production  • Hemoglobin  6.7, no obvious acute blood loss-transfused 2 units PRBCs on 11/29/2022  • No deficiencies of B12, folate or iron- reticulocyte count low, ferritin elevated/iron sat 27% with low TIBC consistent with chronic disease     *CKD 3/4-Baseline creatinine 1.5-1.7     *Comorbidities-dementia, diabetes mellitus, hypertension, hyperlipidemia, hypothyroidism, GERD        Hematology plan/recommendations:  1. Agree with transfusion of PRBCs as needed  2. Would recommend Procrit 20,000 units weekly for hemoglobin less than 10.0 at her nursing home at discharge if can be given per insurance; if not would recommend every 2-week CBC and transfusion of PRBCs for hemoglobin less than 8.0 or symptomatic anemia  3. Underlying bone marrow disorder such as MDS possible but given the patient's dementia and age I do not think a bone marrow exam is in order here  4. Please call if further needed during the hospital stay.                  12/1/2022      CC:

## 2022-12-02 NOTE — NURSING NOTE
"BP remains elevated overnight, PRN Labetalol administered. Patient occasionally anxious, treated once with PRN medication, sat by bedside and talked with patient to keep calm. Frequently calls out \"help me, please God, help me\".  "

## 2022-12-02 NOTE — THERAPY TREATMENT NOTE
Patient Name: Kellen Parmar  : 1935    MRN: [id number]                              Today's Date: 2022       Admit Date: 2022    Visit Dx:     ICD-10-CM ICD-9-CM   1. Symptomatic anemia  D64.9 285.9   2. Uncontrolled hypertension  I10 401.9   3. CKD (chronic kidney disease) stage 4, GFR 15-29 ml/min ([facility])  N18.4 585.4   4. Dementia without behavioral disturbance ([facility])  F03.90 294.20     Patient Active Problem List   Diagnosis   • Clostridium difficile colitis   • HTN (hypertension)   • CKD (chronic kidney disease) stage 4, GFR 15-29 ml/min ([facility])   • JEANIE (acute kidney injury) ([facility])   • Hyponatremia   • Hypokalemia   • Anemia   • Leukocytosis   • Acute metabolic encephalopathy   • Dementia without behavioral disturbance ([facility])   • Carotid stenosis, bilateral   • Diarrhea   • Coronary artery disease involving native coronary artery of native heart without angina pectoris   • Hypothyroidism (acquired)   • Shortness of breath   • JEANIE (acute kidney injury) ([facility])   • Closed intertrochanteric fracture of hip, left, initial encounter ([facility])   • Symptomatic anemia     Past Medical History:   Diagnosis Date   • Anemia    • Asthma    • Cancer (HCC)    • Dementia ([facility])    • Diabetes mellitus ([facility])    • Disease of thyroid gland    • GERD (gastroesophageal reflux disease)    • Hypertension      Past Surgical History:   Procedure Laterality Date   • CHOLECYSTECTOMY     • HIP INTERTROCHANTERIC NAILING Left 10/30/2022    Procedure: HIP INTERTROCHANTERIC NAILING;  Surgeon: Asael Mccabe MD;  Location: [facility];  Service: Orthopedics;  Laterality: Left;      General Information     Row Name 22 1349          Physical Therapy Time and Intention    Document Type therapy note (daily note)  -EM     Mode of Treatment individual therapy;physical therapy  -EM     Row Name 22 1349          General Information    Existing Precautions/Restrictions fall  -EM           User Key  (r) = Recorded By,  (t) = Taken By, (c) = Cosigned By    Initials Name Provider Type    Jossie Ferrara PT Physical Therapist               Mobility     Row Name 12/02/22 1348          Bed Mobility    Supine-Sit [place] (Bed Mobility) maximum assist (25% patient effort);2 person assist;verbal cues  -EM     Sit-Supine [place] (Bed Mobility) maximum assist (25% patient effort);2 person assist  -EM     Assistive Device (Bed Mobility) head of bed elevated;draw sheet  -EM     Row Name 12/02/22 1349          Sit-Stand Transfer    Sit-Stand [place] (Transfers) maximum assist (25% patient effort);2 person assist  -EM     Assistive Device (Sit-Stand Transfers) walker, front-wheeled  -EM     Comment, (Sit-Stand Transfer) patient able to stand at the bedside, cues to widen MARGARITA, decreased weight shifting, difficulty advancing feet  -EM           User Key  (r) = Recorded By, (t) = Taken By, (c) = Cosigned By    Initials Name Provider Type    Jossie Ferrara PT Physical Therapist               Obj/Interventions     Row Name 12/02/22 1356          Motor Skills    Therapeutic Exercise other (see comments)  AP, heel slides, LAQ x 10 (with assist and encouragement)  -EM           User Key  (r) = Recorded By, (t) = Taken By, (c) = Cosigned By    Initials Name Provider Type    Jossie Ferrara PT Physical Therapist               Goals/Plan    No documentation.                Clinical Impression     Row Name 12/02/22 1352          Pain    Pain Location - Side/Orientation Left  -EM     Pain Location lower  -EM     Pain Location - extremity  -EM     Pre/Posttreatment Pain Comment pt states her LLE hurts but cannot rate on numeric scale when questioned  -EM     Pain Intervention(s) Repositioned  -EM     Row Name 12/02/22 1352          Plan of Care Review    Plan of Care Reviewed With patient  -EM     Outcome Evaluation Patient supine in bed, sleepy but arousable, maximal encouragement to participate in therapy as  "patient states \"I just want to go to bed\". Patient up to EOB with maxAx2, sit to stand with maxAx2 with use of rwx, able to stand fully upright but decreased weight shifting and unable to take any steps. Anticipate patient will return to NH at d/c.  -EM     Row Name 12/02/22 1663          Positioning and Restraints    Pre-Treatment Position in bed  -EM     Post Treatment Position bed  -EM     In Bed supine;call light within reach;exit alarm on  -EM           User Key  (r) = Recorded By, (t) = Taken By, (c) = Cosigned By    Initials Name Provider Type    Jossie Ferrara PT Physical Therapist               Outcome Measures     Row Name 12/02/22 7712          How much help from another person do you currently need...    Turning from your back to your side while in flat bed without using bedrails? 2  -EM     Moving from lying on back to sitting on the side of a flat bed without bedrails? 2  -EM     Moving to and from a bed to a chair (including a wheelchair)? 1  -EM     Standing up from a chair using your arms (e.g., wheelchair, bedside chair)? 2  -EM     Climbing 3-5 steps with a railing? 1  -EM     To walk in hospital room? 1  -EM     AM-PAC 6 Clicks Score (PT) 9  -EM     Highest level of mobility 3 --> Sat at edge of bed  -EM           User Key  (r) = Recorded By, (t) = Taken By, (c) = Cosigned By    Initials Name Provider Type    Jossie Ferrara PT Physical Therapist                             Physical Therapy Education     Title: PT OT SLP Therapies (In Progress)     Topic: Physical Therapy (In Progress)     Point: Mobility training (In Progress)     Learning Progress Summary           Patient Acceptance, E, NR by EM at 12/2/2022 1353    Acceptance, E, NR by EM at 11/30/2022 1355                   Point: Home exercise program (Not Started)     Learner Progress:  Not documented in this visit.          Point: Body mechanics (Not Started)     Learner Progress:  Not documented in this visit.          " "Point: Precautions (Not Started)     Learner Progress:  Not documented in this visit.                      User Key     Initials Effective Dates Name Provider Type Discipline     06/16/21 -  Jossie Vizcarra PT Physical Therapist PT              PT Recommendation and Plan  Planned Therapy Interventions (PT): bed mobility training, gait training, home exercise program, patient/family education, transfer training  Plan of Care Reviewed With: patient  Outcome Evaluation: Patient supine in bed, sleepy but arousable, maximal encouragement to participate in therapy as patient states \"I just want to go to bed\". Patient up to EOB with maxAx2, sit to stand with maxAx2 with use of rwx, able to stand fully upright but decreased weight shifting and unable to take any steps. Anticipate patient will return to NH at d/c.     Time Calculation:    PT Charges     Row Name 12/02/22 1353             Time Calculation    Start Time 1121  -EM      Stop Time 1135  -EM      Time Calculation (min) 14 min  -EM      PT Received On 12/02/22  -EM      PT - Next Appointment 12/05/22  -EM         Time Calculation- PT    Total Timed Code Minutes- PT 14 minute(s)  -EM         Timed Charges    69998 - PT Therapeutic Exercise Minutes 5  -EM      71491 - PT Therapeutic Activity Minutes 9  -EM         Total Minutes    Timed Charges Total Minutes 14  -EM       Total Minutes 14  -EM            User Key  (r) = Recorded By, (t) = Taken By, (c) = Cosigned By    Initials Name Provider Type    EM Jossie Vizcarra PT Physical Therapist              Therapy Charges for Today     Code Description Service Date Service Provider Modifiers Qty    80454934245 HC PT THERAPEUTIC ACT EA 15 MIN 12/2/2022 Jossie Vizcarra, PT GP 1    43242350974 HC PT THER SUPP EA 15 MIN 12/2/2022 Jossie Vizcarra, PT GP 1          PT G-Codes  Outcome Measure Options: AM-PAC 6 Clicks Basic Mobility (PT)  AM-PAC 6 Clicks Score (PT): 9  AM-PAC 6 Clicks Score (OT): 9  PT " Discharge Summary  Anticipated Discharge Disposition (PT): skilled nursing facility    Jossie Vizcarra, PT  12/2/2022

## 2022-12-02 NOTE — PROGRESS NOTES
[facility]  Cardiology Progress Note    Patient Identification:  Name: Kellen Parmar  Age: 87 y.o.  Sex: female  :  1935  MRN: [id number]                 Follow Up / Chief Complaint:  34 beat run of SVT    Interval History: Blood pressure still elevated.  Will increase Imdur today.  Monitor.     Subjective: Confused         Objective:    Past Medical History:  Past Medical History:   Diagnosis Date   • Anemia    • Asthma    • Cancer (HCC)    • Dementia (HCC)    • Diabetes mellitus (HCC)    • Disease of thyroid gland    • GERD (gastroesophageal reflux disease)    • Hypertension      Past Surgical History:  Past Surgical History:   Procedure Laterality Date   • CHOLECYSTECTOMY     • HIP INTERTROCHANTERIC NAILING Left 10/30/2022    Procedure: HIP INTERTROCHANTERIC NAILING;  Surgeon: Asael Mccabe MD;  Location: [facility];  Service: Orthopedics;  Laterality: Left;        Social History:   Social History     Tobacco Use   • Smoking status: Former     Packs/day: 0.50     Years: 15.00     Pack years: 7.50     Types: Cigarettes     Quit date:      Years since quittin.9   • Smokeless tobacco: Former   Substance Use Topics   • Alcohol use: Not Currently      Family History:  History reviewed. No pertinent family history.       Allergies:  Allergies   Allergen Reactions   • Contrast Dye Anaphylaxis   • Iodinated Diagnostic Agents Anaphylaxis   • Shrimp Nausea And Vomiting     Allergic to all sea food   • Shellfish-Derived Products Nausea And Vomiting     Scheduled Meds:  allopurinol, 100 mg, Daily  amLODIPine, 10 mg, Q24H  arformoterol, 15 mcg, BID - RT  atorvastatin, 10 mg, Nightly  carvedilol, 25 mg, BID With Meals  cetirizine, 5 mg, Daily  cholecalciferol, 1,000 Units, Daily  clomiPRAMINE, 50 mg, Nightly  docusate sodium, 200 mg, BID  famotidine, 20 mg, BID  hydrALAZINE, 100 mg, TID  isosorbide mononitrate, 30 mg, Q24H  levothyroxine, 100 mcg, Daily  losartan, 100 mg,  Q24H  memantine, 10 mg, Q12H  montelukast, 10 mg, Nightly  nystatin, 5 mL, 4x Daily  oseltamivir, 30 mg, Q24H  polyethylene glycol, 17 g, BID  primidone, 50 mg, TID  sennosides-docusate, 2 tablet, Nightly  sodium bicarbonate, 1,300 mg, TID  tamsulosin, 0.4 mg, Nightly            INTAKE AND OUTPUT:    Intake/Output Summary (Last 24 hours) at 12/2/2022 1233  Last data filed at 12/2/2022 0800  Gross per 24 hour   Intake 300 ml   Output 1250 ml   Net -950 ml       Review of Systems: Confused    Constitutional:  Temp:  [98.4 °F (36.9 °C)-98.7 °F (37.1 °C)] 98.5 °F (36.9 °C)  Heart Rate:  [64-79] 70  Resp:  [16-20] 18  BP: (138-203)/(56-82) 170/64    Physical Exam:  General:  Appears in no acute distress, resting in bed  Eyes: eom normal no conjunctival drainage  HEENT:  No JVD. Thyroid not visibly enlarged. No mucosal pallor or cyanosis  Respiratory: Respirations regular and unlabored at rest. BBS with good air entry in all fields. No crackles, rubs or wheezes auscultated  Cardiovascular: S1S2 Regular rate and rhythm. No murmur, rub or gallop. No carotid bruits. DP/PT pulses     . No pretibial pitting edema  Gastrointestinal: Abdomen soft, flat, non tender. Bowel sounds present. No hepatosplenomegaly. No ascites  Skin:   Skin warm and dry to touch. No rashes    Neuro: AAO x3 CN II-XII grossly intact  Psych: Mood and affect normal, pleasant and cooperative          I reviewed the patient's new clinical results, and personally reviewed and interpreted the patient's ECG and telemetry data from the last 24 hours    Cardiographics     Echocardiogram:   Interpretation Summary    • Left ventricular ejection fraction appears to be 56 - 60%. Left ventricular systolic function is normal. Normal left ventricular cavity size and wall thickness noted. All left ventricular wall segments contract normally. Left ventricular diastolic function is consistent with (grade I) impaired relaxation.  • Normal right ventricular cavity size and  "systolic function noted.  • Estimated right ventricular systolic pressure from tricuspid regurgitation is normal (<35 mmHg).        Previous 11/2/22        Telemetry:  SR               Lab Review   Results from last 7 days   Lab Units 12/01/22  0723   TROPONIN T ng/mL 0.013     Results from last 7 days   Lab Units 11/30/22  0728   MAGNESIUM mg/dL 1.9     Results from last 7 days   Lab Units 12/02/22  0751   SODIUM mmol/L 139   POTASSIUM mmol/L 4.4   BUN mg/dL 27*   CREATININE mg/dL 1.51*   CALCIUM mg/dL 8.1*        Results from last 7 days   Lab Units 12/02/22  0751 12/01/22  0723 11/30/22  0728   WBC 10*3/mm3 4.13 4.64 4.54   HEMOGLOBIN g/dL 8.6* 10.1* 11.0*   HEMATOCRIT % 27.8* 30.2* 32.5*   PLATELETS 10*3/mm3 134* 130* 123*     Results from last 7 days   Lab Units 11/28/22  1738   INR  1.04     The following medical decision was discussed in detail with Dr. Solitario      Assessment/plan:  Symptomatic anemia  Chronic anemia  Hypertension: Monitor  Hyperlipidemia: ALT/AST WNL.  On statin  Dementia  Paroxysmal SVT  CKD creatinine 1.51.  Baseline between 1.5 and 1.7       SR on the monitor without any events overnight.  Blood pressure has improved some.  We will continue current management but may need to make adjustments tomorrow.  Discussed with nurse.        )12/2/2022  ANDREA Oateson/Transcription:   \"Dictated utilizing Dragon dictation\".     "

## 2022-12-02 NOTE — PLAN OF CARE
Goal Outcome Evaluation:   Pt confused, alert and oriented x 2. VSS, no distress or pain at this time.  PRN medications norco and klonopin admin and pt more comfortable and restful.  F/C until more mobile, CL modified diet, + Flu A.  Will cont to monitor for changes.

## 2022-12-02 NOTE — PROGRESS NOTES
Nutrition Services    Patient Name:  Kellen Parmar  YOB: 1935  MRN: [id number]  Admit Date:  11/28/2022      CLINICAL NUTRITION ASSESSMENT     Encounter Information         Reason for Encounter Clear Liquid Diet x 5 days    Admitting Diagnosis CKD, uncontrolled HTN, dementia    Pertinent Medical History DM, GERD, HTN, asthma, chancer, severe dementia    Current Issues Acute Influenza A- on Tamiflu, symptomatic anemia s/p transfusion, oral thrush- nystatin swish/swallow.  RECOMMEND:  1. Advance diet as tolerates per SLP recommendations once able.      Current Nutrition Orders & Evaluation of Intake       Oral Nutrition     Current PO Diet Diet: Liquid Diets; Clear Liquid; Feeding Assistance - Nursing; Texture: Regular Texture (IDDSI 7); Fluid Consistency: Nectar Thick   Supplement    PO Evaluation     Trending % PO Intake    --  Anthropometrics          Height    Weight    Weight: 61.6 kg (135 lb 12.8 oz) (12/01/22 1030)    BMI kg/m2 Body mass index is 23.31 kg/m².    Weight Trend/Change Stable    Weight History  Wt Readings from Last 15 Encounters:   12/01/22 1030 61.6 kg (135 lb 12.8 oz)   10/30/22 0044 59.4 kg (131 lb)   08/12/22 0438 59.5 kg (131 lb 1.6 oz)   08/11/22 0701 58.8 kg (129 lb 10.1 oz)   08/10/22 0550 58.5 kg (129 lb)   08/08/22 0500 57.4 kg (126 lb 9.6 oz)   08/07/22 1809 59.9 kg (132 lb)   08/07/22 0500 59.9 kg (132 lb 1.6 oz)   08/06/22 1659 65.8 kg (145 lb)   05/16/22 0807 51.1 kg (112 lb 10.5 oz)   03/24/22 0500 51.1 kg (112 lb 9.6 oz)   03/23/22 0320 53.1 kg (117 lb)   03/22/22 0544 62.1 kg (137 lb)   03/21/22 2357 61.5 kg (135 lb 9.3 oz)   01/17/22 1201 56.7 kg (125 lb)   10/28/21 1137 52.2 kg (115 lb)   09/22/21 2133 52.5 kg (115 lb 11.2 oz)   09/22/21 1656 49.9 kg (110 lb)   07/15/21 0457 57.4 kg (126 lb 9.6 oz)      --  Labs        Pertinent Labs Reviewed, listed below     Results from last 7 days   Lab Units 12/02/22  0751 12/01/22  0723 11/30/22  0728 11/29/22  0509  11/28/22  1738   SODIUM mmol/L 139 140 137   < > 138   POTASSIUM mmol/L 4.4 4.1 4.2   < > 4.3   CHLORIDE mmol/L 111* 110* 109*   < > 109*   CO2 mmol/L 14.2* 18.2* 17.0*   < > 19.0*   BUN mg/dL 27* 31* 32*   < > 35*   CREATININE mg/dL 1.51* 1.46* 1.47*   < > 1.71*   CALCIUM mg/dL 8.1* 8.2* 8.0*   < > 8.3*   BILIRUBIN mg/dL  --   --  0.2  --  <0.2   ALK PHOS U/L  --   --  127*  --  144*   ALT (SGPT) U/L  --   --  8  --  9   AST (SGOT) U/L  --   --  18  --  17   GLUCOSE mg/dL 95 111* 89   < > 109*    < > = values in this interval not displayed.     Results from last 7 days   Lab Units 12/02/22  0751 12/01/22 0723 11/30/22  0728   MAGNESIUM mg/dL  --   --  1.9   HEMOGLOBIN g/dL 8.6*   < > 11.0*   HEMATOCRIT % 27.8*   < > 32.5*   WBC 10*3/mm3 4.13   < > 4.54   TRIGLYCERIDES mg/dL  --   --  148   ALBUMIN g/dL  --   --  2.90*    < > = values in this interval not displayed.     Results from last 7 days   Lab Units 12/02/22  0751 12/01/22  0723 11/30/22  0728 11/29/22  0500 11/28/22  1846 11/28/22  1738   INR   --   --   --   --   --  1.04   PLATELETS 10*3/mm3 134* 130* 123* 138* 158  --      COVID19   Date Value Ref Range Status   12/01/2022 Not Detected Not Detected - Ref. Range Final     Lab Results   Component Value Date    HGBA1C 5.40 11/30/2022          Medications            Scheduled Medications allopurinol, 100 mg, Oral, Daily  amLODIPine, 10 mg, Oral, Q24H  arformoterol, 15 mcg, Nebulization, BID - RT  atorvastatin, 10 mg, Oral, Nightly  carvedilol, 25 mg, Oral, BID With Meals  cetirizine, 5 mg, Oral, Daily  cholecalciferol, 1,000 Units, Oral, Daily  clomiPRAMINE, 50 mg, Oral, Nightly  docusate sodium, 200 mg, Oral, BID  famotidine, 20 mg, Oral, BID  hydrALAZINE, 100 mg, Oral, TID  isosorbide mononitrate, 30 mg, Oral, Q24H  levothyroxine, 100 mcg, Oral, Daily  losartan, 100 mg, Oral, Q24H  memantine, 10 mg, Oral, Q12H  montelukast, 10 mg, Oral, Nightly  nystatin, 5 mL, Swish & Swallow, 4x Daily  oseltamivir, 30  mg, Oral, Q24H  polyethylene glycol, 17 g, Oral, BID  primidone, 50 mg, Oral, TID  sennosides-docusate, 2 tablet, Oral, Nightly  sodium bicarbonate, 1,300 mg, Oral, TID  tamsulosin, 0.4 mg, Oral, Nightly        Infusions Pharmacy to Dose Oseltamivir (TAMIFLU),         PRN Medications •  clonazePAM  •  HYDROcodone-acetaminophen  •  influenza vaccine  •  labetalol  •  Pharmacy to Dose Oseltamivir (TAMIFLU)  •  [COMPLETED] Insert Peripheral IV **AND** sodium chloride     Physical Findings         Skin, GI, Oral, LDA Pleasantly confused, teeth missing, UE/LE edema 2+, last BM 12/1    NFPE Not applicable at this time   --  PES STATEMENT / NUTRITION DIAGNOSIS      Nutrition Dx Problem  Problem: Inadequate Intake/Infusion  Etiology: MNT for Treatment/Condition  Signs/Symptoms: Clear Liquid Diet    Comment:      NUTRITION INTERVENTION / PLAN OF CARE  Intervention Goal        Intervention Goal(s) Maintain nutrition status, Meet estimated needs, Disease management/therapy, Tolerate PO , Advance diet and No significant weight loss     Nutrition Intervention        RD Action Follow Tx Progress, Care plan reviewed and Recommend/ordered:      Nutrition Prescription          Diet  Advance diet as tolerates per SLP recs once able    Supplement/Snack    EN/PN      Prescription Ordered No, recommended     Monitor/Evaluation        Monitor Per protocol   Discharge Needs Pending clinical course   Education Education not appropriate at this time     RD to follow up per protocol.    Electronically signed by:  Leatha Camarillo RD  12/02/22 18:21 EST

## 2022-12-02 NOTE — PROGRESS NOTES
Enter Query Response Below      Query Response:     Chronic kidney disease stage III Electronically signed by Chavez Uribe MD, 22, 11:03 AM EST.           If applicable, please update the problem list.         Patient: Kellen Parmar        : 1935  Account: [id number]           Admit Date: 22        How to Respond to this query:       a. Click New Note     b. Answer query within the yellow box.                c. Update the Problem List, if applicable.      If you have any questions about this query contact me at: [email or web address]     Dr. Uribe,  Patient has CKD 3 , then CKD 4 also is documented on some progress notes. Baseline creatine is noted between 1.5-1.7.   Creatinine has been stable with levels during this admission as follows:   -1.71  -1.47  -1.46  -1.51    Please clarify the stage of the patients CKD.    KDIGO CKD staging [email or web address]  Stage 1    GFR > 90  Stage 2    GFR 60-89  Stage 3a  GFR 45-59  Stage 3b  GFR 30-44  Stage 4    GFR 15-29  Stage 5    GFR <15  By submitting this query, we are merely seeking further clarification of documentation to accurately reflect all conditions that you are monitoring, evaluating, treating or that extend the hospitalization or utilize additional resources of care. Please utilize your independent clinical judgment when addressing the question(s) above.     This query and your response, once completed, will be entered into the legal medical record.    Sincerely,  Inessa Issa  Clinical Documentation Integrity Program

## 2022-12-02 NOTE — PLAN OF CARE
"Goal Outcome Evaluation:  Plan of Care Reviewed With: patient           Outcome Evaluation: Patient supine in bed, sleepy but arousable, maximal encouragement to participate in therapy as patient states \"I just want to go to bed\". Patient up to EOB with maxAx2, sit to stand with maxAx2 with use of rwx, able to stand fully upright but decreased weight shifting and unable to take any steps. Anticipate patient will return to NH at d/c.  "

## 2022-12-02 NOTE — PROGRESS NOTES
Daily progress note    Primary care physician  Dr. Arredondo    Chief complaint  Doing same with no new complaint still with cough congestion and weakness.  Patient denies chest pain shortness of breath.    History of present illness  86-year-old white female with very complex past medical history and well-known to our service with history of diabetes hypertension hypothyroidism chronic anemia chronic kidney disease stage IV and severe dementia brought to the emergency room with complaint of generalized weakness fatigue tired and no energy and work-up at nursing home revealed hemoglobin of 6.3 admitted for management.  Patient is unable to give detailed history most of the history obtained from the chart nursing staff old record.  Patient has no black stools bloody stools nausea vomiting abdominal pain and her Hemoccult test negative in ER.  Patient is DNR per her wishes.     REVIEW OF SYSTEMS  Unremarkable except for cough congestion and generalized weakness     PHYSICAL EXAM   Blood pressure 170/64, pulse 70, temperature 98.5 °F (36.9 °C), temperature source Oral, resp. rate 18, weight 61.6 kg (135 lb 12.8 oz), SpO2 99 %, not currently breastfeeding.    GENERAL:  Awake and alert in no distress  HEENT:  Unremarkable  NECK:  Supple  CV: regular rhythm, regular rate S1-S2  RESPIRATORY: normal effort, clear to auscultation bilateral  ABDOMEN: soft, nontender nondistended bowel sounds positive  MUSCULOSKELETAL: no deformity  NEURO:  Awake and following commands and moves all 4 extremities  SKIN: warm, dry    LAB RESULTS  Lab Results (last 24 hours)     Procedure Component Value Units Date/Time    Basic Metabolic Panel [[id number]]  (Abnormal) Collected: 12/02/22 0751    Specimen: Blood Updated: 12/02/22 0927     Glucose 95 mg/dL      BUN 27 mg/dL      Creatinine 1.51 mg/dL      Sodium 139 mmol/L      Potassium 4.4 mmol/L      Comment: Slight hemolysis detected by analyzer. Results may be affected.        Chloride 111  mmol/L      CO2 14.2 mmol/L      Calcium 8.1 mg/dL      BUN/Creatinine Ratio 17.9     Anion Gap 13.8 mmol/L      eGFR 33.7 mL/min/1.73      Comment: National Kidney Foundation and American Society of Nephrology (ASN) Task Force recommended calculation based on the Chronic Kidney Disease Epidemiology Collaboration (CKD-EPI) equation refit without adjustment for race.       Narrative:      GFR Normal >60  Chronic Kidney Disease <60  Kidney Failure <15    The GFR formula is only valid for adults with stable renal function between ages 18 and 70.    CBC & Differential [[id number]]  (Abnormal) Collected: 12/02/22 0751    Specimen: Blood Updated: 12/02/22 0829    Narrative:      The following orders were created for panel order CBC & Differential.  Procedure                               Abnormality         Status                     ---------                               -----------         ------                     CBC Auto Differential[[id number]]        Abnormal            Final result                 Please view results for these tests on the individual orders.    CBC Auto Differential [[id number]]  (Abnormal) Collected: 12/02/22 0751    Specimen: Blood Updated: 12/02/22 0829     WBC 4.13 10*3/mm3      RBC 2.95 10*6/mm3      Hemoglobin 8.6 g/dL      Hematocrit 27.8 %      MCV 94.2 fL      MCH 29.2 pg      MCHC 30.9 g/dL      RDW 15.8 %      RDW-SD 55.5 fl      MPV 9.9 fL      Platelets 134 10*3/mm3      Neutrophil % 56.7 %      Lymphocyte % 26.9 %      Monocyte % 13.3 %      Eosinophil % 2.4 %      Basophil % 0.2 %      Immature Grans % 0.5 %      Neutrophils, Absolute 2.34 10*3/mm3      Lymphocytes, Absolute 1.11 10*3/mm3      Monocytes, Absolute 0.55 10*3/mm3      Eosinophils, Absolute 0.10 10*3/mm3      Basophils, Absolute 0.01 10*3/mm3      Immature Grans, Absolute 0.02 10*3/mm3      nRBC 0.0 /100 WBC     Respiratory Panel PCR w/COVID-19(SARS-CoV-2) MIMI/CHERRY/SANDRITA/PAD/[facility]/MAD/[facility] In-House, NP Swab in UTM/VTM, 3-4 HR  TAT - Swab, Nasopharynx [[id number]]  (Abnormal) Collected: 12/01/22 1216    Specimen: Swab from Nasopharynx Updated: 12/01/22 1336     ADENOVIRUS, PCR Not Detected     Coronavirus 229E Not Detected     Coronavirus HKU1 Not Detected     Coronavirus NL63 Not Detected     Coronavirus OC43 Not Detected     COVID19 Not Detected     Human Metapneumovirus Not Detected     Human Rhinovirus/Enterovirus Not Detected     Influenza A H1 2009 PCR Detected     Influenza B PCR Not Detected     Parainfluenza Virus 1 Not Detected     Parainfluenza Virus 2 Not Detected     Parainfluenza Virus 3 Not Detected     Parainfluenza Virus 4 Not Detected     RSV, PCR Not Detected     Bordetella pertussis pcr Not Detected     Bordetella parapertussis PCR Not Detected     Chlamydophila pneumoniae PCR Not Detected     Mycoplasma pneumo by PCR Not Detected    Narrative:      In the setting of a positive respiratory panel with a viral infection PLUS a negative procalcitonin without other underlying concern for bacterial infection, consider observing off antibiotics or discontinuation of antibiotics and continue supportive care. If the respiratory panel is positive for atypical bacterial infection (Bordetella pertussis, Chlamydophila pneumoniae, or Mycoplasma pneumoniae), consider antibiotic de-escalation to target atypical bacterial infection.        Imaging Results (Last 24 Hours)     ** No results found for the last 24 hours. **          Current Facility-Administered Medications:   •  allopurinol (ZYLOPRIM) tablet 100 mg, 100 mg, Oral, Daily, Chavez Uribe MD, 100 mg at 12/02/22 0814  •  amLODIPine (NORVASC) tablet 10 mg, 10 mg, Oral, Q24H, Chavez rUibe MD, 10 mg at 12/02/22 0813  •  arformoterol (BROVANA) nebulizer solution 15 mcg, 15 mcg, Nebulization, BID - RT, Chavez Uribe MD, 15 mcg at 12/02/22 0752  •  atorvastatin (LIPITOR) tablet 10 mg, 10 mg, Oral, Nightly, Chavez Uribe MD, 10 mg at 12/01/22 2010  •  carvedilol (COREG) tablet 25 mg,  25 mg, Oral, BID With Meals, Echo Meyer APRN, 25 mg at 12/02/22 0814  •  cetirizine (zyrTEC) tablet 5 mg, 5 mg, Oral, Daily, Chavez Uribe MD, 5 mg at 12/02/22 0813  •  cholecalciferol (VITAMIN D3) tablet 1,000 Units, 1,000 Units, Oral, Daily, Chavez Uribe MD, 1,000 Units at 12/02/22 0814  •  clomiPRAMINE (ANAFRANIL) capsule 50 mg, 50 mg, Oral, Nightly, Chavez Uribe MD  •  clonazePAM (KlonoPIN) tablet 0.5 mg, 0.5 mg, Oral, TID PRN, Chavez Uribe MD, 0.5 mg at 12/02/22 1054  •  docusate sodium (COLACE) capsule 200 mg, 200 mg, Oral, BID, Chavez Uribe MD, 200 mg at 12/02/22 0813  •  hydrALAZINE (APRESOLINE) tablet 100 mg, 100 mg, Oral, TID, Chavez Uribe MD, 100 mg at 12/02/22 0814  •  HYDROcodone-acetaminophen (NORCO) 5-325 MG per tablet 1 tablet, 1 tablet, Oral, Q4H PRN, Chavez Uribe MD, 1 tablet at 12/02/22 1054  •  Influenza Vac High-Dose Quad (FLUZONE HIGH DOSE) injection 0.7 mL, 0.7 mL, Intramuscular, During Hospitalization, Chavez Uribe MD  •  isosorbide mononitrate (IMDUR) 24 hr tablet 30 mg, 30 mg, Oral, Q24H, Jossie Oconnell APRN, 30 mg at 12/01/22 2009  •  labetalol (NORMODYNE,TRANDATE) injection 10 mg, 10 mg, Intravenous, Q2H PRN, Echo Meyer APRN, 10 mg at 12/02/22 0508  •  levothyroxine (SYNTHROID, LEVOTHROID) tablet 100 mcg, 100 mcg, Oral, Daily, Chavez Uribe MD, 100 mcg at 12/02/22 0813  •  losartan (COZAAR) tablet 100 mg, 100 mg, Oral, Q24H, Chavez Uribe MD, 100 mg at 12/02/22 0814  •  memantine (NAMENDA) tablet 10 mg, 10 mg, Oral, Q12H, Chavez Uribe MD, 10 mg at 12/02/22 0814  •  montelukast (SINGULAIR) tablet 10 mg, 10 mg, Oral, Nightly, Chavez Uribe MD, 10 mg at 12/01/22 2010  •  nystatin (MYCOSTATIN) 100,000 unit/mL suspension 500,000 Units, 5 mL, Swish & Swallow, 4x Daily, Chavez Uribe MD, 500,000 Units at 12/02/22 1130  •  oseltamivir (TAMIFLU) capsule 30 mg, 30 mg, Oral, Q24H, Chavez Uribe MD, 30 mg at 12/02/22 0813  •  pantoprazole (PROTONIX) EC tablet 40 mg, 40 mg,  Oral, BID AC, Alysha Uribe MD, 40 mg at 12/02/22 0813  •  Pharmacy to Dose Oseltamivir (TAMIFLU), , Does not apply, Continuous PRN, Alysha Uribe MD  •  polyethylene glycol (MIRALAX) packet 17 g, 17 g, Oral, BID, Alysha Uribe MD, 17 g at 12/01/22 2011  •  primidone (MYSOLINE) tablet 50 mg, 50 mg, Oral, TID, Alysha Uribe MD, 50 mg at 12/02/22 0813  •  sennosides-docusate (PERICOLACE) 8.6-50 MG per tablet 2 tablet, 2 tablet, Oral, Nightly, Alysha Uribe MD, 2 tablet at 12/01/22 2011  •  sodium bicarbonate tablet 650 mg, 650 mg, Oral, TID, Alysha Uribe MD, 650 mg at 12/02/22 0814  •  [COMPLETED] Insert Peripheral IV, , , Once **AND** sodium chloride 0.9 % flush 10 mL, 10 mL, Intravenous, PRN, Jordy, Ruy SMITH MD, 10 mL at 12/01/22 0816  •  tamsulosin (FLOMAX) 24 hr capsule 0.4 mg, 0.4 mg, Oral, Nightly, Alysha Uribe MD, 0.4 mg at 12/01/22 2010     ASSESSMENT  Acute influenza A infection  Symptomatic anemia s/p transfusion  Chronic anemia   Nonsustained V. tach  Paroxysmal SVT  COPD  Oral thrush  Diabetes mellitus  Hypertension   Hyperlipidemia  Hypothyroidism  Osteoarthritis  Chronic kidney disease stage III  Gastroesophageal reflux disease    PLAN  CPM  Transfuse PRN  Tamiflu for 5 days  Nystatin swish and swallow for 5 days  Hematology consult appreciated  Cardiology to follow patient  Stabilize blood pressure  Adjust nursing home medications  Stress ulcer DVT prophylaxis  Supportive care  DNR  PT/OT  Discussed with nursing staff  Follow closely further recommendation current hospital course    ALYSHA URIBE MD

## 2022-12-03 NOTE — PLAN OF CARE
Goal Outcome Evaluation:      Pt alert and oriented x 2, confused at times, cooperative, follows commands. No distress/pain at this time, pt is taking q4h norco prns and klonopin for anxiety.  Pt slept for a lot of the day in between care.  R/S ivf n.s @ 75m/h per nephro.  Pt did not eat much today, drinks ok.  F/c until more ambulatory.  Pt turned and repositioned, but did not ambulate today.  Prn meds for htn, given.  Will cont to monitor for changes.

## 2022-12-03 NOTE — PLAN OF CARE
Goal Outcome Evaluation:    Pt resting in bed, has slept for a few hours tonight. Pt wanting to get up, reality orientatetion tried on patient many times. Pt confused and alert to self and sometimes, time. VSS  Treated pain with Norco. Will continue to assess.

## 2022-12-03 NOTE — PROGRESS NOTES
Daily progress note    Primary care physician  Dr. Arredondo    Chief complaint  Doing same with no new complaint still with cough congestion and weakness.  Patient denies chest pain shortness of breath.    History of present illness  86-year-old white female with very complex past medical history and well-known to our service with history of diabetes hypertension hypothyroidism chronic anemia chronic kidney disease stage IV and severe dementia brought to the emergency room with complaint of generalized weakness fatigue tired and no energy and work-up at nursing home revealed hemoglobin of 6.3 admitted for management.  Patient is unable to give detailed history most of the history obtained from the chart nursing staff old record.  Patient has no black stools bloody stools nausea vomiting abdominal pain and her Hemoccult test negative in ER.  Patient is DNR per her wishes.     REVIEW OF SYSTEMS  Unremarkable except for cough congestion and generalized weakness     PHYSICAL EXAM   Blood pressure (!) 188/71, pulse 62, temperature 98.2 °F (36.8 °C), resp. rate 16, weight 61.6 kg (135 lb 12.8 oz), SpO2 95 %, not currently breastfeeding.    GENERAL:  Awake and alert in no distress  HEENT:  Unremarkable  NECK:  Supple  CV: regular rhythm, regular rate S1-S2  RESPIRATORY: normal effort, clear to auscultation bilateral  ABDOMEN: soft, nontender nondistended bowel sounds positive  MUSCULOSKELETAL: no deformity  NEURO:  Awake and following commands and moves all 4 extremities  SKIN: warm, dry    LAB RESULTS  Lab Results (last 24 hours)     Procedure Component Value Units Date/Time    Basic Metabolic Panel [[id number]]  (Abnormal) Collected: 12/03/22 0804    Specimen: Blood Updated: 12/03/22 1020     Glucose 96 mg/dL      BUN 22 mg/dL      Creatinine 1.43 mg/dL      Sodium 140 mmol/L      Potassium 4.0 mmol/L      Comment: Slight hemolysis detected by analyzer. Results may be affected.        Chloride 111 mmol/L      CO2 19.4  mmol/L      Calcium 7.8 mg/dL      BUN/Creatinine Ratio 15.4     Anion Gap 9.6 mmol/L      eGFR 36.0 mL/min/1.73      Comment: National Kidney Foundation and American Society of Nephrology (ASN) Task Force recommended calculation based on the Chronic Kidney Disease Epidemiology Collaboration (CKD-EPI) equation refit without adjustment for race.       Narrative:      GFR Normal >60  Chronic Kidney Disease <60  Kidney Failure <15    The GFR formula is only valid for adults with stable renal function between ages 18 and 70.    CBC & Differential [[id number]]  (Abnormal) Collected: 12/03/22 0804    Specimen: Blood Updated: 12/03/22 0908    Narrative:      The following orders were created for panel order CBC & Differential.  Procedure                               Abnormality         Status                     ---------                               -----------         ------                     CBC Auto Differential[[id number]]        Abnormal            Final result                 Please view results for these tests on the individual orders.    CBC Auto Differential [[id number]]  (Abnormal) Collected: 12/03/22 0804    Specimen: Blood Updated: 12/03/22 0908     WBC 3.78 10*3/mm3      RBC 3.26 10*6/mm3      Hemoglobin 9.7 g/dL      Hematocrit 28.9 %      MCV 88.7 fL      MCH 29.8 pg      MCHC 33.6 g/dL      RDW 15.6 %      RDW-SD 51.4 fl      MPV 9.8 fL      Platelets 124 10*3/mm3      Neutrophil % 52.8 %      Lymphocyte % 32.5 %      Monocyte % 10.3 %      Eosinophil % 3.4 %      Basophil % 0.5 %      Immature Grans % 0.5 %      Neutrophils, Absolute 1.99 10*3/mm3      Lymphocytes, Absolute 1.23 10*3/mm3      Monocytes, Absolute 0.39 10*3/mm3      Eosinophils, Absolute 0.13 10*3/mm3      Basophils, Absolute 0.02 10*3/mm3      Immature Grans, Absolute 0.02 10*3/mm3      nRBC 0.0 /100 WBC         Imaging Results (Last 24 Hours)     ** No results found for the last 24 hours. **          Current Facility-Administered  Medications:   •  allopurinol (ZYLOPRIM) tablet 100 mg, 100 mg, Oral, Daily, Chavez Uribe MD, 100 mg at 12/03/22 0849  •  amLODIPine (NORVASC) tablet 10 mg, 10 mg, Oral, Q24H, Chavez Uribe MD, 10 mg at 12/03/22 0849  •  arformoterol (BROVANA) nebulizer solution 15 mcg, 15 mcg, Nebulization, BID - RT, Chavez Uribe MD, 15 mcg at 12/03/22 0806  •  atorvastatin (LIPITOR) tablet 10 mg, 10 mg, Oral, Nightly, Chavez Uribe MD, 10 mg at 12/02/22 2051  •  carvedilol (COREG) tablet 25 mg, 25 mg, Oral, BID With Meals, Echo Meyer, APRN, 25 mg at 12/03/22 0753  •  cetirizine (zyrTEC) tablet 5 mg, 5 mg, Oral, Daily, Chavez Uribe MD, 5 mg at 12/03/22 0849  •  cholecalciferol (VITAMIN D3) tablet 1,000 Units, 1,000 Units, Oral, Daily, Chavez Uribe MD, 1,000 Units at 12/03/22 0849  •  clomiPRAMINE (ANAFRANIL) capsule 50 mg, 50 mg, Oral, Nightly, Chavez Uribe MD  •  clonazePAM (KlonoPIN) tablet 0.5 mg, 0.5 mg, Oral, TID PRN, Chavez Uribe MD, 0.5 mg at 12/03/22 0751  •  docusate sodium (COLACE) capsule 200 mg, 200 mg, Oral, BID, Chavez Uribe MD, 200 mg at 12/03/22 0849  •  famotidine (PEPCID) tablet 20 mg, 20 mg, Oral, BID, Chavez Uribe MD, 20 mg at 12/03/22 0849  •  hydrALAZINE (APRESOLINE) tablet 100 mg, 100 mg, Oral, TID, Chavez Uribe MD, 100 mg at 12/03/22 0849  •  HYDROcodone-acetaminophen (NORCO) 5-325 MG per tablet 1 tablet, 1 tablet, Oral, Q4H PRN, Chavez Uribe MD, 1 tablet at 12/03/22 1221  •  Influenza Vac High-Dose Quad (FLUZONE HIGH DOSE) injection 0.7 mL, 0.7 mL, Intramuscular, During Hospitalization, Chavez Uribe MD  •  isosorbide mononitrate (IMDUR) 24 hr tablet 30 mg, 30 mg, Oral, Q24H, Jossie Oconnell APRN, 30 mg at 12/02/22 2054  •  labetalol (NORMODYNE,TRANDATE) injection 10 mg, 10 mg, Intravenous, Q2H PRN, Echo Meyer APRN, 10 mg at 12/03/22 1542  •  levothyroxine (SYNTHROID, LEVOTHROID) tablet 100 mcg, 100 mcg, Oral, Daily, Chavez Uribe MD, 100 mcg at 12/03/22 0889  •  losartan  (COZAAR) tablet 100 mg, 100 mg, Oral, Q24H, Chavez Uribe MD, 100 mg at 12/03/22 0849  •  memantine (NAMENDA) tablet 10 mg, 10 mg, Oral, Q12H, Chavez Uribe MD, 10 mg at 12/03/22 0849  •  montelukast (SINGULAIR) tablet 10 mg, 10 mg, Oral, Nightly, Chavez Uribe MD, 10 mg at 12/02/22 2052  •  nystatin (MYCOSTATIN) 100,000 unit/mL suspension 500,000 Units, 5 mL, Swish & Swallow, 4x Daily, Chavez Uribe MD, 500,000 Units at 12/03/22 1100  •  oseltamivir (TAMIFLU) 6 MG/ML suspension 30 mg, 30 mg, Oral, Q24H, Chavez Uribe MD  •  Pharmacy to Dose Oseltamivir (TAMIFLU), , Does not apply, Continuous PRN, Chavez Uribe MD  •  polyethylene glycol (MIRALAX) packet 17 g, 17 g, Oral, BID, Chavez Uribe MD, 17 g at 12/02/22 2052  •  primidone (MYSOLINE) tablet 50 mg, 50 mg, Oral, TID, Chavez Uribe MD, 50 mg at 12/03/22 0849  •  sennosides-docusate (PERICOLACE) 8.6-50 MG per tablet 2 tablet, 2 tablet, Oral, Nightly, Chavez Uribe MD, 2 tablet at 12/01/22 2011  •  sodium bicarbonate tablet 1,300 mg, 1,300 mg, Oral, TID, Chavez Uribe MD, 1,300 mg at 12/03/22 0849  •  [COMPLETED] Insert Peripheral IV, , , Once **AND** sodium chloride 0.9 % flush 10 mL, 10 mL, Intravenous, PRN, MaxweltonRuy cazares MD, 10 mL at 12/02/22 2052  •  sodium chloride 0.9 % infusion, 75 mL/hr, Intravenous, Continuous, Star Thurston MD, Last Rate: 75 mL/hr at 12/03/22 1319, 75 mL/hr at 12/03/22 1319  •  tamsulosin (FLOMAX) 24 hr capsule 0.4 mg, 0.4 mg, Oral, Nightly, Chavez Uribe MD, 0.4 mg at 12/02/22 2052     ASSESSMENT  Acute influenza A infection  Symptomatic anemia s/p transfusion  Chronic anemia   Nonsustained V. tach  Paroxysmal SVT  COPD  Oral thrush  Diabetes mellitus  Hypertension   Hyperlipidemia  Hypothyroidism  Osteoarthritis  Urinary retention  Chronic kidney disease stage III  Gastroesophageal reflux disease    PLAN  CPM  Transfuse PRN  Tamiflu for 5 days  Nystatin swish and swallow for 5 days  Hematology consult appreciated  Cardiology  nephrology neurology to follow patient  Stabilize blood pressure  Adjust nursing home medications  Stress ulcer DVT prophylaxis  Supportive care  DNR  PT/OT  Discussed with nursing staff  Follow closely further recommendation current hospital course    ALYSHA POSEY MD

## 2022-12-03 NOTE — CONSULTS
Nephrology Progress Note:     LOS: 4 days   Patient Care Team:  ROSEMARIE Arredondo as PCP - General (Peripheral Vascular Disease)      Subjective     Interval History:   Asked to see patient for CKD 4. 86 year old female well known to me with CKD,DM, HTN, PVD,COPD, and dementia. Baseline creatinine has been (1.8 to 1.9). Patient admitted on this occasion with weakness and worsening confusion. HgB only 6.3 on presentation. She was found to be positive for Influenza A. She is currently on Tamiflu. We are asked to see her for elevated BP readings and her chronic renal disease.     PMHx:  CKD 4 secondary to DM/HTN with prior JEANIE from diuresis  DM  HTN  Dementia  PVD  Chronic lung disease with COPD and SHANON  Afib  Hyperlipidemia  Hypothyroid  AAA  Hx of COVID    Surgical history:  AAA repair 2019  Ileofemoral bypass 2020  Cataracts and cholecystectomy    SHx:  Nursing home  No tobacco  No ETOH    FHx:  ASCAD  HTN          Review of Systems:   Very confused  Denies any complaints except weakness     Objective     Vital Signs  /71   Pulse 70   Temp 98.6 °F (37 °C) (Oral)   Resp 18   Wt 61.6 kg (135 lb 12.8 oz)   SpO2 100%   BMI 23.31 kg/m²     Intake/Output  I/O last 3 completed shifts:  In: 300 [P.O.:300]  Out: 1700 [Urine:1700]  I/O this shift:  In: 240 [P.O.:240]  Out: 350 [Urine:350]      Physical Exam:  NAD  No icterus  No JVD  RRR without rub  Lungs very diminished but clear  Abdomen soft positive bowel sounds  No C/C/E  No rash  No focal neuro but confused.       Results Review:       Imaging Results (Last 24 Hours)     ** No results found for the last 24 hours. **          Medication Review:  allopurinol, 100 mg, Oral, Daily  amLODIPine, 10 mg, Oral, Q24H  arformoterol, 15 mcg, Nebulization, BID - RT  atorvastatin, 10 mg, Oral, Nightly  carvedilol, 25 mg, Oral, BID With Meals  cetirizine, 5 mg, Oral, Daily  cholecalciferol, 1,000 Units, Oral, Daily  clomiPRAMINE, 50 mg, Oral, Nightly  docusate sodium,  200 mg, Oral, BID  famotidine, 20 mg, Oral, BID  hydrALAZINE, 100 mg, Oral, TID  isosorbide mononitrate, 30 mg, Oral, Q24H  levothyroxine, 100 mcg, Oral, Daily  losartan, 100 mg, Oral, Q24H  memantine, 10 mg, Oral, Q12H  montelukast, 10 mg, Oral, Nightly  nystatin, 5 mL, Swish & Swallow, 4x Daily  oseltamivir, 30 mg, Oral, Q24H  polyethylene glycol, 17 g, Oral, BID  primidone, 50 mg, Oral, TID  sennosides-docusate, 2 tablet, Oral, Nightly  sodium bicarbonate, 1,300 mg, Oral, TID  tamsulosin, 0.4 mg, Oral, Nightly        Assessment & Plan   1. CKD 3/4 stable function. Minimal UOP today. Looks dry. IV fluids ordered.  2. Anemia stable.  3. Acute Influenza A on tamiflu  4. Encephalopathy. Very confused.  5. DM with stable sugars  6. HTN mostly controlled but few higher readings. She is on 4 meds at max doses..  7. Treated hypothyroidism  Plan:  Gentle IV saline  Monitor labs and UOP  No change in BP meds today but observe closely.    Symptomatic anemia      Star Thurston MD  12/03/22  13:12 EST

## 2022-12-03 NOTE — PROGRESS NOTES
Cardiology Progress Note    Patient Identification:  Name: Kellen Parmar  Age: 87 y.o.  Sex: female  :  1935  MRN: [id number]                 Follow Up / Chief Complaint:  34 beat run of SVT    Interval History: Blood pressure remains elevated.     Subjective: Lethargic and sleepy, not easily aroused         Objective:    Past Medical History:  Past Medical History:   Diagnosis Date   • Anemia    • Asthma    • Cancer (HCC)    • Dementia (HCC)    • Diabetes mellitus (HCC)    • Disease of thyroid gland    • GERD (gastroesophageal reflux disease)    • Hypertension      Past Surgical History:  Past Surgical History:   Procedure Laterality Date   • CHOLECYSTECTOMY     • HIP INTERTROCHANTERIC NAILING Left 10/30/2022    Procedure: HIP INTERTROCHANTERIC NAILING;  Surgeon: Asael Mccabe MD;  Location: [facility];  Service: Orthopedics;  Laterality: Left;        Social History:   Social History     Tobacco Use   • Smoking status: Former     Packs/day: 0.50     Years: 15.00     Pack years: 7.50     Types: Cigarettes     Quit date:      Years since quittin.9   • Smokeless tobacco: Former   Substance Use Topics   • Alcohol use: Not Currently      Family History:  History reviewed. No pertinent family history.       Allergies:  Allergies   Allergen Reactions   • Contrast Dye Anaphylaxis   • Iodinated Diagnostic Agents Anaphylaxis   • Shrimp Nausea And Vomiting     Allergic to all sea food   • Shellfish-Derived Products Nausea And Vomiting     Scheduled Meds:  allopurinol, 100 mg, Daily  amLODIPine, 10 mg, Q24H  arformoterol, 15 mcg, BID - RT  atorvastatin, 10 mg, Nightly  carvedilol, 25 mg, BID With Meals  cetirizine, 5 mg, Daily  cholecalciferol, 1,000 Units, Daily  clomiPRAMINE, 50 mg, Nightly  docusate sodium, 200 mg, BID  famotidine, 20 mg, BID  hydrALAZINE, 100 mg, TID  isosorbide mononitrate, 30 mg, Q24H  levothyroxine, 100 mcg, Daily  losartan, 100 mg, Q24H  memantine, 10 mg,  Q12H  montelukast, 10 mg, Nightly  nystatin, 5 mL, 4x Daily  oseltamivir, 30 mg, Q24H  polyethylene glycol, 17 g, BID  primidone, 50 mg, TID  sennosides-docusate, 2 tablet, Nightly  sodium bicarbonate, 1,300 mg, TID  tamsulosin, 0.4 mg, Nightly            INTAKE AND OUTPUT:    Intake/Output Summary (Last 24 hours) at 12/3/2022 1358  Last data filed at 12/3/2022 1200  Gross per 24 hour   Intake 240 ml   Output 800 ml   Net -560 ml       I reviewed the patient's new clinical results, and personally reviewed and interpreted the patient's ECG and telemetry data from the last 24 hours.    Constitutional:  Temp:  [98 °F (36.7 °C)-98.6 °F (37 °C)] 98.2 °F (36.8 °C)  Heart Rate:  [62-70] 70  Resp:  [18-20] 18  BP: (142-185)/(60-83) 163/71    Physical Exam:  General:  Appears elderly and frail, but in no acute distress  Eyes: PERTL,  HEENT:  No JVD. Thyroid not visibly enlarged. No mucosal pallor or cyanosis  Respiratory: Respirations regular and unlabored at rest. BBS with good air entry in all fields anteriorly and laterally. No crackles, rubs or wheezes auscultated  Cardiovascular: S1S2 Regular rate and rhythm. No murmur, rub or gallop. DP/PT pulses 1+   . No pretibial pitting edema  Gastrointestinal: Abdomen soft, round, non tender. Bowel sounds present.  No ascites  Musculoskeletal: RAHMAN x4. No abnormal movements  Extremities: No digital clubbing or cyanosis  Skin: Color pink. Skin warm and dry to touch. No rashes    Neuro: Unable to assess-patient sleeping, lethargic, and not easily aroused  Psych: Unable to assess, see above       Lab Review   Results from last 7 days   Lab Units 12/01/22  0723   TROPONIN T ng/mL 0.013     Results from last 7 days   Lab Units 11/30/22  0728   MAGNESIUM mg/dL 1.9     Results from last 7 days   Lab Units 12/03/22  0804   SODIUM mmol/L 140   POTASSIUM mmol/L 4.0   BUN mg/dL 22   CREATININE mg/dL 1.43*   CALCIUM mg/dL 7.8*        Results from last 7 days   Lab Units 12/03/22  0804  "12/02/22  0751 12/01/22  0723   WBC 10*3/mm3 3.78 4.13 4.64   HEMOGLOBIN g/dL 9.7* 8.6* 10.1*   HEMATOCRIT % 28.9* 27.8* 30.2*   PLATELETS 10*3/mm3 124* 134* 130*     Results from last 7 days   Lab Units 11/28/22  1738   INR  1.04       Assessment/plan:  Symptomatic anemia  Chronic anemia  Hypertension: Monitor  Hyperlipidemia: ALT/AST WNL.  On statin  Dementia  Paroxysmal SVT  CKD creatinine 1.51.  Baseline between 1.5 and 1.7       Remains sinus rhythm on the monitor.  Blood pressure remains elevated, but improved.  Now on maximum dosing of losartan, amlodipine, beta-blockade, and hydralazine.      )12/3/2022  ANDREA Henry/Transcription:   \"Dictated utilizing Dragon dictation\".     "

## 2022-12-04 NOTE — PROGRESS NOTES
Cardiology Progress Note    Patient Identification:  Name: Kellen Parmar  Age: 87 y.o.  Sex: female  :  1935  MRN: [id number]                 Follow Up / Chief Complaint:  34 beat run of SVT    Interval History: Blood pressure has trended up overnight, clonidine was added, BP now better controlled     Subjective: remains lethargic and sleepy         Objective:    Past Medical History:  Past Medical History:   Diagnosis Date   • Anemia    • Asthma    • Cancer (HCC)    • Dementia (HCC)    • Diabetes mellitus (HCC)    • Disease of thyroid gland    • GERD (gastroesophageal reflux disease)    • Hypertension      Past Surgical History:  Past Surgical History:   Procedure Laterality Date   • CHOLECYSTECTOMY     • HIP INTERTROCHANTERIC NAILING Left 10/30/2022    Procedure: HIP INTERTROCHANTERIC NAILING;  Surgeon: Asael Mccabe MD;  Location: [facility];  Service: Orthopedics;  Laterality: Left;        Social History:   Social History     Tobacco Use   • Smoking status: Former     Packs/day: 0.50     Years: 15.00     Pack years: 7.50     Types: Cigarettes     Quit date:      Years since quittin.9   • Smokeless tobacco: Former   Substance Use Topics   • Alcohol use: Not Currently      Family History:  History reviewed. No pertinent family history.       Allergies:  Allergies   Allergen Reactions   • Contrast Dye Anaphylaxis   • Iodinated Diagnostic Agents Anaphylaxis   • Shrimp Nausea And Vomiting     Allergic to all sea food   • Shellfish-Derived Products Nausea And Vomiting     Scheduled Meds:  allopurinol, 100 mg, Daily  amLODIPine, 10 mg, Q24H  arformoterol, 15 mcg, BID - RT  atorvastatin, 10 mg, Nightly  carvedilol, 25 mg, BID With Meals  cetirizine, 5 mg, Daily  cholecalciferol, 1,000 Units, Daily  clomiPRAMINE, 50 mg, Nightly  cloNIDine, 0.1 mg, Q8H  docusate sodium, 200 mg, BID  famotidine, 20 mg, BID  guaiFENesin, 600 mg, Q12H  hydrALAZINE, 100 mg, TID  isosorbide mononitrate,  60 mg, Q24H  levothyroxine, 100 mcg, Daily  losartan, 100 mg, Q24H  memantine, 10 mg, Q12H  montelukast, 10 mg, Nightly  nystatin, 5 mL, 4x Daily  oseltamivir, 30 mg, Q24H  polyethylene glycol, 17 g, BID  primidone, 50 mg, TID  sennosides-docusate, 2 tablet, Nightly  sodium bicarbonate, 1,300 mg, TID  tamsulosin, 0.4 mg, Nightly            INTAKE AND OUTPUT:    Intake/Output Summary (Last 24 hours) at 12/4/2022 1141  Last data filed at 12/4/2022 0717  Gross per 24 hour   Intake 0 ml   Output 750 ml   Net -750 ml     I reviewed the patient's new clinical results, and personally reviewed and interpreted the patient's ECG and telemetry data from the last 24 hours.    Constitutional:  Temp:  [97.5 °F (36.4 °C)-98.3 °F (36.8 °C)] 97.8 °F (36.6 °C)  Heart Rate:  [62-82] 65  Resp:  [16-18] 17  BP: (157-211)/(70-93) 157/81    Physical Exam: unchanged from 12/3  General:  Appears elderly and frail, but in no acute distress; resting comfortably in bed with family at bedside  Eyes: PERTL,  HEENT:  No JVD. Thyroid not visibly enlarged. No mucosal pallor or cyanosis  Respiratory: Respirations regular and unlabored at rest. BBS with good air entry in all fields anteriorly and laterally. No crackles, rubs or wheezes auscultated  Cardiovascular: S1S2 Regular rate and rhythm. No murmur, rub or gallop. DP/PT pulses 1+   . No pretibial pitting edema  Gastrointestinal: Abdomen soft, round, non tender. Bowel sounds present.  No ascites  Musculoskeletal: RAHMAN x4. No abnormal movements  Extremities: No digital clubbing or cyanosis  Skin: Color pink. Skin warm and dry to touch. No rashes    Neuro: Unable to assess-patient sleeping, lethargic, and not easily aroused  Psych: Unable to assess, see above       Lab Review   Results from last 7 days   Lab Units 12/01/22  0723   TROPONIN T ng/mL 0.013     Results from last 7 days   Lab Units 11/30/22  0728   MAGNESIUM mg/dL 1.9     Results from last 7 days   Lab Units 12/04/22  0553   SODIUM  "mmol/L 144   POTASSIUM mmol/L 3.8   BUN mg/dL 21   CREATININE mg/dL 1.40*   CALCIUM mg/dL 7.9*        Results from last 7 days   Lab Units 12/04/22  0554 12/03/22  0804 12/02/22  0751   WBC 10*3/mm3 4.22 3.78 4.13   HEMOGLOBIN g/dL 8.8* 9.7* 8.6*   HEMATOCRIT % 27.3* 28.9* 27.8*   PLATELETS 10*3/mm3 137* 124* 134*     Results from last 7 days   Lab Units 11/28/22  1738   INR  1.04       Assessment/plan:  Symptomatic anemia  Chronic anemia  Hypertension: Monitor  Hyperlipidemia: ALT/AST WNL.  On statin  Dementia  Paroxysmal SVT  CKD creatinine 1.51.  Baseline between 1.5 and 1.7       BP improved on clonidine.  Continue.  Could increase dose if necessary.  Primary cardiology team to resume care in am      )12/4/2022  ANDREA Henry/Transcription:   \"Dictated utilizing Dragon dictation\".     "

## 2022-12-04 NOTE — PROGRESS NOTES
Blood pressure readings continue to increase. Will add low dose clonidine since heart rate should tolerate this.

## 2022-12-04 NOTE — PROGRESS NOTES
Daily progress note    Primary care physician  Dr. Arredondo    Chief complaint  Doing better with no new complaint still with cough congestion and weakness.  Patient denies chest pain shortness of breath.  Patient family at bedside with a lot of questions.    History of present illness  86-year-old white female with very complex past medical history and well-known to our service with history of diabetes hypertension hypothyroidism chronic anemia chronic kidney disease stage IV and severe dementia brought to the emergency room with complaint of generalized weakness fatigue tired and no energy and work-up at nursing home revealed hemoglobin of 6.3 admitted for management.  Patient is unable to give detailed history most of the history obtained from the chart nursing staff old record.  Patient has no black stools bloody stools nausea vomiting abdominal pain and her Hemoccult test negative in ER.  Patient is DNR per her wishes.     REVIEW OF SYSTEMS  Unremarkable except weakness.     PHYSICAL EXAM   Blood pressure 157/81, pulse 65, temperature 97.7 °F (36.5 °C), temperature source Oral, resp. rate 17, weight 61.6 kg (135 lb 12.8 oz), SpO2 96 %, not currently breastfeeding.    GENERAL:  Awake and alert in no distress  HEENT:  Unremarkable  NECK:  Supple  CV: regular rhythm, regular rate S1-S2  RESPIRATORY: normal effort, clear to auscultation bilateral  ABDOMEN: soft, nontender nondistended bowel sounds positive  MUSCULOSKELETAL: no deformity  NEURO:  Awake and following commands and moves all 4 extremities  SKIN: warm, dry    LAB RESULTS  Lab Results (last 24 hours)     Procedure Component Value Units Date/Time    Basic Metabolic Panel [[id number]]  (Abnormal) Collected: 12/04/22 0553    Specimen: Blood Updated: 12/04/22 0644     Glucose 94 mg/dL      BUN 21 mg/dL      Creatinine 1.40 mg/dL      Sodium 144 mmol/L      Potassium 3.8 mmol/L      Chloride 112 mmol/L      CO2 24.0 mmol/L      Calcium 7.9 mg/dL       BUN/Creatinine Ratio 15.0     Anion Gap 8.0 mmol/L      eGFR 36.9 mL/min/1.73      Comment: National Kidney Foundation and American Society of Nephrology (ASN) Task Force recommended calculation based on the Chronic Kidney Disease Epidemiology Collaboration (CKD-EPI) equation refit without adjustment for race.       Narrative:      GFR Normal >60  Chronic Kidney Disease <60  Kidney Failure <15    The GFR formula is only valid for adults with stable renal function between ages 18 and 70.    CBC & Differential [[id number]]  (Abnormal) Collected: 12/04/22 0554    Specimen: Blood Updated: 12/04/22 0629    Narrative:      The following orders were created for panel order CBC & Differential.  Procedure                               Abnormality         Status                     ---------                               -----------         ------                     CBC Auto Differential[[id number]]        Abnormal            Final result                 Please view results for these tests on the individual orders.    CBC Auto Differential [[id number]]  (Abnormal) Collected: 12/04/22 0554    Specimen: Blood Updated: 12/04/22 0629     WBC 4.22 10*3/mm3      RBC 3.04 10*6/mm3      Hemoglobin 8.8 g/dL      Hematocrit 27.3 %      MCV 89.8 fL      MCH 28.9 pg      MCHC 32.2 g/dL      RDW 15.8 %      RDW-SD 52.1 fl      MPV 9.4 fL      Platelets 137 10*3/mm3      Neutrophil % 54.2 %      Lymphocyte % 30.1 %      Monocyte % 11.4 %      Eosinophil % 3.3 %      Basophil % 0.5 %      Immature Grans % 0.5 %      Neutrophils, Absolute 2.29 10*3/mm3      Lymphocytes, Absolute 1.27 10*3/mm3      Monocytes, Absolute 0.48 10*3/mm3      Eosinophils, Absolute 0.14 10*3/mm3      Basophils, Absolute 0.02 10*3/mm3      Immature Grans, Absolute 0.02 10*3/mm3      nRBC 0.2 /100 WBC         Imaging Results (Last 24 Hours)     ** No results found for the last 24 hours. **          Current Facility-Administered Medications:   •  allopurinol (ZYLOPRIM)  tablet 100 mg, 100 mg, Oral, Daily, Chavez Uribe MD, 100 mg at 12/04/22 0919  •  amLODIPine (NORVASC) tablet 10 mg, 10 mg, Oral, Q24H, Chavez Uribe MD, 10 mg at 12/04/22 0920  •  arformoterol (BROVANA) nebulizer solution 15 mcg, 15 mcg, Nebulization, BID - RT, Chavez Uribe MD, 15 mcg at 12/04/22 0812  •  atorvastatin (LIPITOR) tablet 10 mg, 10 mg, Oral, Nightly, Chavez Uribe MD, 10 mg at 12/03/22 2022  •  carvedilol (COREG) tablet 25 mg, 25 mg, Oral, BID With Meals, Echo Meyer APRN, 25 mg at 12/04/22 0920  •  cetirizine (zyrTEC) tablet 5 mg, 5 mg, Oral, Daily, Chavez Uribe MD, 5 mg at 12/04/22 0920  •  cholecalciferol (VITAMIN D3) tablet 1,000 Units, 1,000 Units, Oral, Daily, Chavez Uribe MD, 1,000 Units at 12/04/22 0919  •  clomiPRAMINE (ANAFRANIL) capsule 50 mg, 50 mg, Oral, Nightly, Chavez Uribe MD  •  clonazePAM (KlonoPIN) tablet 0.5 mg, 0.5 mg, Oral, TID PRN, Chavez Uribe MD, 0.5 mg at 12/03/22 2023  •  cloNIDine (CATAPRES) tablet 0.1 mg, 0.1 mg, Oral, Q8H, Star Thurston MD, 0.1 mg at 12/04/22 1408  •  docusate sodium (COLACE) capsule 200 mg, 200 mg, Oral, BID, Chavez Uribe MD, 200 mg at 12/04/22 0920  •  famotidine (PEPCID) tablet 20 mg, 20 mg, Oral, BID, Chavez Uribe MD, 20 mg at 12/04/22 0920  •  guaiFENesin (MUCINEX) 12 hr tablet 600 mg, 600 mg, Oral, Q12H, Chavez Uribe MD, 600 mg at 12/04/22 0920  •  hydrALAZINE (APRESOLINE) tablet 100 mg, 100 mg, Oral, TID, Chavez Uribe MD, 100 mg at 12/04/22 0920  •  HYDROcodone-acetaminophen (NORCO) 5-325 MG per tablet 1 tablet, 1 tablet, Oral, Q4H PRN, Chavez Uribe MD, 1 tablet at 12/04/22 0920  •  Influenza Vac High-Dose Quad (FLUZONE HIGH DOSE) injection 0.7 mL, 0.7 mL, Intramuscular, During Hospitalization, Chavez Uribe MD  •  isosorbide mononitrate (IMDUR) 24 hr tablet 60 mg, 60 mg, Oral, Q24H, Chavez Uribe MD, 60 mg at 12/03/22 2045  •  labetalol (NORMODYNE,TRANDATE) injection 10 mg, 10 mg, Intravenous, Q2H PRN, Echo Meyer, APRN, 10  mg at 12/04/22 0505  •  levothyroxine (SYNTHROID, LEVOTHROID) tablet 100 mcg, 100 mcg, Oral, Daily, Chavez Uribe MD, 100 mcg at 12/04/22 0919  •  losartan (COZAAR) tablet 100 mg, 100 mg, Oral, Q24H, Chavez Uribe MD, 100 mg at 12/04/22 0919  •  memantine (NAMENDA) tablet 10 mg, 10 mg, Oral, Q12H, Chavez Uribe MD, 10 mg at 12/04/22 0920  •  montelukast (SINGULAIR) tablet 10 mg, 10 mg, Oral, Nightly, Chavez Uribe MD, 10 mg at 12/03/22 2045  •  nystatin (MYCOSTATIN) 100,000 unit/mL suspension 500,000 Units, 5 mL, Swish & Swallow, 4x Daily, Chavez Uribe MD, 500,000 Units at 12/04/22 0919  •  oseltamivir (TAMIFLU) 6 MG/ML suspension 30 mg, 30 mg, Oral, Q24H, Chavez Uribe MD, 30 mg at 12/04/22 1408  •  Pharmacy to Dose Oseltamivir (TAMIFLU), , Does not apply, Continuous PRN, Chavez Uribe MD  •  polyethylene glycol (MIRALAX) packet 17 g, 17 g, Oral, BID, Chavez Uribe MD, 17 g at 12/04/22 0920  •  primidone (MYSOLINE) tablet 50 mg, 50 mg, Oral, TID, Chavez Uribe MD, 50 mg at 12/04/22 0920  •  sennosides-docusate (PERICOLACE) 8.6-50 MG per tablet 2 tablet, 2 tablet, Oral, Nightly, Chavez Uribe MD, 2 tablet at 12/03/22 2022  •  sodium bicarbonate tablet 1,300 mg, 1,300 mg, Oral, TID, Chavez Uribe MD, 1,300 mg at 12/04/22 0920  •  [COMPLETED] Insert Peripheral IV, , , Once **AND** sodium chloride 0.9 % flush 10 mL, 10 mL, Intravenous, PRN, [place], Ruy SMITH MD, 10 mL at 12/02/22 2052  •  tamsulosin (FLOMAX) 24 hr capsule 0.4 mg, 0.4 mg, Oral, Nightly, Chavez Uribe MD, 0.4 mg at 12/03/22 2045     ASSESSMENT  Acute influenza A infection  Symptomatic anemia s/p transfusion  Chronic anemia   Nonsustained V. tach  Paroxysmal SVT  COPD  Oral thrush  Diabetes mellitus  Hypertension   Hyperlipidemia  Hypothyroidism  Osteoarthritis  Urinary retention  Chronic kidney disease stage III  Gastroesophageal reflux disease    PLAN  CPM  Tamiflu for 5 days  Nystatin swish and swallow for 5 days  Cardiology nephrology neurology to  follow patient  Stabilize blood pressure  Adjust nursing home medications  Stress ulcer DVT prophylaxis  Supportive care  DNR  PT/OT  Discussed with nursing staff  Discharge planning    ALYSHA POSEY MD

## 2022-12-05 NOTE — PROGRESS NOTES
Daily progress note    Primary care physician  Dr. Arredondo    Chief complaint  Doing better with no new complaint still with cough congestion and weakness.  Patient denies chest pain shortness of breath.      History of present illness  86-year-old white female with very complex past medical history and well-known to our service with history of diabetes hypertension hypothyroidism chronic anemia chronic kidney disease stage IV and severe dementia brought to the emergency room with complaint of generalized weakness fatigue tired and no energy and work-up at nursing home revealed hemoglobin of 6.3 admitted for management.  Patient is unable to give detailed history most of the history obtained from the chart nursing staff old record.  Patient has no black stools bloody stools nausea vomiting abdominal pain and her Hemoccult test negative in ER.  Patient is DNR per her wishes.     REVIEW OF SYSTEMS  Unremarkable except weakness.     PHYSICAL EXAM   Blood pressure 174/78, pulse 69, temperature 98.3 °F (36.8 °C), temperature source Oral, resp. rate 18, weight 61.6 kg (135 lb 12.8 oz), SpO2 100 %, not currently breastfeeding.    GENERAL:  Awake and alert in no distress  HEENT:  Unremarkable  NECK:  Supple  CV: regular rhythm, regular rate S1-S2  RESPIRATORY: normal effort, clear to auscultation bilateral  ABDOMEN: soft, nontender nondistended bowel sounds positive  MUSCULOSKELETAL: no deformity  NEURO:  Awake and following commands and moves all 4 extremities  SKIN: warm, dry    LAB RESULTS  Lab Results (last 24 hours)     Procedure Component Value Units Date/Time    Basic Metabolic Panel [[id number]]  (Abnormal) Collected: 12/05/22 0643    Specimen: Blood Updated: 12/05/22 0709     Glucose 98 mg/dL      BUN 19 mg/dL      Creatinine 1.40 mg/dL      Sodium 142 mmol/L      Potassium 4.2 mmol/L      Chloride 110 mmol/L      CO2 21.9 mmol/L      Calcium 7.4 mg/dL      BUN/Creatinine Ratio 13.6     Anion Gap 10.1 mmol/L       eGFR 36.9 mL/min/1.73      Comment: National Kidney Foundation and American Society of Nephrology (ASN) Task Force recommended calculation based on the Chronic Kidney Disease Epidemiology Collaboration (CKD-EPI) equation refit without adjustment for race.       Narrative:      GFR Normal >60  Chronic Kidney Disease <60  Kidney Failure <15    The GFR formula is only valid for adults with stable renal function between ages 18 and 70.    CBC & Differential [[id number]]  (Abnormal) Collected: 12/05/22 0643    Specimen: Blood Updated: 12/05/22 0656    Narrative:      The following orders were created for panel order CBC & Differential.  Procedure                               Abnormality         Status                     ---------                               -----------         ------                     CBC Auto Differential[[id number]]        Abnormal            Final result                 Please view results for these tests on the individual orders.    CBC Auto Differential [[id number]]  (Abnormal) Collected: 12/05/22 0643    Specimen: Blood Updated: 12/05/22 0656     WBC 5.13 10*3/mm3      RBC 3.39 10*6/mm3      Hemoglobin 10.1 g/dL      Hematocrit 30.9 %      MCV 91.2 fL      MCH 29.8 pg      MCHC 32.7 g/dL      RDW 16.1 %      RDW-SD 53.5 fl      MPV 9.6 fL      Platelets 124 10*3/mm3      Neutrophil % 58.4 %      Lymphocyte % 27.1 %      Monocyte % 9.0 %      Eosinophil % 3.5 %      Basophil % 0.4 %      Immature Grans % 1.6 %      Neutrophils, Absolute 3.00 10*3/mm3      Lymphocytes, Absolute 1.39 10*3/mm3      Monocytes, Absolute 0.46 10*3/mm3      Eosinophils, Absolute 0.18 10*3/mm3      Basophils, Absolute 0.02 10*3/mm3      Immature Grans, Absolute 0.08 10*3/mm3      nRBC 0.0 /100 WBC         Imaging Results (Last 24 Hours)     ** No results found for the last 24 hours. **          Current Facility-Administered Medications:   •  allopurinol (ZYLOPRIM) tablet 100 mg, 100 mg, Oral, Daily, Chavez Uribe MD, 100  mg at 12/05/22 1112  •  amLODIPine (NORVASC) tablet 10 mg, 10 mg, Oral, Q24H, Cahvez Uribe MD, 10 mg at 12/05/22 1113  •  arformoterol (BROVANA) nebulizer solution 15 mcg, 15 mcg, Nebulization, BID - RT, Chavez Uribe MD, 15 mcg at 12/05/22 0821  •  atorvastatin (LIPITOR) tablet 10 mg, 10 mg, Oral, Nightly, Chavez Uribe MD, 10 mg at 12/04/22 2108  •  carvedilol (COREG) tablet 25 mg, 25 mg, Oral, BID With Meals, Echo Meyer APRN, 25 mg at 12/05/22 1112  •  cetirizine (zyrTEC) tablet 5 mg, 5 mg, Oral, Daily, Chavez Uribe MD, 5 mg at 12/05/22 1112  •  cholecalciferol (VITAMIN D3) tablet 1,000 Units, 1,000 Units, Oral, Daily, Chavez Uribe MD, 1,000 Units at 12/05/22 1115  •  clomiPRAMINE (ANAFRANIL) capsule 50 mg, 50 mg, Oral, Nightly, Chavez Uribe MD  •  clonazePAM (KlonoPIN) tablet 0.5 mg, 0.5 mg, Oral, TID PRN, Chavez Uribe MD, 0.5 mg at 12/05/22 1316  •  cloNIDine (CATAPRES) tablet 0.1 mg, 0.1 mg, Oral, Q8H, Star Thurston MD, 0.1 mg at 12/05/22 0613  •  docusate sodium (COLACE) capsule 200 mg, 200 mg, Oral, BID, Chavez Uribe MD, 200 mg at 12/05/22 1114  •  famotidine (PEPCID) tablet 20 mg, 20 mg, Oral, BID, Chavez Uribe MD, 20 mg at 12/05/22 1114  •  guaiFENesin (MUCINEX) 12 hr tablet 600 mg, 600 mg, Oral, Q12H, Chavez Uribe MD, 600 mg at 12/05/22 1114  •  hydrALAZINE (APRESOLINE) tablet 100 mg, 100 mg, Oral, TID, Chavez Uribe MD, 100 mg at 12/05/22 1112  •  HYDROcodone-acetaminophen (NORCO) 5-325 MG per tablet 1 tablet, 1 tablet, Oral, Q4H PRN, Chavez Uribe MD, 1 tablet at 12/05/22 1316  •  Influenza Vac High-Dose Quad (FLUZONE HIGH DOSE) injection 0.7 mL, 0.7 mL, Intramuscular, During Hospitalization, Chavez Uribe MD  •  isosorbide mononitrate (IMDUR) 24 hr tablet 60 mg, 60 mg, Oral, Q24H, Chavez Uribe MD, 60 mg at 12/04/22 2110  •  labetalol (NORMODYNE,TRANDATE) injection 10 mg, 10 mg, Intravenous, Q2H PRN, Echo Meyer APRN, 10 mg at 12/04/22 0505  •  levothyroxine (SYNTHROID,  LEVOTHROID) tablet 100 mcg, 100 mcg, Oral, Daily, Alysha Uribe MD, 100 mcg at 12/05/22 1113  •  losartan (COZAAR) tablet 100 mg, 100 mg, Oral, Q24H, Alysha Uribe MD, 100 mg at 12/05/22 1113  •  memantine (NAMENDA) tablet 10 mg, 10 mg, Oral, Q12H, Alysha Uribe MD, 10 mg at 12/05/22 1112  •  montelukast (SINGULAIR) tablet 10 mg, 10 mg, Oral, Nightly, Alysha Uribe MD, 10 mg at 12/04/22 2110  •  nystatin (MYCOSTATIN) 100,000 unit/mL suspension 500,000 Units, 5 mL, Swish & Swallow, 4x Daily, Alysha Uribe MD, 500,000 Units at 12/05/22 1113  •  oseltamivir (TAMIFLU) 6 MG/ML suspension 30 mg, 30 mg, Oral, Q24H, Alysha Uribe MD, 30 mg at 12/05/22 1114  •  polyethylene glycol (MIRALAX) packet 17 g, 17 g, Oral, BID, Alysha Uribe MD, 17 g at 12/05/22 1114  •  primidone (MYSOLINE) tablet 50 mg, 50 mg, Oral, TID, Alysha Uribe MD, 50 mg at 12/05/22 1112  •  sennosides-docusate (PERICOLACE) 8.6-50 MG per tablet 2 tablet, 2 tablet, Oral, Nightly, Alysha Uribe MD, 2 tablet at 12/04/22 2312  •  sodium bicarbonate tablet 1,300 mg, 1,300 mg, Oral, TID, Alysha Uribe MD, 1,300 mg at 12/05/22 1112  •  [COMPLETED] Insert Peripheral IV, , , Once **AND** sodium chloride 0.9 % flush 10 mL, 10 mL, Intravenous, PRN, [place], Ruy SMITH MD, 10 mL at 12/02/22 2052  •  tamsulosin (FLOMAX) 24 hr capsule 0.4 mg, 0.4 mg, Oral, Nightly, Alysha Uribe MD, 0.4 mg at 12/04/22 2110     ASSESSMENT  Acute influenza A   Symptomatic anemia s/p transfusion  Chronic anemia   Nonsustained V. tach  Paroxysmal SVT  COPD  Oral thrush  Diabetes mellitus  Hypertension   Hyperlipidemia  Hypothyroidism  Osteoarthritis  Urinary retention  Chronic kidney disease stage III  Gastroesophageal reflux disease    PLAN  Discharge back to nursing home  Discharge summary dictated    ALYSHA URIBE MD

## 2022-12-05 NOTE — NURSING NOTE
Pt slept on and off through out the shift yelling out and coughing. No complaints  of pain or discomfort will continue to monitor.

## 2022-12-05 NOTE — PLAN OF CARE
Goal Outcome Evaluation:        Patient is AOX self. Patient is very confused and yell. Patients bp is charted slightly elevated.

## 2022-12-05 NOTE — CASE MANAGEMENT/SOCIAL WORK
Continued Stay Note  [facility]     Patient Name: Kellen Parmar  MRN: [id number]  Today's Date: 12/5/2022    Admit Date: 11/28/2022    Plan: Return to [facility] upon DC   Discharge Plan     Row Name 12/05/22 1556       Plan    Plan Comments DC orders noted.  S/W [facility]/ [facility] - skilled bed remains available.  S/W pt's dtr Chacha who is in agreement w/ DC to skilled bed at [facility] today.  DC summary and DC packet given to RN.  [facility] arranged for today at 2100.    Final Note DC to skilled bed at [facility] ..........[facility]/ [facility]               Discharge Codes    No documentation.               Expected Discharge Date and Time     Expected Discharge Date Expected Discharge Time    Dec 5, 2022             Nolvia Ramos RN

## 2022-12-05 NOTE — CASE MANAGEMENT/SOCIAL WORK
"Physicians Statement of Medical Necessity for  Ambulance Transportation    GENERAL INFORMATION     Name: Kellen Parmar  YOB: 1935    Medicare #:  [id number]  Transport Date:    (Valid for round trips this date, or for scheduled repetitive trips for 60 days from the date signed below.)  Origin: [facility]    Destination: [place] [address]   Is the Patient's stay covered under Medicare Part A (PPS/DRG?)Yes  Closest appropriate facility? Yes  If this a hosp-hosp transfer? No  Is this a hospice patient? No    MEDICAL NECESSITY QUESTIONAIRE    Ambulance Transportation is medically necessary only if other means of transportation are contraindicated or would be potentially harmful to the patient.  To meet this requirement, the patient must be either \"bed confined\" or suffer from a condition such that transport by means other than an ambulance is contraindicated by the patient's condition.  The following questions must be answered by the healthcare professional signing below for this form to be valid:     1) Describe the MEDICAL CONDITION (physical and/or mental) of this patient AT THE TIME OF AMBULANCE TRANSPORT that requires the patient to be transported in an ambulance, and why transport by other means is contraindicated by the patient's condition:     Acute influenza A infection  Symptomatic anemia s/p transfusion  Chronic anemia   Nonsustained V. tach  Paroxysmal SVT  COPD  Oral thrush  Diabetes mellitus  Hypertension   Hyperlipidemia  Hypothyroidism  Osteoarthritis  Urinary retention    Past Medical History:   Diagnosis Date   • Anemia    • Asthma    • Cancer (HCC)    • Dementia (HCC)    • Diabetes mellitus (HCC)    • Disease of thyroid gland    • GERD (gastroesophageal reflux disease)    • Hypertension       Past Surgical History:   Procedure Laterality Date   • CHOLECYSTECTOMY     • HIP INTERTROCHANTERIC NAILING Left 10/30/2022    Procedure: HIP " "INTERTROCHANTERIC NAILING;  Surgeon: Asael Mccabe MD;  Location: [facility];  Service: Orthopedics;  Laterality: Left;      2) Is this patient \"bed confined\" as defined below?No    To be \"bed confined\" the patient must satisfy all three of the following criteria:  (1) unable to get up from bed without assistance; AND (2) unable to ambulate;  AND (3) unable to sit in a chair or wheelchair.  3) Can this patient safely be transported by car or wheelchair van (I.e., may safely sit during transport, without an attendant or monitoring?)No   4. In addition to completing questions 1-3 above, please check any of the following conditions that apply*:          *Note: supporting documentation for any boxes checked must be maintained in the patient's medical records Patient is confused and Unable to tolerate seated position for time needed to transport      SIGNATURE OF PHYSICIAN OR OTHER AUTHORIZED HEALTHCARE PROFESSIONAL    I certify that the above information is true and correct based on my evaluation of this patient, and represent that the patient requires transport by ambulance and that other forms of transport are contraindicated.  I understand that this information will be used by the Centers for Medicare and Medicaid Services (CMS) to support the determiniation of medical necessity for ambulance services, and I represent that I have personal knowledge of the patient's condition at the time of transport.       If this box is checked, I also certify that the patient is physically or mentally incapable of signing the ambulance service's claim form and that the institution with which I am affiliated has furnished care, services or assistance to the patient.  My signature below is made on behalf of the patient pursuant to 42 .36(b)(4). In accordance with 42 .37, the specific reason(s) that the patient is physically or mentally incapable of signing the claim for is as follows:     Signature of " Physician or Healthcare Professional  Date/Time:        (For Scheduled repetitive transport, this form is not valid for transports performed more than 60 days after this date).                                                                                                                                            --------------------------------------------------------------------------------------------  Printed Name and Credentials of Physician or Authorized Healthcare Professional     *Form must be signed by patient's attending physician for scheduled, repetitive transports,.  For non-repetitive ambulance transports, if unable to obtain the signature of the attending physician, any of the following may sign (please select below):     Physician  Clinical Nurse Specialist  Registered Nurse     Physician Assistant  Discharge Planner  Licensed Practical Nurse     Nurse Practitioner

## 2022-12-05 NOTE — PLAN OF CARE
Goal Outcome Evaluation:  Plan of Care Reviewed With: patient           Outcome Evaluation: clinical swallow eval completed.  Positioned upright in bed.  Pt with cough w/ thin liquids.  Partially masticated peaches in mouth during talking.  REC puree diet with nectar thick liquids.  Meds w/ puree.

## 2022-12-05 NOTE — THERAPY EVALUATION
Acute Care - Speech Language Pathology   Swallow Initial Evaluation [facility]     Patient Name: Kellen Parmar  : 1935  MRN: [id number]  Today's Date: 2022               Admit Date: 2022    Visit Dx:     ICD-10-CM ICD-9-CM   1. Symptomatic anemia  D64.9 285.9   2. Uncontrolled hypertension  I10 401.9   3. CKD (chronic kidney disease) stage 4, GFR 15-29 ml/min ([facility])  N18.4 585.4   4. Dementia without behavioral disturbance ([facility])  F03.90 294.20     Patient Active Problem List   Diagnosis   • Clostridium difficile colitis   • HTN (hypertension)   • CKD (chronic kidney disease) stage 4, GFR 15-29 ml/min ([facility])   • JEANIE (acute kidney injury) ([facility])   • Hyponatremia   • Hypokalemia   • Anemia   • Leukocytosis   • Acute metabolic encephalopathy   • Dementia without behavioral disturbance ([facility])   • Carotid stenosis, bilateral   • Diarrhea   • Coronary artery disease involving native coronary artery of native heart without angina pectoris   • Hypothyroidism (acquired)   • Shortness of breath   • JEANIE (acute kidney injury) ([facility])   • Closed intertrochanteric fracture of hip, left, initial encounter ([facility])   • Symptomatic anemia     Past Medical History:   Diagnosis Date   • Anemia    • Asthma    • Cancer ([facility])    • Dementia ([facility])    • Diabetes mellitus ([facility])    • Disease of thyroid gland    • GERD (gastroesophageal reflux disease)    • Hypertension      Past Surgical History:   Procedure Laterality Date   • CHOLECYSTECTOMY     • HIP INTERTROCHANTERIC NAILING Left 10/30/2022    Procedure: HIP INTERTROCHANTERIC NAILING;  Surgeon: Asael Mccabe MD;  Location: [facility];  Service: Orthopedics;  Laterality: Left;       SLP Recommendation and Plan  SLP Swallowing Diagnosis: mild-moderate (22 1500)  SLP Diet Recommendation: puree, nectar thick liquids (22 1500)  Recommended Precautions and Strategies: upright posture during/after eating, small bites of food and sips of liquid, assist  with feeding (12/05/22 1500)  SLP Rec. for Method of Medication Administration: with puree (12/05/22 1500)     Monitor for Signs of Aspiration: notify SLP if any concerns (12/05/22 1500)  Recommended Diagnostics: reassess via clinical swallow evaluation (12/05/22 1500)  Swallow Criteria for Skilled Therapeutic Interventions Met: demonstrates skilled criteria (12/05/22 1500)  Anticipated Discharge Disposition (SLP): unknown (12/05/22 1500)  Rehab Potential/Prognosis, Swallowing: adequate, monitor progress closely (12/05/22 1500)  Therapy Frequency (Swallow): evaluation only (12/05/22 1500)  Predicted Duration Therapy Intervention (Days): until discharge (12/05/22 1500)                                        Plan of Care Reviewed With: patient  Outcome Evaluation: clinical swallow eval completed.  Positioned upright in bed.  Pt with cough w/ thin liquids.  Partially masticated peaches in mouth during talking.  REC puree diet with nectar thick liquids.  Meds w/ puree.      SWALLOW EVALUATION (last 72 hours)     SLP Adult Swallow Evaluation     Row Name 12/05/22 1500                   Rehab Evaluation    Document Type evaluation  -SA        Subjective Information no complaints  -SA        Patient Observations alert;cooperative  -SA        Patient Effort good  -SA        Symptoms Noted During/After Treatment none  -SA           General Information    Patient Profile Reviewed yes  -SA        Pertinent History Of Current Problem adm w/ symptomatic anemia; BSE 11/30 rec nectar thick  -SA        Current Method of Nutrition regular textures;nectar/syrup-thick liquids  -SA        Prior Level of Function-Communication cognitive-linguistic impairment  -SA        Plans/Goals Discussed with patient  -SA        Barriers to Rehab medically complex  -SA           Clinical Swallow Eval    Clinical Swallow Evaluation Summary Pt with cough w/ thin liquids.  Partially masticated peaches in mouth during talking.  REC puree diet with  nectar thick liquids.  Meds w/ puree.  -           SLP Evaluation Clinical Impression    SLP Swallowing Diagnosis mild-moderate  -        Functional Impact risk of aspiration/pneumonia  -        Rehab Potential/Prognosis, Swallowing adequate, monitor progress closely  -        Swallow Criteria for Skilled Therapeutic Interventions Met demonstrates skilled criteria  -           Recommendations    Therapy Frequency (Swallow) evaluation only  -        Predicted Duration Therapy Intervention (Days) until discharge  -        SLP Diet Recommendation puree;nectar thick liquids  -        Recommended Diagnostics reassess via clinical swallow evaluation  -        Recommended Precautions and Strategies upright posture during/after eating;small bites of food and sips of liquid;assist with feeding  -        Oral Care Recommendations Oral Care BID/PRN  -        SLP Rec. for Method of Medication Administration with puree  -        Monitor for Signs of Aspiration notify SLP if any concerns  -        Anticipated Discharge Disposition (SLP) unknown  -              User Key  (r) = Recorded By, (t) = Taken By, (c) = Cosigned By    Initials Name Effective Dates    Nolvia Walton MS CCC-SLP 06/01/22 -                 EDUCATION  The patient has been educated in the following areas:   Dysphagia (Swallowing Impairment) Oral Care/Hydration Modified Diet Instruction.              Time Calculation:    Time Calculation- SLP     Row Name 12/05/22 1613             Time Calculation- SLP    SLP Start Time 1500  -            User Key  (r) = Recorded By, (t) = Taken By, (c) = Cosigned By    Initials Name Provider Type    Nolvia Walton MS CCC-SLP Speech and Language Pathologist                Therapy Charges for Today     Code Description Service Date Service Provider Modifiers Qty    [id number] HC ST EVAL ORAL PHARYNG SWALLOW 4 12/5/2022 Nolvia Henning MS CCC-SLP GN 1               Nolvia Henning MS  CCC-SLP  12/5/2022

## 2022-12-05 NOTE — DISCHARGE SUMMARY
Discharge summary     Date of admission 11/28/2022  Date of discharge 12/5/2022    Final diagnosis  Acute influenza A   Symptomatic anemia s/p transfusion  Chronic anemia   Nonsustained V. tach  Paroxysmal SVT  COPD  Oral thrush resolved  Diabetes mellitus  Hypertension   Hyperlipidemia  Hypothyroidism  Osteoarthritis  Urinary retention  Chronic kidney disease stage III  Gastroesophageal reflux disease    Discharge medications    Current Facility-Administered Medications:   •  allopurinol (ZYLOPRIM) tablet 100 mg, 100 mg, Oral, Daily, Chavez Uribe MD, 100 mg at 12/05/22 1112  •  amLODIPine (NORVASC) tablet 10 mg, 10 mg, Oral, Q24H, Chavez Uribe MD, 10 mg at 12/05/22 1113  •  arformoterol (BROVANA) nebulizer solution 15 mcg, 15 mcg, Nebulization, BID - RT, Chavez Uribe MD, 15 mcg at 12/05/22 0821  •  atorvastatin (LIPITOR) tablet 10 mg, 10 mg, Oral, Nightly, Chavez Uribe MD, 10 mg at 12/04/22 2108  •  carvedilol (COREG) tablet 25 mg, 25 mg, Oral, BID With Meals, Echo Meyer, APRN, 25 mg at 12/05/22 1112  •  cholecalciferol (VITAMIN D3) tablet 1,000 Units, 1,000 Units, Oral, Daily, Chavez Uribe MD, 1,000 Units at 12/05/22 1115  •  clomiPRAMINE (ANAFRANIL) capsule 50 mg, 50 mg, Oral, Nightly, Chavez Uribe MD  •  cloNIDine (CATAPRES) tablet 0.2 mg, 0.2 mg, Oral, Q8H, Chavez Uribe MD  •  docusate sodium (COLACE) capsule 200 mg, 200 mg, Oral, BID, Chavez Uribe MD, 200 mg at 12/05/22 1114  •  famotidine (PEPCID) tablet 20 mg, 20 mg, Oral, BID, Chavez Uribe MD, 20 mg at 12/05/22 1114  •  hydrALAZINE (APRESOLINE) tablet 100 mg, 100 mg, Oral, TID, Chavez Uribe MD, 100 mg at 12/05/22 1112  •  isosorbide mononitrate (IMDUR) 24 hr tablet 30 mg, 30 mg, Oral, Q24H, Chavez Uribe MD  •  levothyroxine (SYNTHROID, LEVOTHROID) tablet 100 mcg, 100 mcg, Oral, Daily, Chavez Uribe MD, 100 mcg at 12/05/22 1113  •  losartan (COZAAR) tablet 100 mg, 100 mg, Oral, Q24H, Chavez Uribe MD, 100 mg at 12/05/22 1113  •  memantine  (NAMENDA) tablet 10 mg, 10 mg, Oral, Q12H, Chavez Uribe MD, 10 mg at 12/05/22 1112  •  polyethylene glycol (MIRALAX) packet 17 g, 17 g, Oral, BID, Chavez Uribe MD, 17 g at 12/05/22 1114  •  primidone (MYSOLINE) tablet 50 mg, 50 mg, Oral, TID, Chavez Uribe MD, 50 mg at 12/05/22 1112  •  sennosides-docusate (PERICOLACE) 8.6-50 MG per tablet 2 tablet, 2 tablet, Oral, Nightly, Chavez Uribe MD, 2 tablet at 12/04/22 2312  •  sodium bicarbonate tablet 1,300 mg, 1,300 mg, Oral, TID, Chavez Uribe MD, 1,300 mg at 12/05/22 1112  •  [COMPLETED] Insert Peripheral IV, , , Once **AND** sodium chloride 0.9 % flush 10 mL, 10 mL, Intravenous, PRN, Jordy, Ruy SMITH MD, 10 mL at 12/02/22 2052  •  tamsulosin (FLOMAX) 24 hr capsule 0.4 mg, 0.4 mg, Oral, Nightly, Chavez Uribe MD, 0.4 mg at 12/04/22 2110     Consults obtained  Cardiology  Hematology  Nephrology  Urology    Procedures  None    Hospital course  86-year-old white female with very complex past medical history who is a nursing home resident admitted through emergency room with generalized weakness.  Patient work-up in ER revealed symptomatic anemia admit for management.  Patient admitted received blood transfusion and further evaluated by hematology.  Patient also found to have acute influenza A with elevated blood pressure and paroxysmal SVT and nonsustained V. tach which is followed by cardiology and they adjusted her cardiac medication.  Patient returned to baseline and will be discharged back to nursing home.  Patient also have urinary retention and Rodriguez catheter placed and urology recommend continue Rodriguez and they will do voiding trial as an outpatient while she start walking.  At the time of discharge her hemoglobin is 10.1 and her kidney function BUN 19 creatinine 1.4.  Patient tolerating diet and cleared for discharge and remained DNR throughout hospital course.    Discharge diet regular texture with nectar thick    Activity per PT OT     Medications as  above    Further care per accepting physician at nursing home and follow-up with cardiology nephrology hematology and urology per the instruction and take medication as directed.    ALYSHA POSEY MD

## 2022-12-05 NOTE — PROGRESS NOTES
[facility]  Cardiology Progress Note    Patient Identification:  Name: Kellen Parmar  Age: 87 y.o.  Sex: female  :  1935  MRN: [id number]                 Follow Up / Chief Complaint:  34 beat run of SVT    Interval History:  In bed and she states she is hungry. Blood pressure elevated prior to medications.     Subjective: confused         Objective:    Past Medical History:  Past Medical History:   Diagnosis Date   • Anemia    • Asthma    • Cancer ([facility])    • Dementia ([facility])    • Diabetes mellitus ([facility])    • Disease of thyroid gland    • GERD (gastroesophageal reflux disease)    • Hypertension      Past Surgical History:  Past Surgical History:   Procedure Laterality Date   • CHOLECYSTECTOMY     • HIP INTERTROCHANTERIC NAILING Left 10/30/2022    Procedure: HIP INTERTROCHANTERIC NAILING;  Surgeon: Asael Mccabe MD;  Location: [facility];  Service: Orthopedics;  Laterality: Left;        Social History:   Social History     Tobacco Use   • Smoking status: Former     Packs/day: 0.50     Years: 15.00     Pack years: 7.50     Types: Cigarettes     Quit date:      Years since quittin.9   • Smokeless tobacco: Former   Substance Use Topics   • Alcohol use: Not Currently      Family History:  History reviewed. No pertinent family history.       Allergies:  Allergies   Allergen Reactions   • Contrast Dye Anaphylaxis   • Iodinated Diagnostic Agents Anaphylaxis   • Shrimp Nausea And Vomiting     Allergic to all sea food   • Shellfish-Derived Products Nausea And Vomiting     Scheduled Meds:  allopurinol, 100 mg, Daily  amLODIPine, 10 mg, Q24H  arformoterol, 15 mcg, BID - RT  atorvastatin, 10 mg, Nightly  carvedilol, 25 mg, BID With Meals  cetirizine, 5 mg, Daily  cholecalciferol, 1,000 Units, Daily  clomiPRAMINE, 50 mg, Nightly  cloNIDine, 0.1 mg, Q8H  docusate sodium, 200 mg, BID  famotidine, 20 mg, BID  guaiFENesin, 600 mg, Q12H  hydrALAZINE, 100 mg, TID  isosorbide  mononitrate, 60 mg, Q24H  levothyroxine, 100 mcg, Daily  losartan, 100 mg, Q24H  memantine, 10 mg, Q12H  montelukast, 10 mg, Nightly  nystatin, 5 mL, 4x Daily  oseltamivir, 30 mg, Q24H  polyethylene glycol, 17 g, BID  primidone, 50 mg, TID  sennosides-docusate, 2 tablet, Nightly  sodium bicarbonate, 1,300 mg, TID  tamsulosin, 0.4 mg, Nightly            INTAKE AND OUTPUT:    Intake/Output Summary (Last 24 hours) at 12/5/2022 1205  Last data filed at 12/5/2022 0300  Gross per 24 hour   Intake --   Output 400 ml   Net -400 ml       Review of Systems: confused    Constitutional:  Temp:  [97.7 °F (36.5 °C)-98.1 °F (36.7 °C)] 98.1 °F (36.7 °C)  Heart Rate:  [62-70] 62  Resp:  [18] 18  BP: (155-218)/() 173/75    Physical Exam:  General:  Appears in no acute distress, resting in bed  Eyes: eom normal no conjunctival drainage  HEENT:  No JVD. Thyroid not visibly enlarged. No mucosal pallor or cyanosis  Respiratory: Respirations regular and unlabored at rest. BBS with good air entry in all fields. No crackles, rubs or wheezes auscultated  Cardiovascular: S1S2 Regular rate and rhythm. No murmur, rub or gallop. No carotid bruits. DP/PT pulses     . No pretibial pitting edema  Gastrointestinal: Abdomen soft, flat, non tender. Bowel sounds present. No hepatosplenomegaly. No ascites  Skin:   Skin warm and dry to touch. No rashes    Neuro: AAO x3 CN II-XII grossly intact  Psych: Mood and affect normal, pleasant and cooperative          I reviewed the patient's new clinical results, and personally reviewed and interpreted the patient's ECG and telemetry data from the last 24 hours    Cardiographics     Echocardiogram:   Interpretation Summary    • Left ventricular ejection fraction appears to be 56 - 60%. Left ventricular systolic function is normal. Normal left ventricular cavity size and wall thickness noted. All left ventricular wall segments contract normally. Left ventricular diastolic function is consistent with (grade  "I) impaired relaxation.  • Normal right ventricular cavity size and systolic function noted.  • Estimated right ventricular systolic pressure from tricuspid regurgitation is normal (<35 mmHg).        Previous 11/2/22        Telemetry:  SR               Lab Review   Results from last 7 days   Lab Units 12/01/22  0723   TROPONIN T ng/mL 0.013     Results from last 7 days   Lab Units 11/30/22  0728   MAGNESIUM mg/dL 1.9     Results from last 7 days   Lab Units 12/05/22  0643   SODIUM mmol/L 142   POTASSIUM mmol/L 4.2   BUN mg/dL 19   CREATININE mg/dL 1.40*   CALCIUM mg/dL 7.4*        Results from last 7 days   Lab Units 12/05/22  0643 12/04/22  0554 12/03/22  0804   WBC 10*3/mm3 5.13 4.22 3.78   HEMOGLOBIN g/dL 10.1* 8.8* 9.7*   HEMATOCRIT % 30.9* 27.3* 28.9*   PLATELETS 10*3/mm3 124* 137* 124*     Results from last 7 days   Lab Units 11/28/22  1738   INR  1.04     The following medical decision was discussed in detail with Dr. Solitario      Assessment/plan:  Symptomatic anemia  Chronic anemia  Hypertension: monitor closely  Hyperlipidemia: ALT/AST WNL. On statinn  encephalopathy  Paroxysmal SVT: Sr on the monitor and no events overnight.  CKD creatinine 1.40, nephrology following.         Blood pressure elevated this am prior to medications.  Agree with addition of clonidine, may need to increase. Discussed with nurse and she will retake to see if there is an improvement post medication.    )12/5/2022  ANDREA Oates      EMR Sanjeev/Transcription:   \"Dictated utilizing Dragon dictation\".     "

## 2022-12-06 NOTE — CASE MANAGEMENT/SOCIAL WORK
Case Management Discharge Note      Final Note: DC to skilled bed at [facility] ..........Nolvia AG/ W[facility]ATT         Selected Continued Care - Discharged on 12/5/2022 Admission date: 11/28/2022 - Discharge disposition: Skilled Nursing Facility (DC - External)    Destination Coordination complete.    Service Provider Selected Services Address Phone Fax Patient Preferred    [facility] [address] [address] [phone] --          Durable Medical Equipment    No services have been selected for the patient.              Dialysis/Infusion    No services have been selected for the patient.              Home Medical Care    No services have been selected for the patient.              Therapy    No services have been selected for the patient.              Community Resources    No services have been selected for the patient.              Community & DME    No services have been selected for the patient.                Selected Continued Care - Prior Encounters Includes continued care and service providers with selected services from prior encounters from 8/30/2022 to 12/5/2022    Discharged on 11/3/2022 Admission date: 10/30/2022 - Discharge disposition: Intermediate Care     Destination     Service Provider Selected Services Address Phone Fax Patient Preferred    [facility] [address] [id number] [phone] --                    Transportation Services  Ambulance: [facility]    Final Discharge Disposition Code: 03 - skilled nursing facility (SNF) ([facility])

## 2022-12-13 NOTE — PROGRESS NOTES
Subjective:        Kellen Parmar is a 87 y.o. female who here for follow up    Chief Complaint   Patient presents with   • Follow-up     Hospital        HPI    Kellen Parmar is a 87 year old femal with dementia, hypertension, DM, GERDm thyroid disease. She follows up in office today after discharge from the hospital for symptomatic anemia. She is resident of [facility]  Admitted for symptomatic anemia. Found to have psvt. Coreg increased added imdur 30 while admitted    Echo 8/7/22 ef 56-60%, grade I LV diastolic dysfuntion, Normal RVSP,     The following portions of the patient's history were reviewed and updated as appropriate: allergies, current medications, past family history, past medical history, past social history, past surgical history and problem list.    Past Medical History:   Diagnosis Date   • Anemia    • Asthma    • Cancer (HCC)    • Dementia (HCC)    • Diabetes mellitus (HCC)    • Disease of thyroid gland    • GERD (gastroesophageal reflux disease)    • Hypertension          reports that she quit smoking about 22 years ago. Her smoking use included cigarettes. She has a 7.50 pack-year smoking history. She has quit using smokeless tobacco. She reports that she does not currently use alcohol. She reports that she does not use drugs.     History reviewed. No pertinent family history.    ROS     Review of Systems  Constitutional: No wt loss, fever, fatigue  Gastrointestinal: No nausea, abdominal pain  Behavioral/Psych: No insomnia or anxiety  Cardiovascular no chest pain or shorntess of breaht      Objective:           Vitals and nursing note reviewed.   Constitutional:       Appearance: Well-developed.   HENT:      Head: Normocephalic.      Right Ear: External ear normal.      Left Ear: External ear normal.   Neck:      Vascular: No JVD.   Pulmonary:      Effort: Pulmonary effort is normal. No respiratory distress.      Breath sounds: Normal breath sounds. No stridor. No rales.  "  Cardiovascular:      Normal rate. Regular rhythm.      No gallop.   Pulses:     Intact distal pulses.   Abdominal:      General: Bowel sounds are normal. There is no distension.      Palpations: Abdomen is soft.      Tenderness: There is no abdominal tenderness. There is no guarding.   Musculoskeletal: Normal range of motion.         General: No tenderness.      Cervical back: Normal range of motion. Skin:     General: Skin is warm.   Neurological:      Mental Status: Alert and oriented to person, place, and time.      Deep Tendon Reflexes: Reflexes are normal and symmetric.   Psychiatric:         Judgment: Judgment normal.         Procedures    Interpretation Summary    • Left ventricular ejection fraction appears to be 56 - 60%. Left ventricular systolic function is normal. Normal left ventricular cavity size and wall thickness noted. All left ventricular wall segments contract normally. Left ventricular diastolic function is consistent with (grade I) impaired relaxation.  • Normal right ventricular cavity size and systolic function noted.  • Estimated right ventricular systolic pressure from tricuspid regurgitation is normal (<35 mmHg).     s \"}.      Current Outpatient Medications:   •  amLODIPine (NORVASC) 10 MG tablet, Take 1 tablet by mouth Daily., Disp: , Rfl:   •  arformoterol (BROVANA) 15 MCG/2ML nebulizer solution, Take 2 mL by nebulization 2 (Two) Times a Day., Disp: 120 mL, Rfl:   •  atorvastatin (LIPITOR) 10 MG tablet, Take 10 mg by mouth Every Night., Disp: , Rfl:   •  carvedilol (COREG) 25 MG tablet, Take 1 tablet by mouth 2 (Two) Times a Day With Meals for 30 days., Disp: 60 tablet, Rfl: 0  •  Cholecalciferol (Vitamin D) 50 MCG (2000 UT) tablet, Take 2,000 Units by mouth Daily., Disp: , Rfl:   •  clomiPRAMINE (ANAFRANIL) 50 MG capsule, Take 1 capsule by mouth Every Night. AT BEDTIME TO PREVENT PICKING, Disp: , Rfl:   •  clonazePAM (KlonoPIN) 0.5 MG tablet, Take 1 tablet by mouth 3 (Three) Times a " Day As Needed for Anxiety (hold for sedation) for up to 3 days., Disp: 10 tablet, Rfl: 0  •  cloNIDine (CATAPRES) 0.2 MG tablet, Take 1 tablet by mouth Every 8 (Eight) Hours for 30 days., Disp: 90 tablet, Rfl: 0  •  Clopidogrel Bisulfate (PLAVIX PO), Take 75 mg by mouth Daily., Disp: , Rfl:   •  docusate sodium (COLACE) 250 MG capsule, Take 250 mg by mouth Daily., Disp: , Rfl:   •  hydrALAZINE (APRESOLINE) 100 MG tablet, Take 1 tablet by mouth 3 (Three) Times a Day for 30 days., Disp: 90 tablet, Rfl: 0  •  isosorbide mononitrate (IMDUR) 30 MG 24 hr tablet, Take 1 tablet by mouth Daily for 30 days., Disp: 30 tablet, Rfl: 0  •  levothyroxine (SYNTHROID, LEVOTHROID) 100 MCG tablet, Take 100 mcg by mouth Daily., Disp: , Rfl:   •  losartan (COZAAR) 100 MG tablet, Take 1 tablet by mouth Daily for 30 days., Disp: 30 tablet, Rfl: 0  •  memantine (NAMENDA) 5 MG tablet, Take 5 mg by mouth 2 (Two) Times a Day., Disp: , Rfl:   •  pantoprazole (Protonix) 40 MG EC tablet, Take 1 tablet by mouth Daily., Disp: , Rfl:   •  polyethylene glycol (polyethylene glycol) 17 g packet, Take 17 g by mouth 2 (Two) Times a Day., Disp: , Rfl:   •  primidone (MYSOLINE) 50 MG tablet, Take 50 mg by mouth 3 (Three) Times a Day., Disp: , Rfl:   •  sennosides-docusate (PERICOLACE) 8.6-50 MG per tablet, Take 2 tablets by mouth Every Night., Disp: , Rfl:   •  sodium bicarbonate 650 MG tablet, Take 2 tablets by mouth 3 (Three) Times a Day for 30 days., Disp: 180 tablet, Rfl: 0  •  tamsulosin (FLOMAX) 0.4 MG capsule 24 hr capsule, Take 1 capsule by mouth Every Night., Disp: 30 capsule, Rfl: 0     Assessment:        Patient Active Problem List   Diagnosis   • Clostridium difficile colitis   • HTN (hypertension)   • CKD (chronic kidney disease) stage 4, GFR 15-29 ml/min ([facility])   • JEANIE (acute kidney injury) (HCC)   • Hyponatremia   • Hypokalemia   • Anemia   • Leukocytosis   • Acute metabolic encephalopathy   • Dementia without behavioral disturbance (HCC)    • Carotid stenosis, bilateral   • Diarrhea   • Coronary artery disease involving native coronary artery of native heart without angina pectoris   • Hypothyroidism (acquired)   • Shortness of breath   • JEANIE (acute kidney injury) ([facility])   • Closed intertrochanteric fracture of hip, left, initial encounter ([facility])   • Symptomatic anemia               Plan:   1. Hospital follow up for psvt: continue beta blockade. She denies any palpitations    2. Hypertension: /63 controlled on current regimen, given her age and risk for falls, I do not want to lower her bp at this time.    Importance of controlling hypertension and blood pressure  checkup on the regular basis has been explained. Hypertension as a silent killer has been discussed. Risk reduction of the weight and regular exercises to control the hypertension has been explained.    3. Coronary artery disease: no chest pain.    Risk reduction for the coronary artery disease, controlling the blood pressure, blood sugar management, cholesterol management, exercise, stress management, and proper compliance with medications and follow-up has been discussed               No diagnosis found.    There are no diagnoses linked to this encounter.    COUNSELING: mary Butcher was given to patient for the following topics: diagnostic results, risk factor reductions, impressions, risks and benefits of treatment options and importance of treatment compliance .       SMOKING COUNSELING: denies    Follow up in 6 months or sooner if needed    Sincerely,   ANDREA Leal  [facility]  12/13/22  11:31 EST    EMR Dragon/Transcription disclaimer:   Much of this encounter note is an electronic transcription/translation of spoken language to printed text. The electronic translation of spoken language may permit erroneous, or at times, nonsensical words or phrases to be inadvertently transcribed; Although I have reviewed the note for such errors,  some may still exist.

## 2022-12-30 NOTE — PROGRESS NOTES
REASON FOR FOLLOW-UP:  anemia    History of Present Illness    This is a 89-year-old woman with dementia, diabetes, hypertension, gastric reflux disease and asthma who was recently discharged on 11/3/2022 after a left intertrochanteric hip fracture which was treated with an intertrochanteric nail.  She was treated for urinary tract infection with antibiotics.     The patient was sent to the ER from her nursing center secondary to lab work showing hemoglobin of 6.3.  Hematology consulted for anemia.  Reviewing the patient's labs, she appears to have a chronic anemia with varying degrees of severity since at least 2019.  She was admitted in August of this year with acute kidney injury and anemia at which time the patient had a positive stool occult blood but the family did not want any further work-up including endoscopy.  Her hemoglobin during that hospital stay looks to have run in the 7-8 range.  At the time of her hip surgery in early November her hemoglobin was 8.7 and dropped to 6.2 after surgery for which she was transfused packed red blood cells.  She was discharged on 11/3/2022 with a hemoglobin of 10.8.  On presentation here her hemoglobin is 7.4 and now down to 6.7.  Her white blood cell count and differential and platelet count are both normal to mild thrombocytopenia.  She is noted to have chronic kidney disease with serum creatinine baseline running between 1.5-1.7.    Hemoglobin was 10.1 today after transfusion 2 units PRBCs 11/29/2022.  CBC otherwise fairly unremarkable except platelets 130.  Ferritin  458, iron sat 27% with TIBC 179, B12 632 and folate 5.37.     The patient returned today for follow-up and lab check.  She is unable to give history secondary to dementia.  It appears she has not had any further hospitalization or transfusion needs since hospital discharge 12/5/2022.  Hemoglobin is 8.4 today.    Past Medical History, Past Surgical History, Social History, Family History have been  "reviewed and are without significant changes except as mentioned.    Review of Systems   Unobtainable-dementia    Medications:  The current medication list was reviewed in the EMR    ALLERGIES:    Allergies   Allergen Reactions   • Contrast Dye Anaphylaxis   • Iodinated Contrast Media Anaphylaxis   • Shrimp Nausea And Vomiting     Allergic to all sea food   • Shellfish-Derived Products Nausea And Vomiting       Objective      Vitals:    12/30/22 0803   BP: (!) 189/81   Pulse: 67   Resp: 16   Temp: 97.1 °F (36.2 °C)   TempSrc: Temporal   SpO2: 98%   Weight: 59 kg (130 lb)  Comment: provided from pt caregiver   Height: 162.6 cm (64.02\")   PainSc: 0-No pain     Current Status 12/30/2022   ECOG score 2       Physical Exam    CONSTITUTIONAL: pleasant well-developed elderly woman  HEENT: no icterus, no thrush, moist membranes  LYMPH: no cervical or supraclavicular lad  CV: RRR, S1S2, no murmur  RESP: cta bilat, no wheezing, no rales  GI: soft, non-tender, no splenomegaly, +bs  MUSC: no edema, seated in a wheelchair, right arm/wrist wrapped  NEURO: alert and oriented to person only, moderate global weakness  PSYCH: normal mood    RECENT LABS:  Hematology WBC   Date Value Ref Range Status   12/30/2022 7.74 3.40 - 10.80 10*3/mm3 Final   01/06/2022 11.63 (H) 4.5 - 11.0 10*3/uL Final     RBC   Date Value Ref Range Status   12/30/2022 2.79 (L) 3.77 - 5.28 10*6/mm3 Final   01/06/2022 2.66 (L) 4.0 - 5.2 10*6/uL Final     Hemoglobin   Date Value Ref Range Status   12/30/2022 8.4 (L) 12.0 - 15.9 g/dL Final   01/06/2022 7.8 (L) 12.0 - 16.0 g/dL Final     Hematocrit   Date Value Ref Range Status   12/30/2022 27.3 (L) 34.0 - 46.6 % Final   01/06/2022 25.8 (L) 36.0 - 46.0 % Final     Platelets   Date Value Ref Range Status   12/30/2022 176 140 - 450 10*3/mm3 Final   01/06/2022 229 140 - 440 10*3/uL Final              Assessment & Plan   *Acute on chronic anemia, hypoproliferative  • Patient appears to have a long history of chronic " anemia varying degrees of severity but has required transfusion support on at least 3 occasions over the past 1 year, most recently after hip surgery November 22 with a discharge hemoglobin on 11/3/2022 of -10.8 and again late November 2022 for hemoglobin 6.7 without obvious blood loss  • Red cells are normocytic normochromic, white cell count and differential normal, platelets mildly low 138  • Hemoccult has been positive in the past- family declined endoscopy  • The patient has stage IV CKD with presumed decreased erythropoietin production  • No deficiencies of B12, folate or iron- reticulocyte count low, ferritin elevated/iron sat 27% with low TIBC consistent with chronic disease     *CKD 3/4-Baseline creatinine 1.5-1.7     *Comorbidities-dementia, diabetes mellitus, hypertension, hyperlipidemia, hypothyroidism, GERD    Hematology plan/recommendations:  1. Her nursing home apparently not able to provide Procrit therapy.  2. For now we will check her CBC every 2 weeks at her nursing facility with transfusion support as needed if the hemoglobin is less than 7.5 or symptomatic.  3. Follow-up 3 to 4 months in the office; if she is having frequent transfusion needs we will see about arranging Procrit in the office  4. I do not feel the patient would benefit from a bone marrow examination given her dementia and advanced age.              12/30/2022      CC:

## 2022-12-30 NOTE — PROGRESS NOTES
"Orders entered based on Dr. Salinas's staff message:  \"This patient is in a nursing facility [facility] at [facility] phone #[phone] and [employer].  Please asked them to perform a CBC with differential every 2 weeks on Mondays and fax to our attention here.\"    Called [facility] and informed Oralia, primary nurse, of incoming fax with above orders. Faxed order requisition to them at [phone].  "

## 2023-02-06 ENCOUNTER — TELEPHONE (OUTPATIENT)
Dept: ONCOLOGY | Facility: CLINIC | Age: 88
End: 2023-02-06

## 2023-02-06 NOTE — TELEPHONE ENCOUNTER
“Please be informed that patient has passed. Patient has been marked  in the system. The date of death is: 23  Caller: KYLAH    Relationship:      Best call back number: [phone]

## (undated) DEVICE — TOWEL,OR,DSP,ST,BLUE,STD,4/PK,20PK/CS: Brand: MEDLINE

## (undated) DEVICE — SKIN PREP TRAY W/CHG: Brand: MEDLINE INDUSTRIES, INC.

## (undated) DEVICE — GLV SURG PREMIERPRO ORTHO LTX PF SZ8 BRN

## (undated) DEVICE — PK HIP PINNING 40

## (undated) DEVICE — DRAPE,REIN 53X77,STERILE: Brand: MEDLINE

## (undated) DEVICE — GLV SURG SIGNATURE ESSENTIAL PF LTX SZ8

## (undated) DEVICE — STPLR SKIN VISISTAT WD 35CT

## (undated) DEVICE — 1000 S-DRAPE TOWEL DRAPE 10/BX: Brand: STERI-DRAPE™

## (undated) DEVICE — SUT VIC 3/0 CTI 36IN J944H

## (undated) DEVICE — GLV SURG BIOGEL LTX PF 8

## (undated) DEVICE — DRSNG WND SIL OPTIFOAM GENTLE BRDR ADHS W/SA 4X4IN

## (undated) DEVICE — LOCKING DRILL

## (undated) DEVICE — MAT FLR ABSORBENT LG 4FT 10 2.5FT

## (undated) DEVICE — SOL ISO/ALC RUB 70PCT 4OZ

## (undated) DEVICE — ANTIBACTERIAL UNDYED BRAIDED (POLYGLACTIN 910), SYNTHETIC ABSORBABLE SUTURE: Brand: COATED VICRYL

## (undated) DEVICE — APPL CHLORAPREP HI/LITE 26ML ORNG

## (undated) DEVICE — GOWN,REINF,POLY,SIRUS,BRTH SLV,XLNG/XXL: Brand: MEDLINE